# Patient Record
Sex: MALE | Race: BLACK OR AFRICAN AMERICAN | NOT HISPANIC OR LATINO | Employment: UNEMPLOYED | URBAN - METROPOLITAN AREA
[De-identification: names, ages, dates, MRNs, and addresses within clinical notes are randomized per-mention and may not be internally consistent; named-entity substitution may affect disease eponyms.]

---

## 2017-01-02 RX ORDER — ATENOLOL 100 MG/1
TABLET ORAL
Start: 2017-01-02

## 2017-01-02 NOTE — TELEPHONE ENCOUNTER
Pending Prescriptions:                       Disp   Refills    atenolol (TENORMIN) 100 MG tablet [Pharmac*90 tab*0        Sig: TAKE 1 TABLET BY MOUTH DAILY          Last Written Prescription Date: 06/23/2016  Last Fill Quantity: 30,  # refills: 0   Last Office Visit with [facility], [facility] or [facility] prescribing provider: 12/18/2015

## 2017-01-02 NOTE — TELEPHONE ENCOUNTER
Patient given refill 6/23/16 for 1 month.  Last seen 12/18/15  Needs OV - pharmacy informed.  Maggi Salinas RN

## 2017-01-07 ENCOUNTER — TRANSFERRED RECORDS (OUTPATIENT)
Dept: HEALTH INFORMATION MANAGEMENT | Facility: CLINIC | Age: 27
End: 2017-01-07

## 2017-01-08 DIAGNOSIS — I77.810 AORTIC ROOT DILATATION (H): Primary | ICD-10-CM

## 2017-01-09 RX ORDER — ATENOLOL 100 MG/1
TABLET ORAL
Start: 2017-01-09

## 2017-01-09 NOTE — TELEPHONE ENCOUNTER
Atenlol      Last Written Prescription Date: 06/23/2016  Last Fill Quantity: 30, # refills: 0    Last Office Visit with G, P or [facility] prescribing provider:  2/18/2015   Future Office Visit:        BP Readings from Last 3 Encounters:   06/24/16 99/57   12/18/15 100/60   12/12/15 108/62

## 2017-01-09 NOTE — TELEPHONE ENCOUNTER
Denied  Has upcoming appointment; has enough medication until then (spoke with pt)  Heather SHELBY RN

## 2017-01-10 RX ORDER — ATENOLOL 100 MG/1
100 TABLET ORAL DAILY
Qty: 30 TABLET | Refills: 0 | Status: SHIPPED | OUTPATIENT
Start: 2017-01-10 | End: 2017-01-17

## 2017-01-10 NOTE — TELEPHONE ENCOUNTER
JF,  Patient's mom Taylor (on LID[facility]) states pt out of Atenolol and no refills left at pharmacy from previous provider (inpatient treatment MD)  Pt is scheduled to see you 1/17/2017.  Pended 30 day Rx.  Please authorize if appropriate.  Thanks,  Heather SHELBY RN

## 2017-01-13 ENCOUNTER — TRANSFERRED RECORDS (OUTPATIENT)
Dept: HEALTH INFORMATION MANAGEMENT | Facility: CLINIC | Age: 27
End: 2017-01-13

## 2017-01-17 ENCOUNTER — OFFICE VISIT (OUTPATIENT)
Dept: FAMILY MEDICINE | Facility: CLINIC | Age: 27
End: 2017-01-17
Payer: MEDICAID

## 2017-01-17 VITALS
WEIGHT: 148 LBS | BODY MASS INDEX: 18.99 KG/M2 | HEART RATE: 83 BPM | SYSTOLIC BLOOD PRESSURE: 130 MMHG | TEMPERATURE: 97.5 F | OXYGEN SATURATION: 100 % | DIASTOLIC BLOOD PRESSURE: 80 MMHG | HEIGHT: 74 IN

## 2017-01-17 DIAGNOSIS — Z20.2 STD EXPOSURE: ICD-10-CM

## 2017-01-17 DIAGNOSIS — Z00.00 ROUTINE HISTORY AND PHYSICAL EXAMINATION OF ADULT: Primary | ICD-10-CM

## 2017-01-17 DIAGNOSIS — F11.21: ICD-10-CM

## 2017-01-17 DIAGNOSIS — I77.810 AORTIC ROOT DILATATION (H): ICD-10-CM

## 2017-01-17 DIAGNOSIS — I77.9 DISORDER OF AORTA (H): ICD-10-CM

## 2017-01-17 PROCEDURE — 86780 TREPONEMA PALLIDUM: CPT | Performed by: FAMILY MEDICINE

## 2017-01-17 PROCEDURE — 99213 OFFICE O/P EST LOW 20 MIN: CPT | Mod: 25 | Performed by: FAMILY MEDICINE

## 2017-01-17 PROCEDURE — 36415 COLL VENOUS BLD VENIPUNCTURE: CPT | Performed by: FAMILY MEDICINE

## 2017-01-17 PROCEDURE — 96372 THER/PROPH/DIAG INJ SC/IM: CPT | Performed by: FAMILY MEDICINE

## 2017-01-17 PROCEDURE — 99395 PREV VISIT EST AGE 18-39: CPT | Mod: 25 | Performed by: FAMILY MEDICINE

## 2017-01-17 PROCEDURE — 87389 HIV-1 AG W/HIV-1&-2 AB AG IA: CPT | Performed by: FAMILY MEDICINE

## 2017-01-17 RX ORDER — ATENOLOL 100 MG/1
100 TABLET ORAL DAILY
Qty: 30 TABLET | Refills: 11 | Status: SHIPPED | OUTPATIENT
Start: 2017-01-17 | End: 2017-07-04

## 2017-01-17 RX ORDER — AZITHROMYCIN 500 MG/1
1000 TABLET, FILM COATED ORAL ONCE
Qty: 2 TABLET | Refills: 0 | Status: SHIPPED | OUTPATIENT
Start: 2017-01-17 | End: 2017-01-17

## 2017-01-17 RX ORDER — CEFTRIAXONE SODIUM 250 MG/1
250 INJECTION, POWDER, FOR SOLUTION INTRAMUSCULAR; INTRAVENOUS ONCE
Qty: 2.5 ML | Refills: 0 | OUTPATIENT
Start: 2017-01-17 | End: 2017-01-17

## 2017-01-17 NOTE — PROGRESS NOTES
SUBJECTIVE:     CC: Ravi Hankins is an 26 year old male who presents for preventative health visit.     Healthy Habits:    Do you get at least three servings of calcium containing foods daily (dairy, green leafy vegetables, etc.)? yes    Amount of exercise or daily activities, outside of work: 7 day(s) per week    Problems taking medications regularly No    Medication side effects: No    Have you had an eye exam in the past two years? yes    Do you see a dentist twice per year? no    Do you have sleep apnea, excessive snoring or daytime drowsiness?no          Today's PHQ-2 Score:   PHQ-2 ( 1999 [employer]) 12/12/2015   Q1: Little interest or pleasure in doing things 0   Q2: Feeling down, depressed or hopeless 0   PHQ-2 Score 0       Abuse: Current or Past(Physical, Sexual or Emotional)- No  Do you feel safe in your environment - Yes    Social History   Substance Use Topics     Smoking status: Current Every Day Smoker     Smokeless tobacco: Never Used     Alcohol Use: No     The patient does not drink >3 drinks per day nor >7 drinks per week.    Last PSA: No results found for: PSA    No results for input(s): CHOL, HDL, LDL, TRIG, CHOLHDLRATIO, NHDL in the last 52941 hours.    Reviewed orders with patient. Reviewed health maintenance and updated orders accordingly - Yes    All Histories reviewed and updated in Epic.    STD screening:  History   Sexual Activity     Sexual Activity: Yes     Treating for chlamydia and gonorrhea and checking labs, partner describe notified that she is positive for chlamydia and gonorrhea  He is having some burning with urination  Partner is also being treated today  Recommended no intercourse for one week and then consider reevaluation for cure in 2 or 3 weeks    He recently completed a treatment program for snorted heroin.  He feels like this is going well.  He was on Suboxone for a while but tapered off while inpatient  He does not need refills of recently started medications  while in treatment  He also has a history of aortic root dilatation, and needs a refill on his atenolol        ROS: As per HPI.  Constitutional: no recent illness, no fevers/sweats/chills, sleep normal  Eyes: No vision changes or eye irritation  Ears/Nose/Throat: No runny nose, sore throat or ear pain  CV: no palpitations, no chest pain, no lower extremity swelling.  Resp: no shortness of breath, wheezing, or cough.  GI: no nausea/vomiting/diarrhea, normal stooling pattern, no reflux symptoms, no black or bloody stools  : + Burning with urination  Skin: no changing moles or other lesions, no rash  Musculoskeletal: no joint pain, muscle pain, weakness, trauma or injury  Psych: no depression, no concerns about anxiety  Neuro: no new headaches, dizziness    I have reviewed and updated the patient's past medical, social and family history in the EMR. Current problems are:  Patient Active Problem List    Diagnosis Date Noted     Chemical dependency (H) 06/21/2016     Priority: Medium     Aortic root dilatation (H) 12/18/2015     Priority: Medium     Echo 11/4/2015 SUMMARY Dilated aorta at the level of the sinuses of valsalva to 4.0 cm but 2.8 cm  distal to the sinotubular ridge.  [facility]:  6/5/2007   Root 3.6 cm  Distal 2.3cm             Tobacco abuse 12/18/2015     Priority: Medium     Heroin dependence (H) 02/01/2015     Priority: Medium     Disorder of aorta (H) 10/15/2013     Priority: Medium     CARDIOVASCULAR SCREENING; LDL GOAL LESS THAN 160 10/31/2010     Priority: Medium     Family Hx:  Family History   Problem Relation Age of Onset     HEART DISEASE Brother      HEART DISEASE       self     DIABETES No family hx of      Coronary Artery Disease No family hx of      Hypertension No family hx of      Hyperlipidemia No family hx of      CEREBROVASCULAR DISEASE No family hx of      Breast Cancer No family hx of      Colon Cancer No family hx of      Prostate Cancer No family hx of       Other Cancer No family hx of      Depression No family hx of      Anxiety Disorder No family hx of      MENTAL ILLNESS No family hx of      Substance Abuse No family hx of      Anesthesia Reaction No family hx of      Asthma No family hx of      OSTEOPOROSIS No family hx of      Genetic Disorder No family hx of      Thyroid Disease No family hx of      Obesity No family hx of      Unknown/Adopted No family hx of      Social History:  Social History   Substance Use Topics     Smoking status: Current Every Day Smoker     Smokeless tobacco: Never Used     Alcohol Use: No     Social History     Social History Narrative     Allergies:   No Known Allergies   Current Medications:  Current Outpatient Prescriptions   Medication Sig Dispense Refill     cefTRIAXone (ROCEPHIN) 250 MG injection Inject 250 mg into the muscle once for 1 dose 2.5 mL 0     azithromycin (ZITHROMAX) 500 MG tablet Take 2 tablets (1,000 mg) by mouth once for 1 dose 2 tablet 0     atenolol (TENORMIN) 100 MG tablet Take 1 tablet (100 mg) by mouth daily 30 tablet 11     [DISCONTINUED] atenolol (TENORMIN) 100 MG tablet Take 1 tablet (100 mg) by mouth daily 30 tablet 0     Acetaminophen (TYLENOL PO) Take 650 mg by mouth every 4 hours as needed for mild pain or fever       alum & mag hydroxide-simethicone (MYLANTA/MAALOX) 200-200-20 MG/5ML SUSP Take 30 mLs by mouth every 6 hours as needed for indigestion       guaiFENesin (ROBITUSSIN) 20 mg/mL SOLN Take 10 mLs by mouth every 4 hours as needed for cough       phenol-menthol (CEPASTAT) 14.5 MG lozenge Place 1 lozenge inside cheek every 2 hours as needed for sore throat       loratadine (CLARITIN) 10 MG tablet Take 10 mg by mouth daily as needed for allergies       senna-docusate (SENOKOT-S;PERICOLACE) 8.6-50 MG per tablet Take 2 tablets by mouth nightly as needed for constipation       loperamide (IMODIUM) 2 MG capsule Take 2 mg by mouth 4 times daily as needed for diarrhea       traZODone (DESYREL) 50 MG  "tablet Take 1 tablet (50 mg) by mouth nightly as needed for sleep 30 tablet 0     buprenorphine HCl-naloxone HCl (SUBOXONE) 2-0.5 MG film Take 2 strips on afternoon of discharge, then  Take 1 strip 3 times daily for 3 days, then  Take 1 strip 2 times daily for 3 days, then  Take 1 strip 1 times daily for 3 days, then  Take 0.5 strip 1 times daily for 4 days 22 Film 0        Objective:  /80 mmHg  Pulse 83  Temp(Src) 97.5  F (36.4  C) (Oral)  Ht 6' 2\" (1.88 m)  Wt 148 lb (67.132 kg)  BMI 18.99 kg/m2  SpO2 100%  General Appearance: Pleasant, alert, WN/WD in no acute respiratory distress.  Head Exam: Normal. Normocephalic, atraumatic. No sinus tenderness.  Eye Exam: Normal external eye, conjunctiva, lids. GAUDENCIO.  Ear Exam: Normal auditory canals and external ears. Non-tender.  Left TM-normal. Right TM-normal.  OroPharynx Exam: Dental hygiene adequate. Normal buccal mucosa. Normal pharynx.  Neck Exam: Supple, no masses or enlarged, tender nodes.  Thyroid Exam: No nodules or enlargement or tenderness.  Chest/Respiratory Exam: Normal, comfortable, easy respirations. Chest wall normal. Lungs are clear to auscultation. No wheezing, crackles, or rhonchi.  Cardiovascular Exam: Regular rate and rhythm. No murmur, gallop, or rubs. No pedal edema.  Gastrointestinal Exam: Soft, non-tender, no masses or organomegaly.  Musculoskeletal Exam: Back is non-tender, full ROM of upper and lower extremities.  Skin: no rash, warm and dry.  Neurologic Exam: Nonfocal, no tremor. Normal gait.  Psychiatric Exam: Alert - appropriate, normal affect    ASSESSMENT/PLAN:    ICD-10-CM    1. Routine history and physical examination of adult Z00.00    2. STD exposure Z20.2 cefTRIAXone (ROCEPHIN) 250 MG injection     CEFTRIAXONE NA INJ /250MG     azithromycin (ZITHROMAX) 500 MG tablet     HIV Antigen Antibody Combo     Anti Treponema   3. Aortic root dilatation (H) I77.810 atenolol (TENORMIN) 100 MG tablet   4. Moderate heroin dependence in " "early remission (H) F11.21    5. Disorder of aorta (H) I77.9          COUNSELING:  Reviewed preventive health counseling, as reflected in patient instructions       Regular exercise       Healthy diet/nutrition       Safe sex practices/STD prevention       HIV screeninx in teen years, 1x in adult years, and at intervals if high risk       reports that he has been smoking.  He has never used smokeless tobacco.  Tobacco Cessation Action Plan: Information offered: Patient not interested at this time  Estimated body mass index is 18.99 kg/(m^2) as calculated from the following:    Height as of 16: 6' 2\" (1.88 m).    Weight as of 16: 148 lb (67.132 kg).   Weight management plan noted, stable and monitoring    Counseling Resources:  ATP IV Guidelines  Pooled Cohorts Equation Calculator  FRAX Risk Assessment  ICSI Preventive Guidelines  Dietary Guidelines for Americans,   USDA's MyPlate  ASA Prophylaxis  Lung CA Screening    Ted Tidwell MD  [facility]  "

## 2017-01-17 NOTE — NURSING NOTE
"Chief Complaint   Patient presents with     Physical     not fasting     STD     /80 mmHg  Pulse 83  Temp(Src) 97.5  F (36.4  C) (Oral)  Ht 6' 2\" (1.88 m)  Wt 148 lb (67.132 kg)  BMI 18.99 kg/m2  SpO2 100% Estimated body mass index is 18.99 kg/(m^2) as calculated from the following:    Height as of this encounter: 6' 2\" (1.88 m).    Weight as of this encounter: 148 lb (67.132 kg).  BP completed using cuff size: regular       Health Maintenance due pending provider review:  NONE    n/a      Veronica Arreola CMA  "

## 2017-01-18 LAB
HIV 1+2 AB+HIV1 P24 AG SERPL QL IA: NORMAL
T PALLIDUM IGG+IGM SER QL: NEGATIVE

## 2017-01-26 ENCOUNTER — TELEPHONE (OUTPATIENT)
Dept: FAMILY MEDICINE | Facility: CLINIC | Age: 27
End: 2017-01-26

## 2017-01-26 NOTE — TELEPHONE ENCOUNTER
We called to let him know that we discared the urine he left.  We cannot retest him this early.  Too early to recheck.  I told him at the time, and then told him again, If he wants to be retested he has to wait till 3 weeks after the shot he got

## 2017-01-26 NOTE — TELEPHONE ENCOUNTER
Spoke with pt.  He states his questions were answered.  Problem was his partner who has PCP here was given different information than he was.  No further questions.  Heather SHELBY RN

## 2017-01-26 NOTE — TELEPHONE ENCOUNTER
Reason for Call:  Other call back    Detailed comments: PT would like to go over testing that was done on clinic for 1/17    Phone Number Patient can be reached at: Home number on file [phone] (home)    Best Time:     Can we leave a detailed message on this number? YES    Call taken on 1/26/2017 at 1:53 PM by Kaley Morales

## 2017-01-26 NOTE — TELEPHONE ENCOUNTER
JF  Patient calling regarding Tuesdays test results.  States he gave a urine specimen and received a call from you stating he was positive?  I do not see any UA result from Tuesday 1/24  Please advise.  Thanks, Maggi Salinas RN

## 2017-07-04 ENCOUNTER — HOSPITAL ENCOUNTER (EMERGENCY)
Facility: CLINIC | Age: 27
Discharge: HOME | End: 2017-07-04
Attending: EMERGENCY MEDICINE | Admitting: EMERGENCY MEDICINE
Payer: COMMERCIAL

## 2017-07-04 VITALS
DIASTOLIC BLOOD PRESSURE: 56 MMHG | HEART RATE: 63 BPM | SYSTOLIC BLOOD PRESSURE: 106 MMHG | TEMPERATURE: 97.7 F | RESPIRATION RATE: 16 BRPM | OXYGEN SATURATION: 98 %

## 2017-07-04 DIAGNOSIS — F10.230 ALCOHOL DEPENDENCE WITH UNCOMPLICATED WITHDRAWAL (H): ICD-10-CM

## 2017-07-04 DIAGNOSIS — I77.810 AORTIC ROOT DILATATION (H): ICD-10-CM

## 2017-07-04 DIAGNOSIS — F16.10 ECSTASY ABUSE (H): ICD-10-CM

## 2017-07-04 DIAGNOSIS — F14.23: ICD-10-CM

## 2017-07-04 LAB — ALCOHOL BREATH TEST: 0 (ref 0–0.01)

## 2017-07-04 PROCEDURE — 99283 EMERGENCY DEPT VISIT LOW MDM: CPT | Mod: Z6 | Performed by: EMERGENCY MEDICINE

## 2017-07-04 PROCEDURE — 99283 EMERGENCY DEPT VISIT LOW MDM: CPT | Performed by: EMERGENCY MEDICINE

## 2017-07-04 RX ORDER — ATENOLOL 100 MG/1
100 TABLET ORAL DAILY
Qty: 30 TABLET | Refills: 0 | Status: SHIPPED | OUTPATIENT
Start: 2017-07-04 | End: 2018-03-26 | Stop reason: ALTCHOICE

## 2017-07-04 ASSESSMENT — ENCOUNTER SYMPTOMS: HALLUCINATIONS: 0

## 2017-07-04 NOTE — ED AVS SNAPSHOT
[facility], Emergency Department    [address]    [place] [address]    Phone:  [phone]    Fax:  [phone]                                       Ravi Hankins   MRN: [id number]    Department:  [facility], Emergency Department   Date of Visit:  7/4/2017           Patient Information     Date Of Birth          1990        Your diagnoses for this visit were:     Aortic root dilatation (H)     Alcohol dependence with uncomplicated withdrawal (H)     Cocaine dependence with withdrawal (H)     Ecstasy abuse        You were seen by Peyton Salmon MD.        Discharge Instructions       Discharge to [facility] detox.     Take your medication as prescribed.     24 Hour Appointment Hotline       To make an appointment at any [facility] clinic, call [phone] ([phone]). If you don't have a family doctor or clinic, we will help you find one. [facility] clinics are conveniently located to serve the needs of you and your family.             Review of your medicines      ASK your doctor about these medications        Dose / Directions Last dose taken    * ATENOLOL PO   Dose:  100 mg   Ask about: Which instructions should I use?        Take 100 mg by mouth daily   Refills:  0        * atenolol 100 MG tablet   Commonly known as:  TENORMIN   Dose:  100 mg   Quantity:  30 tablet   Ask about: Which instructions should I use?        Take 1 tablet (100 mg) by mouth daily   Refills:  0        * Notice:  This list has 2 medication(s) that are the same as other medications prescribed for you. Read the directions carefully, and ask your doctor or other care provider to review them with you.            Prescriptions were sent or printed at these locations (1 Prescription)                   Other Prescriptions                Printed at Department/Unit printer (1 of 1)         atenolol (TENORMIN) 100 MG tablet                Procedures and tests performed during your visit     Alcohol breath test  "POCT    Medication History IP Pharmacy Consult      Orders Needing Specimen Collection     None      Pending Results     No orders found from 2017 to 2017.            Pending Culture Results     No orders found from 2017 to 2017.            Pending Results Instructions     If you had any lab results that were not finalized at the time of your Discharge, you can call the ED Lab Result RN at [phone]. You will be contacted by this team for any positive Lab results or changes in treatment. The nurses are available 7 days a week from 10A to 6:30P.  You can leave a message 24 hours per day and they will return your call.        Thank you for choosing [facility]       Thank you for choosing [facility] for your care. Our goal is always to provide you with excellent care. Hearing back from our patients is one way we can continue to improve our services. Please take a few minutes to complete the written survey that you may receive in the mail after you visit with us. Thank you!        [facility] Information     [facility] lets you send messages to your doctor, view your test results, renew your prescriptions, schedule appointments and more. To sign up, go to [email or web address] . Click on \"Log in\" on the left side of the screen, which will take you to the Welcome page. Then click on \"Sign up Now\" on the right side of the page.     You will be asked to enter the access code listed below, as well as some personal information. Please follow the directions to create your username and password.     Your access code is: [email or web address]  Expires: 10/2/2017  3:42 PM     Your access code will  in 90 days. If you need help or a new code, please call your [facility] clinic or [phone].        Care EveryWhere ID     This is your Care EveryWhere ID. This could be used by other organizations to access your [facility] medical records  [id number]        Equal Access to Services     AMANDA GRANT AH: Lonnie meeks " Lance, moreno levy, nelsoncal kapetersonmary khalil, mark dsouza. So [facility] [phone].    ATENCIÓN: Si habla español, tiene a greenberg disposición servicios gratuitos de asistencia lingüística. Llame al [phone].    We comply with applicable federal civil rights laws and [place] laws. We do not discriminate on the basis of race, color, national origin, age, disability sex, sexual orientation or gender identity.            After Visit Summary       This is your record. Keep this with you and show to your community pharmacist(s) and doctor(s) at your next visit.

## 2017-07-04 NOTE — ED AVS SNAPSHOT
` `     [facility], [place], EMERGENCY DEPARTMENT: [phone]                 INTERAGENCY TRANSFER FORM - NOTES (H&P, Discharge Summary, Consults, Procedures, Therapies)   2017                    Hospital Admission Date: 2017  RAVI HANKINS   : 1990  Sex: Male        Patient PCP Information     Provider PCP Type    Ted Tidwell MD General      History & Physicals     No notes of this type exist for this encounter.      Discharge Summaries     No notes of this type exist for this encounter.      Consult Notes     No notes of this type exist for this encounter.         Progress Notes - Physician (Notes from 17 through 17)      ED Provider Notes by Peyton Salmon MD at 2017  2:06 PM     Author:  Peyton Salmon MD Service:  Emergency Medicine Author Type:  Physician    Filed:  2017  3:21 PM Date of Service:  2017  2:06 PM Creation Time:  2017  3:17 PM    Status:  Signed :  Peyton Salmon MD (Physician)           History[CB1.1]     Chief Complaint   Patient presents with     Addiction Problem     seeking detox and treatment of etoh, cocaine, liath. daily use last 6 months. [facility] in [facility] MH section, they cabbed him here for eval.[CB1.2]     HPI  Ravi Hankins is a 27 year old male who presents seeking detox for polysubstance abuse.  He states he was here a year ago for heroin treatment.  Since then he has used cocaine, ecstasy and alcohol.   He uses daily.  Last use was yesterday. He drinks 2/5 L cognac daily.  He has been abusing this for about 6-7 months.  He denies hx of withdrawal seizures.  He denies hx of mental health issues.  No si/hi.  He takes atenolol for aorta dilation.  He is not compliant on this meds and does miss doses.      I have reviewed the Medications, Allergies, Past Medical and Surgical History, and Social History in the Epic system.    Review of Systems   Psychiatric/Behavioral: Negative for  hallucinations, self-injury and suicidal ideas.   All other systems reviewed and are negative.      Physical Exam   BP: 110/62  Pulse: 52  Temp: 97.7  F (36.5  C)  Resp: 16  SpO2: 100 %  Physical Exam   Constitutional: He is oriented to person, place, and time. He appears well-developed and well-nourished. No distress.   Thin male   HENT:   Head: Normocephalic and atraumatic.   Right Ear: External ear normal.   Left Ear: External ear normal.   Nose: Nose normal.   Eyes: No scleral icterus.   Neck: Normal range of motion. Neck supple.   Cardiovascular: Normal rate, regular rhythm and normal heart sounds.    Pulmonary/Chest: Effort normal and breath sounds normal.   Abdominal: Soft. There is no tenderness.   Musculoskeletal: Normal range of motion.   Neurological: He is alert and oriented to person, place, and time.   Skin: Skin is warm and dry. He is not diaphoretic.   Psychiatric: His speech is normal and behavior is normal. Judgment and thought content normal. His mood appears anxious. Cognition and memory are normal.   Nursing note and vitals reviewed.      ED Course[CB1.1]     ED Course[CB1.2]     Procedures[CB1.1]             Labs Ordered and Resulted from Time of ED Arrival Up to the Time of Departure from the ED - No data to display[CB1.2]         Assessments & Plan (with Medical Decision Making)   The patient presents to [facility] seeking detox for polysubstance abuse. He has hx of heroin abuse but states he has been using cocaine, etoh and ecstasy daily for the past 6-7 months.  Last use yesterday.  He feels like he is having withdrawal symptoms.  He denies withdrawal seizures.  He denies si/hi.  He denies mental health issues.  There is a bed a [facility] detox (no detox beds here).  He will be cabbed to [facility].  He is medically stable.     I have reviewed the nursing notes.    I have reviewed the findings, diagnosis, plan and need for follow up with the patient.[CB1.1]    New Prescriptions    No  medications on file       Final diagnoses:   Alcohol dependence with uncomplicated withdrawal (H)   Cocaine dependence with withdrawal (H)   Ecstasy abuse[CB1.2]       7/4/2017   [facility], [place], EMERGENCY DEPARTMENT[CB1.1]     Peyton Salmon MD  07/04/17 1521  [CB1.2]     Revision History        User Key Date/Time User Provider Type Action    > CB1.2 7/4/2017  3:21 PM Peyton Salmon MD Physician Sign     CB1.1 7/4/2017  3:17 PM Peyton Salmon MD Physician                   Procedure Notes     No notes of this type exist for this encounter.      Progress Notes - Therapies (Notes from 07/01/17 through 07/04/17)     No notes of this type exist for this encounter.

## 2017-07-04 NOTE — ED AVS SNAPSHOT
"    [facility], [place], EMERGENCY DEPARTMENT: [phone]                                              INTERAGENCY TRANSFER FORM - PHYSICIAN ORDERS   2017                    Hospital Admission Date: 2017  CHINTAN HARVEY   : 1990  Sex: Male        Attending Provider: (none)    Allergies:  No Known Allergies    Infection:  None   Service:  BEC    Ht:  1.88 m (6' 2\")   Wt:  --   Admission Wt:  --    BMI:  --   BSA:  --            Patient PCP Information     Provider PCP Type    Ted Kervin Tidwell MD General      ED Clinical Impression     Diagnosis Description Comment Added By Time Added    Alcohol dependence with uncomplicated withdrawal (H) [F10.230] Alcohol dependence with uncomplicated withdrawal (H) [F10.230]  Peyton Salmon MD 2017  3:15 PM    Cocaine dependence with withdrawal (H) [F14.23] Cocaine dependence with withdrawal (H) [F14.23]  Peyton Salmon MD 2017  3:16 PM    Ecstasy abuse [F15.10] Ecstasy abuse [F15.10]  Peyton Salmon MD 2017  3:16 PM      Hospital Problems as of 2017     None      Non-Hospital Problems as of 2017              Priority Class Noted    CARDIOVASCULAR SCREENING; LDL GOAL LESS THAN 160   10/31/2010    Disorder of aorta (H) Medium  10/15/2013    Heroin dependence (H) Medium  2015    Aortic root dilatation (H) Medium  2015    Tobacco abuse Medium  2015    Chemical dependency (H) Medium  2016      Code Status History     Date Active Date Inactive Code Status Order ID Comments User Context    2016 12:25 PM 2016  4:04 PM Full Code [id number]  Baljeet Wakefield, RN Inpatient         Medication Review      UNREVIEWED medications. Ask your doctor about these medications        Dose / Directions Comments    * ATENOLOL PO   Ask about: Which instructions should I use?        Dose:  100 mg   Take 100 mg by mouth daily   Refills:  0        * atenolol 100 MG tablet   Commonly known as:  TENORMIN   Used for:  Aortic root dilatation " (H)   Ask about: Which instructions should I use?        Dose:  100 mg   Take 1 tablet (100 mg) by mouth daily   Quantity:  30 tablet   Refills:  0        * Notice:  This list has 2 medication(s) that are the same as other medications prescribed for you. Read the directions carefully, and ask your doctor or other care provider to review them with you.              Further instructions from your care team       Discharge to [facility] detox.     Take your medication as prescribed.

## 2017-07-04 NOTE — ED AVS SNAPSHOT
"    [facility], [place], EMERGENCY DEPARTMENT: [phone]                                              INTERAGENCY TRANSFER FORM - LAB / IMAGING / EKG / EMG RESULTS   2017                    Hospital Admission Date: 2017  CHINTAN HARVEY   : 1990  Sex: Male        Attending Provider: (none)    Allergies:  No Known Allergies    Infection:  None   Service:  BEC    Ht:  1.88 m (6' 2\")   Wt:  --   Admission Wt:  --    BMI:  --   BSA:  --            Patient PCP Information     Provider PCP Type    Ted Kervin Tidwell MD General      Unresulted Labs     None      Encounter-Level Documents:     There are no encounter-level documents.      Order-Level Documents:     There are no order-level documents.      "

## 2017-07-04 NOTE — ED AVS SNAPSHOT
[facility], [place], Emergency Department    [address]    [facility] [address]    Phone:  [phone]    Fax:  [phone]                                       Ravi Hankins   MRN: [id number]    Department:  [facility], Emergency Department   Date of Visit:  7/4/2017           After Visit Summary Signature Page     I have received my discharge instructions, and my questions have been answered. I have discussed any challenges I see with this plan with the nurse or doctor.    ..........................................................................................................................................  Patient/Patient Representative Signature      ..........................................................................................................................................  Patient Representative Print Name and Relationship to Patient    ..................................................               ................................................  Date                                            Time    ..........................................................................................................................................  Reviewed by Signature/Title    ...................................................              ..............................................  Date                                                            Time

## 2017-07-04 NOTE — ED AVS SNAPSHOT
"` `     [facility], [place], EMERGENCY DEPARTMENT: [phone]                                              INTERAGENCY TRANSFER FORM - NURSING   2017                    Hospital Admission Date: 2017  CHINTAN HARVEY   : 1990  Sex: Male        Attending Provider: (none)    Allergies:  No Known Allergies    Infection:  None   Service:  BEC    Ht:  1.88 m (6' 2\")   Wt:  --   Admission Wt:  --    BMI:  --   BSA:  --            Patient PCP Information     Provider PCP Type    Ted Kervin Tidwell MD General      Current Code Status     Date Active Code Status Order ID Comments User Context       Prior      Code Status History     Date Active Date Inactive Code Status Order ID Comments User Context    2016 12:25 PM 2016  4:04 PM Full Code [id number]  Baljeet Wakefield, RN Inpatient      Advance Directives        Does patient have a scanned Advance Directive/ACP document in EPIC?           No        Hospital Problems as of 2017     None      Non-Hospital Problems as of 2017              Priority Class Noted    CARDIOVASCULAR SCREENING; LDL GOAL LESS THAN 160   10/31/2010    Disorder of aorta (H) Medium  10/15/2013    Heroin dependence (H) Medium  2015    Aortic root dilatation (H) Medium  2015    Tobacco abuse Medium  2015    Chemical dependency (H) Medium  2016      Immunizations     Name Date      DPT 95     DPT 92     DPT 90     DPT 10/03/90     DPT 90     HIB 09/10/91     HIB 91     HIB 90     Hepatitis B 00     Hepatitis B 99     Hepatitis B 99     Influenza (IIV3) 10/24/08     MMR 99     MMR 09/10/91     OPV 95     OPV 92     OPV 10/03/90     OPV 90     TD (ADULT, 7+) 03     Tdap (Adacel,Boostrix) 09/20/10     Varicella Pt Report Hx of Varicella/Chicken Pox 97          END      ASSESSMENT     Discharge Profile Flowsheet     COMMUNICATION ASSESSMENT     Patient's " communication style  spoken language (English or Bilingual) 07/04/17 1406            Vitals     Vital Signs Flowsheet     VITAL SIGNS     Comfort  negligible pain 07/04/17 1547    Temp  97.7  F (36.5  C) 07/04/17 1413   Change in Pain  about the same 07/04/17 1413    Temp src  Oral 07/04/17 1413   Pain Control  partially effective 07/04/17 1413    Resp  16 07/04/17 1547   Functioning  can do everything I need to 07/04/17 1413    Pulse  63 07/04/17 1547   Sleep  awake with occasional pain 07/04/17 1413    Pulse/Heart Rate Source  Monitor 07/04/17 1413   MARIA TERESA COMA SCALE      BP  106/56 07/04/17 1547   Best Eye Response  4-->(E4) spontaneous 07/04/17 1413    OXYGEN THERAPY     Best Motor Response  6-->(M6) obeys commands 07/04/17 1413    SpO2  98 % 07/04/17 1547   Best Verbal Response  5-->(V5) oriented 07/04/17 1413    CLINICALLY ALIGNED PAIN ASSESSMENT (CAPA) ([facility], [facility] AND [facility] ADULTS ONLY)     Maria Teresa Coma Scale Score  15 07/04/17 1413            Patient Lines/Drains/Airways Status    Active LINES/DRAINS/AIRWAYS     None            Patient Lines/Drains/Airways Status    Active PICC/CVC     None            Intake/Output Detail Report     None      Case Management/Discharge Planning     Case Management/Discharge Planning Flowsheet     REFERRAL INFORMATION     ABUSE RISK SCREEN      Arrived From  admitted as an inpatient 06/21/16 1129   QUESTION TO PATIENT:  Has a member of your family or a partner(now or in the past) intimidated, hurt, manipulated, or controlled you in any way?  no 07/04/17 1410    LIVING ENVIRONMENT     QUESTION TO PATIENT: Do you feel safe going back to the place where you are living?  yes 07/04/17 1410    Lives With  parent(s) 06/21/16 1129   OBSERVATION: Is there reason to believe there has been maltreatment of a vulnerable adult (ie. Physical/Sexual/Emotional abuse, self neglect, lack of adequate food, shelter, medical care, or financial exploitation)?  no 07/04/17 1410     COPING/STRESS     HOMICIDE RISK      Major Change/Loss/Stressor  chemical dependency/abuse;employment concerns 06/21/16 1141   Homicidal Ideation  no 07/04/17 1419

## 2017-07-04 NOTE — ED PROVIDER NOTES
History     Chief Complaint   Patient presents with     Addiction Problem     seeking detox and treatment of etoh, cocaine, laith. daily use last 6 months. [facility] section, they cabbed him here for eval.     HPI  Ravi Hankins is a 27 year old male who presents seeking detox for polysubstance abuse.  He states he was here a year ago for heroin treatment.  Since then he has used cocaine, ecstasy and alcohol.   He uses daily.  Last use was yesterday. He drinks 2/5 L cognac daily.  He has been abusing this for about 6-7 months.  He denies hx of withdrawal seizures.  He denies hx of mental health issues.  No si/hi.  He takes atenolol for aorta dilation.  He is not compliant on this meds and does miss doses.      I have reviewed the Medications, Allergies, Past Medical and Surgical History, and Social History in the Epic system.    Review of Systems   Psychiatric/Behavioral: Negative for hallucinations, self-injury and suicidal ideas.   All other systems reviewed and are negative.      Physical Exam   BP: 110/62  Pulse: 52  Temp: 97.7  F (36.5  C)  Resp: 16  SpO2: 100 %  Physical Exam   Constitutional: He is oriented to person, place, and time. He appears well-developed and well-nourished. No distress.   Thin male   HENT:   Head: Normocephalic and atraumatic.   Right Ear: External ear normal.   Left Ear: External ear normal.   Nose: Nose normal.   Eyes: No scleral icterus.   Neck: Normal range of motion. Neck supple.   Cardiovascular: Normal rate, regular rhythm and normal heart sounds.    Pulmonary/Chest: Effort normal and breath sounds normal.   Abdominal: Soft. There is no tenderness.   Musculoskeletal: Normal range of motion.   Neurological: He is alert and oriented to person, place, and time.   Skin: Skin is warm and dry. He is not diaphoretic.   Psychiatric: His speech is normal and behavior is normal. Judgment and thought content normal. His mood appears  anxious. Cognition and memory are normal.   Nursing note and vitals reviewed.      ED Course     ED Course     Procedures             Labs Ordered and Resulted from Time of ED Arrival Up to the Time of Departure from the ED - No data to display         Assessments & Plan (with Medical Decision Making)   The patient presents to [facility] seeking detox for polysubstance abuse. He has hx of heroin abuse but states he has been using cocaine, etoh and ecstasy daily for the past 6-7 months.  Last use yesterday.  He feels like he is having withdrawal symptoms.  He denies withdrawal seizures.  He denies si/hi.  He denies mental health issues.  There is a bed a [facility] detox (no detox beds here).  He will be cabbed to [facility].  He is medically stable.     I have reviewed the nursing notes.    I have reviewed the findings, diagnosis, plan and need for follow up with the patient.    New Prescriptions    No medications on file       Final diagnoses:   Alcohol dependence with uncomplicated withdrawal (H)   Cocaine dependence with withdrawal (H)   Ecstasy abuse       7/4/2017   [facility], [place], EMERGENCY DEPARTMENT     Peyton Salmon MD  07/04/17 5801

## 2017-07-04 NOTE — PHARMACY-ADMISSION MEDICATION HISTORY
Admission medication history interview status for the 7/4/2017 admission is complete. See Epic admission navigator for allergy information, pharmacy, prior to admission medications and immunization status.     Medication history interview sources:  Patient, Epic electronic medical record - clinic record 1/17/17    Changes made to PTA medication list (reason)  Added: none  Deleted: none  Changed: none    Additional medication history information (including reliability of information, actions taken by pharmacist): Patient was a reliable historian. He reported he does not take any over the counter products, vitamin or herbal supplements.      Prior to Admission medications    Medication Sig Last Dose Taking? Auth Provider   ATENOLOL PO Take 100 mg by mouth daily 7/4/2017 at Unknown time Yes Unknown, Entered By History         Medication history completed by: Ileana Segura, PharmD, BCPP

## 2017-07-04 NOTE — ED AVS SNAPSHOT
` `     [facility], [place], EMERGENCY DEPARTMENT: [phone]            Medication Administration Report for Ravi Hankins as of 07/04/17 9642   Legend:    Given Hold Not Given Due Canceled Entry Other Actions    Time Time (Time) Time  Time-Action       Inactive    Active    Linked        Medications 06/28/17 06/29/17 06/30/17 07/01/17 07/02/17 07/03/17 07/04/17

## 2017-08-24 DIAGNOSIS — I77.810 AORTIC ROOT DILATATION (H): ICD-10-CM

## 2017-08-24 RX ORDER — METOPROLOL SUCCINATE 50 MG/1
50 TABLET, EXTENDED RELEASE ORAL DAILY
Qty: 30 TABLET | Refills: 3 | Status: SHIPPED | OUTPATIENT
Start: 2017-08-24 | End: 2017-12-22

## 2017-08-24 NOTE — TELEPHONE ENCOUNTER
JF  Routing refill request to provider for review/approval because:  Rx was most recently renewed by IP provider  Please approve if appropriate  Neela Carter RN      Atenolol      Last Written Prescription Date: 7/4/17  Last Fill Quantity: 30, # refills: 0    Last Office Visit with [facility] or [facility] prescribing provider:  1/17/17   Future Office Visit:        BP Readings from Last 3 Encounters:   07/04/17 106/56   01/17/17 130/80   12/18/15 100/60

## 2017-08-25 DIAGNOSIS — I77.810 AORTIC ROOT DILATATION (H): ICD-10-CM

## 2017-08-25 RX ORDER — ATENOLOL 100 MG/1
100 TABLET ORAL DAILY
Qty: 30 TABLET | Refills: 0 | Status: CANCELLED | OUTPATIENT
Start: 2017-08-25

## 2017-08-25 NOTE — TELEPHONE ENCOUNTER
atenolol      Last Written Prescription Date: 7/4/17  Last Fill Quantity: 30, # refills: 0    Last Office Visit with [facility] or [facility] prescribing provider:  1/17/17   Future Office Visit:    Next 5 appointments (look out 90 days)     Sep 07, 2017 11:00 AM CDT   Office Visit with Ted Tidwell MD   [facility]    [address]  [facility] [id number] [phone]                    BP Readings from Last 3 Encounters:   07/04/17 106/56   01/17/17 130/80   12/18/15 100/60

## 2017-08-25 NOTE — TELEPHONE ENCOUNTER
Pharmacy sent fax.  Atenolol is on backorder.  Not expected to be available until late September.  Asking if you want to prescribed alternative, such as Metoprolol.  Patient last saw you 1/17/17.   Future OV scheduled to see you 9/7/17  Please advise.  Thanks, Maggi Salinas RN

## 2017-12-22 ENCOUNTER — TELEPHONE (OUTPATIENT)
Dept: FAMILY MEDICINE | Facility: CLINIC | Age: 27
End: 2017-12-22

## 2017-12-22 DIAGNOSIS — I77.810 AORTIC ROOT DILATATION (H): ICD-10-CM

## 2017-12-22 RX ORDER — METOPROLOL SUCCINATE 50 MG/1
50 TABLET, EXTENDED RELEASE ORAL DAILY
Qty: 30 TABLET | Refills: 0 | Status: SHIPPED | OUTPATIENT
Start: 2017-12-22 | End: 2018-03-26

## 2017-12-22 NOTE — TELEPHONE ENCOUNTER
Medication is being filled for 1 time refill only due to:  Patient needs to be seen because due for physical Jan 2018.   Heather SHELBY RN

## 2018-03-25 DIAGNOSIS — I77.810 AORTIC ROOT DILATATION (H): ICD-10-CM

## 2018-03-26 RX ORDER — METOPROLOL SUCCINATE 50 MG/1
50 TABLET, EXTENDED RELEASE ORAL DAILY
Qty: 30 TABLET | Refills: 0 | Status: SHIPPED | OUTPATIENT
Start: 2018-03-26 | End: 2019-10-16

## 2018-03-26 RX ORDER — ATENOLOL 100 MG/1
TABLET ORAL
Start: 2018-03-26

## 2018-03-26 NOTE — TELEPHONE ENCOUNTER
JF,  Please advise on refill  Patient has upcoming appt with you:    Next 5 appointments (look out 90 days)     Apr 12, 2018  4:30 PM CDT   PHYSICAL with Ted Tidwell MD   [facility]    [address]  [facility] [address]   [phone]                Last Rx 12/22/2017 for 30 days - break in medication  Heather SHELBY RN

## 2018-04-12 ENCOUNTER — OFFICE VISIT (OUTPATIENT)
Dept: FAMILY MEDICINE | Facility: CLINIC | Age: 28
End: 2018-04-12
Payer: COMMERCIAL

## 2018-04-12 VITALS
SYSTOLIC BLOOD PRESSURE: 133 MMHG | WEIGHT: 158.1 LBS | TEMPERATURE: 98.3 F | DIASTOLIC BLOOD PRESSURE: 77 MMHG | BODY MASS INDEX: 20.29 KG/M2 | HEART RATE: 109 BPM | OXYGEN SATURATION: 97 % | HEIGHT: 74 IN

## 2018-04-12 DIAGNOSIS — Z00.00 ROUTINE HISTORY AND PHYSICAL EXAMINATION OF ADULT: Primary | ICD-10-CM

## 2018-04-12 DIAGNOSIS — F11.21: ICD-10-CM

## 2018-04-12 DIAGNOSIS — I77.810 AORTIC ROOT DILATATION (H): ICD-10-CM

## 2018-04-12 PROCEDURE — 99213 OFFICE O/P EST LOW 20 MIN: CPT | Mod: 25 | Performed by: FAMILY MEDICINE

## 2018-04-12 PROCEDURE — 99395 PREV VISIT EST AGE 18-39: CPT | Performed by: FAMILY MEDICINE

## 2018-04-12 RX ORDER — ATENOLOL 100 MG/1
100 TABLET ORAL DAILY
Qty: 90 TABLET | Refills: 3 | Status: SHIPPED | OUTPATIENT
Start: 2018-04-12 | End: 2019-04-25

## 2018-04-12 NOTE — PROGRESS NOTES
SUBJECTIVE:   CC: Ravi Hankins is an 27 year old male who presents for preventative health visit.     Physical   Annual:     Getting at least 3 servings of Calcium per day::  Yes    Bi-annual eye exam::  Yes    Dental care twice a year::  NO    Sleep apnea or symptoms of sleep apnea::  None    Diet::  Regular (no restrictions)    Frequency of exercise::  1 day/week    Duration of exercise::  Less than 15 minutes    Taking medications regularly::  Yes    Medication side effects::  None    Additional concerns today::  No                  in addition to health maintenance patient would like to discuss the following problem:  He would like to follow up for his Hx of aortic root dilatation, and get a refill on atenolol  He has been taking this medication she this was discovered when he was a kid  Last significant cardiology F/u was when he was 17, and echo scanned from that time showing 36mm diameter  He did have a follow up echo at [facility] in 2015 with a diameter of 4cm reported  At one point he was told not to play basketball/etc but then also told it was ok.  He had a genetic workup for Marfan and this was negative  His cardiac Hx is complicated by a Hx of heroin dependence  Currently on probation, and sober, gets 2x/week urine drug screenings      ROS: As per HPI.    Problem pertinent review of systems no polyuria polydipsia vision changes or peripheral numbness or tingling    Problem pertinent exam  Cardiovascular Exam: Regular rate and rhythm. No murmur, gallop, or rubs. No pedal edema, normal pulses.    Problem pertinent labs  BP Readings from Last 3 Encounters:   04/12/18 133/77   07/04/17 106/56   01/17/17 130/80    Wt Readings from Last 3 Encounters:   04/12/18 158 lb 1.6 oz (71.7 kg)   01/17/17 148 lb (67.1 kg)   06/21/16 148 lb (67.1 kg)          Recent Labs   Lab Test  06/22/16   0731  02/03/15   0855  01/07/13   0845   ALT  38   --   30   CR  0.99  1.03  0.95   GFRESTIMATED  >90  Non   GFR Calc    88  >90   GFRESTBLACK  >90   GFR Calc    >90   GFR Calc    >90   POTASSIUM  4.8  3.4  4.0        Problem pertinent ASSESSMENT, PLAN:  Aortic root dilatation, heroin dep early remission  It is unclear that taking atenolol 100mg is really the best treatment at this time  Given stability he may be able to stop this  I recommend cardiology follow up, reassessment since his last visit was > 10 years ago        Today's PHQ-2 Score:   PHQ-2 ( 1999 [employer]) 4/12/2018   Q1: Little interest or pleasure in doing things 0   Q2: Feeling down, depressed or hopeless 0   PHQ-2 Score 0   Q1: Little interest or pleasure in doing things Not at all   Q2: Feeling down, depressed or hopeless Not at all   PHQ-2 Score 0       Abuse: Current or Past(Physical, Sexual or Emotional)- No  Do you feel safe in your environment - Yes    Social History   Substance Use Topics     Smoking status: Current Every Day Smoker     Smokeless tobacco: Never Used     Alcohol use No     Alcohol Use 4/12/2018   If you drink alcohol do you typically have greater than 3 drinks per day OR greater than 7 drinks per week? No   No flowsheet data found.    Last PSA: No results found for: PSA    Reviewed orders with patient. Reviewed health maintenance and updated orders accordingly - Yes  Labs reviewed in EPIC  BP Readings from Last 3 Encounters:   04/12/18 133/77   07/04/17 106/56   01/17/17 130/80    Wt Readings from Last 3 Encounters:   04/12/18 158 lb 1.6 oz (71.7 kg)   01/17/17 148 lb (67.1 kg)   06/21/16 148 lb (67.1 kg)                  Patient Active Problem List   Diagnosis     CARDIOVASCULAR SCREENING; LDL GOAL LESS THAN 160     Disorder of aorta (H)     Heroin dependence (H)     Aortic root dilatation (H)     Tobacco abuse     Chemical dependency (H)     Past Surgical History:   Procedure Laterality Date     NO HISTORY OF SURGERY         Social History   Substance Use Topics     Smoking status: Current Every Day  Smoker     Smokeless tobacco: Never Used     Alcohol use No     Family History   Problem Relation Age of Onset     HEART DISEASE Brother      HEART DISEASE       self     DIABETES No family hx of      Coronary Artery Disease No family hx of      Hypertension No family hx of      Hyperlipidemia No family hx of      CEREBROVASCULAR DISEASE No family hx of      Breast Cancer No family hx of      Colon Cancer No family hx of      Prostate Cancer No family hx of      Other Cancer No family hx of      Depression No family hx of      Anxiety Disorder No family hx of      MENTAL ILLNESS No family hx of      Substance Abuse No family hx of      Anesthesia Reaction No family hx of      Asthma No family hx of      OSTEOPOROSIS No family hx of      Genetic Disorder No family hx of      Thyroid Disease No family hx of      Obesity No family hx of      Unknown/Adopted No family hx of          Current Outpatient Prescriptions   Medication Sig Dispense Refill     atenolol (TENORMIN) 100 MG tablet Take 1 tablet (100 mg) by mouth daily 90 tablet 3     metoprolol succinate (TOPROL-XL) 50 MG 24 hr tablet Take 1 tablet (50 mg) by mouth daily 30 tablet 0     ATENOLOL PO Take 100 mg by mouth daily       No Known Allergies    Reviewed and updated as needed this visit by clinical staff         Reviewed and updated as needed this visit by Provider            Review of Systems  C: NEGATIVE for fever, chills, change in weight  I: NEGATIVE for worrisome rashes, moles or lesions  E: NEGATIVE for vision changes or irritation  ENT: NEGATIVE for ear, mouth and throat problems  R: NEGATIVE for significant cough or SOB  CV: NEGATIVE for chest pain, palpitations or peripheral edema  GI: NEGATIVE for nausea, abdominal pain, heartburn, or change in bowel habits   male: negative for dysuria, hematuria, decreased urinary stream, erectile dysfunction, urethral discharge  M: NEGATIVE for significant arthralgias or myalgia  N: NEGATIVE for weakness,  "dizziness or paresthesias  P: NEGATIVE for changes in mood or affect    OBJECTIVE:   There were no vitals taken for this visit.    Physical Exam    General Appearance: Pleasant, alert, in no acute respiratory distress.  Head Exam: Normal. Normocephalic, atraumatic. No sinus tenderness.  Eye Exam: Normal external eye, conjunctiva, lids. GAUDENCIO.  Ear Exam: Normal auditory canals and external ears. Non-tender.  Left TM-normal. Right TM-normal.  OroPharynx Exam: Dental hygiene adequate. Normal buccal mucosa. Normal pharynx.  Neck Exam: Supple, no masses or enlarged, tender nodes.  Thyroid Exam: No nodules or enlargement or tenderness.  Chest/Respiratory Exam: Normal, comfortable, easy respirations. Chest wall normal. Lungs are clear to auscultation. No wheezing, crackles, or rhonchi.  Cardiovascular Exam: Regular rate and rhythm. No murmur, gallop, or rubs. No pedal edema.  Gastrointestinal Exam: Soft, non-tender, no masses or organomegaly.  Musculoskeletal Exam: Back is non-tender, full ROM of upper and lower extremities.  Skin: no rash, warm and dry.    Neurologic Exam: Nonfocal, no tremor. Normal gait.  Psychiatric Exam: Alert - appropriate, normal affect      ASSESSMENT/PLAN:       ICD-10-CM    1. Routine history and physical examination of adult Z00.00    2. Aortic root dilatation (H) I77.810 atenolol (TENORMIN) 100 MG tablet     CARDIO  ADULT REFERRAL       COUNSELING:   Reviewed preventive health counseling, as reflected in patient instructions       Regular exercise       Healthy diet/nutrition       Safe sex practices/STD prevention         reports that he has been smoking.  He has never used smokeless tobacco.  Tobacco Cessation Action Plan: Information offered: Patient not interested at this time  Estimated body mass index is 19 kg/(m^2) as calculated from the following:    Height as of 1/17/17: 6' 2\" (1.88 m).    Weight as of 1/17/17: 148 lb (67.1 kg).       Counseling Resources:  ATP IV " Guidelines  Pooled Cohorts Equation Calculator  FRAX Risk Assessment  ICSI Preventive Guidelines  Dietary Guidelines for Americans, 2010  USDA's MyPlate  ASA Prophylaxis  Lung CA Screening    Ted Kervin Tidwell MD  [facility]

## 2018-04-12 NOTE — NURSING NOTE
"Chief Complaint   Patient presents with     Physical       Initial /77  Pulse 109  Temp 98.3  F (36.8  C) (Oral)  Ht 6' 2\" (1.88 m)  Wt 158 lb 1.6 oz (71.7 kg)  SpO2 97%  BMI 20.3 kg/m2 Estimated body mass index is 20.3 kg/(m^2) as calculated from the following:    Height as of this encounter: 6' 2\" (1.88 m).    Weight as of this encounter: 158 lb 1.6 oz (71.7 kg).  Medication Reconciliation: complete      Health Maintenance that is potentially due pending provider review:  NONE    n/a    CUBA Candelario  "

## 2018-04-12 NOTE — MR AVS SNAPSHOT
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   o   Discuss preventive care.    o   Review your medicines if your doctor has prescribed any.    You should be tested each year for STDs (sexually transmitted diseases), if you re at risk.     After age 35, talk to your provider about cholesterol testing. If you are at risk for heart disease, have your cholesterol tested at least every 5 years.     If you are at risk for diabetes, you should have a diabetes test (fasting glucose).  Shots: Get a flu shot each year. Get a tetanus shot every 10 years.     Nutrition:    Eat at least 5 servings of fruits and vegetables daily.     Eat whole-grain bread, whole-wheat pasta and brown rice instead of white grains and rice.     Talk to your provider about Calcium and Vitamin D.     Lifestyle    Exercise for at least 150 minutes a week (30 minutes a day, 5 days a week). This will help you control your weight and prevent disease.     Limit alcohol to one drink per day.     No smoking.     Wear sunscreen to prevent skin cancer.     See your dentist every six months for an exam and cleaning.             Follow-ups after your visit        Additional Services     CARDIO  ADULT REFERRAL       [facility] is referring you to Cardiology Services.       The  Representative will assist you in the coordination of your  "Cardiology care as prescribed by your physician.    The  Representative will call you within 24 hours to help schedule your appointment, or you may contact the  Representative at: [phone].         Type of Referral: Cardiology Follow Up            Timeframe requested: within 4 weeks       Coverage of these services is subject to the terms and limitations of your health insurance plan.  Please call member services at your health plan with any benefit or coverage questions.      If X-rays, CT or MRI's have been performed, please contact the facility where they were done to arrange for , prior to your scheduled appointment.  Please bring this referral request to your appointment and present it to your specialist.                  Who to contact     If you have questions or need follow up information about today's clinic visit or your schedule please contact [facility] directly at [phone].  Normal or non-critical lab and imaging results will be communicated to you by MyChart, letter or phone within 4 business days after the clinic has received the results. If you do not hear from us within 7 days, please contact the clinic through MyChart or phone. If you have a critical or abnormal lab result, we will notify you by phone as soon as possible.  Submit refill requests through [employer] or call your pharmacy and they will forward the refill request to us. Please allow 3 business days for your refill to be completed.          Additional Information About Your Visit        [facility] Information     [employer]form lets you send messages to your doctor, view your test results, renew your prescriptions, schedule appointments and more. To sign up, go to [email or web address] . Click on \"Log in\" on the left side of the screen, which will take you to the Welcome page. Then click on \"Sign up Now\" on the right side of the page.     You will be asked to enter the access code listed below, as " "well as some personal information. Please follow the directions to create your username and password.     Your access code is: [email or web address]  Expires: 2018  9:52 PM     Your access code will  in 90 days. If you need help or a new code, please call your [facility] clinic or [phone].        Care EveryWhere ID     This is your [facility] EveryWhere ID. This could be used by other organizations to access your [facility] medical records  [id number]        Your Vitals Were     Pulse Temperature Height Pulse Oximetry BMI (Body Mass Index)       109 98.3  F (36.8  C) (Oral) 6' 2\" (1.88 m) 97% 20.3 kg/m2        Blood Pressure from Last 3 Encounters:   18 133/77   17 106/56   17 130/80    Weight from Last 3 Encounters:   18 158 lb 1.6 oz (71.7 kg)   17 148 lb (67.1 kg)   16 148 lb (67.1 kg)              We Performed the Following     CARDIO  ADULT REFERRAL     OFFICE/OUTPT VISIT,EST,LEVL III          Today's Medication Changes          These changes are accurate as of 18  9:52 PM.  If you have any questions, ask your nurse or doctor.               These medicines have changed or have updated prescriptions.        Dose/Directions    * ATENOLOL PO   This may have changed:  Another medication with the same name was added. Make sure you understand how and when to take each.   Changed by:  Ted Tidwell MD        Dose:  100 mg   Take 100 mg by mouth daily   Refills:  0       * atenolol 100 MG tablet   Commonly known as:  TENORMIN   This may have changed:  You were already taking a medication with the same name, and this prescription was added. Make sure you understand how and when to take each.   Used for:  Aortic root dilatation (H)   Changed by:  Ted Tidwell MD        Dose:  100 mg   Take 1 tablet (100 mg) by mouth daily   Quantity:  90 tablet   Refills:  3       * Notice:  This list has 2 medication(s) that are the same as other medications " prescribed for you. Read the directions carefully, and ask your doctor or other care provider to review them with you.         Where to get your medicines      These medications were sent to [id number] - [place], [place] - [address]  [address] [address]     Phone:  [phone]     atenolol 100 MG tablet                Primary Care Provider Office Phone # Fax #    Ted Kervin Tidwell [phone] [address] [address]        Equal Access to Services     [facility]: Hadii aad ku hadasho Soomaali, waaxda luqadaha, qaybta kaalmada adeegyada, waxcal corona haydebby loza . So [facility] [phone].    ATENCIÓN: Si habla español, tiene a greenberg disposición servicios gratuitos de asistencia lingüística. [facility] [phone].    We comply with applicable federal civil rights laws and [place] laws. We do not discriminate on the basis of race, color, national origin, age, disability, sex, sexual orientation, or gender identity.            Thank you!     Thank you for choosing [facility]  for your care. Our goal is always to provide you with excellent care. Hearing back from our patients is one way we can continue to improve our services. Please take a few minutes to complete the written survey that you may receive in the mail after your visit with us. Thank you!             Your Updated Medication List - Protect others around you: Learn how to safely use, store and throw away your medicines at www.disposemymeds.org.          This list is accurate as of 4/12/18  9:52 PM.  Always use your most recent med list.                   Brand Name Dispense Instructions for use Diagnosis    * ATENOLOL PO      Take 100 mg by mouth daily        * atenolol 100 MG tablet    TENORMIN    90 tablet    Take 1 tablet (100 mg) by mouth daily    Aortic root dilatation (H)       metoprolol succinate 50 MG 24 hr tablet    TOPROL-XL    30  tablet    Take 1 tablet (50 mg) by mouth daily    Aortic root dilatation (H)       * Notice:  This list has 2 medication(s) that are the same as other medications prescribed for you. Read the directions carefully, and ask your doctor or other care provider to review them with you.

## 2019-04-25 ENCOUNTER — OFFICE VISIT (OUTPATIENT)
Dept: FAMILY MEDICINE | Facility: CLINIC | Age: 29
End: 2019-04-25
Payer: COMMERCIAL

## 2019-04-25 VITALS
HEART RATE: 87 BPM | SYSTOLIC BLOOD PRESSURE: 129 MMHG | BODY MASS INDEX: 20.43 KG/M2 | OXYGEN SATURATION: 96 % | WEIGHT: 159.2 LBS | HEIGHT: 74 IN | DIASTOLIC BLOOD PRESSURE: 82 MMHG

## 2019-04-25 DIAGNOSIS — Z11.3 SCREEN FOR STD (SEXUALLY TRANSMITTED DISEASE): ICD-10-CM

## 2019-04-25 DIAGNOSIS — F11.21: ICD-10-CM

## 2019-04-25 DIAGNOSIS — F10.920 ALCOHOLIC INTOXICATION WITHOUT COMPLICATION (H): ICD-10-CM

## 2019-04-25 DIAGNOSIS — I77.810 AORTIC ROOT DILATATION (H): ICD-10-CM

## 2019-04-25 DIAGNOSIS — Z00.00 ROUTINE HISTORY AND PHYSICAL EXAMINATION OF ADULT: Primary | ICD-10-CM

## 2019-04-25 PROCEDURE — 87591 N.GONORRHOEAE DNA AMP PROB: CPT | Performed by: FAMILY MEDICINE

## 2019-04-25 PROCEDURE — 36415 COLL VENOUS BLD VENIPUNCTURE: CPT | Performed by: FAMILY MEDICINE

## 2019-04-25 PROCEDURE — 0064U ANTB TP TOTAL&RPR IA QUAL: CPT | Performed by: FAMILY MEDICINE

## 2019-04-25 PROCEDURE — 99213 OFFICE O/P EST LOW 20 MIN: CPT | Mod: 25 | Performed by: FAMILY MEDICINE

## 2019-04-25 PROCEDURE — 80053 COMPREHEN METABOLIC PANEL: CPT | Performed by: FAMILY MEDICINE

## 2019-04-25 PROCEDURE — 87389 HIV-1 AG W/HIV-1&-2 AB AG IA: CPT | Performed by: FAMILY MEDICINE

## 2019-04-25 PROCEDURE — 99395 PREV VISIT EST AGE 18-39: CPT | Performed by: FAMILY MEDICINE

## 2019-04-25 PROCEDURE — 87491 CHLMYD TRACH DNA AMP PROBE: CPT | Performed by: FAMILY MEDICINE

## 2019-04-25 RX ORDER — ATENOLOL 100 MG/1
100 TABLET ORAL DAILY
Qty: 90 TABLET | Refills: 3 | Status: SHIPPED | OUTPATIENT
Start: 2019-04-25 | End: 2020-05-15

## 2019-04-25 ASSESSMENT — MIFFLIN-ST. JEOR: SCORE: 1761.88

## 2019-04-25 NOTE — LETTER
Ravi Hankins  [address] [place] [address]  [facility] [address]    Dear Ravi    It was nice to see you at your recent visit.  Enclosed are the lab results taken at that time.  Resulted Orders   Neisseria gonorrhoeae PCR   Result Value Ref Range    Specimen Descrip Urine     N Gonorrhea PCR Negative NEG^Negative   Chlamydia trachomatis PCR   Result Value Ref Range    Specimen Description Urine     Chlamydia Trachomatis PCR Negative NEG^Negative   HIV Antigen Antibody Combo   Result Value Ref Range    HIV Antigen Antibody Combo Nonreactive NR^Nonreactive          Comment:      HIV-1 p24 Ag & HIV-1/HIV-2 Ab Not Detected   Treponema Abs w Reflex to RPR and Titer   Result Value Ref Range    Treponema Antibodies Nonreactive NR^Nonreactive   Comprehensive metabolic panel   Result Value Ref Range    Sodium 144 133 - 144 mmol/L    Potassium 3.8 3.4 - 5.3 mmol/L    Chloride 109 94 - 109 mmol/L    Carbon Dioxide 26 20 - 32 mmol/L    Anion Gap 9 3 - 14 mmol/L    Glucose 85 70 - 99 mg/dL    Urea Nitrogen 8 7 - 30 mg/dL    Creatinine 1.04 0.66 - 1.25 mg/dL    Calcium 8.7 8.5 - 10.1 mg/dL    Bilirubin Total 0.4 0.2 - 1.3 mg/dL    Albumin 3.8 3.4 - 5.0 g/dL    Protein Total 7.2 6.8 - 8.8 g/dL    Alkaline Phosphatase 45 40 - 150 U/L    ALT 20 0 - 70 U/L    AST 19 0 - 45 U/L       Your test results are normal.  Follow up as needed or in 1 year.    Sincerely,    Ted Tidwell MD MPH
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
## 2019-04-25 NOTE — NURSING NOTE
"Chief Complaint   Patient presents with     Physical     /82   Pulse 87   Ht 1.88 m (6' 2\")   Wt 72.2 kg (159 lb 3.2 oz)   SpO2 96%   BMI 20.44 kg/m   Estimated body mass index is 20.44 kg/m  as calculated from the following:    Height as of this encounter: 1.88 m (6' 2\").    Weight as of this encounter: 72.2 kg (159 lb 3.2 oz).  Medication Reconciliation: complete      Health Maintenance that is potentially due pending provider review:  NONE    n/a    CUBA Candelario  "

## 2019-04-26 LAB
ALBUMIN SERPL-MCNC: 3.8 G/DL (ref 3.4–5)
ALP SERPL-CCNC: 45 U/L (ref 40–150)
ALT SERPL W P-5'-P-CCNC: 20 U/L (ref 0–70)
ANION GAP SERPL CALCULATED.3IONS-SCNC: 9 MMOL/L (ref 3–14)
AST SERPL W P-5'-P-CCNC: 19 U/L (ref 0–45)
BILIRUB SERPL-MCNC: 0.4 MG/DL (ref 0.2–1.3)
BUN SERPL-MCNC: 8 MG/DL (ref 7–30)
CALCIUM SERPL-MCNC: 8.7 MG/DL (ref 8.5–10.1)
CHLORIDE SERPL-SCNC: 109 MMOL/L (ref 94–109)
CO2 SERPL-SCNC: 26 MMOL/L (ref 20–32)
CREAT SERPL-MCNC: 1.04 MG/DL (ref 0.66–1.25)
GFR SERPL CREATININE-BSD FRML MDRD: >90 ML/MIN/{1.73_M2}
GLUCOSE SERPL-MCNC: 85 MG/DL (ref 70–99)
HIV 1+2 AB+HIV1 P24 AG SERPL QL IA: NONREACTIVE
POTASSIUM SERPL-SCNC: 3.8 MMOL/L (ref 3.4–5.3)
PROT SERPL-MCNC: 7.2 G/DL (ref 6.8–8.8)
SODIUM SERPL-SCNC: 144 MMOL/L (ref 133–144)
T PALLIDUM AB SER QL: NONREACTIVE

## 2019-04-28 LAB
C TRACH DNA SPEC QL NAA+PROBE: NEGATIVE
N GONORRHOEA DNA SPEC QL NAA+PROBE: NEGATIVE
SPECIMEN SOURCE: NORMAL
SPECIMEN SOURCE: NORMAL

## 2019-10-09 ENCOUNTER — HOSPITAL ENCOUNTER (EMERGENCY)
Facility: CLINIC | Age: 29
Discharge: HOME | End: 2019-10-09
Admitting: PHYSICIAN ASSISTANT
Payer: COMMERCIAL

## 2019-10-09 VITALS
SYSTOLIC BLOOD PRESSURE: 114 MMHG | OXYGEN SATURATION: 100 % | WEIGHT: 168 LBS | HEIGHT: 74 IN | BODY MASS INDEX: 21.56 KG/M2 | DIASTOLIC BLOOD PRESSURE: 74 MMHG | TEMPERATURE: 98.1 F | RESPIRATION RATE: 17 BRPM | HEART RATE: 62 BPM

## 2019-10-09 DIAGNOSIS — R55 SYNCOPE: ICD-10-CM

## 2019-10-09 LAB
ANION GAP SERPL CALCULATED.3IONS-SCNC: 7 MMOL/L (ref 3–14)
BASOPHILS # BLD AUTO: 0 10E9/L (ref 0–0.2)
BASOPHILS NFR BLD AUTO: 1 %
BUN SERPL-MCNC: 11 MG/DL (ref 7–30)
CALCIUM SERPL-MCNC: 7.8 MG/DL (ref 8.5–10.1)
CHLORIDE SERPL-SCNC: 112 MMOL/L (ref 94–109)
CO2 SERPL-SCNC: 24 MMOL/L (ref 20–32)
CREAT SERPL-MCNC: 1.05 MG/DL (ref 0.66–1.25)
DIFFERENTIAL METHOD BLD: ABNORMAL
EOSINOPHIL # BLD AUTO: 0.1 10E9/L (ref 0–0.7)
EOSINOPHIL NFR BLD AUTO: 2 %
ERYTHROCYTE [DISTWIDTH] IN BLOOD BY AUTOMATED COUNT: 13.2 % (ref 10–15)
GFR SERPL CREATININE-BSD FRML MDRD: >90 ML/MIN/{1.73_M2}
GLUCOSE SERPL-MCNC: 85 MG/DL (ref 70–99)
HCT VFR BLD AUTO: 43.3 % (ref 40–53)
HGB BLD-MCNC: 14.3 G/DL (ref 13.3–17.7)
INTERPRETATION ECG - MUSE: NORMAL
LYMPHOCYTES # BLD AUTO: 1.3 10E9/L (ref 0.8–5.3)
LYMPHOCYTES NFR BLD AUTO: 32 %
MCH RBC QN AUTO: 30.9 PG (ref 26.5–33)
MCHC RBC AUTO-ENTMCNC: 33 G/DL (ref 31.5–36.5)
MCV RBC AUTO: 94 FL (ref 78–100)
MONOCYTES # BLD AUTO: 0.5 10E9/L (ref 0–1.3)
MONOCYTES NFR BLD AUTO: 13 %
NEUTROPHILS # BLD AUTO: 2.1 10E9/L (ref 1.6–8.3)
NEUTROPHILS NFR BLD AUTO: 52 %
NRBC # BLD AUTO: 0 10*3/UL
NRBC BLD AUTO-RTO: 1 /100
PLATELET # BLD AUTO: 154 10E9/L (ref 150–450)
POTASSIUM SERPL-SCNC: 3.8 MMOL/L (ref 3.4–5.3)
RBC # BLD AUTO: 4.63 10E12/L (ref 4.4–5.9)
SODIUM SERPL-SCNC: 143 MMOL/L (ref 133–144)
TROPONIN I SERPL-MCNC: <0.015 UG/L (ref 0–0.04)
WBC # BLD AUTO: 4 10E9/L (ref 4–11)

## 2019-10-09 PROCEDURE — 84484 ASSAY OF TROPONIN QUANT: CPT | Performed by: PHYSICIAN ASSISTANT

## 2019-10-09 PROCEDURE — 99284 EMERGENCY DEPT VISIT MOD MDM: CPT | Mod: 25

## 2019-10-09 PROCEDURE — 85025 COMPLETE CBC W/AUTO DIFF WBC: CPT | Performed by: PHYSICIAN ASSISTANT

## 2019-10-09 PROCEDURE — 25000128 H RX IP 250 OP 636: Performed by: PHYSICIAN ASSISTANT

## 2019-10-09 PROCEDURE — 93005 ELECTROCARDIOGRAM TRACING: CPT

## 2019-10-09 PROCEDURE — 80048 BASIC METABOLIC PNL TOTAL CA: CPT | Performed by: PHYSICIAN ASSISTANT

## 2019-10-09 PROCEDURE — 96360 HYDRATION IV INFUSION INIT: CPT

## 2019-10-09 RX ADMIN — SODIUM CHLORIDE 1000 ML: 9 INJECTION, SOLUTION INTRAVENOUS at 15:23

## 2019-10-09 ASSESSMENT — ENCOUNTER SYMPTOMS
LIGHT-HEADEDNESS: 1
DIAPHORESIS: 1
NAUSEA: 1

## 2019-10-09 ASSESSMENT — MIFFLIN-ST. JEOR: SCORE: 1796.79

## 2019-10-09 NOTE — ED TRIAGE NOTES
Pt reports felling lightheaded and nauseated, pt had a syncopal event that lasted 30-45 today while getting out of a car.  Pt reports a history of a dilated aorta.

## 2019-10-09 NOTE — ED NOTES
Bed: ED24  Expected date:   Expected time:   Means of arrival:   Comments:  Gregory 421 Syncope kymberly 29 m

## 2019-10-09 NOTE — ED PROVIDER NOTES
History     Chief Complaint:  Loss of Consciousness    HPI   Ravi Hankins is a 29 year old male with a history of tobacco use, chemical dependency, aortic root dilation, and heroin dependence who presents alone via EMS for evaluation of syncope today, associated with lightheadedness and nausea. Patient reports feeling baseline and having a typical day. Of note, he did use marijuana and drink alcohol today. While getting out of the car today just after smoking a joint of marijuana, the patient felt lightheaded, diaphoresis, and had a syncopal episode that lasted approximately 16 seconds per his friend.  He did not fall, and the friend was able to help him to the ground.  He notes that when his friend checked his pulse immediately after this it appeared to be low in the 40's-50's. He did not hit his head as his brother was able to help him down. His brother called EMS.    Here, he does not know if he had any tongue biting but denies urinary or bowel incontinence. He denies any chest pain, shortness of breath or exertional episodes.  He is followed by cardiology for his aortic root dilation, with most recent echo in 2015 showing no new concerning changes which would require intervention.  He is prescribed beta-blocker which he states he is compliant with.  Allergies:  No Known Drug Allergies     Medications:    Tenormin  Atenolol  Toprol    Past Medical History:    Chemical dependency  Aortic root dilation  Tobacco abuse  Heroin dependence  Migraines    Past Surgical History:    Surgical history reviewed. No pertinent surgical history.    Family History:    Brother: heart disease  Daughter: diabetes    Social History:  The patient was accompanied to the ED by EMS.  Smoking Status: Current Every Day Smoker  Smokeless Tobacco: Never Used  Alcohol Use: Negative  Drug Use: Positive  PCP: Ted Tidwell  Marital Status:  Single      Review of Systems   Constitutional: Positive for diaphoresis.  "  Respiratory: Negative for shortness of breath.    Cardiovascular: Negative for chest pain.   Gastrointestinal: Positive for nausea.   Endocrine: Polydipsia:      Genitourinary: Negative for enuresis.   Neurological: Positive for syncope and light-headedness.   All other systems reviewed and are negative.      Physical Exam     Patient Vitals for the past 24 hrs:   BP Temp Temp src Pulse Heart Rate Resp SpO2 Height Weight   10/09/19 1530 (!) 121/90 -- -- 73 71 14 -- -- --   10/09/19 1500 -- -- -- -- 71 (!) 7 100 % -- --   10/09/19 1451 131/84 98.1  F (36.7  C) Oral -- 69 16 99 % 1.88 m (6' 2\") 76.2 kg (168 lb)     Physical Exam  General: Alert and cooperative with exam. Resting comfortably on gurney  Head:  Scalp is NC/AT  Eyes:  No scleral icterus, PERRL  ENT:  The external nose and ears are normal.   Neck:  Normal range of motion without rigidity.  CV:  Regular rate and rhythm    No pathologic murmur, rubs, or gallops.  Resp:  Breath sounds are clear bilaterally.  No crackles, wheezes, rhonchi.    Non-labored, no retractions or accessory muscle use  GI:  Abdomen is soft, no distension, no tenderness, no masses. No peritoneal signs.  Bowel sounds present in all quadrants  :  No suprapubic or flank tenderness  MS:  No lower extremity edema or asymmetric calf swelling.  Skin:  Warm and dry, No rash or lesions noted.  Neuro: Oriented. No gross motor deficits.    Strength and sensation grossly intact in all 4 extremities.  Cranial nerves 2-12 intact. GCS: 15. Normal finger to nose and heel to shin testing.  Gait normal  Psych:  Awake. Alert. Normal affect. Appropriate interactions.   Emergency Department Course     ECG:  ECG taken at 1514, ECG read at 1514 by Dr. Aristeo Ennis MD  Normal sinus rhythm  Normal ECG  Rate 67 bpm. AR interval 188. QRS duration 92. QT/QTc 400/422. P-R-T axes 76 71 76.     Laboratory:  Laboratory findings were communicated with the patient who voiced understanding of the " findings.    CBC: AWNL (WBC 4.0, HGB 14.3, )   BMP: Chloride (A), Calcium 7.8 (L) o/w WNL (Creatinine 1.05)   Troponin (Collected 1502): <0.015     Interventions:  1523 0.9% NaCl bolus 1000 mL IV      Emergency Department Course:  Nursing notes and vitals reviewed.  EKG obtained in the ED, see results above.    IV was inserted and blood was drawn for laboratory testing, results above.     (1500)   I performed an exam of the patient as documented above. History obtained from patient.    (1630)   Findings and plan explained to the Patient. Patient discharged home with instructions regarding supportive care, medications, and reasons to return. The importance of close follow-up was reviewed. I personally reviewed the laboratory results with the Patient and answered all related questions prior to discharge.    Impression & Plan      Medical Decision Makin-year-old male who presents after syncopal episode.  Patient history and records reviewed.  On my examination he is well-appearing with normal vitals and a completely normal exam.  EKG is normal with no evidence of WPW, prolonged QT, Brugada features, evidence of hypertrophic cardiomyopathy, ischemia, or arrhythmia.  He has a normal neurologic exam with no focal deficits to suggest stroke.  Lab work here demonstrates normal hemoglobin, electrolytes, negative troponin.    The patient's episode does seem most consistent with a vasovagal spell given the onset immediately following smoking, lightheadedness, diaphoresis with low heart rate.  There is no exertional component, and was no associated chest pain or shortness of breath at the time or here in the emergency department to raise further concern for ACS, dissection, or PE.  Nevertheless the patient does have a history of aortic root dilation for which he is followed by cardiology but has not kept his recent appointments.  While I do not feel that this episode is related to this, I nevertheless stressed the  importance that he is seen by cardiology as soon as possible for evaluation of this condition given his family history and known history of aortic root dilation.  Referral was placed for cardiology and echocardiogram as he states he would like to get set up within the [facility] system.  He will return to the ED if he has any new or worsening symptoms, or if he develops any chest pain, shortness of breath, or further syncopal episodes.    Diagnosis:    ICD-10-CM    1. Syncope R55 Follow-Up with Cardiologist     Echocardiogram Complete        Disposition:   Discharge.    Scribe Disclosure:  IBreezy, am serving as a scribe at 2:57 PM on 10/9/2019 to document services personally performed by Lincoln Madison PA-C based on my observations and the provider's statements to me.     EMERGENCY DEPARTMENT       Lincoln Madison PA-C  10/10/19 0953

## 2019-10-10 ASSESSMENT — ENCOUNTER SYMPTOMS: SHORTNESS OF BREATH: 0

## 2019-10-15 ENCOUNTER — HOSPITAL ENCOUNTER (OUTPATIENT)
Dept: CARDIOLOGY | Facility: CLINIC | Age: 29
Discharge: HOME | End: 2019-10-15
Attending: PHYSICIAN ASSISTANT | Admitting: PHYSICIAN ASSISTANT
Payer: COMMERCIAL

## 2019-10-15 DIAGNOSIS — R55 SYNCOPE: ICD-10-CM

## 2019-10-15 PROCEDURE — 93306 TTE W/DOPPLER COMPLETE: CPT

## 2019-10-15 PROCEDURE — 93306 TTE W/DOPPLER COMPLETE: CPT | Mod: 26 | Performed by: INTERNAL MEDICINE

## 2019-10-16 ENCOUNTER — OFFICE VISIT (OUTPATIENT)
Dept: CARDIOLOGY | Facility: CLINIC | Age: 29
End: 2019-10-16
Attending: PHYSICIAN ASSISTANT
Payer: COMMERCIAL

## 2019-10-16 VITALS
SYSTOLIC BLOOD PRESSURE: 129 MMHG | WEIGHT: 160.8 LBS | BODY MASS INDEX: 20.64 KG/M2 | HEART RATE: 74 BPM | HEIGHT: 74 IN | DIASTOLIC BLOOD PRESSURE: 85 MMHG

## 2019-10-16 DIAGNOSIS — I77.810 AORTIC ROOT DILATATION (H): Primary | ICD-10-CM

## 2019-10-16 DIAGNOSIS — R55 VASOVAGAL SYNCOPE: ICD-10-CM

## 2019-10-16 PROCEDURE — 99204 OFFICE O/P NEW MOD 45 MIN: CPT | Performed by: INTERNAL MEDICINE

## 2019-10-16 ASSESSMENT — MIFFLIN-ST. JEOR: SCORE: 1764.13

## 2019-10-16 NOTE — PROGRESS NOTES
Service Date: 10/16/2019      HISTORY OF PRESENT ILLNESS:  It is a pleasure for me to see this very delightful 29-year-old gentleman who is accompanied by his mother.  He is here for followup of enlarged aortic root as well as a recent syncopal episode.      His mother tells me that she had a son who  at the age of 20 from ruptured aortic root.  Her family has been through extensive genetic screening and she tells me that all the usual causes of aortic aneurysm have been excluded.        The patient himself does have an aortic root, which was described at the 95th percentile for his age and body habitus.  He has been followed up by Dr. Ibrahim until his detention.  He has been taking atenolol 100 mg for many years.  This medication is well tolerated.  In fact, he tells me that if he stops, it would feel palpitations and his heart.  He thinks this may be psychological and I tend to agree with him.      This young gentleman has a history of opioid abuse.  Fortunately, he managed to kick this habit 5 years ago.  He does use alcohol and weed.  He is a smoker.  He has no previous episodes of syncope.  Several days ago, he bought a bottle of cognac and had 2 shots.  He then smoked some weed.  As he was getting out of his car, he felt lightheaded.  He also felt mildly nauseated.  He walked a few steps and his brother witnessed him collapsing.  He looked very pale and his lips were purplish.  His heart rate was in the 40s.  He was taken to the emergency room.  He recovered very quickly.  Laboratory investigations were unremarkable.  His EKG shows sinus rhythm with benign repolarization only.      He had breakfast that day.  This event occurred around 12:00 p.m.  He had not been drinking enough water that day, and he felt dehydrated.  He had no systemic disturbances such as fevers, urinary or bowel disturbances.      PHYSICAL EXAMINATION:  Unremarkable.        He has had a followup echocardiogram in our offices in his  aortic root at the level sinuses of Valsalva measured 3.8 cm.      IMPRESSION:   1.  Vasovagal syncope.   2.  Very mild aortic root enlargement, idiopathic.      I feel quite certain that this gentleman had his first episode of vasovagal syncope.  I think the combination of possible dehydration, alcohol use and perhaps weed had contributed to him having this episode.  I do not think is related at all to his mild aortic root enlargement and I strongly reassured him.  We talked about avoidance maneuvers should he experience those symptoms again.      I also reviewed a 2 or 3 echocardiograms done over the years.  His aortic root size has been stable.  As such, I do not think we need to follow it up more than once every 2-3 years.  As atenolol is well tolerated, I have asked him to continue this medication for the time being.  His blood pressure is high normal today, though I think this may be white coat effect.      I look forward to seeing him again in 2-3 years' time for followup.  He should have a repeat echocardiography prior to clinic visit.         PHILOMENA ESQUIVEL MD, [facility]             D: 10/16/2019   T: 10/16/2019   MT: RUSSELL      Name:     CHINTAN HARVEY   MRN:      [id number]        Account:      [id number]   :      1990           Service Date: 10/16/2019      Document: [id number]

## 2019-10-16 NOTE — LETTER
10/16/2019    Ted Tidwell MD  [address] [facility] [address]    RE: Ravi Hankins       Dear Colleague,    I had the pleasure of seeing Ravi Hankins in the [facility].    HPI and Plan:   See dictation    No orders of the defined types were placed in this encounter.      No orders of the defined types were placed in this encounter.      Encounter Diagnoses   Name Primary?     Vasovagal syncope      Aortic root dilatation (H) Yes       CURRENT MEDICATIONS:  Current Outpatient Medications   Medication Sig Dispense Refill     atenolol (TENORMIN) 100 MG tablet Take 1 tablet (100 mg) by mouth daily 90 tablet 3       ALLERGIES   No Known Allergies    PAST MEDICAL HISTORY:  History reviewed. No pertinent past medical history.    PAST SURGICAL HISTORY:  Past Surgical History:   Procedure Laterality Date     NO HISTORY OF SURGERY         FAMILY HISTORY:  Family History   Problem Relation Age of Onset     Heart Disease Brother      Abdominal Aortic Aneurysm Brother      Heart Disease Other         self     Diabetes No family hx of      Coronary Artery Disease No family hx of      Hypertension No family hx of      Hyperlipidemia No family hx of      Cerebrovascular Disease No family hx of      Breast Cancer No family hx of      Colon Cancer No family hx of      Prostate Cancer No family hx of      Other Cancer No family hx of      Depression No family hx of      Anxiety Disorder No family hx of      Mental Illness No family hx of      Substance Abuse No family hx of      Anesthesia Reaction No family hx of      Asthma No family hx of      Osteoporosis No family hx of      Genetic Disorder No family hx of      Thyroid Disease No family hx of      Obesity No family hx of      Unknown/Adopted No family hx of        SOCIAL HISTORY:  Social History     Socioeconomic History     Marital status: Single     Spouse name: None     Number of children: None     Years of  "education: None     Highest education level: None   Occupational History     None   Social Needs     Financial resource strain: None     Food insecurity:     Worry: None     Inability: None     Transportation needs:     Medical: None     Non-medical: None   Tobacco Use     Smoking status: Current Every Day Smoker     Packs/day: 0.25     Smokeless tobacco: Never Used   Substance and Sexual Activity     Alcohol use: Yes     Alcohol/week: 0.0 standard drinks     Comment: Pt reports having \"2 shots \" today.  Pt reports drinking  everyday.     Drug use: Yes     Types: Marijuana     Comment: Pt reports smoking a blunt today, pt reports smoking canabis everyday..     Sexual activity: Yes   Lifestyle     Physical activity:     Days per week: None     Minutes per session: None     Stress: None   Relationships     Social connections:     Talks on phone: None     Gets together: None     Attends Restorationism service: None     Active member of club or organization: None     Attends meetings of clubs or organizations: None     Relationship status: None     Intimate partner violence:     Fear of current or ex partner: None     Emotionally abused: None     Physically abused: None     Forced sexual activity: None   Other Topics Concern     Parent/sibling w/ CABG, MI or angioplasty before 65F 55M? No   Social History Narrative     None       Review of Systems:  Skin:  Negative     Eyes:  Positive for glasses  ENT:  Negative    Respiratory:  Negative    Cardiovascular:  Negative    Gastroenterology: Negative    Genitourinary:  Negative    Musculoskeletal:  Negative    Neurologic:  Positive for migraine headaches  Psychiatric:  Positive for depression;anxiety;excessive stress  Heme/Lymph/Imm:  Negative    Endocrine:  Negative      Physical Exam:  Vitals: /85   Pulse 74   Ht 1.88 m (6' 2\")   Wt 72.9 kg (160 lb 12.8 oz)   BMI 20.65 kg/m       Constitutional:  cooperative, alert and oriented, well developed, well nourished, in no " acute distress        Skin:  warm and dry to the touch, no apparent skin lesions or masses noted   pacemaker incision in the left infraclavicular area was well-healed      Head:  normocephalic, no masses or lesions        Eyes:  pupils equal and round, conjunctivae and lids unremarkable, sclera white, no xanthalasma, EOMS intact, no nystagmus        Lymph:No Cervical lymphadenopathy present     ENT:  no pallor or cyanosis, dentition good        Neck:  carotid pulses are full and equal bilaterally, JVP normal, no carotid bruit        Respiratory:  normal breath sounds, clear to auscultation, normal A-P diameter, normal symmetry, normal respiratory excursion, no use of accessory muscles         Cardiac: regular rhythm, normal S1/S2, no S3 or S4, apical impulse not displaced, no murmurs, gallops or rubs                pulses full and equal, no bruits auscultated                                        GI:  abdomen soft, non-tender, BS normoactive, no mass, no HSM, no bruits        Extremities and Muscular Skeletal:  no deformities, clubbing, cyanosis, erythema observed              Neurological:  no gross motor deficits        Psych:  Alert and Oriented x 3        Recent Lab Results:  LIPID RESULTS:  No results found for: CHOL, HDL, LDL, TRIG, CHOLHDLRATIO    LIVER ENZYME RESULTS:  Lab Results   Component Value Date    AST 19 04/25/2019    ALT 20 04/25/2019       CBC RESULTS:  Lab Results   Component Value Date    WBC 4.0 10/09/2019    RBC 4.63 10/09/2019    HGB 14.3 10/09/2019    HCT 43.3 10/09/2019    MCV 94 10/09/2019    MCH 30.9 10/09/2019    MCHC 33.0 10/09/2019    RDW 13.2 10/09/2019     10/09/2019       BMP RESULTS:  Lab Results   Component Value Date     10/09/2019    POTASSIUM 3.8 10/09/2019    CHLORIDE 112 (H) 10/09/2019    CO2 24 10/09/2019    ANIONGAP 7 10/09/2019    GLC 85 10/09/2019    BUN 11 10/09/2019    CR 1.05 10/09/2019    GFRESTIMATED >90 10/09/2019    GFRESTBLACK >90 10/09/2019    ASHLEY  7.8 (L) 10/09/2019        A1C RESULTS:  No results found for: A1C    INR RESULTS:  No results found for: INR        CC  Lincoln Madison PA-C  [facility] [address]  [place] [address]                  Thank you for allowing me to participate in the care of your patient.      Sincerely,     DR PHILOMENA ESQUIVEL MD     [facility]    cc:   Lincoln Madison PA-C  [facility]  [address]  [place] [address]

## 2019-10-16 NOTE — LETTER
10/16/2019      Ted Tidwell MD  [address] [facility] [address]      RE: Robinnabila MARBIN Hankins       Dear Colleague,    I had the pleasure of seeing Ravi Hankins in the [facility].    Service Date: 10/16/2019      HISTORY OF PRESENT ILLNESS:  It is a pleasure for me to see this very delightful 29-year-old gentleman who is accompanied by his mother.  He is here for followup of enlarged aortic root as well as a recent syncopal episode.      His mother tells me that she had a son who  at the age of 20 from ruptured aortic root.  Her family has been through extensive genetic screening and she tells me that all the usual causes of aortic aneurysm have been excluded.        The patient himself does have an aortic root, which was described at the 95th percentile for his age and body habitus.  He has been followed up by Dr. Ibrahim until his [facility].  He has been taking atenolol 100 mg for many years.  This medication is well tolerated.  In fact, he tells me that if he stops, it would feel palpitations and his heart.  He thinks this may be psychological and I tend to agree with him.      This young gentleman has a history of opioid abuse.  Fortunately, he managed to kick this habit 5 years ago.  He does use alcohol and weed.  He is a smoker.  He has no previous episodes of syncope.  Several days ago, he bought a bottle of cognac and had 2 shots.  He then smoked some weed.  As he was getting out of his car, he felt lightheaded.  He also felt mildly nauseated.  He walked a few steps and his brother witnessed him collapsing.  He looked very pale and his lips were purplish.  His heart rate was in the 40s.  He was taken to the emergency room.  He recovered very quickly.  Laboratory investigations were unremarkable.  His EKG shows sinus rhythm with benign repolarization only.      He had breakfast that day.  This event occurred around 12:00 p.m.  He had not been  drinking enough water that day, and he felt dehydrated.  He had no systemic disturbances such as fevers, urinary or bowel disturbances.      PHYSICAL EXAMINATION:  Unremarkable.        He has had a followup echocardiogram in our offices in his aortic root at the level sinuses of Valsalva measured 3.8 cm.      IMPRESSION:   1.  Vasovagal syncope.   2.  Very mild aortic root enlargement, idiopathic.      I feel quite certain that this gentleman had his first episode of vasovagal syncope.  I think the combination of possible dehydration, alcohol use and perhaps weed had contributed to him having this episode.  I do not think is related at all to his mild aortic root enlargement and I strongly reassured him.  We talked about avoidance maneuvers should he experience those symptoms again.      I also reviewed a 2 or 3 echocardiograms done over the years.  His aortic root size has been stable.  As such, I do not think we need to follow it up more than once every 2-3 years.  As atenolol is well tolerated, I have asked him to continue this medication for the time being.  His blood pressure is high normal today, though I think this may be white coat effect.      I look forward to seeing him again in 2-3 years' time for followup.  He should have a repeat echocardiography prior to clinic visit.         PHILOMENA ESQUIVEL MD, [facility]             D: 10/16/2019   T: 10/16/2019   MT: RUSSELL      Name:     CHINTAN HARVEY   MRN:      [id number]        Account:      [id number]   :      1990           Service Date: 10/16/2019      Document: [id number]           Outpatient Encounter Medications as of 10/16/2019   Medication Sig Dispense Refill     atenolol (TENORMIN) 100 MG tablet Take 1 tablet (100 mg) by mouth daily 90 tablet 3     [DISCONTINUED] ATENOLOL PO Take 100 mg by mouth daily       [DISCONTINUED] metoprolol succinate (TOPROL-XL) 50 MG 24 hr tablet Take 1 tablet (50 mg) by mouth daily 30 tablet 0     Facility-Administered  Encounter Medications as of 10/16/2019   Medication Dose Route Frequency Provider Last Rate Last Dose     Self Administer Medications: Behavioral Services   Does not apply See Admin Instructions Jamie Romero MD           Again, thank you for allowing me to participate in the care of your patient.      Sincerely,    DR PHILOMENA ESQUIVEL MD     [facility]    cc:   HEATHER DumontC  [facility]  [address]  [place] [address]

## 2020-03-01 NOTE — PATIENT INSTRUCTIONS
Preventive Health Recommendations  Male Ages 26 - 39    Yearly exam:             See your health care provider every year in order to  o   Review health changes.   o   Discuss preventive care.    o   Review your medicines if your doctor has prescribed any.    You should be tested each year for STDs (sexually transmitted diseases), if you re at risk.     After age 35, talk to your provider about cholesterol testing. If you are at risk for heart disease, have your cholesterol tested at least every 5 years.     If you are at risk for diabetes, you should have a diabetes test (fasting glucose).  Shots: Get a flu shot each year. Get a tetanus shot every 10 years.     Nutrition:    Eat at least 5 servings of fruits and vegetables daily.     Eat whole-grain bread, whole-wheat pasta and brown rice instead of white grains and rice.     Talk to your provider about Calcium and Vitamin D.     Lifestyle    Exercise for at least 150 minutes a week (30 minutes a day, 5 days a week). This will help you control your weight and prevent disease.     Limit alcohol to one drink per day.     No smoking.     Wear sunscreen to prevent skin cancer.     See your dentist every six months for an exam and cleaning.        no

## 2020-03-08 ENCOUNTER — OFFICE VISIT (OUTPATIENT)
Dept: URGENT CARE | Facility: URGENT CARE | Age: 30
End: 2020-03-08
Payer: COMMERCIAL

## 2020-03-08 VITALS
BODY MASS INDEX: 20.54 KG/M2 | RESPIRATION RATE: 20 BRPM | WEIGHT: 160 LBS | DIASTOLIC BLOOD PRESSURE: 74 MMHG | SYSTOLIC BLOOD PRESSURE: 108 MMHG | TEMPERATURE: 98.8 F | OXYGEN SATURATION: 97 % | HEART RATE: 72 BPM

## 2020-03-08 DIAGNOSIS — K64.4 EXTERNAL HEMORRHOIDS: Primary | ICD-10-CM

## 2020-03-08 PROCEDURE — 99213 OFFICE O/P EST LOW 20 MIN: CPT | Performed by: PHYSICIAN ASSISTANT

## 2020-03-08 RX ORDER — LIDOCAINE HYDROCHLORIDE 20 MG/ML
JELLY TOPICAL PRN
Qty: 30 ML | Refills: 1 | Status: SHIPPED | OUTPATIENT
Start: 2020-03-08 | End: 2021-05-11

## 2020-03-08 NOTE — PATIENT INSTRUCTIONS
Patient Education     Treating Hemorrhoids: Surgery    Your healthcare provider may recommend surgery to remove hemorrhoids that cause severe symptoms. Your healthcare provider can explain the procedure that will be used. You ll also be told how to get ready for surgery, and what to expect while you recover. You may have tried other minor surgical procedures for your hemorrhoids (rubberband ligation, infrared coagulation) but found your hemorrhoids returned.  Getting ready for surgery  Your surgery will be done at a hospital or outpatient surgical center. Be sure to follow all your healthcare provider s guidelines to prepare for surgery.    Tell your healthcare provider what medicines you take. This includes blood thinners, aspirin, and ibuprofen. Also mention if you take any herbal remedies or supplements. In some cases, you may need to stop taking them a week before surgery.    Stop smoking.    Arrange for an adult family member or friend to give you a ride home after the procedure.    Stop eating and drinking before midnight, the night before your surgery, or as instructed by your doctor.    Follow a bowel preparation (such as with enemas) if instructed to do so.  Risks and complications When to call your healthcare provider  The possible risks and complications of the treatments described on these pages include:    Infection    Bleeding    Trouble urinating    Narrowing of the anal canal (very rare)  After surgery, call your healthcare provider immediately if you have any of the following:    Increasing pain    Persistent bleeding    Fever or chills    Inability to move your bowels    Trouble urinating  The day of surgery  Arrive at the hospital or surgery center on time. You will be asked to sign some forms and change into a patient gown. You ll then be given an IV (intravenous line), which supplies fluids and medicine. You may also be given a laxative or enema to clean stool from your rectum. Just before  surgery, you ll talk with an anesthesiologist. He or she can explain the type of medicine used to prevent pain during surgery.    Local anesthesia numbs just the surgical area.    Monitored sedation makes you relaxed and drowsy.    Regional anesthesia numbs certain areas of your body.    General anesthesia lets you sleep during the procedure.  During surgery  Your healthcare provider will insert an anoscope to view the anal canal. Using surgical tools, the swollen hemorrhoids are then removed. In some cases, the incision is closed with sutures. In other cases, you may have a procedure that closes the incision with staples.  Hemorrhoidectomy with sutures  The hemorrhoids are removed using surgical tools, such as a scalpel or cautery (sealing) device. The incision is then closed with sutures. In some cases, the incision may be left partially open. This allows fluid to drain and helps the healing process.  Stapled hemorrhoidopexy  This procedure uses a special device to remove a ring of tissue from the anal canal. Removing the tissue cuts off blood supply to the hemorrhoids, causing them to shrink. The tissue ring is then secured with staples. This helps hold the tissue in place.  After surgery  You ll be taken to a recovery area to rest for a while. You can usually go home the same day. But in some cases you may need to remain in the hospital overnight. For a short time after surgery you may have nausea, minor bleeding, and discharge. You ll also likely have some pain. To help you feel better, your healthcare provider will prescribe pain medicine. You may also be prescribed medicines to help make bowel movements easier.  Date Last Reviewed: 7/1/2016 2000-2019 [employer]. [address] [place] [address], [place] [address]. All rights reserved. This information is not intended as a substitute for professional medical care. Always follow your healthcare professional's instructions.           Patient Education      Hemorrhoids    Hemorrhoids are swollen and inflamed veins inside the rectum and near the anus. The rectum is the last several inches of the colon. The anus is the passage between the rectum and the outside of the body.  Causes  The veins can become swollen due to increased pressure in them. This is most often caused by:    Chronic constipation or diarrhea    Straining when having a bowel movement    Sitting too long on the toilet    A low-fiber diet    Pregnancy  Symptoms    Bleeding from the rectum (this may be noticeable after bowel movements)    Lump near the anus    Itching around the anus    Pain around the anus  There are different types of hemorrhoids. Depending on the type you have and the severity, you may be able to treat yourself at home. In some cases, a procedure may be the best treatment option. Your healthcare provider can tell you more about this, if needed.  Home care  General care    To get relief from pain or itching, try:  ? Medicines. Your healthcare provider may recommend stool softeners, suppositories, or laxatives to help manage constipation. Use these exactly as directed.  ? Sitz baths. A sitz bath involves sitting in a few inches of warm bath water. Be careful not to make the water so hot that you burn yourself--test it before sitting in it. Soak for about 10 to 15 minutes a few times a day. This may help relieve pain.  ? Topical products. Your healthcare provider may prescribe or recommend creams, ointments, or pads that can be applied to the hemorrhoid. Use these exactly as directed.  Tips to help prevent hemorrhoids    Eat more fiber. Fiber adds bulk to stool and absorbs water as it moves through your colon. This makes stool softer and easier to pass.  ? Increase the fiber in your diet with more fiber-rich foods. These include fresh fruit, vegetables, and whole grains.  ? Take a fiber supplement or bulking agent, if advised by your healthcare provider. These include products such as  psyllium or methylcellulose.    Drink more water. Your healthcare provider may direct you to drink plenty of water. This can help keep stool soft.    Be more active. Frequent exercise aids digestion and helps prevent constipation. It may also help make bowel movements more regular.    Don t strain during bowel movements. This can make hemorrhoids more likely. Also, don t sit on the toilet for long periods of time.  Follow-up care  Follow up with your healthcare provider as advised. If a culture or imaging tests were done, someone will let you know the results when they are ready. This may take a few days or longer. If your healthcare provider recommends a procedure for your hemorrhoids, these options can be discussed. Options may include surgery and outpatient office treatments.  When to seek medical advice  Call your healthcare provider right away if any of these occur:    Increased bleeding from the rectum    Increased pain around the rectum or anus    Weakness or dizziness  Call 911  Call 911 if any of these occur:    Trouble breathing or swallowing    Fainting or loss of consciousness    Unusually fast heart rate    Vomiting blood    Large amounts of blood in stool or black, tarry stools  Date Last Reviewed: 9/1/2017 2000-2019 [employer]. [address] [place] [address], [place] [address]. All rights reserved. This information is not intended as a substitute for professional medical care. Always follow your healthcare professional's instructions.

## 2020-05-07 ENCOUNTER — VIRTUAL VISIT (OUTPATIENT)
Dept: FAMILY MEDICINE | Facility: OTHER | Age: 30
End: 2020-05-07

## 2020-05-07 NOTE — PROGRESS NOTES
"Date: 2020 15:41:20  Clinician: Devorah De La Torre  Clinician NPI: [id number]  Patient: Ravi Hankins  Patient : 1990  Patient Address: [address] [place] [address]  Patient Phone: [phone]  Visit Protocol: URI  Patient Summary:  Ravi is a 29 year old ( : 1990 ) male who initiated a Visit for COVID-19 (Coronavirus) evaluation and screening. When asked the question \"Please sign me up to receive news, health information and promotions. \", Ravi responded \"No\".    Ravi states his symptoms started today.   His symptoms consist of. Ravi also feels feverish.   Symptom details   Temperature: His current temperature is 99.9 degrees Fahrenheit.    Ravi denies having myalgias, rhinitis, facial pain or pressure, sore throat, cough, nasal congestion, vomiting, nausea, teeth pain, ageusia, anosmia, diarrhea, ear pain, headache, malaise, wheezing, enlarged lymph nodes, and chills. He also denies taking antibiotic medication for the symptoms and having recent facial or sinus surgery in the past 60 days. He is not experiencing dyspnea.    Pertinent COVID-19 (Coronavirus) information  Ravi does not work or volunteer as healthcare worker or a  and does not work or volunteer in a healthcare facility.   He lives with a healthcare worker.   Ravi has not had a close contact with a laboratory-confirmed COVID-19 patient within 14 days of symptom onset. He also has not had a close contact with a suspected COVID-19 patient within 14 days of symptom onset.   Pertinent medical history  Ravi needs a return to work/school note.   Weight: 165 lbs   Ravi smokes or uses smokeless tobacco.   Weight: 165 lbs    MEDICATIONS: atenolol oral, ALLERGIES: NKDA  Clinician Response:  Dear Robertledanabila,   Your symptoms show that you may have coronavirus (COVID-19). This illness can cause fever, cough and trouble breathing. Many " people get a mild case and get better on their own. Some people can get very sick.   Will I be tested for COVID-19?  We would recommend you be tested for coronavirus. Here is how to get that scheduled:   Call [phone]. Tell them you were referred by [facility] to have a COVID-19 test. You will be scheduled at one of our [facility] testing locations (drive-up). Please have your [facility] visit information ready when you call including your visit ID number to verify you were referred.    How can I protect others in the meantime?  First, stay home and away from others (self-isolate) until:   You've had no fever---and no medicine that reduces fever---for 3 full days (72 hours). And...    Your other symptoms have gotten better. For example, your cough or breathing has improved. And...    At least 10 days have passed since your symptoms started.   During this time:   Don't go to work, school or anywhere else.     Stay away from others in your home. No hugging, kissing or shaking hands.    Don't let anyone visit.    Cover your mouth and nose with a mask, tissue or wash cloth to avoid spreading germs.    Wash your hands and face often. Use soap and water.   How can I take care of myself?  1.Take Tylenol (acetaminophen) for fever or pain. If you have liver or kidney problems, ask your family doctor if it's okay to take Tylenol.   Adults can take either:    650 mg (two 325 mg pills) every 4 to 6 hours, or...    1,000 mg (two 500 mg pills) every 8 hours as needed.     Note: Don't take more than 3,000 mg in one day.   For children, check the Tylenol bottle for the right dose. The dose is based on the child's age or weight.  2.If you have other health problems (like cancer, heart failure, an organ transplant or severe kidney disease): Call your specialty clinic if you don't feel better in the next 2 days.  3.Know when to call 911: If your breathing is so bad that it keeps you from doing normal activities, call 911 or go to the  emergency room. Tell them that you've been staying home and may have COVID-19.  4.Sign up for [employer]. We know it's scary to hear that you might have COVID-19. We want to track your symptoms to make sure you're okay over the next 2 weeks. Please look for an email from [employer]---this is a free, online program that we'll use to keep in touch. To sign up, follow the link in the email. Learn more at [email or web address].  Where can I get more information?  To learn more about COVID-19 and how to care for yourself at home, please visit the CDC website at https://www.cdc.gov/coronavirus/2019-ncov/about/steps-when-sick.html.  For more about your care at [facility], please visit [email or web address]     Diagnosis: Myalgia  Diagnosis ICD: M79.1

## 2020-05-08 ENCOUNTER — OFFICE VISIT - HEALTHEAST (OUTPATIENT)
Dept: FAMILY MEDICINE | Facility: CLINIC | Age: 30
End: 2020-05-08

## 2020-05-08 DIAGNOSIS — Z20.822 SUSPECTED 2019 NOVEL CORONAVIRUS INFECTION: ICD-10-CM

## 2020-05-09 ENCOUNTER — COMMUNICATION - HEALTHEAST (OUTPATIENT)
Dept: FAMILY MEDICINE | Facility: CLINIC | Age: 30
End: 2020-05-09

## 2020-06-12 ENCOUNTER — TELEPHONE (OUTPATIENT)
Dept: FAMILY MEDICINE | Facility: CLINIC | Age: 30
End: 2020-06-12

## 2020-06-12 ENCOUNTER — HOSPITAL ENCOUNTER (OUTPATIENT)
Dept: BEHAVIORAL HEALTH | Facility: CLINIC | Age: 30
Discharge: HOME | End: 2020-06-12
Attending: FAMILY MEDICINE | Admitting: FAMILY MEDICINE
Payer: COMMERCIAL

## 2020-06-12 VITALS — BODY MASS INDEX: 21.56 KG/M2 | HEIGHT: 74 IN | WEIGHT: 168 LBS

## 2020-06-12 VITALS — TEMPERATURE: 99.7 F

## 2020-06-12 DIAGNOSIS — F14.20 COCAINE USE DISORDER, SEVERE, DEPENDENCE (H): ICD-10-CM

## 2020-06-12 DIAGNOSIS — F12.20 CANNABIS USE DISORDER, SEVERE, DEPENDENCE (H): ICD-10-CM

## 2020-06-12 DIAGNOSIS — F17.200 TOBACCO USE DISORDER, MODERATE, DEPENDENCE: ICD-10-CM

## 2020-06-12 DIAGNOSIS — F10.20 ALCOHOL USE DISORDER, SEVERE, DEPENDENCE (H): ICD-10-CM

## 2020-06-12 PROBLEM — F19.20 CHEMICAL DEPENDENCY (H): Status: ACTIVE | Noted: 2020-06-12

## 2020-06-12 LAB
SARS-COV-2 PCR COMMENT: NORMAL
SARS-COV-2 RNA SPEC QL NAA+PROBE: NEGATIVE
SARS-COV-2 RNA SPEC QL NAA+PROBE: NORMAL
SPECIMEN SOURCE: NORMAL
SPECIMEN SOURCE: NORMAL

## 2020-06-12 PROCEDURE — U0003 INFECTIOUS AGENT DETECTION BY NUCLEIC ACID (DNA OR RNA); SEVERE ACUTE RESPIRATORY SYNDROME CORONAVIRUS 2 (SARS-COV-2) (CORONAVIRUS DISEASE [COVID-19]), AMPLIFIED PROBE TECHNIQUE, MAKING USE OF HIGH THROUGHPUT TECHNOLOGIES AS DESCRIBED BY CMS-2020-01-R: HCPCS | Performed by: FAMILY MEDICINE

## 2020-06-12 PROCEDURE — 10020000 ZZH LODGING PLUS FACILITY CHARGE ADULT

## 2020-06-12 PROCEDURE — H0001 ALCOHOL AND/OR DRUG ASSESS: HCPCS | Mod: HF,TEL

## 2020-06-12 RX ORDER — IBUPROFEN 200 MG
200-400 TABLET ORAL EVERY 6 HOURS PRN
COMMUNITY
End: 2022-06-22

## 2020-06-12 RX ORDER — MAGNESIUM HYDROXIDE/ALUMINUM HYDROXICE/SIMETHICONE 120; 1200; 1200 MG/30ML; MG/30ML; MG/30ML
30 SUSPENSION ORAL EVERY 6 HOURS PRN
COMMUNITY
End: 2020-07-17

## 2020-06-12 RX ORDER — ACETAMINOPHEN 325 MG/1
325-650 TABLET ORAL EVERY 4 HOURS PRN
COMMUNITY
End: 2022-06-22

## 2020-06-12 RX ORDER — AMOXICILLIN 250 MG
2 CAPSULE ORAL DAILY PRN
COMMUNITY
End: 2020-07-17

## 2020-06-12 RX ORDER — LANOLIN ALCOHOL/MO/W.PET/CERES
3 CREAM (GRAM) TOPICAL
COMMUNITY
End: 2020-07-17

## 2020-06-12 ASSESSMENT — ANXIETY QUESTIONNAIRES
7. FEELING AFRAID AS IF SOMETHING AWFUL MIGHT HAPPEN: MORE THAN HALF THE DAYS
2. NOT BEING ABLE TO STOP OR CONTROL WORRYING: SEVERAL DAYS
4. TROUBLE RELAXING: SEVERAL DAYS
5. BEING SO RESTLESS THAT IT IS HARD TO SIT STILL: MORE THAN HALF THE DAYS
1. FEELING NERVOUS, ANXIOUS, OR ON EDGE: MORE THAN HALF THE DAYS
6. BECOMING EASILY ANNOYED OR IRRITABLE: SEVERAL DAYS
3. WORRYING TOO MUCH ABOUT DIFFERENT THINGS: MORE THAN HALF THE DAYS
GAD7 TOTAL SCORE: 11

## 2020-06-12 ASSESSMENT — PATIENT HEALTH QUESTIONNAIRE - PHQ9: SUM OF ALL RESPONSES TO PHQ QUESTIONS 1-9: 10

## 2020-06-12 ASSESSMENT — PAIN SCALES - GENERAL: PAINLEVEL: MODERATE PAIN (4)

## 2020-06-12 ASSESSMENT — MIFFLIN-ST. JEOR: SCORE: 1791.79

## 2020-06-12 NOTE — PROGRESS NOTES
Progress Note    This patient had a Rule 25 Assessment on 6/12/20 completed by FERNANDA Banda.  This patient was seen for a face to face update of the Comprehensive Substance Abuse assessment on 6/12/2020 by FERNANDA Vogt.  INSIDE: The patient's Rule 25 Assessment completed on 6/12/20 is in the patient's electronic medical record in Epic in the Chart Review section under the Notes/Trans Tab.    Alcohol/Drug use since the last CD evaluation (include date of last use):     No additional substances use since the last CD evaluation     Please note any other clinical changes since the last CD evaluation (such as medication changes, additional legal charges, detoxification admissions, overdoses, etc.)     No significant changes since the last CD evaluation       ASAM Dimensions Original scores Current Scores   I.) Intoxication and Withdrawal: 1 1   II.) Biomedical:  0 0   III.) Emotional and Behavioral:  1 1   IV.) Readiness to Change:  2 2   V.) Relapse Potential: 4 4   VI.) Recovery Environmental: 3 3     Please list clinical justifications for the above ASAM score changes since the original comprehensive assessment:     None of the ASAM scores on the six dimensions had changed since the Rule 25 Assessment was completed on 6/12/20.       Current ERIK: Current UA:     0.000     Positive for Cocaine, Oxycodone and THC and negative for all other screened drugs.       PHQ-9, REZA-7   PHQ-9 on 6/12/2020 REZA-7 on 6/12/2020   The patient's PHQ-9 score was 14 out of 27, indicating moderate depression.   The patient's REZA-7 score was 12 out of 21, indicating moderate anxiety.       Rankin-Suicide Severity Rating Scale Reassessment   Have you ever wished you were dead or that you could go to sleep and not wake up?  Past Month:  No     Have you actually had any thoughts of killing yourself?  Past Month:  No     Have you been thinking about how you might do this?     Past Month:  No   Lifetime:  No   Have you had  "these thoughts and had some intention of acting on them?     Past Month:  No   Lifetime:  No   Have you started to work out the details of how to kill yourself?   Past Month:  No   Lifetime:  No   Do you intend to carry out this plan?   No     When you have the thoughts how long do they last?  The patient denied ever having any suicidal thoughts in life.     Are there things - anyone or anything (i.e. family, Yazdanism, pain of death) that stopped you from wanting to die or acting on thoughts of suicide?  Does not apply       2008  [facility]  Used with permission by Leslie Gaines, PhD.       Guide to C-SSRS Risk Ratings   NO IDEATION:  with no active thoughts IDEATION: with a wish to die. IDEATION: with active thoughts. Risk Ratings   If Yes No No 0 - Very Low Risk   If NA Yes No 1 - Low Risk   If NA Yes Yes 2 - Low/moderate risk   IDEATION: associated thoughts of methods without intent or plan INTENT: Intent to follow through on suicide PLAN: Plan to follow through on suicide Risk Ratings cont...   If Yes No No 3 - Moderate Risk   If Yes Yes No 4 - High Risk   If Yes Yes Yes 5 - High Risk   The patient's ADDITIONAL RISK FACTORS and lack of PROTECTIVE FACTORS may increase their overall suicide risk ratings.     Additional Risk Factors:    No additional risk factors   Protective Factors:    Having people in his/her life that would prevent the patient from considering a suicide attempt (i.e. young children, spouse, parents, etc.)     An absence of chronic health problems or stable and well treated chronic health issues     Having easy access to supportive family members     Having restricted access to highly lethal means of suicide     Risk Status   1. - Low Risk: Evaluation Counselors:  Document in Epic / SBAR to counselor \"Low Risk\".      Treatment Counselors:  Reassess upon admission as applicable, assess weekly in progress notes under Dimension 3 and summarize in Discharge / " Treatment summary under Dimension 3.     Additional information to support suicide risk rating: There was no additional information to provide at this time.

## 2020-06-12 NOTE — PROGRESS NOTES
Ravi Hankins       6/12/2020  [email or web address]    Dear Rodo,     It was a pleasure meeting with you today, June 12th, 2020 for your Chemical Dependency Evaluation.  Based on your evaluation the recommendations are as follows:    1)  Complete a residential based or similar treatment program, such as [facility].   2)  Abstain from all mood-altering chemicals unless prescribed by a licensed provider.   3)  Attend, at minimum, 2 weekly support group meetings, such as 12 step based (AA/NA), SMART Recovery, Health Realizations, and/or Refuge Recovery meetings.     4)  Actively work with a male mentor/sponsor on a weekly basis.   5)  Follow all the recommendations of your treatment/medical providers.  6)  Patient may benefit from participating in individual psychotherapy to address significant grief and possible depression and anxiety.    As mentioned in our phone conversation, you can reach out to Inpatient Intake at [phone] if needing a bed on detox prior to your admission into [facility].    If I can be of further service, please don't hesitate to e-mail me or leave a message at my office.     Sincerely wishing you the very best,     Annmarie Pitts, MICHAEL, LADC,CI  CD Evaluation Counselor  [facility]  [address] [place] [id number]  [email or web address] Tel: [phone]  Fax: [phone]    (securely e-mailed to the pt on 6/12/2020)

## 2020-06-12 NOTE — PROGRESS NOTES
"[facility] Nursing Health Assessment      Vital signs:     [age] F    Direct admission    Counselor: Obie  Drug of Choice: Cocaine  Last use: 6/10/20  Home clinic/MD: Ted Tidwell MD [facility]  Psychiatrist/therapist: not currently    Medical history/current conditions:  Hx of aortic root dilatation    H&P Screen:  H&P within the last 90 days: No.  Patient has been informed they are required to obtain an H&P within the next 14 days.  --Pt is scheduled for a phone visit with Butch Romano PA-C to discuss atenolol refills and Rx for Seroquel appt 6/15 8:20am      Mental Health diagnosis: none  Medication compliant?: yes  Recent sucidal thoughts? no    When? na  Current thought of self-harm? no   Plan? na    Pain assessment:   Pt. Experiencing pain at this time?  No      Nursing Assessment Summary:  COVID-19 SCREEN COMPLETED BY LP RN:      Community Medical Screen for COVID19     In the last 2 weeks how frequently have you been out in the community? infrequently     Where have you been going outside of your home? Not really leaving home     Have you been covering your nose and mouth while out in the community?yes     Who do you live with and have they been following \"stay at home\" orders? yes     Have you come in contact with anyone in the last month who \"was suspected of\" or \"tested positive\" for COVID-19? No. Pt was tested on 5/8 and was negative     \"Do you\" or \"have you\" had....any of the following symptoms in the last 2 weeks? no       Fever     Cough     Shortness of breath or difficulty breathing     Chills     Repeated shaking with chills    Muscle pain     Headache     Sore throat     New loss of taste or smell     Pt needs approval from Infection Prevention for admission to [facility]? no     Pt approved for COVID-19 screen? yes      COVID TEST COMPLETED BY [facility] ? Yes results pending    COVID-19 - Pt informed of the following while at :    Wear mask over " "nose/mouth at all times when out in pt milieu    Staff will take temperature and O2Sats twice daily    Practice good hand washing hygiene and avoid touching face    If pt has any of the symptoms below, notify staff immediately.      Fever     Cough     Shortness of breath or difficulty breathing     Chills     Repeated shaking with chills    Muscle pain     Integrative Therapies: Essential Oils    Patient requesting essential oil inhaler to manage (Mood/Mental Health/Physical/Spiritual symptoms).     Discussed appropriate use of essential oil inhalers and instructed patient not to leave labeled product out on unit.     Patient was screened for kidney disease, asthma/reactive airway disease and rashes and wounds or 1st trimester of pregnancy    List Essential Oils requested by pt lavender    Patient verbalized and demonstrated understanding of how to use essential oil inhaler correctly and will notify LP RN with any concerns or side effects. Patient agrees not to share their essential oil inhaler with other clients.  Continue to support the patient in safely utilizing integrative therapies as able to manage symptoms during treatment.       Patient tobacco use:    Do you use tobacco? Yes \"I use about a half a pack a day and I am trying to quit.\"   NRT (Nicotine Replacement therapy) ordered?pt declined  Pt is aware of the dangers of tobacco cessation and in contemplation.    Pt given written education.      On-going nursing intervention required?   No    Acute care visit recommended: no       "

## 2020-06-12 NOTE — ADDENDUM NOTE
Encounter addended [facility] on: 6/12/2020 11:16 AM   Actions taken: Clinical Note Signed
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
## 2020-06-12 NOTE — TELEPHONE ENCOUNTER
Reason for Call:  Medication or medication refill:    Do you use a [facility]?  Name of the pharmacy and phone number for the current request:     [facility]    Name of the medication requested:   atenolol (TENORMIN) 100 MG tablet  30 tablet  0  5/15/2020     Seroquel     Other request:  Pt needs refill for atenolo and would to start  Seroquel     Can we leave a detailed message on this number? YES    Phone number patient can be reached at: Other phone number:  [phone]    Best Time: any    Call taken on 6/12/2020 at 3:42 PM by Marlo Parkinson

## 2020-06-12 NOTE — PROGRESS NOTES
"The patient has been notified of the following:     \"We have found that certain health care needs can be provided without the need for a face to face visit.  This service lets us provide the care you need with a phone conversation.      I will have full access to your [facility] [place] medical record during this entire phone call.   I will be taking notes for your medical record.     Since this is like an office visit, we will bill your insurance company for this service.  If your insurance denies the charge we will appeal and/or write off the cost of the service.  The Governor's executive order may result in expanded health insurance coverage for this service, which would be paid by your insurance.         There are potential benefits and risks of telephone visits (e.g. limits to patient confidentiality) that differ from in-person visits.?  Confidentiality still applies for telephone services, and nobody will record the visit.  It is important to be in a quiet, private space that is free of distractions (including cell phone or other devices) during the visit.??     If during the course of the call I believe a telephone visit is not appropriate, you will not be charged for this service\"    Consent has been obtained for this service by care team member: Yes    Pt also gave verbal consent for LIDIA to and from:       Himself, [phone] [email or web address]     His Emergency Contact:  Christal Hankins (Mom)   Tel:[phone]    His PCP, Dr. Tidwell at [facility], [phone].    Phone visit start time: 8:00 a.m.  Phone visit end time:  9:30 a.m.    Rule 25 Assessment  Background Information   1. Date of Assessment Request  2. Date of Assessment  6/12/2020 3. Date Service Authorized     4.   FERNANDA Montoya   5.  Phone Number   [phone]  6. Referent  Self 7. Assessment Site  [facility]     8. Client Name   Ravi Hankins 9. Date of " "Birth  1990 Age  30 year old 10. Gender  male  11. PMI/ Insurance No.  [id number]   12. Client's Primary Language:  English 13. Do you require special accommodations, such as an  or assistance with written material? No   14. Current Address: [address] [place] [id number]   15. Client Phone Numbers: [phone] (home)      16. Tell me what has happened to bring you here today.    \"I use alcohol, opiates and cocaine.  It's a problem and I want to get into [facility].  I was in that program over 4-5 years ago.\"    17. Have you had other rule 25 assessments?     Yes. When, Where, and What circumstances: \"I have had two before 4-5 years ago and another about 6 months before that.\"    DIMENSION I - Acute Intoxication /Withdrawal Potential   1. Chemical use most recent 12 months outside a facility and other significant use history (client self-report)              X = Primary Drug Used   Age of First Use Most Recent Pattern of Use and Duration   Need enough information to show pattern (both frequency and amounts) and to show tolerance for each chemical that has a diagnosis   Date of last use and time, if needed   Withdrawal Potential? Requiring special care Method of use  (oral, smoked, snort, IV, etc)   X   Alcohol     13 \"When I was younger I did not use much, but at age 16 it increased and continued until the present.  It varies, but typically I   use a lot, a half pint to a pint daily.\" 6/11/2020  \"half pint of Cognac.\" high oral   X   Marijuana/  Hashish   13 \" I typically smoke a couple bowls before bed for as long as I can remember.\" \"couple hours ago, a couple bowls.\" moderate smoke   X   Cocaine/Crack     17 \"In the beginning it was only a couple of lines, but it became progressively worse.  If I get drunk I want cocaine.  I use a couple grams over a weeks time.\" 6/10/2020  \"about a gram that day.\" moderate snort      Meth/  Amphetamines   No use          Heroin     24 " "'That was what I went to tx for .\" \"4-5 years ago.\"     X   Other Opiates/  Synthetics   20 \"If I am just doing coke and not drinking I use Percocet to calm down.  I use a couple 30 mg in a day once or twice a week.\" 6/9/2020  \"a 30 mg pill.\" moderate snort      Inhalants     No use          Benzodiazepines     No use          Hallucinogens     No use          Barbiturates/  Sedatives/  Hypnotics No use          Over-the-Counter Drugs   No use          Other     No use       X   Nicotine     12 \"I use about a half a pack a day and I am trying to quit.\" 6/12/2020 none smoke     2. Do you use greater amounts of alcohol/other drugs to feel intoxicated or achieve the desired effect?  Yes.  Or use the same amount and get less of an effect?  Yes.  Example: The patient reported having increased use and tolerance issues with alcohol and powder cocaine.    3A. Have you ever been to detox?     Yes    3B. When was the first time?     4-5 years ago.    3C. How many times since then?     none    3D. Date of most recent detox:     \"4-5 years ago.\"    4.  Withdrawal symptoms: Have you had any of the following withdrawal symptoms?  Past 12 months Recent (past 30 days)   Sweating (Rapid Pulse)  Unable to Sleep  Agitation  Headache  Fatigue / Extremely Tired  Sad / Depressed Feeling  Muscle Aches  Vivid / Unpleasant Dreams  Irritability  Sensitivity to Noise  Nausea / Vomiting  Diarrhea  Diminished Appetite  Unable to Eat  Anxiety / Worried Sweating (Rapid Pulse)  Unable to Sleep  Agitation  Headache  Fatigue / Extremely Tired  Sad / Depressed Feeling  Muscle Aches  Vivid / Unpleasant Dreams  Irritability  Sensitivity to Noise  Nausea / Vomiting  Diarrhea  Diminished Appetite  Unable to Eat  Anxiety / Worried     's Visual Observations and Symptoms: telephone visit, could not assess.    Based on the above information, is withdrawal likely to require attention as part of treatment participation?  Yes    Dimension I Ratings " "  Acute intoxication/Withdrawal potential - The placing authority must use the criteria in Dimension I to determine a client s acute intoxication and withdrawal potential.    RISK DESCRIPTIONS - Severity ratin Client has some difficulty tolerating and coping with withdrawal discomfort. Client's intoxication may be severe, but responds to support and treatment such that the client does not immediately endanger self or others. Client displays moderate signs and symptoms with moderate risk of severe withdrawal.    REASONS SEVERITY WAS ASSIGNED (What about the amount of the person s use and date of most recent use and history of withdrawal problems suggests the potential of withdrawal symptoms requiring professional assistance? )     Patient reports that his last use of alcohol was on 2020 when he drank a half pint of Cognac.  His last use of Percocet was on 2020 when he had a 30 mg pill and marijuana was \"a couple hours ago, 2 bowls.\"   He had about a gram of cocaine on 6/10/2020.  Patient reports several symptoms of intoxication or withdrawal.   The pt is currently using and will likely experience withdrawal from alcohol and opiates should he cease his use.  The pt may be in need of detox should they pursue treatment.       DIMENSION II - Biomedical Complications and Conditions   1a. Do you have any current health/medical conditions?(Include any infectious diseases, allergies, or chronic or acute pain, history of chronic conditions)       No, although he takes Atenolol for high blood pressure and has lidocaine for a recent hemorrhoid.     1b. On a scale of mild, moderate to severe please specify the severity of the patient's diabetes and/or neuropathy.    The patient denied having a history of being diagnosed with diabetes or neuropathy.    2. Do you have a health care provider? When was your most recent appointment? What concerns were identified?     The patient's PCP is Dr. Tidwell at [facility], " "[phone].    3. If indicated by answers to items 1 or 2: How do you deal with these concerns? Is that working for you? If you are not receiving care for this problem, why not?      The patient denied having any current clinical health issues.    4A. List current medication(s) including over-the-counter or herbal supplements--including pain management:     Current Outpatient Medications   Medication     atenolol (TENORMIN) 100 MG tablet     lidocaine (XYLOCAINE) 2 % external gel     No current facility-administered medications for this encounter.      Facility-Administered Medications Ordered in Other Encounters   Medication     Self Administer Medications: Behavioral Services       4B. Do you follow current medical recommendations/take medications as prescribed?     Yes    4C. When did you last take your medication?     2020      4D. Do you need a referral to have a follow up with a primary care physician?    No.    5. Has a health care provider/healer ever recommended that you reduce or quit alcohol/drug use?     Yes    6. Are you pregnant?     NA, because the patient is male    7. Have you had any injuries, assaults/violence towards you, accidents, health related issues, overdose(s) or hospitalizations related to your use of alcohol or other drugs:     Yes, explain: \"I did have some bad dope 4-5 years ago and that's part of the reason I went to tx.\"    8. Do you have any specific physical needs/accommodations? No    Dimension II Ratings   Biomedical Conditions and Complications - The placing authority must use the criteria in Dimension II to determine a client s biomedical conditions and complications.   RISK DESCRIPTIONS - Severity ratin Client displays full functioning with good ability to cope with physical discomfort.    REASONS SEVERITY WAS ASSIGNED (What physical/medical problems does this person have that would inhibit his or her ability to participate in treatment? What issues does he or she " "have that require assistance to address?)    The pt reports that he has no significant medical concerns and sees  His PCP when needed.  The pt does take medication for high blood pressure and also has lidocaine gel for a recent hemorrhoid. The pt denies any medical conditions that would interfere with tx.  He is a daily smoker, smoking a half a pack of cigarettes and he would like to quit.  He reports general pain at a level of 4 out of 10.       DIMENSION III - Emotional, Behavioral, Cognitive Conditions and Complications   1. (Optional) Tell me what it was like growing up in your family. (substance use, mental health, discipline, abuse, support)     \"Actually I grew up pretty good.  My parents  when I was 13 and it went down hill from there.  I have two brothers and one sister.  My oldest brother passed away in 1994. I am the baby.  There was no abuse.\"    2. When was the last time that you had significant problems...  A. with feeling very trapped, lonely, sad, blue, depressed or hopeless  about the future? Past Month-\"I recently lost my little sister (his niece) to an overdose.  She passed just over a year ago.\"    B. with sleep trouble, such as bad dreams, sleeping restlessly, or falling  asleep during the day? Past Month-\"I have a lot of things on my mind.  I feel guilty like I should have been a better support to my niece.\"    C. with feeling very anxious, nervous, tense, scared, panicked, or like  something bad was going to happen? Past Month-\"I need help and I need to get into tx.\"    D. with becoming very distressed and upset when something reminded  you of the past? Past Month-\"I feel like I have done the wrong things in my life and could have been further along in my life.\"    E. with thinking about ending your life or committing suicide? Never    3. When was the last time that you did the following things two or more times?  A. Lied or conned to get things you wanted or to avoid having to " "do  something? Past Month    B. Had a hard time paying attention at school, work, or home? Past month-\"My mind has too many things that it worry about.\"    C. Had a hard time listening to instructions at school, work, or home? Never    D. Were a bully or threatened other people? Never    E. Started physical fights with other people? Past Month- \"My cousin and I got into an argument and I was under the influence.\"    Note: These questions are from the Global Appraisal of Individual Needs--Short Screener. Any item marked  past month  or  2 to 12 months ago  will be scored with a severity rating of at least 2.     For each item that has occurred in the past month or past year ask follow up questions to determine how often the person has felt this way or has the behavior occurred? How recently? How has it affected their daily living? And, whether they were using or in withdrawal at the time?    See above    4A. If the person has answered item 2E with  in the past year  or  the past month , ask about frequency and history of suicide in the family or someone close and whether they were under the influence.     The patient denied any family member or someone close to the patient had ever completed suicide.    Any history of suicide in your family? Or someone close to you?     The patient denied any family member or someone close to the patient had ever completed suicide.    4B. If the person answered item 2E  in the past month  ask about  intent, plan, means and access and any other follow-up information  to determine imminent risk. Document any actions taken to intervene  on any identified imminent risk.      The patient denied having any suicide ideation within the past month.    5A. Have you ever been diagnosed with a mental health problem?     No      5B. Are you receiving care for any mental health issues? If yes, what is the focus of that care or treatment?  Are you satisfied with the service? Most recent " "appointment?  How has it been helpful?     The patient denied having any current or past mental health issues that had required treatment.    6. Have you been prescribed medications for emotional/psychological problems?     The patient denied having any history of being prescribed psychotropic medications for mental health issues.    7. Does your MH provider know about your use?     The patient does not currently have any mental health providers.    8A. Have you ever been verbally, emotionally, physically or sexually abused?      The patient denied having any history of being verbally, emotionally, physically or sexually abused.     Follow up questions to learn current risk, continuing emotional impact.      The patient denied having any history of being verbally, emotionally, physically or sexually abused.    8B. Have you received counseling for abuse?      The patient denied having any history of being verbally, emotionally, physically or sexually abused.    9. Have you ever experienced or been part of a group that experienced community violence, historical trauma, rape or assault?     No.    10A. :    No    11. Do you have problems with any of the following things in your daily life?    Headaches, Concentration, Remembering, In relationships with others and Fights, being fired, arrests      Note: If the person has any of the above problems, follow up with items 12, 13, and 14. If none of the issues in item 11 are a problem for the person, skip to item 15.    The patient would benefit from developing sober coping skills.    12. Have you been diagnosed with traumatic brain injury or Alzheimer s?  No    13. If the answer to #12 is no, ask the following questions:    Have you ever hit your head or been hit on the head? Yes, \"when I was a kid I was pushed into a concrete wall while playing basketball.  I had a severe concussion.\"  Were you ever seen in the Emergency Room, hospital or by a doctor because of an " "injury to your head? Yes    Have you had any significant illness that affected your brain (brain tumor, meningitis, West Nile Virus, stroke or seizure, heart attack, near drowning or near suffocation)? No    14. If the answer to #12 is yes, ask if any of the problems identified in #11 occurred since the head injury or loss of oxygen. No    15A. Highest grade of school completed:     High school graduate/GED    15B. Do you have a learning disability? No    15C. Did you ever have tutoring in Math or English? Yes    15D. Have you ever been diagnosed with Fetal Alcohol Effects or Fetal Alcohol Syndrome? No    16. If yes to item 15 B, C, or D: How has this affected your use or been affected by your use?     'It does not.\"    Dimension III Ratings   Emotional/Behavioral/Cognitive - The placing authority must use the criteria in Dimension III to determine a client s emotional, behavioral, and cognitive conditions and complications.   RISK DESCRIPTIONS - Severity ratin Client has impulse control and coping skills. Client presents a mild to moderate risk of harm to self or others or displays symptoms of emotional, behavioral or cognitive problems. Client has a mental health diagnosis and is stable. Client functions adequately in significant life areas.    REASONS SEVERITY WAS ASSIGNED - What current issues might with thinking, feelings or behavior pose barriers to participation in a treatment program? What coping skills or other assets does the person have to offset those issues? Are these problems that can be initially accommodated by a treatment provider? If not, what specialized skills or attributes must a provider have?    The pt denies any mental health diagnosis and denies taking any medication for any mental health concerns.  He does express significant grief over the loss of his niece, whom he calls his \"little sister\" just over a year ago.  Pt denies a history of any form of abuse. At the time of the assessment, " "pt's PHQ-9 score was 10 indicating moderate depression and his REZA-7 score was 11 indicating moderate anxiety.  Pt denies SIB, SA, suicidal thoughts at this time or at any time during his lifetime. During pt's assessment, his Gem-Suicide Severity Rating Scale score was 0. - Very Low Risk:  Evaluation Counselors:  Document in Epic / SBAR to counselor \"Very Low Risk\".      Treatment Counselors:  Reassess upon admission as applicable, assess weekly in progress notes under Dimension 3 and summarize in Discharge / Treatment summary under Dimension 3. indicating a level of risk.  Pt lacks emotional and stress management skills.  He would benefit from beginning individual mental health therapy to address his feelings of grief as well as other feelings he is experiencing.       DIMENSION IV - Readiness for Change   1. You ve told me what brought you here today. (first section) What do you think the problem really is?     \"I hve never really spoke with anyone about my feelings, grief resentment.  I need counseling.\"    2. Tell me how things are going. Ask enough questions to determine whether the person has use related problems or assets that can be built upon in the following areas:     Family/friends/relationships; \"My brother and I have not been the closest.  He does not approve of my use.  I do have friends that don't use, but I don't hang out with them.\"  Legal; \"I had a DWI and a DUI a month apart over 2 and a half years ago.  I have no felonies.\"  Financial; 'I am just making it and the money goes to drugs.'  Emotional; \"I have a lot to talk about with someone.\"  Educational; \"I would like to go back to school.  I like working with kids.\"   Recreational/ leisure; \"It has been hard with COVID-19.  I am just [place] walks [place] or hang out with my family.  I like basketball and roller skating. When I am using, I stay home and sometimes I go out.\"  Vocational/employment; \"I was working about 5 months ago " "and have been on unemployment.  I worked about 30 hours a week.\"  Living arrangements (DSM): \"I live with my Mom.  She has been sober for over 30 years.\"        3. What activities have you engaged in when using alcohol/other drugs that could be hazardous to you or others (i.e. driving a car/motorcycle/boat, operating machinery, unsafe sex, sharing needles for drugs or tattoos, etc     The patient denied engaging in any of the above dangerous activities when using alcohol and/or drugs except for DWI..    4. How much time do you spend getting, using or getting over using alcohol or drugs? (DSM)     \"At least half a day.\"    5. Reasons for drinking/drug use (Use the space below to record answers. It may not be necessary to ask each item.)  Like the feeling Yes   Trying to forget problems Yes   To cope with stress Yes   To relieve physical pain Yes   To cope with anxiety Yes   To cope with depression Yes   To relax or unwind Yes   Makes it easier to talk with people No   Partner encourages use No   Most friends drink or use Yes   To cope with family problems Yes   Afraid of withdrawal symptoms/to feel better Yes   Other (specify)  No     A. What concerns other people about your alcohol or drug use/Has anyone told you that you use too much? What did they say? (DSM)     \"My brother and others are concerned because I have been using too long, I am not getting anywhere and they don't want to lose me.\"    B. What did you think about that/ do you think you have a problem with alcohol or drug use?     \"I am an addict.\"    6. What changes are you willing to make? What substance are you willing to stop using? How are you going to do that? Have you tried that before? What interfered with your success with that goal?      \"I am willing to quit everything and go to tx.\"    7. What would be helpful to you in making this change?     \"I need mental health counseling and really paying attention in tx.\"    Dimension IV Ratings " "  Readiness for Change - The placing authority must use the criteria in Dimension IV to determine a client s readiness for change.   RISK DESCRIPTIONS - Severity ratin Client displays verbal compliance, but lacks consistent behaviors; has low motivation for change; and is passively involved in treatment.    REASONS SEVERITY WAS ASSIGNED - (What information did the person provide that supports your assessment of his or her readiness to change? How aware is the person of problems caused by continued use? How willing is she or he to make changes? What does the person feel would be helpful? What has the person been able to do without help?)      Patient admits he has a problem with alcohol, cocaine, opiates, marijuana and nicotine..  Patient has some insight into the effects substance use has had on his physical and mental health but lacks consistent behaviors and follow-through for sobriety. Pt has continued to use despite feeling unhealthy, interpersonal conflicts, increased financial stress and feelings of depression related to long-term use. Pt is willing to attend residential tx.  Pt appears to be in the Preparation Stage within the Stages of Change Model.        DIMENSION V - Relapse, Continued Use, and Continued Problem Potential   1A. In what ways have you tried to control, cut-down or quit your use? If you have had periods of sobriety, how did you accomplish that? What was helpful? What happened to prevent you from continuing your sobriety? (DSM)     \"I went to tx 4-5 years ago.  I was sober for 3 weeks in tx.\"    1B. What were the circumstances of your most recent relapse with mood altering chemicals?    'I did not take tx seriously enough and I did not change my environment.  I need to deal with my mental state and the losses I have experienced.\"    2. Have you experienced cravings? If yes, ask follow up questions to determine if the person recognizes triggers and if the person has had any success in " "dealing with them.     The patient reported having cravings to use mood altering chemicals on an almost daily basis.  \"I crave alcohol and then cocaine and cravings are at a 9 out of 10.\"    3. Have you been treated for alcohol/other drug abuse/dependence? Yes.    3B. Number of times(lifetime) (over what period) \"I have been in to tx twice.\"  3C. Number of times completed treatment (lifetime) none.    3D. During the past three years have you participated in outpatient and/or residential?  No    4. Support group participation: Have you/do you attend support group meetings to reduce/stop your alcohol/drug use? How recently? What was your experience? Are you willing to restart? If the person has not participated, is he or she willing?     \"The last time I went was a year ago for probation due to DUI.  I am willing to go.\"    5. What would assist you in staying sober/straight?     \"Being able to talk with someone about my feelings.\"    Dimension V Ratings   Relapse/Continued Use/Continued problem potential - The placing authority must use the criteria in Dimension V to determine a client s relapse, continued use, and continued problem potential.   RISK DESCRIPTIONS - Severity ratin No awareness of the negative impact of mental health problems or substance abuse. No coping skills to arrest mental health or addiction illnesses, or prevent relapse.    REASONS SEVERITY WAS ASSIGNED - (What information did the person provide that indicates his or her understanding of relapse issues? What about the person s experience indicates how prone he or she is to relapse? What coping skills does the person have that decrease relapse potential?)      Patient has attended 2 CD treatments, completing neither of those treatments.  Pt has minimally  attended 12 step groups and is interested in going again.  The pt reported having no sober time except for the 3 weeks he was in [facility] 4-5 years ago.  Pt identified his triggers as " "hanging out with the wrong people and not dealing with his feelings as well as strong cravings at a 9 out of 10. Pt lacks minimal impulse control along with lacking sober coping skills. Pt is currently using and is at a high risk for continued use.       DIMENSION VI - Recovery Environment   1. Are you employed/attending school? Tell me about that.     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 Describe a typical day; evening for you. Work, school, social, leisure, volunteer, spiritual practices. Include time spent obtaining, using, recovering from drugs or alcohol. (DSM)     \"I wake up, eat and start to drink.  I may take a walk.  My tolerance is so high.  In the evening I don't do much.  I drink and sometimes use cocaine.  I smoke 2 bowls of pot so I can sleep.\"      Please describe what leisure activities have been associated with your substance abuse:     \"Nothing really.\"    2B. How often do you spend more time than you planned using or use more than you planned? (DSM)     \"every day.\"    3. How important is using to your social connections? Do many of your family or friends use?     \"Most of the friends I see use.\"    4A. Are you currently in a significant relationship?     No    4C. Sexual Orientation:     Heterosexual    5A. Who do you live with?      \"I live with my Mom.'    5B. Tell me about their alcohol/drug use and mental health issues.     'My Mom has been in recovery for more than 30 years.  She has no mental health concerns\"    5C. Are you concerned for your safety there? No    5D. Are you concerned about the safety of anyone else who lives with you? No    6A. Do you have children who live with you?     No    6B. Do you have children who do not live with you?     No    7A. Who supports you in making changes in your alcohol or drug use? What are they willing to do to support you? Who is upset or angry about you making changes in your alcohol or drug use? How big a " "problem is this for you?      \"My Mom and I know my brother would support me.\"    7B. This table is provided to record information about the person s relationships and available support It is not necessary to ask each item; only to get a comprehensive picture of their support system.  How often can you count on the following people when you need someone?   Partner / Spouse The patient does not have a current partner or spouse.   Parent(s)/Aunt(s)/Uncle(s)/Grandparents Always supportive   Sibling(s)/Cousin(s) Always supportive   Child(jeane) The patient doesn't have any children.   Other relative(s) Always supportive   Friend(s)/neighbor(s) Always supportive   Child(jeane) s father(s)/mother(s) Pt does not have children   Support group member(s) Always supportive  The patient denied having any current involvement with 12-step or other support group meetings.   Community of rubi members The patient denied having any current involvement with community rubi members.   /counselor/therapist/healer The patient denied having any current involvement with a , counselor, therapist or healer.   Other (specify) No     8A. What is your current living situation?     See above    8B. What is your long term plan for where you will be living?     \" I want to get my own place.\"    8C. Tell me about your living environment/neighborhood? Ask enough follow up questions to determine safety, criminal activity, availability of alcohol and drugs, supportive or antagonistic to the person making changes.      \"I have no concerns.\"    9. Criminal justice history: Gather current/recent history and any significant history related to substance use--Arrests? Convictions? Circumstances? Alcohol or drug involvement? Sentences? Still on probation or parole? Expectations of the court? Current court order? Any sex offenses - lifetime? What level? (DSM)    HX of a DWI and a DUI \"a month apart over two and a half years ago.\"    10. " What obstacles exist to participating in treatment? (Time off work, childcare, funding, transportation, pending correction time, living situation)     The patient denied having any obstacles for participating in substance abuse treatment.    Dimension VI Ratings   Recovery environment - The placing authority must use the criteria in Dimension VI to determine a client s recovery environment.   RISK DESCRIPTIONS - Severity rating: 3 Client is not engaged in structured, meaningful activity and the client's peers, family, significant other, and living environment are unsupportive, or there is significant criminal justice system involvement.    REASONS SEVERITY WAS ASSIGNED - (What support does the person have for making changes? What structure/stability does the person have in his or her daily life that will increase the likelihood that changes can be sustained? What problems exist in the person s environment that will jeopardize getting/staying clean and sober?)     The pt currently lives with his mother who has no current issues with chemical dependency or mental health concerns.  The pt has no concerns about his living situation or neighborhood although he would like ot get a place of his own.  He has a history of a DWI and a DUI over 2 and a half years ago and denies any other legal problems. The patient reported  having a relationship conflict with his brother and others due to his ongoing substance abuse issues. The patient identified as being heterosexual and he  denied being in a romantic relationship at this time.  The patient reported having some increased financial stress due to his use.  The patient lacks a current sober peer support network.  He is currently receiving unemployment benefits.       Client Choice/Exceptions   Would you like services specific to language, age, gender, culture, Mu-ism preference, race, ethnicity, sexual orientation or disability?  No    What particular treatment choices and options  would you like to have? residential    Do you have a preference for a particular treatment program? [facility].    Criteria for Diagnosis     Criteria for Diagnosis  DSM-5 Criteria for Substance Use Disorder  Instructions: Determine whether the client currently meets the criteria for Substance Use Disorder using the diagnostic criteria in the DSM-V pp.[phone]. Current means during the most recent 12 months outside a facility that controls access to substances    Category of Substance Severity (ICD-10 Code / DSM 5 Code)     Alcohol Use Disorder Severe  (10.20) (303.90)   Cannabis Use Disorder Severe   (F12.20) (304.30)   Hallucinogen Use Disorder The patient does not meet the criteria for a Hallucinogen use disorder.   Inhalant Use Disorder The patient does not meet the criteria for an Inhalant use disorder.   Opioid Use Disorder Moderate (F11.20) (304.00)   Sedative, Hypnotic, or Anxiolytic Use Disorder The patient does not meet the criteria for a Sedative/Hypnotic use disorder.   Stimulant Related Disorder Severe-(cociane) 9304.200 (14.20)   Tobacco Use Disorder Moderate   (F17.200) (305.1)   Other (or unknown) Substance Use Disorder The patient does not currently meet the criteria for a Other (or unknown) Substance use disorder, but has a history of snorting Heroin.       Collateral Contact Summary   Number of contacts made: none    Contact with referring person:  Yes    If court related records were reviewed, summarize here: No court records had been reviewed at the time of this documentation.      Rule 25 Assessment Summary and Plan   's Recommendation    1)  Complete a residential based or similar treatment program, such as [facility].   2)  Abstain from all mood-altering chemicals unless prescribed by a licensed provider.   3)  Attend, at minimum, 2 weekly support group meetings, such as 12 step based (AA/NA), SMART Recovery, Health Realizations, and/or  [facility] Recovery meetings.     4)  Actively work with a male mentor/sponsor on a weekly basis.   5)  Follow all the recommendations of your treatment/medical providers.  6)  Patient may benefit from participating in individual psychotherapy to address significant grief and possible depression and anxiety.      Collateral Contacts     Name    [facility] EMR   Relationship    NA Phone Number    NA Releases    NA       Information Provided:  This writer reviewed this patients Electronic Medical Record and the information reviewed largely supports the information the patient reported during their CD evaluation.      ollateral Contacts      A problematic pattern of alcohol/drug use leading to clinically significant impairment or distress, as manifested by at least two of the following, occurring within a 12-month period:    1.) Alcohol/drug is often taken in larger amounts or over a longer period than was intended.  2.) There is a persistent desire or unsuccessful efforts to cut down or control alcohol/drug use  3.) A great deal of time is spent in activities necessary to obtain alcohol, use alcohol, or recover from its effects.  4.) Craving, or a strong desire or urge to use alcohol/drug  6.) Continued alcohol use despite having persistent or recurrent social or interpersonal problems caused or exacerbated by the effects of alcohol/drug.  8.) Recurrent alcohol/drug use in situations in which it is physically hazardous.  10.) Tolerance, as defined by either of the following: A need for markedly increased amounts of alcohol/drug to achieve intoxication or desired effect. and A markedly diminished effect with continued use of the same amount of alcohol/drug.  11.) Withdrawal, as manifested by either of the following: The characteristic withdrawal syndrome for alcohol/drug (refer to Criteria A and B of the criteria set for alcohol/drug withdrawal). and Alcohol/drug (or a closely related substance, such as a  benzodiazepine) is taken to relieve or avoid withdrawal symptoms.      Specify if: In early remission:  After full criteria for alcohol/drug use disorder were previously met, none of the criteria for alcohol/drug use disorder have been met for at least 3 months but for less than 12 months (with the exception that Criterion A4,  Craving or a strong desire or urge to use alcohol/drug  may be met).     In sustained remission:   After full criteria for alcohol use disorder were previously met, non of the criteria for alcohol/drug use disorder have been met at any time during a period of 12 months or longer (with the exception that Criterion A4,  Craving or strong desire or urge to use alcohol/drug  may be met).   Specify if:   This additional specifier is used if the individual is in an environment where access to alcohol is restricted.    Mild: Presence of 2-3 symptoms  Moderate: Presence of 4-5 symptoms  Severe: Presence of 6 or more symptoms

## 2020-06-12 NOTE — PROGRESS NOTES
Initial Services Plan        Service Initiation Date: 6/12/2020    Immediate health and/or safety concerns: No    Identify health and safety concern(s) below and include plan to address:    None Identified    Treatment suggestions for client during the time between intake (admit date) and completion of the individual treatment plan:     Look for a sober support network, i.e. 12 step, Smart Recovery, [facility], etc  Tour the treatment center or outpatient clinic  Introduce yourself to your treatment group. Spend time getting to know your peers  Review your patient or client handbook  Begin working on your treatment goal list    Completed by: FERNANDA Vogt  Date completed: 6/12/2020 at 2:54 PM

## 2020-06-13 ENCOUNTER — HOSPITAL ENCOUNTER (OUTPATIENT)
Dept: BEHAVIORAL HEALTH | Facility: CLINIC | Age: 30
End: 2020-06-13
Attending: FAMILY MEDICINE
Payer: COMMERCIAL

## 2020-06-13 VITALS
TEMPERATURE: 98.4 F | DIASTOLIC BLOOD PRESSURE: 84 MMHG | SYSTOLIC BLOOD PRESSURE: 126 MMHG | HEART RATE: 97 BPM | OXYGEN SATURATION: 98 %

## 2020-06-13 PROCEDURE — H2035 A/D TX PROGRAM, PER HOUR: HCPCS | Mod: HQ

## 2020-06-13 PROCEDURE — 10020000 ZZH LODGING PLUS FACILITY CHARGE ADULT

## 2020-06-13 ASSESSMENT — ANXIETY QUESTIONNAIRES: GAD7 TOTAL SCORE: 11

## 2020-06-14 ENCOUNTER — HOSPITAL ENCOUNTER (OUTPATIENT)
Dept: BEHAVIORAL HEALTH | Facility: CLINIC | Age: 30
End: 2020-06-14
Attending: FAMILY MEDICINE
Payer: COMMERCIAL

## 2020-06-14 PROCEDURE — 10020000 ZZH LODGING PLUS FACILITY CHARGE ADULT

## 2020-06-14 PROCEDURE — H2035 A/D TX PROGRAM, PER HOUR: HCPCS | Mod: HQ

## 2020-06-15 ENCOUNTER — VIRTUAL VISIT (OUTPATIENT)
Dept: FAMILY MEDICINE | Facility: CLINIC | Age: 30
End: 2020-06-15
Payer: COMMERCIAL

## 2020-06-15 ENCOUNTER — HOSPITAL ENCOUNTER (OUTPATIENT)
Dept: BEHAVIORAL HEALTH | Facility: CLINIC | Age: 30
End: 2020-06-15
Attending: FAMILY MEDICINE
Payer: COMMERCIAL

## 2020-06-15 VITALS — OXYGEN SATURATION: 100 % | TEMPERATURE: 97.8 F

## 2020-06-15 DIAGNOSIS — G47.00 INSOMNIA, UNSPECIFIED TYPE: Primary | ICD-10-CM

## 2020-06-15 DIAGNOSIS — I77.810 AORTIC ROOT DILATATION (H): ICD-10-CM

## 2020-06-15 DIAGNOSIS — F19.10 SUBSTANCE ABUSE (H): ICD-10-CM

## 2020-06-15 PROCEDURE — 99214 OFFICE O/P EST MOD 30 MIN: CPT | Mod: 95 | Performed by: PHYSICIAN ASSISTANT

## 2020-06-15 RX ORDER — ATENOLOL 100 MG/1
100 TABLET ORAL DAILY
Qty: 30 TABLET | Refills: 11 | Status: SHIPPED | OUTPATIENT
Start: 2020-06-15 | End: 2020-07-23

## 2020-06-15 RX ORDER — QUETIAPINE FUMARATE 25 MG/1
25 TABLET, FILM COATED ORAL
Qty: 30 TABLET | Refills: 0 | Status: SHIPPED | OUTPATIENT
Start: 2020-06-15 | End: 2020-07-23

## 2020-06-15 NOTE — PROGRESS NOTES
"Writer and co-staff Darrin spoke with patient this morning informing him that he would be discharged on behavioral grounds for possessing his cell phone in treatment, holding open an exterior door locked for covid health and safety reasons so that Pt and another pt could smoke, vulgar language with staff, and being \"look out\" for two other patients that were engaging sexual acts. Pt had been warned that continued violation of L+ rules would result in discharge and Pt continued to violate rules and behave dishonestly. Pt was vulgar toward staff and others when informed of discharge and was throwing objects in his room. Pt was escorted by security from . Writer gave Pt referral for area crisis residences that would provide a 10 day safe sober environment, MI/CD services, and assistance to Pt to secure placement at an all male residential treatment facility.   "

## 2020-06-15 NOTE — PROGRESS NOTES
[address] [place] [address]                  Ravi Hankins, 1990, was admitted for evaluation/treatment of chemical dependency at [facility].  This person took part in these program(s):    ______ The Inpatient Program   ______ The Outpatient Program   __x____ The [facility]   ______ [facility] Out[facility]       Date admitted: 6-12-20  Date discharged: 6-15-20    Type of discharge:   ______ Satisfactory - completed evaluation / treatment   ______ Discharged without completing   ___x___ Behavioral discharge   ______ Transferred to another chemical dependency program   ______ Transferred to another type of service   ______ Left against medical advice (AMA) / Eloped       Comments:         Counselor: FERNANDA Wood                       Date: 6/15/2020             Time: 1:14 PM

## 2020-06-15 NOTE — PROGRESS NOTES
"Around 10pm, the 6th floor staff saw on camera that this pt went inside a peer s room and the peer stayed outside his own room watching towards the  as loud music was being played inside the room. As soon the 6th floor staff went to check on them following their suspicious behavior, the peer quickly went inside his room seeing the staff coming and said something to this pt, while this pt was found by the staff inside the restroom which was very suspicious. Staff told them both to empty their pockets and show whatever they have. They both didn't comply to the staff and instead complaining that staff can't touch them. The staff brought them both to writer's office and as soon they all got inside the writer's office. They both were using \"F\" word towards the staff in the presence of this writer, and especially this pt was very loud and disrespectful towards the 6th floor staff saying that he doesn't trust him and don't want to talk to the staff. Writer told them both to watch out for their language and be respectful to the staff. Per staffs' decision, writer took their UA/BA while the 6th floor staff searched their rooms with the 5th floor staff. Later they complied to show their pockets and Rodo turned in his cell phone which he was hiding earlier. Writer told this pt that he signed the paper of electronic policy and reminded him how clearly writer told him during the orientation that possessing cell phone is a ground for discharge, and how writer gave him chance to turn in the phone if he hasn't turn in the phone in the morning on the day of admission. This pt told the writer that he didn't realized having a phone with him.     "

## 2020-06-15 NOTE — NURSING NOTE
"Chief Complaint   Patient presents with     Sleep Problem     requesting seroquel     Heart Problem     initial There were no vitals taken for this visit. Estimated body mass index is 21.57 kg/m  as calculated from the following:    Height as of 6/12/20: 1.88 m (6' 2\").    Weight as of 6/12/20: 76.2 kg (168 lb).  BP completed using cuff size: regular.  L  R arm      Health Maintenance that is potentially due pending provider review:      Carlos Alberto Conn ma  "

## 2020-06-15 NOTE — PROGRESS NOTES
Name: Ravi ZAZUETA Hankins  Date: 6/14/2020  Medical Record: [id number]  Envelope Number: [id number]  List of Contents (List each item separately in new row):   1 cracked cell phone  Admission:  I am responsible for any personal items that are not sent to the safe or pharmacy.  [facility] is not responsible for loss, theft or damage of any property in my possession.  Patient Signature:  ___________________________________________     Date/Time:__________________________    Staff Signature: __________________________________       Date/Time:__________________________    2nd Staff person, if patient is unable/unwilling to sign:      __________________________________________________________       Date/Time: __________________________  Discharge:  [facility] has returned all of my personal belongings:    Patient Signature: ________________________________________     Date/Time: ____________________________________  Staff Signature: ______________________________________     Date/Time:_____________________________________

## 2020-06-15 NOTE — PROGRESS NOTES
"Talked to pt about following all the rules. Pt. Was informed the his his use of the \"F\" word and any other swear words would be grounds foe discharge. Pt.knows what he needs to do to continue in the treatment program. LP night staff heard pt agree.  "

## 2020-06-15 NOTE — PROGRESS NOTES
CHEMICAL DEPENDENCY DISCHARGE SUMMARY    PATIENT NAME: Ravi Hankins  : 1990    EVALUATION COUNSELOR: FERNANDA Montoya  TREATMENT COUNSELORS: Tor[facility]; [facility]  REFERRAL SOURCE: Self  PROGRAM: [facility]  ADMISSION DATE: 2020  DATE OF LAST SESSION: 2020  DISCHARGE DATE: 6/15/2020    ADMISSION DIAGNOSIS:    Alcohol Use Disorder--Severe (F10.20) (303.90)  Cannabis Use Disorder--Severe (F12.20) (304.30)  Opioid Use Disorder--Moderate (F11.20) (304.00)  Stimulant Related Disorder--Severe-(cociane) (F14.20) (304.200)  Tobacco Use Disorder--Moderate (F17.200) (305.1)    DISCHARGE DIAGNOSIS:  Alcohol Use Disorder--Severe (F10.20) (303.90)  Cannabis Use Disorder--Severe (F12.20) (304.30)  Opioid Use Disorder--Moderate (F11.20) (304.00)  Stimulant Related Disorder--Severe-(cociane) (F14.20) (304.200)  Tobacco Use Disorder--Moderate (F17.200) (305.1)    DISCHARGE STATUS: Pt was discharged from the [facility] program for behavioral issues.    LAST USE DATE: Patient reported last date of use as: opioids 2020, cocaine 6/10/2020, alcohol 2020, marijuana 2020, nicotine 2020     DAYS OF TREATMENT COMPLETED: Patient completed 2 days of treatment    PRESENTING INFORMATION: Pt presented to the [facility] facility in [place], [place] for a substance use evaluations via telephone. The pt was a self referent. The pt was seeking treatment for polysubstance use. The pt reported a hx of high blood pressure and grief and loss. The pt was assessed to be appropriate for substance use disorder tx at [facility].      READMITTANCE INFORMATION: Due to pt's behavior while in the [facility] program pt would be better served in an all male extended residential program.       SERVICES PROVIDED: Our services included assessment, treatment planning and education regarding chemical dependency, mental illness,  relationships, and relapse prevention. The patient participated in recovery oriented workshops.    ISSUES ADDRESS IN TREATMENT:  Due to pt only participating in two weekend workshops pt's risk ratings are unchanged from his initial evaluation on 2020 except for Dimension 1 which was changed from 2 to 1 due to pt reporting sobriety upon discharge from the [facility] program.     DIMENSION 1 - ACUTE INTOXICATION/WITHDRAWAL POTENTIAL  ADMISSION RISK RATIN  DISCHARGE RISK RATIN       DIMENSION 2 - BIOMEDICAL COMPLICATIONS AND CONDITIONS  ADMISSION RISK RATIN   DISCHARGE RISK RATIN       DIMENSION 3 - EMOTIONAL, BEHAVIORAL, COGNITIVE CONDITIONS AND COMPLICATIONS  ADMISSION RISK RATIN   DISCHARGE RISK RATIN       DIMENSION 4 - READINESS FOR CHANGE  ADMISSION RISK RATIN   DISCHARGE RISK RATIN       DIMENSION 5 - RELAPSE, CONTINUED USE AND CONTINUED PROBLEM POTENTIAL   ADMISSION RISK RATIN   DISCHARGE RISK RATIN     DIMENSION 6 - RECOVERY ENVIRONMENT  ADMISSION RISK RATING: 3   DISCHARGE RISK RATING: 3     LIVING ARRANGEMENTS AT DISCHARGE: Pt reported that he had a safe place to go upon discharge from the [facility] program.     PROGNOSIS: Prognosis for this patient is unfavorable at this time. This can be upgraded if the patient follows the continuing care recommendations below.      CONTINUING CARE RECOMMENDATIONS AND REFERRALS:    1.  Abstain from all mood-altering chemicals unless prescribed by a licensed medical provider, and take all medications as prescribed.  2.  Attend a minimum of three AA/NA/Sober support groups weekly in the community.  3.  Obtain a permanent male sponsor and maintain regular contact with him.  4.  Admit immediately into a residential tx program and follow all recommendations.  5.  Continue to invest in building a sober support network.  6.  Continue to monitor and understand relapse triggers and stressors through the use and development of  healthy coping skills.  7.  Obtain a mental health therapist and attend all scheduled programming  8. Attend all scheduled medical appointments.  9. Take medication as prescribed       This information has been disclosed to you from records protected by Federal confidentiality rules (42 CFR part 2). The Federal rules prohibit you from making any further disclosure of this information unless further disclosure is expressly permitted by the written consent of the person to whom it pertains or as otherwise permitted by 42 CFR part 2. A general authorization for the release of medical or other information is NOT sufficient for this purpose. The Federal rules restrict any use of the information to criminally investigate or prosecute any alcohol or drug abuse patient.       Terry Singh [facility]

## 2020-06-15 NOTE — PROGRESS NOTES
"Ravi Hankins is a 30 year old male who is being evaluated via a billable telephone visit.      The patient has been notified of following:     \"This telephone visit will be conducted via a call between you and your physician/provider. We have found that certain health care needs can be provided without the need for a physical exam.  This service lets us provide the care you need with a short phone conversation.  If a prescription is necessary we can send it directly to your pharmacy.  If lab work is needed we can place an order for that and you can then stop by our lab to have the test done at a later time.    Telephone visits are billed at different rates depending on your insurance coverage. During this emergency period, for some insurers they may be billed the same as an in-person visit.  Please reach out to your insurance provider with any questions.    If during the course of the call the physician/provider feels a telephone visit is not appropriate, you will not be charged for this service.\"    Patient has given verbal consent for Telephone visit?  Yes    What phone number would you like to be contacted at? [phone]    How would you like to obtain your AVS? Mail a copy    Subjective     Ravi Hankins is a 30 year old male who presents via phone visit today for the following health issues:    HPI  In patient treatment Friday and he cant sleep.  29 y/o new to me male here to discuss a couple of issues.  He has long standing issue with alcohol and polysubstance abuse.  He entered treatment end of last week.  Things have been overall going well.  His one main issue is that he struggles with sleep while going through treatment.  He has had this in the past.  He has tried multiple medications including trazodone, hydroxyzine.  He has been on low dose seroquel, and he found that quite beneficial without any side effects.    Lifelong history of aortic root dilation.  Has been stable through the " years and is followed by cardio.  He has bene on atenolol and does need refills.  If he does not take, can get some palpitations.           BP Readings from Last 3 Encounters:   06/13/20 126/84   03/08/20 108/74   10/16/19 129/85    Wt Readings from Last 3 Encounters:   06/12/20 76.2 kg (168 lb)   03/08/20 72.6 kg (160 lb)   10/16/19 72.9 kg (160 lb 12.8 oz)                    Reviewed and updated as needed this visit by Provider         Review of Systems   Constitutional, HEENT, cardiovascular, pulmonary, gi and gu systems are negative, except as otherwise noted.       Objective   Reported vitals:  There were no vitals taken for this visit.   alert and no distress  PSYCH: Alert and oriented times 3; coherent speech, normal   rate and volume, able to articulate logical thoughts, able   to abstract reason, no tangential thoughts, no hallucinations   or delusions  His affect is normal  RESP: No cough, no audible wheezing, able to talk in full sentences  Remainder of exam unable to be completed due to telephone visits    Diagnostic Test Results:  Labs reviewed in Epic        Assessment/Plan:  1. Aortic root dilatation (H)  Stable, follows with cardio.  - atenolol (TENORMIN) 100 MG tablet; Take 1 tablet (100 mg) by mouth daily  Dispense: 30 tablet; Refill: 11    2. Insomnia, unspecified type    - QUEtiapine (SEROQUEL) 25 MG tablet; Take 1 tablet (25 mg) by mouth nightly as needed  Dispense: 30 tablet; Refill: 0    3. Substance abuse (H)  Currently in treatment.      No follow-ups on file.      Phone call duration:  9 minutes    Butch Romano PA-C

## 2020-06-18 ENCOUNTER — TELEPHONE (OUTPATIENT)
Dept: ADDICTION MEDICINE | Facility: CLINIC | Age: 30
End: 2020-06-18
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
## 2020-06-22 ENCOUNTER — TELEPHONE (OUTPATIENT)
Dept: ADDICTION MEDICINE | Facility: CLINIC | Age: 30
End: 2020-06-22

## 2020-06-22 NOTE — TELEPHONE ENCOUNTER
6/22 Spoke with Rodo today knew last week that he left [facility] but he still wants to be apart of the [facility] we are just trying to find time to meet up so that I can do his Intake papers because of him being back to work [facility], he said he would  shoot me a text tomorrow if we can meet up after 4pm will wait for his text

## 2020-06-29 ENCOUNTER — APPOINTMENT (OUTPATIENT)
Dept: ADDICTION MEDICINE | Facility: CLINIC | Age: 30
End: 2020-06-29
Payer: COMMERCIAL

## 2020-07-06 ENCOUNTER — TELEPHONE (OUTPATIENT)
Dept: ADDICTION MEDICINE | Facility: CLINIC | Age: 30
End: 2020-07-06

## 2020-07-17 ENCOUNTER — HOSPITAL ENCOUNTER (EMERGENCY)
Facility: CLINIC | Age: 30
Discharge: HOME | End: 2020-07-18
Attending: EMERGENCY MEDICINE | Admitting: EMERGENCY MEDICINE
Payer: COMMERCIAL

## 2020-07-17 VITALS
TEMPERATURE: 98.1 F | HEART RATE: 90 BPM | WEIGHT: 165 LBS | DIASTOLIC BLOOD PRESSURE: 89 MMHG | HEIGHT: 74 IN | RESPIRATION RATE: 18 BRPM | SYSTOLIC BLOOD PRESSURE: 132 MMHG | OXYGEN SATURATION: 100 % | BODY MASS INDEX: 21.17 KG/M2

## 2020-07-17 DIAGNOSIS — S09.90XA CLOSED HEAD INJURY, INITIAL ENCOUNTER: ICD-10-CM

## 2020-07-17 DIAGNOSIS — S01.01XA LACERATION OF SCALP, INITIAL ENCOUNTER: ICD-10-CM

## 2020-07-17 DIAGNOSIS — S01.312A LACERATION OF LEFT EAR, INITIAL ENCOUNTER: ICD-10-CM

## 2020-07-17 PROCEDURE — 12011 RPR F/E/E/N/L/M 2.5 CM/<: CPT

## 2020-07-17 PROCEDURE — 90715 TDAP VACCINE 7 YRS/> IM: CPT | Performed by: EMERGENCY MEDICINE

## 2020-07-17 PROCEDURE — 25000128 H RX IP 250 OP 636: Performed by: EMERGENCY MEDICINE

## 2020-07-17 PROCEDURE — 12002 RPR S/N/AX/GEN/TRNK2.6-7.5CM: CPT

## 2020-07-17 PROCEDURE — 90471 IMMUNIZATION ADMIN: CPT

## 2020-07-17 PROCEDURE — 99283 EMERGENCY DEPT VISIT LOW MDM: CPT | Mod: 25

## 2020-07-17 RX ADMIN — CLOSTRIDIUM TETANI TOXOID ANTIGEN (FORMALDEHYDE INACTIVATED), CORYNEBACTERIUM DIPHTHERIAE TOXOID ANTIGEN (FORMALDEHYDE INACTIVATED), BORDETELLA PERTUSSIS TOXOID ANTIGEN (GLUTARALDEHYDE INACTIVATED), BORDETELLA PERTUSSIS FILAMENTOUS HEMAGGLUTININ ANTIGEN (FORMALDEHYDE INACTIVATED), BORDETELLA PERTUSSIS PERTACTIN ANTIGEN, AND BORDETELLA PERTUSSIS FIMBRIAE 2/3 ANTIGEN 0.5 ML: 5; 2; 2.5; 5; 3; 5 INJECTION, SUSPENSION INTRAMUSCULAR at 23:23

## 2020-07-17 ASSESSMENT — ENCOUNTER SYMPTOMS
HEADACHES: 1
VOMITING: 0
WOUND: 1

## 2020-07-17 ASSESSMENT — MIFFLIN-ST. JEOR: SCORE: 1778.19

## 2020-07-17 NOTE — ED AVS SNAPSHOT
Emergency Department  [address] [facility] [address]  Phone:  [phone]  Fax:  [phone]                                    Ravi Hankins   MRN: [id number]    Department:   Emergency Department   Date of Visit:  7/17/2020           After Visit Summary Signature Page    I have received my discharge instructions, and my questions have been answered. I have discussed any challenges I see with this plan with the nurse or doctor.    ..........................................................................................................................................  Patient/Patient Representative Signature      ..........................................................................................................................................  Patient Representative Print Name and Relationship to Patient    ..................................................               ................................................  Date                                   Time    ..........................................................................................................................................  Reviewed by Signature/Title    ...................................................              ..............................................  Date                                               Time          22EPIC Rev 08/18

## 2020-07-18 RX ORDER — HYDROCODONE BITARTRATE AND ACETAMINOPHEN 5; 325 MG/1; MG/1
1 TABLET ORAL EVERY 6 HOURS PRN
Qty: 10 TABLET | Refills: 0 | Status: SHIPPED | OUTPATIENT
Start: 2020-07-18 | End: 2020-07-21
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
## 2020-07-23 ENCOUNTER — OFFICE VISIT (OUTPATIENT)
Dept: FAMILY MEDICINE | Facility: CLINIC | Age: 30
End: 2020-07-23
Payer: COMMERCIAL

## 2020-07-23 VITALS
BODY MASS INDEX: 19.79 KG/M2 | DIASTOLIC BLOOD PRESSURE: 88 MMHG | HEIGHT: 74 IN | RESPIRATION RATE: 14 BRPM | SYSTOLIC BLOOD PRESSURE: 138 MMHG | OXYGEN SATURATION: 100 % | WEIGHT: 154.2 LBS | HEART RATE: 71 BPM

## 2020-07-23 DIAGNOSIS — Z48.02: Primary | ICD-10-CM

## 2020-07-23 DIAGNOSIS — I77.810 AORTIC ROOT DILATATION (H): ICD-10-CM

## 2020-07-23 DIAGNOSIS — G47.00 INSOMNIA, UNSPECIFIED TYPE: ICD-10-CM

## 2020-07-23 DIAGNOSIS — K21.9 GASTROESOPHAGEAL REFLUX DISEASE WITHOUT ESOPHAGITIS: ICD-10-CM

## 2020-07-23 PROCEDURE — 99214 OFFICE O/P EST MOD 30 MIN: CPT | Performed by: FAMILY MEDICINE

## 2020-07-23 RX ORDER — QUETIAPINE FUMARATE 25 MG/1
25 TABLET, FILM COATED ORAL
Qty: 30 TABLET | Refills: 1 | Status: SHIPPED | OUTPATIENT
Start: 2020-07-23 | End: 2021-05-11

## 2020-07-23 RX ORDER — ATENOLOL 100 MG/1
100 TABLET ORAL DAILY
Qty: 30 TABLET | Refills: 11 | COMMUNITY
Start: 2020-07-23 | End: 2021-07-15

## 2020-07-23 ASSESSMENT — PAIN SCALES - GENERAL: PAINLEVEL: EXTREME PAIN (8)

## 2020-07-23 ASSESSMENT — MIFFLIN-ST. JEOR: SCORE: 1729.2

## 2020-07-23 NOTE — PROGRESS NOTES
"Subjective     Ravi Hankins is a 30 year old male who presents to clinic today for the following health issues:     HPI       ED/UC Followup:    Facility:  [facility]  Date of visit: 07/17/2020  Reason for visit: head injury   Current Status: 8 staples, still having headache     Has not washed wound since the injury 7/17  Has some headache  Never had loss of consciousness with laceration on the scalp  Wondering if can get more pain medications- out of them from  Emergency department .    Has been a bit more stressed due to the injury/event  Needs refill on Seroquel that helps him to sleep  Denies  depression or bipolar disorder  Denies any further street drugs.  Likes to  basket ball , bedtime not done any for 2 yrs     We talked about his Blood pressure , initial was high.  Had three pork chops with a lot of salt last night  Every morning fried egg sandwiches   Heart burn lately, daily.    Does take atenolol daily   PROBLEMS TO ADD ON...    BP Readings from Last 3 Encounters:   07/23/20 138/88   07/17/20 132/89   03/08/20 108/74    Wt Readings from Last 3 Encounters:   07/23/20 69.9 kg (154 lb 3.2 oz)   07/17/20 74.8 kg (165 lb)   03/08/20 72.6 kg (160 lb)                    Reviewed and updated as needed this visit by Provider         Review of Systems   Constitutional, HEENT, cardiovascular, pulmonary, GI, , musculoskeletal, neuro, skin, endocrine and psych systems are negative, except as otherwise noted.      Objective    /88   Pulse 71   Resp 14   Ht 1.88 m (6' 2\")   Wt 69.9 kg (154 lb 3.2 oz)   SpO2 100%   BMI 19.80 kg/m    Body mass index is 19.8 kg/m .  Physical Exam   GENERAL: healthy, alert and no distress  left parietal scalp laceration- staples removed. Scabbed wound but otherwise well approximated   NECK: no adenopathy, no asymmetry, masses, or scars and thyroid normal to palpation  RESP: lungs clear to auscultation - no rales, rhonchi or wheezes  CV: regular rate and " rhythm, normal S1 S2, no S3 or S4, no murmur, click or rub, no peripheral edema and peripheral pulses strong  ABDOMEN: soft, nontender, no hepatosplenomegaly, no masses and bowel sounds normal  MS: no gross musculoskeletal defects noted, no edema    Diagnostic Test Results:  Labs reviewed in Epic  none         Assessment & Plan       ICD-10-CM    1. Encounter for removal of staples  Z48.02    2. Insomnia, unspecified type  G47.00 QUEtiapine (SEROQUEL) 25 MG tablet   3. Aortic root dilatation (H)  I77.810 atenolol (TENORMIN) 100 MG tablet    initial bp was very high- recheck is better   4. Gastroesophageal reflux disease without esophagitis  K21.9 omeprazole (PRILOSEC) 20 MG DR capsule    change life style habits and avoid fatty  diet . OK to start PPI for 8 weeks        Tobacco Cessation:   reports that he has been smoking. He has been smoking about 0.25 packs per day. He has never used smokeless tobacco.  Tobacco Cessation Action Plan: Information offered: Patient not interested at this time            Return in about 4 weeks (around 8/20/2020) for routine physical.    Monie Avilez MD  [facility]

## 2020-07-23 NOTE — PATIENT INSTRUCTIONS
1. Encounter for removal of staples  Wound is looking ok.  Ibuprofen with something to eat up to 400-600 mg three times daily as needed  Headache for next few days      2. Insomnia, unspecified type  Refill is given do make an appointment with primary care physicain for further long term refills  - QUEtiapine (SEROQUEL) 25 MG tablet; Take 1 tablet (25 mg) by mouth nightly as needed (sleep)  Dispense: 30 tablet; Refill: 1    3. Aortic root dilatation (H)  Initial Blood pressure was high- please avoid salty foods.  No medications changes needed today.    - atenolol (TENORMIN) 100 MG tablet; Take 1 tablet (100 mg) by mouth daily  No refills needed    4. Gastroesophageal reflux disease without esophagitis  Avoid fatty foods, if that alone does not help ok to start proton pump inhibitors for 8 weeks    - omeprazole (PRILOSEC) 20 MG DR capsule; Take 1 capsule (20 mg) by mouth daily  Dispense: 30 capsule; Refill: 2

## 2020-08-10 ENCOUNTER — TELEPHONE (OUTPATIENT)
Dept: FAMILY MEDICINE | Facility: CLINIC | Age: 30
End: 2020-08-10

## 2020-08-10 NOTE — TELEPHONE ENCOUNTER
Patient wondering if he can come in to have sutures removed on ear   Please advise when he can come in is available this Wednesday 08/12  or Thursday 08/13 after 4pm   Shawnee Joseph MA

## 2020-08-11 ENCOUNTER — OFFICE VISIT (OUTPATIENT)
Dept: FAMILY MEDICINE | Facility: CLINIC | Age: 30
End: 2020-08-11
Payer: COMMERCIAL

## 2020-08-11 DIAGNOSIS — Z48.02 ENCOUNTER FOR REMOVAL OF SUTURES: Primary | ICD-10-CM

## 2020-08-11 PROCEDURE — 99207 ZZC NON-BILLABLE SERV PER CHARTING: CPT | Performed by: FAMILY MEDICINE

## 2020-08-11 NOTE — PROGRESS NOTES
Subjective     Ravi Hankins is a 30 year old male who presents to clinic today for the following health issues:    HPI       Patient is here for  Suture removal   Sutures removal missed on 7/23 from left ear lobe  No concerns     -------------------------------------    BP Readings from Last 3 Encounters:   07/23/20 138/88   07/17/20 132/89   03/08/20 108/74    Wt Readings from Last 3 Encounters:   07/23/20 69.9 kg (154 lb 3.2 oz)   07/17/20 74.8 kg (165 lb)   03/08/20 72.6 kg (160 lb)                    Reviewed and updated as needed this visit by Provider         Review of Systems   Constitutional, HEENT, cardiovascular, pulmonary, gi and gu systems are negative, except as otherwise noted.      Objective    There were no vitals taken for this visit.  There is no height or weight on file to calculate BMI.  Physical Exam   GENERAL: healthy, alert and no distress  Left ear  Helix)- 5 sutures removed from  Left ear-wound well approxinated and healing   Diagnostic Test Results:  Labs reviewed in Epic  none         Assessment & Plan       ICD-10-CM    1. Encounter for removal of sutures  Z48.02               Return if symptoms worsen or fail to improve, for concerns,unresolved.    Monie Avilez MD  [facility]

## 2020-09-30 ENCOUNTER — OFFICE VISIT (OUTPATIENT)
Dept: FAMILY MEDICINE | Facility: CLINIC | Age: 30
End: 2020-09-30
Payer: COMMERCIAL

## 2020-09-30 VITALS
HEART RATE: 71 BPM | TEMPERATURE: 98.5 F | DIASTOLIC BLOOD PRESSURE: 89 MMHG | HEIGHT: 73 IN | SYSTOLIC BLOOD PRESSURE: 138 MMHG | BODY MASS INDEX: 20.28 KG/M2 | RESPIRATION RATE: 16 BRPM | OXYGEN SATURATION: 95 % | WEIGHT: 153 LBS

## 2020-09-30 DIAGNOSIS — F39 MOOD DISORDER (H): ICD-10-CM

## 2020-09-30 DIAGNOSIS — Z00.00 ROUTINE GENERAL MEDICAL EXAMINATION AT A HEALTH CARE FACILITY: Primary | ICD-10-CM

## 2020-09-30 DIAGNOSIS — G43.809 OTHER MIGRAINE WITHOUT STATUS MIGRAINOSUS, NOT INTRACTABLE: ICD-10-CM

## 2020-09-30 DIAGNOSIS — Z11.3 SCREEN FOR STD (SEXUALLY TRANSMITTED DISEASE): ICD-10-CM

## 2020-09-30 DIAGNOSIS — M62.838 NECK MUSCLE SPASM: ICD-10-CM

## 2020-09-30 DIAGNOSIS — F17.200 NICOTINE DEPENDENCE, UNCOMPLICATED, UNSPECIFIED NICOTINE PRODUCT TYPE: ICD-10-CM

## 2020-09-30 DIAGNOSIS — F43.21 DYSFUNCTIONAL GRIEVING: ICD-10-CM

## 2020-09-30 DIAGNOSIS — R03.0 ELEVATED BP WITHOUT DIAGNOSIS OF HYPERTENSION: ICD-10-CM

## 2020-09-30 PROCEDURE — 86780 TREPONEMA PALLIDUM: CPT | Performed by: FAMILY MEDICINE

## 2020-09-30 PROCEDURE — 80053 COMPREHEN METABOLIC PANEL: CPT | Performed by: FAMILY MEDICINE

## 2020-09-30 PROCEDURE — 86696 HERPES SIMPLEX TYPE 2 TEST: CPT | Performed by: FAMILY MEDICINE

## 2020-09-30 PROCEDURE — 87591 N.GONORRHOEAE DNA AMP PROB: CPT | Performed by: FAMILY MEDICINE

## 2020-09-30 PROCEDURE — 87389 HIV-1 AG W/HIV-1&-2 AB AG IA: CPT | Performed by: FAMILY MEDICINE

## 2020-09-30 PROCEDURE — 99395 PREV VISIT EST AGE 18-39: CPT | Performed by: FAMILY MEDICINE

## 2020-09-30 PROCEDURE — 36415 COLL VENOUS BLD VENIPUNCTURE: CPT | Performed by: FAMILY MEDICINE

## 2020-09-30 PROCEDURE — 99213 OFFICE O/P EST LOW 20 MIN: CPT | Mod: 25 | Performed by: FAMILY MEDICINE

## 2020-09-30 PROCEDURE — 82977 ASSAY OF GGT: CPT | Performed by: FAMILY MEDICINE

## 2020-09-30 PROCEDURE — 87491 CHLMYD TRACH DNA AMP PROBE: CPT | Performed by: FAMILY MEDICINE

## 2020-09-30 PROCEDURE — 86695 HERPES SIMPLEX TYPE 1 TEST: CPT | Performed by: FAMILY MEDICINE

## 2020-09-30 ASSESSMENT — PATIENT HEALTH QUESTIONNAIRE - PHQ9: SUM OF ALL RESPONSES TO PHQ QUESTIONS 1-9: 8

## 2020-09-30 ASSESSMENT — MIFFLIN-ST. JEOR: SCORE: 1699.94

## 2020-09-30 NOTE — NURSING NOTE
"Chief Complaint   Patient presents with     Physical     initial BP (!) 132/92 (BP Location: Left arm, Cuff Size: Adult Regular)   Pulse 71   Ht 1.842 m (6' 0.5\")   Wt 69.4 kg (153 lb)   SpO2 95%   BMI 20.47 kg/m   Estimated body mass index is 20.47 kg/m  as calculated from the following:    Height as of this encounter: 1.842 m (6' 0.5\").    Weight as of this encounter: 69.4 kg (153 lb).  BP completed using cuff size: regular.  L arm      Health Maintenance that is potentially due pending provider review:  NONE    n/a    Carlos Alberto Conn ma  "

## 2020-09-30 NOTE — PROGRESS NOTES
SUBJECTIVE:   CC: Ravi Hankins is an 30 year old male who presents for preventative health visit.   Here to get all sexually transmitted infections completed  We discussed his mood and alcohol use  Reports he Always had anger issues.  Was given Seroquel to help him sleep- takes on and off    Drinking WAS out of hand since sister passed away- 2 yrs ago.  Now on weekend about 10 shots of abby each day on weekend.  Counseling- once a week.  Does admit drinks more than should.  Does admit fluctuating moods    History of migraine headache  Start with neck spasm  Wondering if the PHYSICAL THERAPY / CHIROPRACTER will help  Takes over the counter extra strenght tyleol- can not tell if tthat helps but goes to sleep and that helps  Ice pack, dark, quite room helps tthe most    Smoking about 2 cigs a day  l    Patient has been advised of split billing requirements and indicates understanding: Yes  Healthy Habits:     Getting at least 3 servings of Calcium per day:  Yes    Bi-annual eye exam:  NO    Dental care twice a year:  NO    Sleep apnea or symptoms of sleep apnea:  Daytime drowsiness and Excessive snoring    Diet:  Other    Frequency of exercise:  None    Taking medications regularly:  Yes    Medication side effects:  Not applicable    PHQ-2 Total Score: 2    Additional concerns today:  No          PROBLEMS TO ADD ON...    Today's PHQ-2 Score:   PHQ-2 ( 1999 Pfizer) 9/30/2020   Q1: Little interest or pleasure in doing things 1   Q2: Feeling down, depressed or hopeless 1   PHQ-2 Score 2   Q1: Little interest or pleasure in doing things Several days   Q2: Feeling down, depressed or hopeless Several days   PHQ-2 Score 2       Abuse: Current or Past(Physical, Sexual or Emotional)- No  Do you feel safe in your environment? Yes        Social History     Tobacco Use     Smoking status: Current Every Day Smoker     Packs/day: 0.25     Smokeless tobacco: Never Used   Substance Use Topics     Alcohol use: Yes      Alcohol/week: 0.0 standard drinks     Comment: 2 times/week     If you drink alcohol do you typically have >3 drinks per day or >7 drinks per week? No    Alcohol Use 9/30/2020   Prescreen: >3 drinks/day or >7 drinks/week? Yes   Prescreen: >3 drinks/day or >7 drinks/week? -   AUDIT SCORE  16     AUDIT - Alcohol Use Disorders Identification Test - Reproduced from the World Health Organization Audit 2001 (Second Edition) 9/30/2020   1.  How often do you have a drink containing alcohol? 2 to 3 times a week   2.  How many drinks containing alcohol do you have on a typical day when you are drinking? 3 or 4   3.  How often do you have five or more drinks on one occasion? Monthly   4.  How often during the last year have you found that you were not able to stop drinking once you had started? Weekly   5.  How often during the last year have you failed to do what was normally expected of you because of drinking? Never   6.  How often during the last year have you needed a first drink in the morning to get yourself going after a heavy drinking session? Never   7.  How often during the last year have you had a feeling of guilt or remorse after drinking? Monthly   8.  How often during the last year have you been unable to remember what happened the night before because of your drinking? Less than monthly   9.  Have you or someone else been injured because of your drinking? No   10. Has a relative, friend, doctor or other health care worker been concerned about your drinking or suggested you cut down? Yes, during the last year   TOTAL SCORE 16       Last PSA: No results found for: PSA    Reviewed orders with patient. Reviewed health maintenance and updated orders accordingly - Yes  Labs reviewed in EPIC    Reviewed and updated as needed this visit by clinical staff  Tobacco  Allergies  Meds  Problems  Med Hx  Surg Hx  Fam Hx         Reviewed and updated as needed this visit by Provider  Tobacco  Allergies  Meds   "Problems  Med Hx  Surg Hx  Fam Hx            Review of Systems  CONSTITUTIONAL: NEGATIVE for fever, chills, change in weight  INTEGUMENTARY/SKIN: NEGATIVE for worrisome rashes, moles or lesions  EYES: NEGATIVE for vision changes or irritation  ENT: NEGATIVE for ear, mouth and throat problems  RESP: NEGATIVE for significant cough or SOB  CV: NEGATIVE for chest pain, palpitations or peripheral edema  GI: NEGATIVE for nausea, abdominal pain, heartburn, or change in bowel habits   male: negative for dysuria, hematuria, decreased urinary stream, erectile dysfunction, urethral discharge  MUSCULOSKELETAL: NEGATIVE for significant arthralgias or myalgia  NEURO: NEGATIVE for weakness, dizziness or paresthesias  PSYCHIATRIC: NEGATIVE for changes in mood or affect    OBJECTIVE:   /89   Pulse 71   Temp 98.5  F (36.9  C) (Oral)   Resp 16   Ht 1.842 m (6' 0.5\")   Wt 69.4 kg (153 lb)   SpO2 95%   BMI 20.47 kg/m      Physical Exam  GENERAL: healthy, alert and no distress  EYES: Eyes grossly normal to inspection, PERRL and conjunctivae and sclerae normal  HENT: ear canals and TM's normal, nose and mouth without ulcers or lesions  NECK: no adenopathy, no asymmetry, masses, or scars and thyroid normal to palpation  RESP: lungs clear to auscultation - no rales, rhonchi or wheezes  CV: regular rate and rhythm, normal S1 S2, no S3 or S4, no murmur, click or rub, no peripheral edema and peripheral pulses strong  ABDOMEN: soft, nontender, no hepatosplenomegaly, no masses and bowel sounds normal  MS: no gross musculoskeletal defects noted, no edema  SKIN: no suspicious lesions or rashes  NEURO: Normal strength and tone, mentation intact and speech normal  PSYCH: mentation appears normal, affect normal/bright    Diagnostic Test Results:  Labs reviewed in Epic  No results found for this or any previous visit (from the past 24 hour(s)).    ASSESSMENT/PLAN:   Ravi 30 year old male was seen today for " physical.    Diagnoses and all orders for this visit:    Routine general medical examination at a health care facility    Neck muscle spasm  -     [facility] PT, HAND, AND CHIROPRACTIC REFERRAL; Future  Ok to start PHYSICAL THERAPY     Other migraine without status migrainosus, not intractable  -     [facility] PT, HAND, AND CHIROPRACTIC REFERRAL; Future  Over the counter NSAID help  Advised to osmar headache diary and if > 2 migraine headache/month prophylactic medications like elavil, be benficial.  Advised follow up regrading it in 1-3 months    Mood disorder (H)  -     MENTAL HEALTH REFERRAL  - Adult; Psychiatry; Psychiatry; [facility]: [facility] Psychiatry Service/Bridge to Long-Term Psychiatry as indicated ([phone]); No - Patient must be evaluated prior to placing referral OR document reason for refer...    Alcoholism /alcohol abuse (H)  -     Comprehensive metabolic panel (BMP + Alb, Alk Phos, ALT, AST, Total. Bili, TP)  -     GGT  Advised cessation of alcohol use.  I shared my concerns that possible undiagnosed mood disorder is contributing to alcohol abuse     Elevated BP without diagnosis of hypertension  Initia; Blood pressure was high- recheck is better      Dysfunctional grieving  -     MENTAL HEALTH REFERRAL  - Adult; Psychiatry; Psychiatry; [facility]: [facility] Psych[facility]ry Service/Bridge to Long-Term Psychiatry as indicated ([phone]); No - Patient must be evaluated prior to placing referral OR document reason for refer...    Nicotine dependence, uncomplicated, unspecified nicotine product type    Screen for STD (sexually transmitted disease)  -     HIV Antigen Antibody Combo  -     Chlamydia trachomatis PCR  -     Neisseria gonorrhoeae PCR  -     Herpes Simplex Virus 1 and 2 IgG  -     Treponema Abs w Reflex to RPR and Titer        Patient has been advised of split billing requirements and indicates understanding: Yes  COUNSELING:   Reviewed preventive health counseling, as reflected in patient  "instructions       Regular exercise       Healthy diet/nutrition       Alcohol Use       Safe sex practices/STD prevention       One time pneumovax for smokers    Estimated body mass index is 20.47 kg/m  as calculated from the following:    Height as of this encounter: 1.842 m (6' 0.5\").    Weight as of this encounter: 69.4 kg (153 lb).         He reports that he has been smoking. He has been smoking about 0.25 packs per day. He has never used smokeless tobacco.  Tobacco Cessation Action Plan:   Phone counseling: Place order for Tobacco Cessation Referral      Counseling Resources:  ATP IV Guidelines  Pooled Cohorts Equation Calculator  FRAX Risk Assessment  ICSI Preventive Guidelines  Dietary Guidelines for Americans, 2010  USDA's MyPlate  ASA Prophylaxis  Lung CA Screening    Monie Avilez MD  [facility]  "

## 2020-09-30 NOTE — PATIENT INSTRUCTIONS
Do whats possible to cut back on drinking to 0 drinks a day  If anger and mood is on going issues- see psychiatrist and or follow up att uptown provider  HOW TO QUIT SMOKING  Smoking is one of the hardest habits to break. About half of all those who have ever smoked have been able to quit, and most of those (about 70%) who still smoke want to quit. Here are some of the best ways to stop smoking.     KEEP TRYING:  It takes most smokers about 8 tries before they are finally able to fully quit. So, the more often you try and fail, the better your chance of quitting the next time! So, don't give up!    GO COLD TURKEY:  Most ex-smokers quit cold turkey. Trying to cut back gradually doesn't seem to work as well, perhaps because it continues the smoking habit. Also, it is possible to fool yourself by inhaling more while smoking fewer cigarettes. This results in the same amount of nicotine in your body!    GET SUPPORT:  Support programs can make an important difference, especially for the heavy smoker. These groups offer lectures, methods to change your behavior and peer support. Call the free national Quitline for more information. 800-QUIT-NOW (367-017-3197). Low-cost or free programs are offered by many hospitals, local chapters of the American Lung Association (357-106-3391) and the American Cancer Society (966-236-2630). Support at home is important too. Non-smokers can help by offering praise and encouragement. If the smoker fails to quit, encourage them to try again!    OVER-THE-COUNTER MEDICINES:  For those who can't quit on their own, Nicotine Replacement Therapy (NRT) may make quitting much easier. Certain aids such as the nicotine patch, gum and lozenge are available without a prescription. However, it is best to use these under the guidance of your doctor. The skin patch provides a steady supply of nicotine to the body. Nicotine gum and lozenge gives temporary bursts of low levels of nicotine. Both methods take  the edge off the craving for cigarettes. WARNING: If you feel symptoms of nicotine overdose, such as nausea, vomiting, dizziness, weakness, or fast heartbeat, stop using these and see your doctor.    PRESCRIPTION MEDICINES:  After evaluating your smoking patterns and prior attempts at quitting, your doctor may offer a prescription medicine such as bupropion (Zyban, Wellbutrin), varenicline (Chantix, Champix), a niocotine inhaler or nasal spray. Each has its unique advantage and side effects which your doctor can review with you.    HEALTH BENEFITS OF QUITTING:  The benefits of quitting start right away and keep improving the longer you go without smokin minutes: blood pressure and pulse return to normal  8 hours: oxygen levels return to normal  2 days: ability to smell and taste begins to improve as damaged nerves start to regrow  2-3 weeks: circulation and lung function improves  1-9 months: decreased cough, congestion and shortness of breath; less tired  1 year: risk of heart attack decreases by half  5 years: risk of lung cancer decreases by half; risk of stroke becomes the same as a non-smoker  For information about how to quit smoking, visit the following links:  National Cancer [place] ,   Clearing the Air, Quit Smoking Today   - an online booklet. http://www.smokefree.gov/pubs/clearing_the_air.pdf  Smokefree.gov http://smokefree.gov/  QuitNet http://www.quitnet.com/    9418-8573 Dacrie Olivas, [address] [place] [address], [place] [id number]. All rights reserved. This information is not intended as a substitute for professional medical care. Always follow your healthcare professional's instructions.

## 2020-10-01 LAB
ALBUMIN SERPL-MCNC: 4.3 G/DL (ref 3.4–5)
ALP SERPL-CCNC: 89 U/L (ref 40–150)
ALT SERPL W P-5'-P-CCNC: 73 U/L (ref 0–70)
ANION GAP SERPL CALCULATED.3IONS-SCNC: 7 MMOL/L (ref 3–14)
AST SERPL W P-5'-P-CCNC: 67 U/L (ref 0–45)
BILIRUB SERPL-MCNC: 0.4 MG/DL (ref 0.2–1.3)
BUN SERPL-MCNC: 14 MG/DL (ref 7–30)
C TRACH DNA SPEC QL NAA+PROBE: NEGATIVE
CALCIUM SERPL-MCNC: 9.2 MG/DL (ref 8.5–10.1)
CHLORIDE SERPL-SCNC: 107 MMOL/L (ref 94–109)
CO2 SERPL-SCNC: 25 MMOL/L (ref 20–32)
CREAT SERPL-MCNC: 0.97 MG/DL (ref 0.66–1.25)
GFR SERPL CREATININE-BSD FRML MDRD: >90 ML/MIN/{1.73_M2}
GGT SERPL-CCNC: 120 U/L (ref 0–75)
GLUCOSE SERPL-MCNC: 54 MG/DL (ref 70–99)
HIV 1+2 AB+HIV1 P24 AG SERPL QL IA: NONREACTIVE
HSV1 IGG SERPL QL IA: 0.4 AI (ref 0–0.8)
HSV2 IGG SERPL QL IA: <0.2 AI (ref 0–0.8)
N GONORRHOEA DNA SPEC QL NAA+PROBE: NEGATIVE
POTASSIUM SERPL-SCNC: 5.2 MMOL/L (ref 3.4–5.3)
PROT SERPL-MCNC: 8.4 G/DL (ref 6.8–8.8)
SODIUM SERPL-SCNC: 139 MMOL/L (ref 133–144)
SPECIMEN SOURCE: NORMAL
SPECIMEN SOURCE: NORMAL
T PALLIDUM AB SER QL: NONREACTIVE

## 2020-10-02 NOTE — RESULT ENCOUNTER NOTE
Please call patient  Negative sexually transmitted infection- all above listed    Liver enzymes slight high- most likely from fatty liver due to alcohol use. Please avod and receck labs to mkae sure they are improving  Rest of labs- electrolytes kidney functions normal  Thanks

## 2021-04-16 ENCOUNTER — OFFICE VISIT (OUTPATIENT)
Dept: URGENT CARE | Facility: URGENT CARE | Age: 31
End: 2021-04-16
Payer: COMMERCIAL

## 2021-04-16 VITALS
TEMPERATURE: 98.4 F | HEART RATE: 75 BPM | DIASTOLIC BLOOD PRESSURE: 64 MMHG | BODY MASS INDEX: 21.87 KG/M2 | OXYGEN SATURATION: 98 % | HEIGHT: 73 IN | SYSTOLIC BLOOD PRESSURE: 100 MMHG | WEIGHT: 165 LBS

## 2021-04-16 DIAGNOSIS — Z20.818 STREP THROAT EXPOSURE: Primary | ICD-10-CM

## 2021-04-16 LAB
DEPRECATED S PYO AG THROAT QL EIA: NEGATIVE
SPECIMEN SOURCE: NORMAL
SPECIMEN SOURCE: NORMAL
STREP GROUP A PCR: NOT DETECTED

## 2021-04-16 PROCEDURE — 87651 STREP A DNA AMP PROBE: CPT | Performed by: PHYSICIAN ASSISTANT

## 2021-04-16 PROCEDURE — 99213 OFFICE O/P EST LOW 20 MIN: CPT | Performed by: FAMILY MEDICINE

## 2021-04-16 PROCEDURE — 99N1174 PR STATISTIC STREP A RAPID: Performed by: PHYSICIAN ASSISTANT

## 2021-04-16 ASSESSMENT — MIFFLIN-ST. JEOR: SCORE: 1762.32

## 2021-04-16 NOTE — PROGRESS NOTES
"SUBJECTIVE:Ravi Hankins is a 30 year old male with a chief complaint of strep exposure.    Onset of symptoms was none.    Course of illness: none.    Current and Associated symptoms: none    Predisposing factors include exposure to strep.    History reviewed. No pertinent past medical history.  No Known Allergies  Social History     Tobacco Use     Smoking status: Current Every Day Smoker     Packs/day: 0.10     Smokeless tobacco: Never Used     Tobacco comment: 1-2 cigarettes a day   Substance Use Topics     Alcohol use: Yes     Alcohol/week: 0.0 standard drinks     Comment: 2 times/week       ROS:  SKIN: no rash  GI: no vomiting    OBJECTIVE:   /64   Pulse 75   Temp 98.4  F (36.9  C) (Tympanic)   Ht 1.854 m (6' 1\")   Wt 74.8 kg (165 lb)   SpO2 98%   BMI 21.77 kg/m  GENERAL APPEARANCE: healthy, alert and no distress  No respiratory distress, speaks in complete sentences    Rapid Strep test is negative; await throat culture results.      ICD-10-CM    1. Strep throat exposure  Z20.818 Streptococcus A Rapid Scr w Reflx to PCR     Group A Streptococcus PCR Throat Swab       Symptomatic treat with gargles, lozenges, and OTC analgesic as needed.  Follow-up with primary clinic if not improving.     "

## 2021-05-11 ENCOUNTER — OFFICE VISIT (OUTPATIENT)
Dept: FAMILY MEDICINE | Facility: CLINIC | Age: 31
End: 2021-05-11
Payer: COMMERCIAL

## 2021-05-11 VITALS
RESPIRATION RATE: 12 BRPM | BODY MASS INDEX: 20.81 KG/M2 | TEMPERATURE: 98.6 F | SYSTOLIC BLOOD PRESSURE: 112 MMHG | WEIGHT: 157 LBS | DIASTOLIC BLOOD PRESSURE: 78 MMHG | HEART RATE: 70 BPM | HEIGHT: 73 IN

## 2021-05-11 DIAGNOSIS — Z20.2 EXPOSURE TO TRICHOMONAS: ICD-10-CM

## 2021-05-11 DIAGNOSIS — Z11.3 SCREENING EXAMINATION FOR STD (SEXUALLY TRANSMITTED DISEASE): Primary | ICD-10-CM

## 2021-05-11 DIAGNOSIS — R74.8 ELEVATED LIVER ENZYMES: ICD-10-CM

## 2021-05-11 LAB
ALBUMIN SERPL-MCNC: 3.8 G/DL (ref 3.4–5)
ALP SERPL-CCNC: 70 U/L (ref 40–150)
ALT SERPL W P-5'-P-CCNC: 44 U/L (ref 0–70)
ANION GAP SERPL CALCULATED.3IONS-SCNC: 5 MMOL/L (ref 3–14)
AST SERPL W P-5'-P-CCNC: 43 U/L (ref 0–45)
BILIRUB SERPL-MCNC: 0.4 MG/DL (ref 0.2–1.3)
BUN SERPL-MCNC: 11 MG/DL (ref 7–30)
CALCIUM SERPL-MCNC: 9.4 MG/DL (ref 8.5–10.1)
CHLORIDE SERPL-SCNC: 109 MMOL/L (ref 94–109)
CO2 SERPL-SCNC: 27 MMOL/L (ref 20–32)
CREAT SERPL-MCNC: 1.02 MG/DL (ref 0.66–1.25)
GFR SERPL CREATININE-BSD FRML MDRD: >90 ML/MIN/{1.73_M2}
GGT SERPL-CCNC: 90 U/L (ref 0–75)
GLUCOSE SERPL-MCNC: 71 MG/DL (ref 70–99)
POTASSIUM SERPL-SCNC: 3.9 MMOL/L (ref 3.4–5.3)
PROT SERPL-MCNC: 7.8 G/DL (ref 6.8–8.8)
SODIUM SERPL-SCNC: 141 MMOL/L (ref 133–144)

## 2021-05-11 PROCEDURE — 87591 N.GONORRHOEAE DNA AMP PROB: CPT | Performed by: FAMILY MEDICINE

## 2021-05-11 PROCEDURE — 86803 HEPATITIS C AB TEST: CPT | Performed by: FAMILY MEDICINE

## 2021-05-11 PROCEDURE — 82977 ASSAY OF GGT: CPT | Performed by: FAMILY MEDICINE

## 2021-05-11 PROCEDURE — 87389 HIV-1 AG W/HIV-1&-2 AB AG IA: CPT | Performed by: FAMILY MEDICINE

## 2021-05-11 PROCEDURE — 80053 COMPREHEN METABOLIC PANEL: CPT | Performed by: FAMILY MEDICINE

## 2021-05-11 PROCEDURE — 99000 SPECIMEN HANDLING OFFICE-LAB: CPT | Performed by: FAMILY MEDICINE

## 2021-05-11 PROCEDURE — 99213 OFFICE O/P EST LOW 20 MIN: CPT | Performed by: FAMILY MEDICINE

## 2021-05-11 PROCEDURE — 36415 COLL VENOUS BLD VENIPUNCTURE: CPT | Performed by: FAMILY MEDICINE

## 2021-05-11 PROCEDURE — 86780 TREPONEMA PALLIDUM: CPT | Mod: 90 | Performed by: FAMILY MEDICINE

## 2021-05-11 PROCEDURE — 87491 CHLMYD TRACH DNA AMP PROBE: CPT | Performed by: FAMILY MEDICINE

## 2021-05-11 RX ORDER — METRONIDAZOLE 500 MG/1
500 TABLET ORAL 3 TIMES DAILY
Qty: 21 TABLET | Refills: 0 | Status: SHIPPED | OUTPATIENT
Start: 2021-05-11 | End: 2021-05-18

## 2021-05-11 ASSESSMENT — MIFFLIN-ST. JEOR: SCORE: 1729.99

## 2021-05-11 NOTE — PROGRESS NOTES
"    Assessment & Plan     Screening examination for STD (sexually transmitted disease)     - NEISSERIA GONORRHOEA PCR  - CHLAMYDIA TRACHOMATIS PCR  - HIV Antigen Antibody Combo  - Hepatitis C antibody  - Treponema Abs w Reflex to RPR and Titer    Elevated liver enzymes  Elevated LFT -   Looks like pt due for liver enzyme recheck   Pending today   - Comprehensive metabolic panel (BMP + Alb, Alk Phos, ALT, AST, Total. Bili, TP)  - GGT    Exposure to trichomonas  Girlfriend with trichomonas - currently being treated with abx -   Discussed metronidazole 500mg BID for 7 days  Discussed potential side effects of medication and recommended avoid alcohol  - pt aware of risk  Pt will call or RTC if symptoms worsen or do not improve.   - metroNIDAZOLE (FLAGYL) 500 MG tablet; Take 1 tablet (500 mg) by mouth 3 times daily for 7 days - printed incorrectly -   Will take BID                 No follow-ups on file.    Yasemin Stephens, [facility] is a 30 year old who presents for the following health issues     HPI     Here for std screening and lab work.   GF tested positive for trichomonas   GF treated with meds on Monday -         Review of Systems   Constitutional, HEENT, cardiovascular, pulmonary, GI, , musculoskeletal, neuro, skin, endocrine and psych systems are negative, except as otherwise noted.      Objective    /78   Pulse 70   Temp 98.6  F (37  C) (Oral)   Resp 12   Ht 1.861 m (6' 1.25\")   Wt 71.2 kg (157 lb)   BMI 20.57 kg/m    Body mass index is 20.57 kg/m .  Physical Exam   GENERAL: healthy, alert and no distress                "

## 2021-05-11 NOTE — LETTER
May 18, 2021      Ravi Hankins  [address] [place] [address] [address]  [facility] [address]        Dear ,    Enclosed is a copy of your results. If you have any questions, please contact us at [phone].     -GGT is still just a little borderline.  This can go up with alcohol use and we recommend to cut back or stop completely to get this back to normal.  The good news is your liver tests are back to normal.   -Liver and gallbladder tests (ALT,AST, Alk phos,bilirubin) are normal.   -Kidney function (GFR) is normal.   -Sodium is normal.   -Potassium is normal.   -Calcium is normal.   -Glucose (diabetic screening test) is normal.   -Hepatitis C antibody screen test shows no signs of a previous hepatitis C infection.   -HIV test is normal.   -Chlamydia and gonnohrea tests are normal.     Thanks,   Yasemin Stephens, DO     Resulted Orders   NEISSERIA GONORRHOEA PCR   Result Value Ref Range    Specimen Descrip Urine     N Gonorrhea PCR Negative NEG^Negative      Comment:      Negative for N. gonorrhoeae rRNA by transcription mediated amplification.  A negative result by transcription mediated amplification does not preclude   the presence of N. gonorrhoeae infection because results are dependent on   proper and adequate collection, absence of inhibitors, and sufficient rRNA to   be detected.     CHLAMYDIA TRACHOMATIS PCR   Result Value Ref Range    Specimen Description Urine     Chlamydia Trachomatis PCR Negative NEG^Negative      Comment:      Negative for C. trachomatis rRNA by transcription mediated amplification.  A negative result by transcription mediated amplification does not preclude   the presence of C. trachomatis infection because results are dependent on   proper and adequate collection, absence of inhibitors, and sufficient rRNA to   be detected.     HIV Antigen Antibody Combo   Result Value Ref Range    HIV Antigen Antibody Combo Nonreactive NR^Nonreactive          Comment:      HIV-1 p24 Ag & HIV-1/HIV-2  Ab Not Detected   Hepatitis C antibody   Result Value Ref Range    Hepatitis C Antibody Nonreactive NR^Nonreactive      Comment:      Assay performance characteristics have not been established for newborns,   infants, and children     Comprehensive metabolic panel (BMP + Alb, Alk Phos, ALT, AST, Total. Bili, TP)   Result Value Ref Range    Sodium 141 133 - 144 mmol/L    Potassium 3.9 3.4 - 5.3 mmol/L    Chloride 109 94 - 109 mmol/L    Carbon Dioxide 27 20 - 32 mmol/L    Anion Gap 5 3 - 14 mmol/L    Glucose 71 70 - 99 mg/dL    Urea Nitrogen 11 7 - 30 mg/dL    Creatinine 1.02 0.66 - 1.25 mg/dL    GFR Estimate >90 >60 mL/min/[1.73_m2]      Comment:      Non  GFR Calc  Starting 12/18/2018, serum creatinine based estimated GFR (eGFR) will be   calculated using the Chronic Kidney Disease Epidemiology Collaboration   (CKD-EPI) equation.      GFR Estimate If Black >90 >60 mL/min/[1.73_m2]      Comment:       GFR Calc  Starting 12/18/2018, serum creatinine based estimated GFR (eGFR) will be   calculated using the Chronic Kidney Disease Epidemiology Collaboration   (CKD-EPI) equation.      Calcium 9.4 8.5 - 10.1 mg/dL    Bilirubin Total 0.4 0.2 - 1.3 mg/dL    Albumin 3.8 3.4 - 5.0 g/dL    Protein Total 7.8 6.8 - 8.8 g/dL    Alkaline Phosphatase 70 40 - 150 U/L    ALT 44 0 - 70 U/L    AST 43 0 - 45 U/L   GGT   Result Value Ref Range    GGT 90 (H) 0 - 75 U/L   Treponema Abs w Reflex to RPR and Titer   Result Value Ref Range    Treponema Antibodies Nonreactive NR^Nonreactive      Comment:      Methodology Change: Test performed on the [employer] Liaison XL by Treponema   pallidum Total Antibodies Assay as of 3.17.2020.

## 2021-05-12 LAB
C TRACH DNA SPEC QL NAA+PROBE: NEGATIVE
HCV AB SERPL QL IA: NONREACTIVE
HIV 1+2 AB+HIV1 P24 AG SERPL QL IA: NONREACTIVE
N GONORRHOEA DNA SPEC QL NAA+PROBE: NEGATIVE
SPECIMEN SOURCE: NORMAL
SPECIMEN SOURCE: NORMAL
T PALLIDUM AB SER QL: NONREACTIVE

## 2021-05-18 NOTE — RESULT ENCOUNTER NOTE
Please send copy of results with a letter:    Enclosed is a copy of your results. If you have any questions, please contact us at [phone].    -GGT is still just a little borderline.  This can go up with alcohol use and we recommend to cut back or stop completely to get this back to normal.  The good news is your liver tests are back to normal.  -Liver and gallbladder tests (ALT,AST, Alk phos,bilirubin) are normal.  -Kidney function (GFR) is normal.  -Sodium is normal.  -Potassium is normal.  -Calcium is normal.  -Glucose (diabetic screening test) is normal.  -Hepatitis C antibody screen test shows no signs of a previous hepatitis C infection.  -HIV test is normal.  -Chlamydia and gonnohrea tests are normal.    Thanks,   Yasemin Stephens, DO

## 2021-06-02 ENCOUNTER — VIRTUAL VISIT (OUTPATIENT)
Dept: FAMILY MEDICINE | Facility: CLINIC | Age: 31
End: 2021-06-02
Payer: COMMERCIAL

## 2021-06-02 DIAGNOSIS — R11.10 NON-INTRACTABLE VOMITING, PRESENCE OF NAUSEA NOT SPECIFIED, UNSPECIFIED VOMITING TYPE: Primary | ICD-10-CM

## 2021-06-02 DIAGNOSIS — Z11.59 SCREENING FOR VIRAL DISEASE: ICD-10-CM

## 2021-06-02 PROCEDURE — 99213 OFFICE O/P EST LOW 20 MIN: CPT | Mod: 95 | Performed by: FAMILY MEDICINE

## 2021-06-02 NOTE — PROGRESS NOTES
Ravi is a 30 year old who is being evaluated via a billable telephone visit.      What phone number would you like to be contacted at? [phone]  How would you like to obtain your AVS? Mail a copy    Assessment & Plan     Non-intractable vomiting, presence of nausea not specified, unspecified vomiting type  He reports that the vomiting was most likely from food he had eaten.  He reports feeling better.  Is possible that this could be from viral gastroenteritis.  It is reassuring that his symptoms have improved.  I ordered a COVID-19 test since he is required to have a negative test before going back to work.  - Symptomatic COVID-19 Virus (Coronavirus) by PCR; Future    Screening for viral disease  - Symptomatic COVID-19 Virus (Coronavirus) by PCR; Future                 No follow-ups on file.    Deejay Acosta, [facility]   Ravi is a 30 year old who presents for the following health issues     HPI     Pt requesting orders for COVID test - pt had food poisoning and threw up at work yesterday, boss is requesting a negative COVID test before he can return to work.  He denies feeling sick any longer.  He denies having fevers or congestion.  He is not short of breath or coughing.        Review of Systems   Constitutional, HEENT, cardiovascular, pulmonary, gi and gu systems are negative, except as otherwise noted.      Objective           Vitals:  No vitals were obtained today due to virtual visit.    Physical Exam   healthy, alert and no distress  PSYCH: Alert and oriented times 3; coherent speech, normal   rate and volume, able to articulate logical thoughts, able   to abstract reason, no tangential thoughts, no hallucinations   or delusions  His affect is normal  RESP: No cough, no audible wheezing, able to talk in full sentences  Remainder of exam unable to be completed due to telephone visits                Phone call duration: 5 minutes

## 2021-06-03 DIAGNOSIS — Z11.59 SCREENING FOR VIRAL DISEASE: ICD-10-CM

## 2021-06-03 DIAGNOSIS — R11.10 NON-INTRACTABLE VOMITING, PRESENCE OF NAUSEA NOT SPECIFIED, UNSPECIFIED VOMITING TYPE: ICD-10-CM

## 2021-06-03 LAB
LABORATORY COMMENT REPORT: NORMAL
SARS-COV-2 RNA RESP QL NAA+PROBE: NEGATIVE
SARS-COV-2 RNA RESP QL NAA+PROBE: NORMAL
SPECIMEN SOURCE: NORMAL
SPECIMEN SOURCE: NORMAL

## 2021-06-03 PROCEDURE — U0003 INFECTIOUS AGENT DETECTION BY NUCLEIC ACID (DNA OR RNA); SEVERE ACUTE RESPIRATORY SYNDROME CORONAVIRUS 2 (SARS-COV-2) (CORONAVIRUS DISEASE [COVID-19]), AMPLIFIED PROBE TECHNIQUE, MAKING USE OF HIGH THROUGHPUT TECHNOLOGIES AS DESCRIBED BY CMS-2020-01-R: HCPCS | Performed by: FAMILY MEDICINE

## 2021-06-03 PROCEDURE — U0005 INFEC AGEN DETEC AMPLI PROBE: HCPCS | Performed by: FAMILY MEDICINE

## 2021-06-08 NOTE — PATIENT INSTRUCTIONS - HE
If you were tested, we will call you with your COVID-19 result. You don't need to call us to check on your result. You can also use the information at the end of this document to sign up for [facility] for[MD] where you can get your results and a message about those results sent to you through the for[MD] application.    Regardless of if you have been tested or not:  Patient who have symptoms (cough, fever, or shortness of breath), need to isolate for 7 days from when symptoms started AND 72 hours after fever resolves (without fever reducing medications) AND improvement of respiratory symptoms (whichever is longer).      Isolate yourself at home (in own room/own bathroom if possible)    Do Not allow any visitors    Do Not go to work or school    Do Not go to [employer],  centers, shopping, or other public places.    Do Not shake hands.    Avoid close and intimate contact with others (hugging, kissing).    Follow CDC recommendations for household cleaning of frequently touched services.     After the initial 7 days, continue to isolate yourself from household members as much as possible. To continue decrease the risk of community spread and exposure, you and any members of your household should limit activities in public for 14 days after starting home isolation.     You can reference the following CDC link for helpful home isolation/care tips:  https://www.cdc.gov/coronavirus/2019-ncov/downloads/10Things.pdf    Protect Others:    Cover Your Mouth and Nose with a mask, disposable tissue or wash cloth to avoid spreading germs to others.    Wash your hands and face frequently with soap and water    Call Back If: Breathing difficulty develops or you become worse.    For more information about COVID19 and options for caring for yourself at home, please visit the CDC website at https://www.cdc.gov/coronavirus/2019-ncov/about/steps-when-sick.html  For more options for care at [facility], please visit  our website at [email or web address]    For more information, please use the [place] Department of Health COVID-19 Website: https://www.health.[facility].[place]/diseases/coronavirus/index.html  [place] Department of Health ([facility]) COVID-19 Hotlines (Interpreters available):      Health questions: Phone Number: [phone] or [phone] and Hours: 7 a.m. to 7 p.m.    Schools and  questions: Phone Number: [phone] or [phone] and Hours 7 a.m. to 7 p.m.

## 2021-06-20 NOTE — LETTER
Letter by Lena Mejía RN at      Author: Lena Mejía RN Service: -- Author Type: --    Filed:  Encounter Date: 5/9/2020 Status: (Other)       5/9/2020        Ravi Hankins  [address] [place] [address] [address]  [facility] [address]    This letter provides a written record that you were tested for COVID-19 on 5/8/20.     Your result was negative.    This means that we didnt find the virus that causes COVID-19 in your sample. A test may show negative when you do actually have the virus. This can happen when the virus is in the early stages of infection, before you feel illness symptoms.    Even if you dont have symptoms, they may still appear. For safety, its very important to follow these rules.    Keep yourself away from others (self-isolation):      Stay home. Dont go to work, school or anywhere else.     Stay in your own room (and use your own bathroom), if you can.    Stay away from others in your home. No hugging, kissing or shaking hands. No visitors.    Clean high touch surfaces often (doorknobs, counters, handles, etc.). Use a household cleaning spray or wipes.    Cover your mouth and nose with a mask, tissue or washcloth to avoid spreading germs.    Wash your hands and face often with soap and water.    Stay in self-isolation until you meet ALL of the guidelines below:    1. You have had no fever for at least 72 hours (that is 3 full days of no fever without the use of medicine that reduces fevers), AND  2. other symptoms (such as cough, shortness of breath) have gotten better, AND  3. at least 10 days have passed since your symptoms first appeared.    Going back to work  Check with your employer for any guidelines to follow for going back to work.    Employers: This document serves as formal notice that your employee tested negative for COVID-19, as of the testing date shown above.    For questions regarding this letter or your Negative COVID-19 result, call [phone] between 8A to 6:30P (M-F) and 10A  to 6:30P (weekends).

## 2021-07-15 ENCOUNTER — TELEPHONE (OUTPATIENT)
Dept: FAMILY MEDICINE | Facility: CLINIC | Age: 31
End: 2021-07-15

## 2021-07-15 DIAGNOSIS — I77.810 AORTIC ROOT DILATATION (H): ICD-10-CM

## 2021-07-15 RX ORDER — ATENOLOL 100 MG/1
100 TABLET ORAL DAILY
Qty: 90 TABLET | Refills: 0 | Status: SHIPPED | OUTPATIENT
Start: 2021-07-15 | End: 2021-10-21

## 2021-07-15 NOTE — TELEPHONE ENCOUNTER
JF,   ELLEN:  Mom (Taylor Cancinoer) asking for refill for pt of Atenolol   Not due for physical until Sept 2021  Med is reported as historical however looking back and per mom pt has been on 100mg daily for years  Prescription approved per [facility] Refill Protocol.  Heather SHELBY RN

## 2021-08-19 ENCOUNTER — TELEPHONE (OUTPATIENT)
Dept: BEHAVIORAL HEALTH | Facility: CLINIC | Age: 31
End: 2021-08-19

## 2021-08-19 ENCOUNTER — HOSPITAL ENCOUNTER (EMERGENCY)
Facility: CLINIC | Age: 31
Discharge: LEFT BEFORE DISCHARGE | End: 2021-08-19
Payer: COMMERCIAL

## 2021-08-19 VITALS
RESPIRATION RATE: 16 BRPM | DIASTOLIC BLOOD PRESSURE: 92 MMHG | TEMPERATURE: 98.1 F | OXYGEN SATURATION: 100 % | BODY MASS INDEX: 21.62 KG/M2 | HEART RATE: 59 BPM | SYSTOLIC BLOOD PRESSURE: 141 MMHG | WEIGHT: 165 LBS

## 2021-08-19 NOTE — TELEPHONE ENCOUNTER
Pt called with interest in [facility]  Scheduled eval for 8/25 by video.  Referral created, bens requested

## 2021-08-19 NOTE — ED TRIAGE NOTES
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 12 lean normal  Vitals  130;ssbp, orthostatics negative . No IV in place. Walked to stretcher .

## 2021-08-20 ENCOUNTER — OFFICE VISIT (OUTPATIENT)
Dept: FAMILY MEDICINE | Facility: CLINIC | Age: 31
End: 2021-08-20
Payer: COMMERCIAL

## 2021-08-20 VITALS
OXYGEN SATURATION: 97 % | HEART RATE: 57 BPM | TEMPERATURE: 98.6 F | WEIGHT: 160.5 LBS | BODY MASS INDEX: 21.27 KG/M2 | SYSTOLIC BLOOD PRESSURE: 129 MMHG | DIASTOLIC BLOOD PRESSURE: 85 MMHG | HEIGHT: 73 IN

## 2021-08-20 DIAGNOSIS — I77.9 DISORDER OF AORTA (H): ICD-10-CM

## 2021-08-20 DIAGNOSIS — R55 SYNCOPE, UNSPECIFIED SYNCOPE TYPE: Primary | ICD-10-CM

## 2021-08-20 LAB
ALBUMIN SERPL-MCNC: 3.5 G/DL (ref 3.4–5)
ALP SERPL-CCNC: 64 U/L (ref 40–150)
ALT SERPL W P-5'-P-CCNC: 79 U/L (ref 0–70)
ANION GAP SERPL CALCULATED.3IONS-SCNC: 6 MMOL/L (ref 3–14)
AST SERPL W P-5'-P-CCNC: 80 U/L (ref 0–45)
BILIRUB SERPL-MCNC: 0.8 MG/DL (ref 0.2–1.3)
BUN SERPL-MCNC: 11 MG/DL (ref 7–30)
CALCIUM SERPL-MCNC: 8.9 MG/DL (ref 8.5–10.1)
CHLORIDE BLD-SCNC: 106 MMOL/L (ref 94–109)
CO2 SERPL-SCNC: 26 MMOL/L (ref 20–32)
CREAT SERPL-MCNC: 1.14 MG/DL (ref 0.66–1.25)
ERYTHROCYTE [DISTWIDTH] IN BLOOD BY AUTOMATED COUNT: 12.4 % (ref 10–15)
GFR SERPL CREATININE-BSD FRML MDRD: 85 ML/MIN/1.73M2
GLUCOSE BLD-MCNC: 92 MG/DL (ref 70–99)
HCT VFR BLD AUTO: 41.3 % (ref 40–53)
HGB BLD-MCNC: 13.5 G/DL (ref 13.3–17.7)
MCH RBC QN AUTO: 31.8 PG (ref 26.5–33)
MCHC RBC AUTO-ENTMCNC: 32.7 G/DL (ref 31.5–36.5)
MCV RBC AUTO: 97 FL (ref 78–100)
PLATELET # BLD AUTO: 156 10E3/UL (ref 150–450)
POTASSIUM BLD-SCNC: 3.6 MMOL/L (ref 3.4–5.3)
PROT SERPL-MCNC: 7.4 G/DL (ref 6.8–8.8)
RBC # BLD AUTO: 4.25 10E6/UL (ref 4.4–5.9)
SODIUM SERPL-SCNC: 138 MMOL/L (ref 133–144)
TSH SERPL DL<=0.005 MIU/L-ACNC: 0.76 MU/L (ref 0.4–4)
WBC # BLD AUTO: 3.5 10E3/UL (ref 4–11)

## 2021-08-20 PROCEDURE — 80053 COMPREHEN METABOLIC PANEL: CPT | Performed by: FAMILY MEDICINE

## 2021-08-20 PROCEDURE — 84443 ASSAY THYROID STIM HORMONE: CPT | Performed by: FAMILY MEDICINE

## 2021-08-20 PROCEDURE — 85027 COMPLETE CBC AUTOMATED: CPT | Performed by: FAMILY MEDICINE

## 2021-08-20 PROCEDURE — 99213 OFFICE O/P EST LOW 20 MIN: CPT | Performed by: FAMILY MEDICINE

## 2021-08-20 PROCEDURE — 36415 COLL VENOUS BLD VENIPUNCTURE: CPT | Performed by: FAMILY MEDICINE

## 2021-08-20 PROCEDURE — 93000 ELECTROCARDIOGRAM COMPLETE: CPT | Performed by: FAMILY MEDICINE

## 2021-08-20 ASSESSMENT — MIFFLIN-ST. JEOR: SCORE: 1734.28

## 2021-08-20 NOTE — PATIENT INSTRUCTIONS
PLEASE CALL ONE OF THE FOLLOWING NUMBERS TO SCHEDULE YOUR CARDIOLOGY PROCEDURE/VISIT:    [facility] SCHEDULING:   [phone]

## 2021-08-20 NOTE — PROGRESS NOTES
Assessment & Plan     Syncope, unspecified syncope type  Syncopal episodes -   Went to ER and was not seen - too busy with ill/COVID patients  EKG today shows left ventricular hypertrophy -   Will check ECHO and refer to cardiology for further work-up - may need zio patch monitor for heart palpitation  - EKG 12-lead complete w/read - Clinics  - Adult Cardiology Eval Referral; Future  - Echocardiogram Complete; Future  - Comprehensive metabolic panel (BMP + Alb, Alk Phos, ALT, AST, Total. Bili, TP); Future  - TSH with free T4 reflex; Future  - CBC with platelets; Future  - Comprehensive metabolic panel (BMP + Alb, Alk Phos, ALT, AST, Total. Bili, TP)  - TSH with free T4 reflex  - CBC with platelets    Disorder of aorta (H)     - Adult Cardiology Eval Referral; Future  - Echocardiogram Complete; Future             Tobacco Cessation:   reports that he has been smoking. He has been smoking about 0.10 packs per day. He has never used smokeless tobacco.  Not addressed at visit today         No follow-ups on file.    Yasemin Stephens DO  [facility]    Wilfredo Rodrigues is a 31 year old who presents for the following health issues     HPI     Concern - fainting   Onset: yesterday   Description: pt states after work he came home ate and as he put his plate on the sink he felt off and then woke up on the floor states he was in the heat all day but has been hydrating   Intensity: moderate  Progression of Symptoms:  same  Accompanying Signs & Symptoms: slight headache thru the day and feeling of being off , random palpitations also   Previous history of similar problem: yes, states he had same episode last year  Precipitating factors:        Worsened by:   Alleviating factors:        Improved by:   Therapies tried and outcome: did go to ED but wait was too long so left     Was feeling off yesterday -   Within the first hour of working -   Was having heart fluttering and skipping beats - felt a  "little lightheaded and dizzy  Finished eating food - stood up and felt like vision was blurry  Had LOC and woke up on the ground - alone so unwitnessed  Called EMS and went to hospital but the wait was 4+ hours (ccovid pandemic)  No loss of bowel or bladder control  No additional palpitations    Had similar episode syncope October 9, 2019 -           Review of Systems   Constitutional, HEENT, cardiovascular, pulmonary, gi and gu systems are negative, except as otherwise noted.      Objective    /85 (BP Location: Right arm, Patient Position: Sitting, Cuff Size: Adult Regular)   Pulse 57   Temp 98.6  F (37  C) (Temporal)   Ht 1.85 m (6' 0.84\")   Wt 72.8 kg (160 lb 8 oz)   SpO2 97%   BMI 21.27 kg/m    Body mass index is 21.27 kg/m .  Physical Exam   GENERAL: healthy, alert and no distress  RESP: lungs clear to auscultation - no rales, rhonchi or wheezes  CV: regular rate and rhythm, normal S1 S2, no S3 or S4, no murmur, click or rub, no peripheral edema and peripheral pulses strong    EKG - Reviewed and interpreted by me sinus kymberly with LVH enlargement            "

## 2021-08-25 NOTE — TELEPHONE ENCOUNTER
Patient has a virtual sobeida eval today, 8/25/2021 at 0800. A phone call was made out to patient to check them in for this appointment, but no answer and their voicemail box is full. Will try to call again.

## 2021-08-25 NOTE — TELEPHONE ENCOUNTER
Patient returned call and said they forgot about this appointment, so they would like to reschedule. Patient was transferred to outpatient intake to get this appointment reschedule.

## 2022-01-18 ENCOUNTER — OFFICE VISIT (OUTPATIENT)
Dept: URGENT CARE | Facility: URGENT CARE | Age: 32
End: 2022-01-18
Payer: COMMERCIAL

## 2022-01-18 VITALS
BODY MASS INDEX: 21.21 KG/M2 | TEMPERATURE: 96.8 F | HEART RATE: 82 BPM | RESPIRATION RATE: 15 BRPM | OXYGEN SATURATION: 99 % | DIASTOLIC BLOOD PRESSURE: 85 MMHG | WEIGHT: 160 LBS | SYSTOLIC BLOOD PRESSURE: 137 MMHG

## 2022-01-18 DIAGNOSIS — J02.9 SORE THROAT: Primary | ICD-10-CM

## 2022-01-18 LAB
DEPRECATED S PYO AG THROAT QL EIA: NEGATIVE
GROUP A STREP BY PCR: NOT DETECTED

## 2022-01-18 PROCEDURE — 99213 OFFICE O/P EST LOW 20 MIN: CPT | Performed by: FAMILY MEDICINE

## 2022-01-18 PROCEDURE — 87651 STREP A DNA AMP PROBE: CPT | Performed by: FAMILY MEDICINE

## 2022-01-18 NOTE — PROGRESS NOTES
SUBJECTIVE:Ravi Hankins is a 31 year old male with a chief complaint of sore throat.    Onset of symptoms was day(s) ago.    Course of illness: still present.    Severity moderate  Current and Associated symptoms: none  Predisposing factors include 2 recent negative COVID tests.    No past medical history on file.  No Known Allergies  Social History     Tobacco Use     Smoking status: Current Every Day Smoker     Packs/day: 0.10     Smokeless tobacco: Never Used     Tobacco comment: 1-2 cigarettes a day   Substance Use Topics     Alcohol use: Yes     Alcohol/week: 0.0 standard drinks     Comment: 2 times/week       ROS:  SKIN: no rash  GI: no vomiting    OBJECTIVE:   /85 (BP Location: Left arm, Patient Position: Sitting, Cuff Size: Adult Regular)   Pulse 82   Temp 96.8  F (36  C) (Tympanic)   Resp 15   Wt 72.6 kg (160 lb)   SpO2 99%   BMI 21.21 kg/m  GENERAL APPEARANCE: healthy, alert and no distress  EYES: EOMI,  PERRL, conjunctiva clear  HENT: ear canals and TM's normal.  Nose normal.  Pharynx erythematous with some exudate noted.  RESP: lungs clear to auscultation - no rales, rhonchi or wheezes  SKIN: no suspicious lesions or rashes    Rapid Strep test is negative; await throat culture results.      ICD-10-CM    1. Sore throat  J02.9 Streptococcus A Rapid Screen w/Reflex to PCR     Group A Streptococcus PCR Throat Swab     Pt declines COVID testing  Symptomatic treat with gargles, lozenges, and OTC analgesic as needed.  Follow-up with primary clinic if not improving.

## 2022-01-21 ENCOUNTER — HOSPITAL ENCOUNTER (OUTPATIENT)
Dept: CARDIOLOGY | Facility: CLINIC | Age: 32
Discharge: HOME | End: 2022-01-21
Attending: FAMILY MEDICINE | Admitting: FAMILY MEDICINE
Payer: COMMERCIAL

## 2022-01-21 DIAGNOSIS — I77.9 DISORDER OF AORTA (H): ICD-10-CM

## 2022-01-21 DIAGNOSIS — R55 SYNCOPE, UNSPECIFIED SYNCOPE TYPE: ICD-10-CM

## 2022-01-21 LAB — BI-PLANE LVEF ECHO: NORMAL

## 2022-01-21 PROCEDURE — 93306 TTE W/DOPPLER COMPLETE: CPT | Mod: 26 | Performed by: INTERNAL MEDICINE

## 2022-01-21 PROCEDURE — 93306 TTE W/DOPPLER COMPLETE: CPT

## 2022-01-21 NOTE — LETTER
2022      Ravi Hankins  [address]   [facility] [address]        Dear ,    We are writing to inform you of your test results.    Your heart ultrasound (ECHO) is stable. You continue to have the borderline aortic root dilation at 3.8 cm but this is stable since 2019 so we can continue to monitor.   Plan to repeat in 2-3 years.       Resulted Orders   Echocardiogram Complete   Result Value Ref Range    Biplane LVEF 63%     Narrative    [id number]  [id number]  [id number]  [id number]^DEANGELO^MACK^DANAE     [facility]  [facility]  Echocardiography Laboratory  [address]  [address]  [place] [address]  Phone [phone]  Fax [phone]     Name: RAVI HANKINS  MRN: [id number]  : 1990  Study Date: 2022 12:43 PM  Age: 31 yrs  Gender: Male  Patient Location: [facility]  Reason For Study: Syncope, unspecified syncope type, Disorder of aorta (H)  Ordering Physician: MACK BRIGHT  Referring Physician: MACK BRIGHT  Performed By: [facility] Marianela Groves     BSA: 1.9 m2  Height: 72 in  Weight: 160 lb  HR: 77  BP: 125/88 mmHg  ______________________________________________________________________________  Procedure  Complete Echo Adult.     ______________________________________________________________________________  Interpretation Summary     1. Normal left ventricular size and systolic function. Biplane LVEF 63%.  2. Normal right ventricular systolic function.  3. Normal cardiac valves.  4. Borderline dilated aortic root (3.8 cm), normal ascending aorta.     ______________________________________________________________________________  Left Ventricle  The left ventricle is normal in size. There is normal left ventricular wall  thickness. Left ventricular systolic function is normal. Biplane LVEF is 63%.  Left ventricular diastolic function is normal. No regional wall motion  abnormalities noted.     Right Ventricle  The right  ventricle is normal in size and function.     Atria  Normal left atrial size. Right atrial size is normal. There is no color  Doppler evidence of an atrial shunt.     Mitral Valve  The mitral valve leaflets appear normal. There is no evidence of stenosis,  fluttering, or prolapse. There is no mitral regurgitation noted.     Tricuspid Valve  Normal tricuspid valve. This degree of valvular regurgitation is within normal  limits. Right ventricular systolic pressure could not be approximated due to  inadequate tricuspid regurgitation.     Aortic Valve  The aortic valve is trileaflet. No aortic regurgitation is present. No aortic  stenosis is present.     Pulmonic Valve  The pulmonic valve is not well seen, but is grossly normal. This degree of  valvular regurgitation is within normal limits.     Vessels  Borderline aortic root dilatation. 3.8 cm. Normal size ascending aorta. The  inferior vena cava is normal.     Pericardium  There is no pericardial effusion.     Rhythm  Sinus rhythm was noted.     ______________________________________________________________________________  MMode/2D Measurements & Calculations  IVSd: 1.1 cm  LVIDd: 5.0 cm  LVIDs: 3.5 cm  LVPWd: 1.0 cm  FS: 30.9 %  LV mass(C)d: 195.0 grams  LV mass(C)dI: 100.6 grams/m2  Ao root diam: 3.8 cm     asc Aorta Diam: 3.7 cm  LVOT diam: 2.5 cm  LVOT area: 4.8 cm2  LA Volume Indexed (AL/bp): 37.3 ml/m2  RWT: 0.40     Doppler Measurements & Calculations  MV E max juve: 75.8 cm/sec  MV A max juve: 67.2 cm/sec  MV E/A: 1.1  MV dec time: 0.20 sec  PA acc time: 0.13 sec  Pulm Sys Juve: 45.0 cm/sec  Pulm Easley Juve: 54.4 cm/sec  Pulm A Revs Juve: 44.3 cm/sec  Pulm S/D: 0.83     E/E': 6.7  Peak E' Juve: 11.3 cm/sec     ______________________________________________________________________________  Report approved by: Dr Dru Aguiar 01/21/2022 01:45 PM             If you have any questions or concerns, please call the clinic at the number listed above.        Sincerely,      Yasemin Stephens, DO/ms

## 2022-01-25 ENCOUNTER — OFFICE VISIT (OUTPATIENT)
Dept: CARDIOLOGY | Facility: CLINIC | Age: 32
End: 2022-01-25
Attending: FAMILY MEDICINE
Payer: COMMERCIAL

## 2022-01-25 VITALS
HEIGHT: 74 IN | HEART RATE: 82 BPM | BODY MASS INDEX: 21.3 KG/M2 | WEIGHT: 166 LBS | SYSTOLIC BLOOD PRESSURE: 125 MMHG | DIASTOLIC BLOOD PRESSURE: 81 MMHG

## 2022-01-25 DIAGNOSIS — I77.9 DISORDER OF AORTA (H): ICD-10-CM

## 2022-01-25 DIAGNOSIS — R55 SYNCOPE, UNSPECIFIED SYNCOPE TYPE: ICD-10-CM

## 2022-01-25 PROCEDURE — 99213 OFFICE O/P EST LOW 20 MIN: CPT | Performed by: INTERNAL MEDICINE

## 2022-01-25 ASSESSMENT — MIFFLIN-ST. JEOR: SCORE: 1777.72

## 2022-01-25 NOTE — PROGRESS NOTES
HPI and Plan:   31-year-old man who is here for follow-up of borderline aortic root enlargement.    History outlined in my note from October 2019.    Has had 2 more episodes of presyncope.  Both episodes involve losing consciousness perhaps for a few seconds.  No serious injury as a result.  Both episodes occur when he was getting out from the car.  Continues to drink several spirits over the weekend as well as smoke weed.  Denies dehydration during these episodes  He is probably more prone to vasovagal syncope given his low body mass.  I reminded him that heavy alcohol intake and perhaps smoking we could precipitate these episodes.  We talked about avoidance maneuvers such as avoiding sudden positional changes and sitting down or even laying down when he has warning symptoms.  He understands    As for his aortic root it is only mildly enlarged at 3.8 cm at worst has been no significant changes.    His very mild borderline aortic root enlargement is stable.  I will see him again in 2 years time with repeat echocardiography prior to clinic visit.    No orders of the defined types were placed in this encounter.      No orders of the defined types were placed in this encounter.      Encounter Diagnoses   Name Primary?     Syncope, unspecified syncope type      Disorder of aorta (H)        CURRENT MEDICATIONS:  Current Outpatient Medications   Medication Sig Dispense Refill     atenolol (TENORMIN) 100 MG tablet Take 1 tablet (100 mg) by mouth daily 90 tablet 0     acetaminophen (TYLENOL) 325 MG tablet Take 325-650 mg by mouth every 4 hours as needed for mild pain (Patient not taking: Reported on 8/20/2021)       ibuprofen (ADVIL/MOTRIN) 200 MG tablet Take 200-400 mg by mouth every 6 hours as needed for mild pain (Patient not taking: Reported on 8/20/2021)         ALLERGIES   No Known Allergies    PAST MEDICAL HISTORY:  No past medical history on file.    PAST SURGICAL HISTORY:  Past Surgical History:   Procedure  Laterality Date     NO HISTORY OF SURGERY         FAMILY HISTORY:  Family History   Problem Relation Age of Onset     Heart Disease Brother      Abdominal Aortic Aneurysm Brother      Heart Disease Other         self     Substance Abuse Mother      Substance Abuse Father      Substance Abuse Maternal Grandfather      Diabetes No family hx of      Coronary Artery Disease No family hx of      Hypertension No family hx of      Hyperlipidemia No family hx of      Cerebrovascular Disease No family hx of      Breast Cancer No family hx of      Colon Cancer No family hx of      Prostate Cancer No family hx of      Other Cancer No family hx of      Depression No family hx of      Anxiety Disorder No family hx of      Mental Illness No family hx of      Anesthesia Reaction No family hx of      Asthma No family hx of      Osteoporosis No family hx of      Genetic Disorder No family hx of      Thyroid Disease No family hx of      Obesity No family hx of      Unknown/Adopted No family hx of        SOCIAL HISTORY:  Social History     Socioeconomic History     Marital status: Single     Spouse name: None     Number of children: None     Years of education: None     Highest education level: None   Occupational History     None   Tobacco Use     Smoking status: Current Every Day Smoker     Packs/day: 0.10     Smokeless tobacco: Never Used     Tobacco comment: 1-2 cigarettes a day   Substance and Sexual Activity     Alcohol use: Yes     Alcohol/week: 0.0 standard drinks     Comment: 2 times/week     Drug use: Yes     Types: Marijuana     Comment: Pt reports smoking a blunt today, pt reports smoking canabis everyday..     Sexual activity: Yes     Partners: Female   Other Topics Concern     Parent/sibling w/ CABG, MI or angioplasty before 65F 55M? No   Social History Narrative     None     Social Determinants of Health     Financial Resource Strain: Not on file   Food Insecurity: Not on file   Transportation Needs: Not on file  "  Physical Activity: Not on file   Stress: Not on file   Social Connections: Not on file   Intimate Partner Violence: Not on file   Housing Stability: Not on file       Review of Systems:  Skin:        Eyes:       ENT:       Respiratory:       Cardiovascular:       Gastroenterology:      Genitourinary:       Musculoskeletal:       Neurologic:       Psychiatric:       Heme/Lymph/Imm:       Endocrine:         Physical Exam:  Vitals: /81 (BP Location: Left arm, Cuff Size: Adult Regular)   Pulse 82   Ht 1.88 m (6' 2\")   Wt 75.3 kg (166 lb)   BMI 21.31 kg/m      Constitutional:  cooperative, alert and oriented, well developed, well nourished, in no acute distress        Skin:  warm and dry to the touch, no apparent skin lesions or masses noted   pacemaker incision in the left infraclavicular area was well-healed      Head:  normocephalic, no masses or lesions        Eyes:  pupils equal and round, conjunctivae and lids unremarkable, sclera white, no xanthalasma, EOMS intact, no nystagmus        Lymph:No Cervical lymphadenopathy present     ENT:  no pallor or cyanosis, dentition good        Neck:  carotid pulses are full and equal bilaterally, JVP normal, no carotid bruit        Respiratory:  normal breath sounds, clear to auscultation, normal A-P diameter, normal symmetry, normal respiratory excursion, no use of accessory muscles         Cardiac: regular rhythm, normal S1/S2, no S3 or S4, apical impulse not displaced, no murmurs, gallops or rubs                pulses full and equal, no bruits auscultated                                        GI:  abdomen soft, non-tender, BS normoactive, no mass, no HSM, no bruits        Extremities and Muscular Skeletal:  no deformities, clubbing, cyanosis, erythema observed              Neurological:  no gross motor deficits        Psych:  Alert and Oriented x 3        Recent Lab Results:  LIPID RESULTS:  No results found for: CHOL, HDL, LDL, TRIG, CHOLHDLRATIO    LIVER " ENZYME RESULTS:  Lab Results   Component Value Date    AST 80 (H) 08/20/2021    AST 43 05/11/2021    ALT 79 (H) 08/20/2021    ALT 44 05/11/2021       CBC RESULTS:  Lab Results   Component Value Date    WBC 3.5 (L) 08/20/2021    WBC 4.0 10/09/2019    RBC 4.25 (L) 08/20/2021    RBC 4.63 10/09/2019    HGB 13.5 08/20/2021    HGB 14.3 10/09/2019    HCT 41.3 08/20/2021    HCT 43.3 10/09/2019    MCV 97 08/20/2021    MCV 94 10/09/2019    MCH 31.8 08/20/2021    MCH 30.9 10/09/2019    MCHC 32.7 08/20/2021    MCHC 33.0 10/09/2019    RDW 12.4 08/20/2021    RDW 13.2 10/09/2019     08/20/2021     10/09/2019       BMP RESULTS:  Lab Results   Component Value Date     08/20/2021     05/11/2021    POTASSIUM 3.6 08/20/2021    POTASSIUM 3.9 05/11/2021    CHLORIDE 106 08/20/2021    CHLORIDE 109 05/11/2021    CO2 26 08/20/2021    CO2 27 05/11/2021    ANIONGAP 6 08/20/2021    ANIONGAP 5 05/11/2021    GLC 92 08/20/2021    GLC 71 05/11/2021    BUN 11 08/20/2021    BUN 11 05/11/2021    CR 1.14 08/20/2021    CR 1.02 05/11/2021    GFRESTIMATED 85 08/20/2021    GFRESTIMATED >90 05/11/2021    GFRESTBLACK >90 05/11/2021    ASHLEY 8.9 08/20/2021    ASHLEY 9.4 05/11/2021        A1C RESULTS:  No results found for: A1C    INR RESULTS:  No results found for: INR        CC  Yasemin Stephens DO  [address]  [place] [address]

## 2022-01-25 NOTE — LETTER
1/25/2022    Ted Tidwell MD  [address] [facility] [address]    RE: Ravi Hankins       Dear Colleague,     I had the pleasure of seeing Ravi Hankins in the [facility].  HPI and Plan:   31-year-old man who is here for follow-up of borderline aortic root enlargement.    History outlined in my note from October 2019.    Has had 2 more episodes of presyncope.  Both episodes involve losing consciousness perhaps for a few seconds.  No serious injury as a result.  Both episodes occur when he was getting out from the car.  Continues to drink several spirits over the weekend as well as smoke weed.  Denies dehydration during these episodes  He is probably more prone to vasovagal syncope given his low body mass.  I reminded him that heavy alcohol intake and perhaps smoking we could precipitate these episodes.  We talked about avoidance maneuvers such as avoiding sudden positional changes and sitting down or even laying down when he has warning symptoms.  He understands    As for his aortic root it is only mildly enlarged at 3.8 cm at worst has been no significant changes.    His very mild borderline aortic root enlargement is stable.  I will see him again in 2 years time with repeat echocardiography prior to clinic visit.    No orders of the defined types were placed in this encounter.      No orders of the defined types were placed in this encounter.      Encounter Diagnoses   Name Primary?     Syncope, unspecified syncope type      Disorder of aorta (H)        CURRENT MEDICATIONS:  Current Outpatient Medications   Medication Sig Dispense Refill     atenolol (TENORMIN) 100 MG tablet Take 1 tablet (100 mg) by mouth daily 90 tablet 0     acetaminophen (TYLENOL) 325 MG tablet Take 325-650 mg by mouth every 4 hours as needed for mild pain (Patient not taking: Reported on 8/20/2021)       ibuprofen (ADVIL/MOTRIN) 200 MG tablet Take 200-400 mg by mouth every 6 hours as needed  for mild pain (Patient not taking: Reported on 8/20/2021)         ALLERGIES   No Known Allergies    PAST MEDICAL HISTORY:  No past medical history on file.    PAST SURGICAL HISTORY:  Past Surgical History:   Procedure Laterality Date     NO HISTORY OF SURGERY         FAMILY HISTORY:  Family History   Problem Relation Age of Onset     Heart Disease Brother      Abdominal Aortic Aneurysm Brother      Heart Disease Other         self     Substance Abuse Mother      Substance Abuse Father      Substance Abuse Maternal Grandfather      Diabetes No family hx of      Coronary Artery Disease No family hx of      Hypertension No family hx of      Hyperlipidemia No family hx of      Cerebrovascular Disease No family hx of      Breast Cancer No family hx of      Colon Cancer No family hx of      Prostate Cancer No family hx of      Other Cancer No family hx of      Depression No family hx of      Anxiety Disorder No family hx of      Mental Illness No family hx of      Anesthesia Reaction No family hx of      Asthma No family hx of      Osteoporosis No family hx of      Genetic Disorder No family hx of      Thyroid Disease No family hx of      Obesity No family hx of      Unknown/Adopted No family hx of        SOCIAL HISTORY:  Social History     Socioeconomic History     Marital status: Single     Spouse name: None     Number of children: None     Years of education: None     Highest education level: None   Occupational History     None   Tobacco Use     Smoking status: Current Every Day Smoker     Packs/day: 0.10     Smokeless tobacco: Never Used     Tobacco comment: 1-2 cigarettes a day   Substance and Sexual Activity     Alcohol use: Yes     Alcohol/week: 0.0 standard drinks     Comment: 2 times/week     Drug use: Yes     Types: Marijuana     Comment: Pt reports smoking a blunt today, pt reports smoking canabis everyday..     Sexual activity: Yes     Partners: Female   Other Topics Concern     Parent/sibling w/ CABG, MI or  "angioplasty before 65F 55M? No   Social History Narrative     None     Social Determinants of Health     Financial Resource Strain: Not on file   Food Insecurity: Not on file   Transportation Needs: Not on file   Physical Activity: Not on file   Stress: Not on file   Social Connections: Not on file   Intimate Partner Violence: Not on file   Housing Stability: Not on file       Review of Systems:  Skin:        Eyes:       ENT:       Respiratory:       Cardiovascular:       Gastroenterology:      Genitourinary:       Musculoskeletal:       Neurologic:       Psychiatric:       Heme/Lymph/Imm:       Endocrine:         Physical Exam:  Vitals: /81 (BP Location: Left arm, Cuff Size: Adult Regular)   Pulse 82   Ht 1.88 m (6' 2\")   Wt 75.3 kg (166 lb)   BMI 21.31 kg/m      Constitutional:  cooperative, alert and oriented, well developed, well nourished, in no acute distress        Skin:  warm and dry to the touch, no apparent skin lesions or masses noted   pacemaker incision in the left infraclavicular area was well-healed      Head:  normocephalic, no masses or lesions        Eyes:  pupils equal and round, conjunctivae and lids unremarkable, sclera white, no xanthalasma, EOMS intact, no nystagmus        Lymph:No Cervical lymphadenopathy present     ENT:  no pallor or cyanosis, dentition good        Neck:  carotid pulses are full and equal bilaterally, JVP normal, no carotid bruit        Respiratory:  normal breath sounds, clear to auscultation, normal A-P diameter, normal symmetry, normal respiratory excursion, no use of accessory muscles         Cardiac: regular rhythm, normal S1/S2, no S3 or S4, apical impulse not displaced, no murmurs, gallops or rubs                pulses full and equal, no bruits auscultated                                        GI:  abdomen soft, non-tender, BS normoactive, no mass, no HSM, no bruits        Extremities and Muscular Skeletal:  no deformities, clubbing, cyanosis, erythema " observed              Neurological:  no gross motor deficits        Psych:  Alert and Oriented x 3        Recent Lab Results:  LIPID RESULTS:  No results found for: CHOL, HDL, LDL, TRIG, CHOLHDLRATIO    LIVER ENZYME RESULTS:  Lab Results   Component Value Date    AST 80 (H) 08/20/2021    AST 43 05/11/2021    ALT 79 (H) 08/20/2021    ALT 44 05/11/2021       CBC RESULTS:  Lab Results   Component Value Date    WBC 3.5 (L) 08/20/2021    WBC 4.0 10/09/2019    RBC 4.25 (L) 08/20/2021    RBC 4.63 10/09/2019    HGB 13.5 08/20/2021    HGB 14.3 10/09/2019    HCT 41.3 08/20/2021    HCT 43.3 10/09/2019    MCV 97 08/20/2021    MCV 94 10/09/2019    MCH 31.8 08/20/2021    MCH 30.9 10/09/2019    MCHC 32.7 08/20/2021    MCHC 33.0 10/09/2019    RDW 12.4 08/20/2021    RDW 13.2 10/09/2019     08/20/2021     10/09/2019       BMP RESULTS:  Lab Results   Component Value Date     08/20/2021     05/11/2021    POTASSIUM 3.6 08/20/2021    POTASSIUM 3.9 05/11/2021    CHLORIDE 106 08/20/2021    CHLORIDE 109 05/11/2021    CO2 26 08/20/2021    CO2 27 05/11/2021    ANIONGAP 6 08/20/2021    ANIONGAP 5 05/11/2021    GLC 92 08/20/2021    GLC 71 05/11/2021    BUN 11 08/20/2021    BUN 11 05/11/2021    CR 1.14 08/20/2021    CR 1.02 05/11/2021    GFRESTIMATED 85 08/20/2021    GFRESTIMATED >90 05/11/2021    GFRESTBLACK >90 05/11/2021    ASHLEY 8.9 08/20/2021    ASHLEY 9.4 05/11/2021        A1C RESULTS:  No results found for: A1C    INR RESULTS:  No results found for: INR        DR PHILOMENA ESQUIVEL MD   [facility]      cc:   Yasemin Stephens DO  [address] [address]

## 2022-01-26 NOTE — RESULT ENCOUNTER NOTE
Please send copy of results with a letter:    Enclosed is a copy of your results. If you have any questions, please contact us at [phone].    Your heart ultrasound (ECHO) is stable. You continue to have the borderline aortic root dilation at 3.8 cm but this is stable since 2019 so we can continue to monitor.  Plan to repeat in 2-3 years.      Thanks,   Yasemin Stephens, DO

## 2022-02-17 PROBLEM — F10.20 ALCOHOL DEPENDENCE, UNCOMPLICATED (H): Status: ACTIVE | Noted: 2020-09-30

## 2022-06-21 NOTE — PROGRESS NOTES
SUBJECTIVE:   CC: Ravi Hankins is an 32 year old male who presents for preventative health visit.     {Split Bill scripting  The purpose of this visit is to discuss your medical history and prevent health problems before you are sick. You may be responsible for a co-pay, coinsurance, or deductible if your visit today includes services such as checking on a sore throat, having an x-ray or lab test, or treating and evaluating a new or existing condition :082265}  {Patient advised of split billing (Optional):678433}  Healthy Habits:     Getting at least 3 servings of Calcium per day:  Yes    Bi-annual eye exam:  Yes    Dental care twice a year:  NO    Sleep apnea or symptoms of sleep apnea:  None    Diet:  Regular (no restrictions)    Frequency of exercise:  2-3 days/week    Duration of exercise:  Greater than 60 minutes    Taking medications regularly:  Yes    Medication side effects:  None    PHQ-2 Total Score: 1    Additional concerns today:  No    {Add if <65 person on Medicare  - Required Questions (Optional):777281}  {Outside tests to abstract? :231812}    {additional problems to add (Optional):264216}    Today's PHQ-2 Score:   PHQ-2 ( 1999 [employer]) 6/22/2022   Q1: Little interest or pleasure in doing things 0   Q2: Feeling down, depressed or hopeless 1   PHQ-2 Score 1   PHQ-2 Total Score (12-17 Years)- Positive if 3 or more points; Administer PHQ-A if positive -   Q1: Little interest or pleasure in doing things Not at all   Q2: Feeling down, depressed or hopeless Several days   PHQ-2 Score 1       Abuse: Current or Past(Physical, Sexual or Emotional)- No  Do you feel safe in your environment? Yes    Have you ever done Advance Care Planning? (For example, a Health Directive, POLST, or a discussion with a medical provider or your loved ones about your wishes): No, advance care planning information given to patient to review.  Patient declined advance care planning discussion at this time.    Social History  "    Tobacco Use     Smoking status: Current Every Day Smoker     Packs/day: 0.10     Smokeless tobacco: Never Used     Tobacco comment: 1-2 cigarettes a day   Substance Use Topics     Alcohol use: Yes     Alcohol/week: 0.0 standard drinks     Comment: 2 times/week     If you drink alcohol do you typically have >3 drinks per day or >7 drinks per week? No    Alcohol Use 6/22/2022   Prescreen: >3 drinks/day or >7 drinks/week? Yes   Prescreen: >3 drinks/day or >7 drinks/week? -   AUDIT SCORE  7     AUDIT - Alcohol Use Disorders Identification Test - Reproduced from the World Health Organization Audit 2001 (Second Edition) 6/22/2022   1.  How often do you have a drink containing alcohol? 2 to 3 times a week   2.  How many drinks containing alcohol do you have on a typical day when you are drinking? 3 or 4   3.  How often do you have five or more drinks on one occasion? Less than monthly   4.  How often during the last year have you found that you were not able to stop drinking once you had started? Never   5.  How often during the last year have you failed to do what was normally expected of you because of drinking? Never   6.  How often during the last year have you needed a first drink in the morning to get yourself going after a heavy drinking session? Never   7.  How often during the last year have you had a feeling of guilt or remorse after drinking? Less than monthly   8.  How often during the last year have you been unable to remember what happened the night before because of your drinking? Less than monthly   9.  Have you or someone else been injured because of your drinking? No   10. Has a relative, friend, doctor or other health care worker been concerned about your drinking or suggested you cut down? No   TOTAL SCORE 7       Last PSA: No results found for: PSA    Reviewed orders with patient. Reviewed health maintenance and updated orders accordingly - { :560342::\"Yes\"}  {Chronicprobdata " "(optional):585503}    Reviewed and updated as needed this visit by clinical staff   Tobacco  Allergies  Meds   Med Hx  Surg Hx  Fam Hx  Soc Hx          Reviewed and updated as needed this visit by Provider                   {HISTORY OPTIONS (Optional):127341}    Review of Systems   Constitutional: Negative for chills and fever.   HENT: Negative for congestion, ear pain and sore throat.    Eyes: Negative for pain and visual disturbance.   Respiratory: Negative for cough and shortness of breath.    Cardiovascular: Negative for chest pain, palpitations and peripheral edema.   Gastrointestinal: Positive for heartburn. Negative for abdominal pain, constipation, diarrhea, hematochezia and nausea.   Genitourinary: Negative for dysuria, frequency, genital sores, hematuria, impotence, penile discharge and urgency.   Musculoskeletal: Negative for arthralgias, joint swelling and myalgias.   Skin: Negative for rash.   Neurological: Negative for dizziness, weakness and paresthesias.   Psychiatric/Behavioral: Positive for mood changes. The patient is not nervous/anxious.      {MALE ROS (Optional):888742::\"CONSTITUTIONAL: NEGATIVE for fever, chills, change in weight\",\"INTEGUMENTARY/SKIN: NEGATIVE for worrisome rashes, moles or lesions\",\"EYES: NEGATIVE for vision changes or irritation\",\"ENT: NEGATIVE for ear, mouth and throat problems\",\"RESP: NEGATIVE for significant cough or SOB\",\"CV: NEGATIVE for chest pain, palpitations or peripheral edema\",\"GI: NEGATIVE for nausea, abdominal pain, heartburn, or change in bowel habits\",\" male: negative for dysuria, hematuria, decreased urinary stream, erectile dysfunction, urethral discharge\",\"MUSCULOSKELETAL: NEGATIVE for significant arthralgias or myalgia\",\"NEURO: NEGATIVE for weakness, dizziness or paresthesias\",\"PSYCHIATRIC: NEGATIVE for changes in mood or affect\"}    OBJECTIVE:   /87   Pulse 70   Temp 97.7  F (36.5  C) (Temporal)   Resp 18   Ht 1.84 m (6' 0.44\")   Wt 71.4 " "kg (157 lb 6.4 oz)   SpO2 96%   BMI 21.09 kg/m      Physical Exam  {Exam Choices (Optional):727073}    {Diagnostic Test Results (Optional):804528::\"Diagnostic Test Results:\",\"Labs reviewed in Epic\"}    ASSESSMENT/PLAN:   {Diag Picklist:843376}    {Patient advised of split billing (Optional):039653}    COUNSELING:   {MALE COUNSELING MESSAGES:695104::\"Reviewed preventive health counseling, as reflected in patient instructions\"}    Estimated body mass index is 21.09 kg/m  as calculated from the following:    Height as of this encounter: 1.84 m (6' 0.44\").    Weight as of this encounter: 71.4 kg (157 lb 6.4 oz).     {Weight Management Plan (ACO) Complete if BMI is abnormal-  Ages 18-64  BMI >24.9.  Age 65+ with BMI <23 or >30 (Optional):308979}    He reports that he has been smoking. He has been smoking about 0.10 packs per day. He has never used smokeless tobacco.  Tobacco Cessation Action Plan:   {TOBACCO CESSATION ACTION PLAN:147426}      Counseling Resources:  ATP IV Guidelines  Pooled Cohorts Equation Calculator  FRAX Risk Assessment  ICSI Preventive Guidelines  Dietary Guidelines for Americans, 2010  USDA's MyPlate  ASA Prophylaxis  Lung CA Screening    Monie Avilez MD  [facility]  "

## 2022-06-22 ENCOUNTER — OFFICE VISIT (OUTPATIENT)
Dept: FAMILY MEDICINE | Facility: CLINIC | Age: 32
End: 2022-06-22
Payer: COMMERCIAL

## 2022-06-22 VITALS
HEIGHT: 72 IN | BODY MASS INDEX: 21.32 KG/M2 | DIASTOLIC BLOOD PRESSURE: 87 MMHG | WEIGHT: 157.4 LBS | TEMPERATURE: 97.7 F | HEART RATE: 70 BPM | OXYGEN SATURATION: 96 % | RESPIRATION RATE: 18 BRPM | SYSTOLIC BLOOD PRESSURE: 130 MMHG

## 2022-06-22 DIAGNOSIS — Z91.199 NO-SHOW FOR APPOINTMENT: Primary | ICD-10-CM

## 2022-06-22 RX ORDER — ATENOLOL 100 MG/1
100 TABLET ORAL DAILY
Qty: 90 TABLET | Refills: 0 | Status: SHIPPED | OUTPATIENT
Start: 2022-06-22 | End: 2022-10-04

## 2022-06-22 ASSESSMENT — ENCOUNTER SYMPTOMS
SORE THROAT: 0
FEVER: 0
NERVOUS/ANXIOUS: 0
COUGH: 0
CONSTIPATION: 0
WEAKNESS: 0
DIARRHEA: 0
MYALGIAS: 0
SHORTNESS OF BREATH: 0
DIZZINESS: 0
PARESTHESIAS: 0
NAUSEA: 0
EYE PAIN: 0
CHILLS: 0
PALPITATIONS: 0
HEMATOCHEZIA: 0
ABDOMINAL PAIN: 0
JOINT SWELLING: 0
ARTHRALGIAS: 0
FREQUENCY: 0
HEARTBURN: 1
DYSURIA: 0
HEMATURIA: 0

## 2022-06-22 ASSESSMENT — PAIN SCALES - GENERAL: PAINLEVEL: NO PAIN (0)

## 2022-08-02 ENCOUNTER — TELEPHONE (OUTPATIENT)
Dept: UROLOGY | Facility: CLINIC | Age: 32
End: 2022-08-02

## 2022-08-02 NOTE — TELEPHONE ENCOUNTER
Writer called and spoke with patient.  Stent was removed by girlfriend by direction of RN over phone.    Nicki TALLEY RN Urology 8/2/2022 2:11 PM

## 2022-08-02 NOTE — TELEPHONE ENCOUNTER
[facility] Call Center    Phone Message    May a detailed message be left on voicemail: yes     Reason for Call: Other: . pt stated he had emergency kidney stone surgery with  on 7/29/2022 at [facility] and was told to call to schedule a stent removal, please call pt to schedule, thank you    Action Taken: Message routed to:  [facility] ([facility] uro    Travel Screening: Not Applicable

## 2022-10-03 DIAGNOSIS — R03.0 ELEVATED BP WITHOUT DIAGNOSIS OF HYPERTENSION: Primary | ICD-10-CM

## 2022-10-04 RX ORDER — ATENOLOL 100 MG/1
100 TABLET ORAL DAILY
Qty: 30 TABLET | Refills: 0 | Status: SHIPPED | OUTPATIENT
Start: 2022-10-04 | End: 2022-11-02

## 2022-10-04 NOTE — TELEPHONE ENCOUNTER
Routing refill request to provider for review/approval because:  Medication needs associated diagnosis.  Elyse QUEVEDO RN

## 2022-11-02 ENCOUNTER — TELEPHONE (OUTPATIENT)
Dept: FAMILY MEDICINE | Facility: CLINIC | Age: 32
End: 2022-11-02

## 2022-11-02 DIAGNOSIS — R03.0 ELEVATED BP WITHOUT DIAGNOSIS OF HYPERTENSION: ICD-10-CM

## 2022-11-02 RX ORDER — ATENOLOL 100 MG/1
100 TABLET ORAL DAILY
Qty: 30 TABLET | Refills: 0 | Status: SHIPPED | OUTPATIENT
Start: 2022-11-02 | End: 2022-12-05

## 2022-12-05 DIAGNOSIS — R03.0 ELEVATED BP WITHOUT DIAGNOSIS OF HYPERTENSION: ICD-10-CM

## 2022-12-05 RX ORDER — ATENOLOL 100 MG/1
100 TABLET ORAL DAILY
Qty: 30 TABLET | Refills: 0 | Status: SHIPPED | OUTPATIENT
Start: 2022-12-05 | End: 2023-01-05

## 2022-12-05 NOTE — TELEPHONE ENCOUNTER
Medication is being filled for 1 time refill only due to:  Patient needs to be seen because it has been more than one year since last visit.     Future appointment 12/12.  Maggi Salinas RN

## 2023-01-05 DIAGNOSIS — R03.0 ELEVATED BP WITHOUT DIAGNOSIS OF HYPERTENSION: ICD-10-CM

## 2023-01-05 RX ORDER — ATENOLOL 100 MG/1
100 TABLET ORAL DAILY
Qty: 30 TABLET | Refills: 0 | Status: SHIPPED | OUTPATIENT
Start: 2023-01-05 | End: 2023-01-09

## 2023-01-05 NOTE — TELEPHONE ENCOUNTER
A.S.      Routing refill request to provider for review/approval because:  Last seen 8/20/21.  Cancelled 6 appointments for physical with you between 6/22/22-12/12/22    Has appointment scheduled for 1/9/23  Have sent several refills for one month supply     Thanks,  Maggi Salinas RN

## 2023-01-05 NOTE — TELEPHONE ENCOUNTER
A.S.      Please see message from 11:49 am today.  Patient currently out of medication.      Thank you,  Maggi Salinas RN

## 2023-01-09 ENCOUNTER — OFFICE VISIT (OUTPATIENT)
Dept: FAMILY MEDICINE | Facility: CLINIC | Age: 33
End: 2023-01-09
Payer: COMMERCIAL

## 2023-01-09 VITALS
WEIGHT: 162.6 LBS | DIASTOLIC BLOOD PRESSURE: 72 MMHG | OXYGEN SATURATION: 97 % | BODY MASS INDEX: 22.02 KG/M2 | HEART RATE: 75 BPM | HEIGHT: 72 IN | SYSTOLIC BLOOD PRESSURE: 107 MMHG | RESPIRATION RATE: 16 BRPM | TEMPERATURE: 99.3 F

## 2023-01-09 DIAGNOSIS — F10.20 ALCOHOL DEPENDENCE, UNCOMPLICATED (H): ICD-10-CM

## 2023-01-09 DIAGNOSIS — F39 MOOD DISORDER (H): ICD-10-CM

## 2023-01-09 DIAGNOSIS — R03.0 ELEVATED BP WITHOUT DIAGNOSIS OF HYPERTENSION: ICD-10-CM

## 2023-01-09 DIAGNOSIS — Z00.00 ROUTINE GENERAL MEDICAL EXAMINATION AT A HEALTH CARE FACILITY: Primary | ICD-10-CM

## 2023-01-09 DIAGNOSIS — I77.9 DISORDER OF AORTA (H): ICD-10-CM

## 2023-01-09 DIAGNOSIS — Z11.3 SCREEN FOR STD (SEXUALLY TRANSMITTED DISEASE): ICD-10-CM

## 2023-01-09 LAB
ALBUMIN SERPL BCG-MCNC: 4.2 G/DL (ref 3.5–5.2)
ALP SERPL-CCNC: 60 U/L (ref 40–129)
ALT SERPL W P-5'-P-CCNC: 17 U/L (ref 10–50)
ANION GAP SERPL CALCULATED.3IONS-SCNC: 13 MMOL/L (ref 7–15)
AST SERPL W P-5'-P-CCNC: 26 U/L (ref 10–50)
BILIRUB SERPL-MCNC: 0.4 MG/DL
BUN SERPL-MCNC: 12.5 MG/DL (ref 6–20)
CALCIUM SERPL-MCNC: 9.6 MG/DL (ref 8.6–10)
CHLORIDE SERPL-SCNC: 105 MMOL/L (ref 98–107)
CREAT SERPL-MCNC: 1.1 MG/DL (ref 0.67–1.17)
DEPRECATED HCO3 PLAS-SCNC: 22 MMOL/L (ref 22–29)
GFR SERPL CREATININE-BSD FRML MDRD: >90 ML/MIN/1.73M2
GGT SERPL-CCNC: 27 U/L (ref 8–61)
GLUCOSE SERPL-MCNC: 93 MG/DL (ref 70–99)
POTASSIUM SERPL-SCNC: 4.4 MMOL/L (ref 3.4–5.3)
PROT SERPL-MCNC: 7.4 G/DL (ref 6.4–8.3)
SODIUM SERPL-SCNC: 140 MMOL/L (ref 136–145)

## 2023-01-09 PROCEDURE — 87340 HEPATITIS B SURFACE AG IA: CPT | Performed by: FAMILY MEDICINE

## 2023-01-09 PROCEDURE — 86780 TREPONEMA PALLIDUM: CPT | Performed by: FAMILY MEDICINE

## 2023-01-09 PROCEDURE — 99395 PREV VISIT EST AGE 18-39: CPT | Performed by: FAMILY MEDICINE

## 2023-01-09 PROCEDURE — 87591 N.GONORRHOEAE DNA AMP PROB: CPT | Performed by: FAMILY MEDICINE

## 2023-01-09 PROCEDURE — 82977 ASSAY OF GGT: CPT | Performed by: FAMILY MEDICINE

## 2023-01-09 PROCEDURE — 36415 COLL VENOUS BLD VENIPUNCTURE: CPT | Performed by: FAMILY MEDICINE

## 2023-01-09 PROCEDURE — 86803 HEPATITIS C AB TEST: CPT | Performed by: FAMILY MEDICINE

## 2023-01-09 PROCEDURE — 87389 HIV-1 AG W/HIV-1&-2 AB AG IA: CPT | Performed by: FAMILY MEDICINE

## 2023-01-09 PROCEDURE — 87491 CHLMYD TRACH DNA AMP PROBE: CPT | Performed by: FAMILY MEDICINE

## 2023-01-09 PROCEDURE — 80053 COMPREHEN METABOLIC PANEL: CPT | Performed by: FAMILY MEDICINE

## 2023-01-09 RX ORDER — ATENOLOL 100 MG/1
100 TABLET ORAL DAILY
Qty: 90 TABLET | Refills: 3 | Status: ON HOLD | OUTPATIENT
Start: 2023-01-09 | End: 2023-03-20

## 2023-01-09 RX ORDER — QUETIAPINE FUMARATE 50 MG/1
50 TABLET, FILM COATED ORAL
Status: ON HOLD | COMMUNITY
Start: 2022-12-14 | End: 2023-03-20

## 2023-01-09 ASSESSMENT — ENCOUNTER SYMPTOMS
FEVER: 0
HEMATURIA: 0
EYE PAIN: 0
WEAKNESS: 0
DIARRHEA: 0
FREQUENCY: 0
NERVOUS/ANXIOUS: 0
HEARTBURN: 0
ARTHRALGIAS: 0
HEADACHES: 0
DYSURIA: 0
COUGH: 0
ABDOMINAL PAIN: 0
PARESTHESIAS: 0
NAUSEA: 0
MYALGIAS: 0
HEMATOCHEZIA: 0
DIZZINESS: 0
SHORTNESS OF BREATH: 0
JOINT SWELLING: 0
CONSTIPATION: 0
SORE THROAT: 0
PALPITATIONS: 0
CHILLS: 0

## 2023-01-09 ASSESSMENT — ANXIETY QUESTIONNAIRES
8. IF YOU CHECKED OFF ANY PROBLEMS, HOW DIFFICULT HAVE THESE MADE IT FOR YOU TO DO YOUR WORK, TAKE CARE OF THINGS AT HOME, OR GET ALONG WITH OTHER PEOPLE?: NOT DIFFICULT AT ALL
GAD7 TOTAL SCORE: 3
GAD7 TOTAL SCORE: 3
6. BECOMING EASILY ANNOYED OR IRRITABLE: NOT AT ALL
4. TROUBLE RELAXING: NOT AT ALL
2. NOT BEING ABLE TO STOP OR CONTROL WORRYING: SEVERAL DAYS
GAD7 TOTAL SCORE: 3
IF YOU CHECKED OFF ANY PROBLEMS ON THIS QUESTIONNAIRE, HOW DIFFICULT HAVE THESE PROBLEMS MADE IT FOR YOU TO DO YOUR WORK, TAKE CARE OF THINGS AT HOME, OR GET ALONG WITH OTHER PEOPLE: NOT DIFFICULT AT ALL
5. BEING SO RESTLESS THAT IT IS HARD TO SIT STILL: NOT AT ALL
7. FEELING AFRAID AS IF SOMETHING AWFUL MIGHT HAPPEN: NOT AT ALL
1. FEELING NERVOUS, ANXIOUS, OR ON EDGE: SEVERAL DAYS
7. FEELING AFRAID AS IF SOMETHING AWFUL MIGHT HAPPEN: NOT AT ALL
3. WORRYING TOO MUCH ABOUT DIFFERENT THINGS: SEVERAL DAYS

## 2023-01-09 ASSESSMENT — PATIENT HEALTH QUESTIONNAIRE - PHQ9
10. IF YOU CHECKED OFF ANY PROBLEMS, HOW DIFFICULT HAVE THESE PROBLEMS MADE IT FOR YOU TO DO YOUR WORK, TAKE CARE OF THINGS AT HOME, OR GET ALONG WITH OTHER PEOPLE: NOT DIFFICULT AT ALL
SUM OF ALL RESPONSES TO PHQ QUESTIONS 1-9: 1
SUM OF ALL RESPONSES TO PHQ QUESTIONS 1-9: 1

## 2023-01-09 ASSESSMENT — PAIN SCALES - GENERAL: PAINLEVEL: NO PAIN (0)

## 2023-01-09 NOTE — LETTER
January 10, 2023      Ravi Hankins  [address]  [facility] [address]        Dear ,    We are writing to inform you of your test results.    -All of your labs are normal.   -Liver and gallbladder tests are normal (ALT,AST, Alk phos, bilirubin), kidney function is normal (Cr, GFR), sodium is normal, potassium is normal, calcium is normal, glucose is normal.     -keep up the good work.     Please keep us posted with questions or concerns .       Resulted Orders   Comprehensive metabolic panel (BMP + Alb, Alk Phos, ALT, AST, Total. Bili, TP)   Result Value Ref Range    Sodium 140 136 - 145 mmol/L    Potassium 4.4 3.4 - 5.3 mmol/L    Chloride 105 98 - 107 mmol/L    Carbon Dioxide (CO2) 22 22 - 29 mmol/L    Anion Gap 13 7 - 15 mmol/L    Urea Nitrogen 12.5 6.0 - 20.0 mg/dL    Creatinine 1.10 0.67 - 1.17 mg/dL    Calcium 9.6 8.6 - 10.0 mg/dL    Glucose 93 70 - 99 mg/dL    Alkaline Phosphatase 60 40 - 129 U/L    AST 26 10 - 50 U/L    ALT 17 10 - 50 U/L    Protein Total 7.4 6.4 - 8.3 g/dL    Albumin 4.2 3.5 - 5.2 g/dL    Bilirubin Total 0.4 <=1.2 mg/dL    GFR Estimate >90 >60 mL/min/1.73m2      Comment:      Effective December 21, 2021 eGFRcr in adults is calculated using the 2021 CKD-EPI creatinine equation which includes age and gender (Taylor et al., NEJM, DOI: 10.1056/BAMPew4622372)   GGT   Result Value Ref Range    GGT 27 8 - 61 U/L       If you have any questions or concerns, please call the clinic at the number listed above.       Sincerely,      Monie Avilez MD/shay

## 2023-01-09 NOTE — PROGRESS NOTES
SUBJECTIVE:   CC: Ravi is an 32 year old who presents for preventative health visit.     Patient has been advised of split billing requirements and indicates understanding: Yes  Healthy Habits:     Getting at least 3 servings of Calcium per day:  Yes    Bi-annual eye exam:  Yes    Dental care twice a year:  NO    Sleep apnea or symptoms of sleep apnea:  None    Diet:  Regular (no restrictions)    Frequency of exercise:  2-3 days/week    Duration of exercise:  30-45 minutes    Taking medications regularly:  Yes    Medication side effects:  Not applicable    PHQ-2 Total Score: 0    Additional concerns today:  No  out patient treatment- for alcohol abuse   No alcohol since 3 weeks ago.  Lithotripsy for renal calculi.  Staying hydrated.      GF is 13 weeks pregnant. Expected date of delivery 2023  Quit smoing 6 months ago    Sexually transmitted infection screening.  GF diagnosed with HEPATITIS B VIRUS  infection  Today's PHQ-2 Score:   PHQ-2 (  [employer]) 2023   Q1: Little interest or pleasure in doing things 0   Q2: Feeling down, depressed or hopeless 0   PHQ-2 Score 0   PHQ-2 Total Score (12-17 Years)- Positive if 3 or more points; Administer PHQ-A if positive -   Q1: Little interest or pleasure in doing things Not at all   Q2: Feeling down, depressed or hopeless Not at all   PHQ-2 Score 0       Have you ever done Advance Care Planning? (For example, a Health Directive, POLST, or a discussion with a medical provider or your loved ones about your wishes): No, advance care planning information given to patient to review.  Patient declined advance care planning discussion at this time.    Social History     Tobacco Use     Smoking status: Former     Packs/day: 0.10     Years: 5.00     Pack years: 0.50     Types: Cigarettes     Quit date:      Years since quittin.0     Smokeless tobacco: Never     Tobacco comments:     1-2 cigarettes a day for 5 years   Substance Use Topics     Alcohol use:  Yes     Alcohol/week: 0.0 standard drinks     Comment: 2 times/week     If you drink alcohol do you typically have >3 drinks per day or >7 drinks per week? No    Alcohol Use 1/9/2023   Prescreen: >3 drinks/day or >7 drinks/week? No   Prescreen: >3 drinks/day or >7 drinks/week? -   AUDIT SCORE  -     AUDIT - Alcohol Use Disorders Identification Test - Reproduced from the World Health Organization Audit 2001 (Second Edition) 6/22/2022   1.  How often do you have a drink containing alcohol? 2 to 3 times a week   2.  How many drinks containing alcohol do you have on a typical day when you are drinking? 3 or 4   3.  How often do you have five or more drinks on one occasion? Less than monthly   4.  How often during the last year have you found that you were not able to stop drinking once you had started? Never   5.  How often during the last year have you failed to do what was normally expected of you because of drinking? Never   6.  How often during the last year have you needed a first drink in the morning to get yourself going after a heavy drinking session? Never   7.  How often during the last year have you had a feeling of guilt or remorse after drinking? Less than monthly   8.  How often during the last year have you been unable to remember what happened the night before because of your drinking? Less than monthly   9.  Have you or someone else been injured because of your drinking? No   10. Has a relative, friend, doctor or other health care worker been concerned about your drinking or suggested you cut down? No   TOTAL SCORE 7       Last PSA: No results found for: PSA    Reviewed orders with patient. Reviewed health maintenance and updated orders accordingly - Yes  BP Readings from Last 3 Encounters:   01/09/23 107/72   06/22/22 130/87   01/25/22 125/81    Wt Readings from Last 3 Encounters:   01/09/23 73.8 kg (162 lb 9.6 oz)   06/22/22 71.4 kg (157 lb 6.4 oz)   01/25/22 75.3 kg (166 lb)                     Reviewed and updated as needed this visit by clinical staff   Tobacco  Allergies  Meds  Problems  Med Hx  Surg Hx  Fam Hx          Reviewed and updated as needed this visit by Provider   Tobacco  Allergies  Meds  Problems  Med Hx  Surg Hx  Fam Hx             Review of Systems   Constitutional: Negative for chills and fever.   HENT: Negative for congestion, ear pain, hearing loss and sore throat.    Eyes: Negative for pain and visual disturbance.   Respiratory: Negative for cough and shortness of breath.    Cardiovascular: Negative for chest pain, palpitations and peripheral edema.   Gastrointestinal: Negative for abdominal pain, constipation, diarrhea, heartburn, hematochezia and nausea.   Genitourinary: Negative for dysuria, frequency, genital sores, hematuria and urgency.   Musculoskeletal: Negative for arthralgias, joint swelling and myalgias.   Skin: Negative for rash.   Neurological: Negative for dizziness, weakness, headaches and paresthesias.   Psychiatric/Behavioral: Negative for mood changes. The patient is not nervous/anxious.          OBJECTIVE:   /72   Pulse 75   Temp 99.3  F (37.4  C) (Temporal)   Resp 16   Ht 1.829 m (6')   Wt 73.8 kg (162 lb 9.6 oz)   SpO2 97%   BMI 22.05 kg/m      Physical Exam  GENERAL: healthy, alert and no distress  EYES: Eyes grossly normal to inspection, PERRL and conjunctivae and sclerae normal  HENT: ear canals and TM's normal, nose and mouth without ulcers or lesions  NECK: no adenopathy, no asymmetry, masses, or scars and thyroid normal to palpation  RESP: lungs clear to auscultation - no rales, rhonchi or wheezes  CV: regular rate and rhythm, normal S1 S2, no S3 or S4, no murmur, click or rub, no peripheral edema and peripheral pulses strong  ABDOMEN: soft, nontender, no hepatosplenomegaly, no masses and bowel sounds normal  MS: no gross musculoskeletal defects noted, no edema  SKIN: no suspicious lesions or rashes  NEURO: Normal strength and  tone, mentation intact and speech normal  PSYCH: mentation appears normal, affect normal/bright    Diagnostic Test Results:  Labs reviewed in Epic  No results found for this or any previous visit (from the past 24 hour(s)).    ASSESSMENT/PLAN:   Ravi was seen today for physical.    Diagnoses and all orders for this visit:    Routine general medical examination at a health care facility    Alcohol dependence, uncomplicated (H)  Comments:  In treatment program.  Congratulated on soberity for past 3 weeks  Orders:  -     Comprehensive metabolic panel (BMP + Alb, Alk Phos, ALT, AST, Total. Bili, TP); Future  -     GGT; Future  -     Comprehensive metabolic panel (BMP + Alb, Alk Phos, ALT, AST, Total. Bili, TP)  -     GGT    Mood disorder (H)  Comments:  stable on seroquel. takes it at hs    Disorder of aorta (H)  Comments:  Repeat echo- next yr- along with cardiology follow up  Orders:  -     Echocardiogram Complete; Future    Elevated BP without diagnosis of hypertension  Comments:  stable  Orders:  -     atenolol (TENORMIN) 100 MG tablet; Take 1 tablet (100 mg) by mouth daily  -     Comprehensive metabolic panel (BMP + Alb, Alk Phos, ALT, AST, Total. Bili, TP); Future  -     Comprehensive metabolic panel (BMP + Alb, Alk Phos, ALT, AST, Total. Bili, TP)    Screen for STD (sexually transmitted disease)  -     Chlamydia trachomatis PCR; Future  -     Hepatitis C antibody; Future  -     HIV Antigen Antibody Combo; Future  -     Neisseria gonorrhoeae PCR; Future  -     Treponema Abs w Reflex to RPR and Titer  -     Hepatitis B surface antigen; Future  -     Chlamydia trachomatis PCR  -     Hepatitis C antibody  -     HIV Antigen Antibody Combo  -     Neisseria gonorrhoeae PCR  -     Hepatitis B surface antigen    Other orders  -     Cancel: INFLUENZA VACCINE IM > 6 MONTHS VALENT IIV4 (AFLURIA/FLUZONE)- patient declined   -     REVIEW OF HEALTH MAINTENANCE PROTOCOL ORDERS  -     Cancel: Pneumococcal 20 Valent  Conjugate (Prevnar 20)- patient declined   -     Cancel: COVID-19,PF,PFIZER BOOSTER BIVALENT (12+YRS)- patient declined         Patient has been advised of split billing requirements and indicates understanding: Yes      COUNSELING:   Reviewed preventive health counseling, as reflected in patient instructions       Regular exercise       Healthy diet/nutrition       Vision screening       Hearing screening       Alcohol Use        Safe sex practices/STD prevention        He reports that he quit smoking about a year ago. His smoking use included cigarettes. He has a 0.50 pack-year smoking history. He has never used smokeless tobacco.  Nicotine/Tobacco Cessation Plan:   pt reports quit smoking since 6 months            Monie Avilez MD  [facility] [place]  Answers for HPI/ROS submitted by the patient on 1/9/2023  If you checked off any problems, how difficult have these problems made it for you to do your work, take care of things at home, or get along with other people?: Not difficult at all  PHQ9 TOTAL SCORE: 1  REZA 7 TOTAL SCORE: 3

## 2023-01-10 LAB
C TRACH DNA SPEC QL NAA+PROBE: NEGATIVE
HBV SURFACE AG SERPL QL IA: NONREACTIVE
HCV AB SERPL QL IA: NONREACTIVE
HIV 1+2 AB+HIV1 P24 AG SERPL QL IA: NONREACTIVE
N GONORRHOEA DNA SPEC QL NAA+PROBE: NEGATIVE
T PALLIDUM AB SER QL: NONREACTIVE

## 2023-02-01 ENCOUNTER — TELEPHONE (OUTPATIENT)
Dept: FAMILY MEDICINE | Facility: CLINIC | Age: 33
End: 2023-02-01
Payer: COMMERCIAL

## 2023-02-01 DIAGNOSIS — Z11.59 ENCOUNTER FOR HEPATITIS C VIRUS SCREENING TEST FOR HIGH RISK PATIENT: Primary | ICD-10-CM

## 2023-02-01 DIAGNOSIS — Z91.89 ENCOUNTER FOR HEPATITIS C VIRUS SCREENING TEST FOR HIGH RISK PATIENT: Primary | ICD-10-CM

## 2023-02-01 NOTE — TELEPHONE ENCOUNTER
A.S.      Patient called  Per AVS he was to have labs done, noticed not all labs were ordered.    Only order needed is Hep C antibody, all the rest listed below have been done.    Note from 1/9/23 OV:    Screen for STD (sexually transmitted disease)  -     Chlamydia trachomatis PCR; Future  -     Hepatitis C antibody; Future  -     HIV Antigen Antibody Combo; Future  -     Neisseria gonorrhoeae PCR; Future  -     Treponema Abs w Reflex to RPR and Titer  -     Hepatitis B surface antigen; Future  -     HIV Antigen Antibody Combo  -     Hepatitis B surface antigen    Thank you,  Maggi Salinas RN

## 2023-02-01 NOTE — TELEPHONE ENCOUNTER
Please clarify the question by calling patient and inform patient that.  I do see all, STI completed on January 9, 2023 that includes HIV hepatitis C syphilis and hepatitis B along with gonorrhea and chlamydia.    Is he asking for repeat STI including above?      Thank you.

## 2023-02-25 NOTE — TELEPHONE ENCOUNTER
"7/6 Called Rodo and he answer but then hung up was trying to see if I could do his intake this week if I don't hear back from him I\"ll try reaching out again next week  "
Normal

## 2023-03-15 ENCOUNTER — TELEPHONE (OUTPATIENT)
Dept: BEHAVIORAL HEALTH | Facility: CLINIC | Age: 33
End: 2023-03-15
Payer: COMMERCIAL

## 2023-03-15 NOTE — TELEPHONE ENCOUNTER
Pt is a(n) adult (18+ out of HS) Seeking as eval for Adult JES[facility] Assessment for [facility]. and is interested in L+.  Appointment scheduled by:  Patient.  (If patient is self-pay, we much complete a Cost Estimate and save it to the pt chart)  Caller name:  same    Caller phone #: same  If in person, please state reason why:  NA  Brief reason for appt:  Seeking L due to ETOH and cocaine   May seek er for pos 3a Detox     Cost estimate Did not get completed.  Contact information verified/updated: Yes

## 2023-03-17 ENCOUNTER — HOSPITAL ENCOUNTER (INPATIENT)
Facility: CLINIC | Age: 33
LOS: 3 days | Discharge: PSYCHIATRIC OR SUBSTANCE USE FACILITY | End: 2023-03-20
Attending: EMERGENCY MEDICINE | Admitting: PSYCHIATRY & NEUROLOGY
Payer: COMMERCIAL

## 2023-03-17 DIAGNOSIS — F10.229 ALCOHOL DEPENDENCE WITH INTOXICATION WITH COMPLICATION (H): ICD-10-CM

## 2023-03-17 DIAGNOSIS — F10.20 ALCOHOL DEPENDENCE, CONTINUOUS (H): Primary | ICD-10-CM

## 2023-03-17 DIAGNOSIS — Z11.52 ENCOUNTER FOR SCREENING LABORATORY TESTING FOR SEVERE ACUTE RESPIRATORY SYNDROME CORONAVIRUS 2 (SARS-COV-2): ICD-10-CM

## 2023-03-17 LAB
ALBUMIN SERPL BCG-MCNC: 4.6 G/DL (ref 3.5–5.2)
ALCOHOL BREATH TEST: 0.16 (ref 0–0.01)
ALP SERPL-CCNC: 86 U/L (ref 40–129)
ALT SERPL W P-5'-P-CCNC: 39 U/L (ref 10–50)
AMPHETAMINES UR QL SCN: ABNORMAL
ANION GAP SERPL CALCULATED.3IONS-SCNC: 20 MMOL/L (ref 7–15)
AST SERPL W P-5'-P-CCNC: 49 U/L (ref 10–50)
BARBITURATES UR QL SCN: ABNORMAL
BASOPHILS # BLD AUTO: 0 10E3/UL (ref 0–0.2)
BASOPHILS NFR BLD AUTO: 0 %
BENZODIAZ UR QL SCN: ABNORMAL
BILIRUB SERPL-MCNC: 0.5 MG/DL
BUN SERPL-MCNC: 10.4 MG/DL (ref 6–20)
BZE UR QL SCN: ABNORMAL
CALCIUM SERPL-MCNC: 9.4 MG/DL (ref 8.6–10)
CANNABINOIDS UR QL SCN: ABNORMAL
CHLORIDE SERPL-SCNC: 100 MMOL/L (ref 98–107)
CREAT SERPL-MCNC: 0.8 MG/DL (ref 0.67–1.17)
DEPRECATED HCO3 PLAS-SCNC: 21 MMOL/L (ref 22–29)
EOSINOPHIL # BLD AUTO: 0 10E3/UL (ref 0–0.7)
EOSINOPHIL NFR BLD AUTO: 0 %
ERYTHROCYTE [DISTWIDTH] IN BLOOD BY AUTOMATED COUNT: 12.9 % (ref 10–15)
GFR SERPL CREATININE-BSD FRML MDRD: >90 ML/MIN/1.73M2
GLUCOSE SERPL-MCNC: 88 MG/DL (ref 70–99)
HCT VFR BLD AUTO: 44.2 % (ref 40–53)
HGB BLD-MCNC: 14.4 G/DL (ref 13.3–17.7)
IMM GRANULOCYTES # BLD: 0 10E3/UL
IMM GRANULOCYTES NFR BLD: 0 %
LYMPHOCYTES # BLD AUTO: 1.8 10E3/UL (ref 0.8–5.3)
LYMPHOCYTES NFR BLD AUTO: 37 %
MAGNESIUM SERPL-MCNC: 2 MG/DL (ref 1.7–2.3)
MCH RBC QN AUTO: 30.4 PG (ref 26.5–33)
MCHC RBC AUTO-ENTMCNC: 32.6 G/DL (ref 31.5–36.5)
MCV RBC AUTO: 93 FL (ref 78–100)
MONOCYTES # BLD AUTO: 0.7 10E3/UL (ref 0–1.3)
MONOCYTES NFR BLD AUTO: 14 %
NEUTROPHILS # BLD AUTO: 2.3 10E3/UL (ref 1.6–8.3)
NEUTROPHILS NFR BLD AUTO: 49 %
NRBC # BLD AUTO: 0 10E3/UL
NRBC BLD AUTO-RTO: 0 /100
OPIATES UR QL SCN: ABNORMAL
PLATELET # BLD AUTO: 187 10E3/UL (ref 150–450)
POTASSIUM SERPL-SCNC: 4 MMOL/L (ref 3.4–5.3)
PROT SERPL-MCNC: 8.4 G/DL (ref 6.4–8.3)
RBC # BLD AUTO: 4.73 10E6/UL (ref 4.4–5.9)
SARS-COV-2 RNA RESP QL NAA+PROBE: NEGATIVE
SODIUM SERPL-SCNC: 141 MMOL/L (ref 136–145)
WBC # BLD AUTO: 4.7 10E3/UL (ref 4–11)

## 2023-03-17 PROCEDURE — 99285 EMERGENCY DEPT VISIT HI MDM: CPT | Performed by: EMERGENCY MEDICINE

## 2023-03-17 PROCEDURE — 85025 COMPLETE CBC W/AUTO DIFF WBC: CPT | Performed by: EMERGENCY MEDICINE

## 2023-03-17 PROCEDURE — 250N000013 HC RX MED GY IP 250 OP 250 PS 637: Performed by: PSYCHIATRY & NEUROLOGY

## 2023-03-17 PROCEDURE — C9803 HOPD COVID-19 SPEC COLLECT: HCPCS | Performed by: EMERGENCY MEDICINE

## 2023-03-17 PROCEDURE — 83735 ASSAY OF MAGNESIUM: CPT | Performed by: EMERGENCY MEDICINE

## 2023-03-17 PROCEDURE — 80307 DRUG TEST PRSMV CHEM ANLYZR: CPT | Performed by: EMERGENCY MEDICINE

## 2023-03-17 PROCEDURE — 128N000004 HC R&B CD ADULT

## 2023-03-17 PROCEDURE — 80053 COMPREHEN METABOLIC PANEL: CPT | Performed by: EMERGENCY MEDICINE

## 2023-03-17 PROCEDURE — 99284 EMERGENCY DEPT VISIT MOD MDM: CPT | Performed by: EMERGENCY MEDICINE

## 2023-03-17 PROCEDURE — U0003 INFECTIOUS AGENT DETECTION BY NUCLEIC ACID (DNA OR RNA); SEVERE ACUTE RESPIRATORY SYNDROME CORONAVIRUS 2 (SARS-COV-2) (CORONAVIRUS DISEASE [COVID-19]), AMPLIFIED PROBE TECHNIQUE, MAKING USE OF HIGH THROUGHPUT TECHNOLOGIES AS DESCRIBED BY CMS-2020-01-R: HCPCS | Performed by: EMERGENCY MEDICINE

## 2023-03-17 PROCEDURE — 250N000013 HC RX MED GY IP 250 OP 250 PS 637: Performed by: EMERGENCY MEDICINE

## 2023-03-17 PROCEDURE — 36415 COLL VENOUS BLD VENIPUNCTURE: CPT | Performed by: EMERGENCY MEDICINE

## 2023-03-17 PROCEDURE — HZ2ZZZZ DETOXIFICATION SERVICES FOR SUBSTANCE ABUSE TREATMENT: ICD-10-PCS | Performed by: PSYCHIATRY & NEUROLOGY

## 2023-03-17 PROCEDURE — 82075 ASSAY OF BREATH ETHANOL: CPT | Performed by: EMERGENCY MEDICINE

## 2023-03-17 RX ORDER — DIAZEPAM 5 MG
5-20 TABLET ORAL EVERY 30 MIN PRN
Status: DISCONTINUED | OUTPATIENT
Start: 2023-03-17 | End: 2023-03-20 | Stop reason: HOSPADM

## 2023-03-17 RX ORDER — DIAZEPAM 5 MG
5-20 TABLET ORAL EVERY 30 MIN PRN
Status: DISCONTINUED | OUTPATIENT
Start: 2023-03-17 | End: 2023-03-17

## 2023-03-17 RX ORDER — ATENOLOL 50 MG/1
50 TABLET ORAL DAILY PRN
Status: DISCONTINUED | OUTPATIENT
Start: 2023-03-17 | End: 2023-03-18

## 2023-03-17 RX ORDER — TRAZODONE HYDROCHLORIDE 50 MG/1
50 TABLET, FILM COATED ORAL
Status: DISCONTINUED | OUTPATIENT
Start: 2023-03-17 | End: 2023-03-20 | Stop reason: HOSPADM

## 2023-03-17 RX ORDER — MULTIPLE VITAMINS W/ MINERALS TAB 9MG-400MCG
1 TAB ORAL DAILY
Status: DISCONTINUED | OUTPATIENT
Start: 2023-03-17 | End: 2023-03-17

## 2023-03-17 RX ORDER — FOLIC ACID 1 MG/1
1 TABLET ORAL DAILY
Status: DISCONTINUED | OUTPATIENT
Start: 2023-03-17 | End: 2023-03-17

## 2023-03-17 RX ORDER — MAGNESIUM HYDROXIDE/ALUMINUM HYDROXICE/SIMETHICONE 120; 1200; 1200 MG/30ML; MG/30ML; MG/30ML
30 SUSPENSION ORAL EVERY 4 HOURS PRN
Status: DISCONTINUED | OUTPATIENT
Start: 2023-03-17 | End: 2023-03-20 | Stop reason: HOSPADM

## 2023-03-17 RX ORDER — AMOXICILLIN 250 MG
1 CAPSULE ORAL 2 TIMES DAILY PRN
Status: DISCONTINUED | OUTPATIENT
Start: 2023-03-17 | End: 2023-03-20 | Stop reason: HOSPADM

## 2023-03-17 RX ORDER — FOLIC ACID 1 MG/1
1 TABLET ORAL DAILY
Status: DISCONTINUED | OUTPATIENT
Start: 2023-03-17 | End: 2023-03-20 | Stop reason: HOSPADM

## 2023-03-17 RX ORDER — MULTIPLE VITAMINS W/ MINERALS TAB 9MG-400MCG
1 TAB ORAL DAILY
Status: DISCONTINUED | OUTPATIENT
Start: 2023-03-17 | End: 2023-03-20 | Stop reason: HOSPADM

## 2023-03-17 RX ORDER — HYDROXYZINE HYDROCHLORIDE 25 MG/1
25 TABLET, FILM COATED ORAL EVERY 4 HOURS PRN
Status: DISCONTINUED | OUTPATIENT
Start: 2023-03-17 | End: 2023-03-20 | Stop reason: HOSPADM

## 2023-03-17 RX ADMIN — MULTIPLE VITAMINS W/ MINERALS TAB 1 TABLET: TAB at 13:04

## 2023-03-17 RX ADMIN — DIAZEPAM 5 MG: 5 TABLET ORAL at 20:40

## 2023-03-17 RX ADMIN — TRAZODONE HYDROCHLORIDE 50 MG: 50 TABLET ORAL at 20:40

## 2023-03-17 RX ADMIN — FOLIC ACID 1 MG: 1 TABLET ORAL at 13:04

## 2023-03-17 RX ADMIN — THIAMINE HCL TAB 100 MG 100 MG: 100 TAB at 13:04

## 2023-03-17 ASSESSMENT — ACTIVITIES OF DAILY LIVING (ADL)
CONCENTRATING,_REMEMBERING_OR_MAKING_DECISIONS_DIFFICULTY: NO
ADLS_ACUITY_SCORE: 28
WALKING_OR_CLIMBING_STAIRS_DIFFICULTY: NO
DOING_ERRANDS_INDEPENDENTLY_DIFFICULTY: NO
ADLS_ACUITY_SCORE: 28
ADLS_ACUITY_SCORE: 35
ADLS_ACUITY_SCORE: 45
FALL_HISTORY_WITHIN_LAST_SIX_MONTHS: NO
DRESSING/BATHING_DIFFICULTY: NO
DIFFICULTY_EATING/SWALLOWING: NO
CHANGE_IN_FUNCTIONAL_STATUS_SINCE_ONSET_OF_CURRENT_ILLNESS/INJURY: NO
ADLS_ACUITY_SCORE: 28
ADLS_ACUITY_SCORE: 35
TOILETING_ISSUES: NO
WEAR_GLASSES_OR_BLIND: NO
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
## 2023-03-17 NOTE — ED PROVIDER NOTES
"ED Provider Note  [facility]      History     Chief Complaint   Patient presents with     Addiction Problem     Seeking detox on alcohol, last drink was around 6AM today \" had \"4 shots of abby\" daily drinker, drinks \" at least 8 shooters or more\" Unsure if he had seizures in the past, denies SI     HPI  Ravi Hankins is a 32 year old male who presents to the emergency department seeking detox from alcohol.  Patient states been drinking alcohol daily for years.  He states that he has escalated his use over the past several months.  He states that he drinks at least 8 shooters of Doyle [employer] Carlos tequila per day.  He states that he needs to drink immediately in the morning her he feels ill.  Patient states that his withdrawal symptoms consist of mild tremulousness.  He states that he is more bothered by feelings of anxiety and nausea.  He states that he resumes drinking when the symptoms developed and they immediately resolved.  He denies history of alcohol withdrawal seizures or DTs.  Denies any recent fall or injury.  He denies any recent illness or medical concerns.  He denies any mental health concerns including depression and suicidal ideation.  Patient states he last drank 4 shots this morning before coming to the emergency department.    Past Medical History  No past medical history on file.  Past Surgical History:   Procedure Laterality Date     NO HISTORY OF SURGERY       atenolol (TENORMIN) 100 MG tablet  QUEtiapine (SEROQUEL) 50 MG tablet      No Known Allergies  Family History  Family History   Problem Relation Age of Onset     Heart Disease Brother      Abdominal Aortic Aneurysm Brother      Heart Disease Other         self     Substance Abuse Mother      Substance Abuse Father      Substance Abuse Maternal Grandfather      Diabetes No family hx of      Coronary Artery Disease No family hx of      Hypertension No family hx of      Hyperlipidemia No family hx of      " "Cerebrovascular Disease No family hx of      Breast Cancer No family hx of      Colon Cancer No family hx of      Prostate Cancer No family hx of      Other Cancer No family hx of      Depression No family hx of      Anxiety Disorder No family hx of      Mental Illness No family hx of      Anesthesia Reaction No family hx of      Asthma No family hx of      Osteoporosis No family hx of      Genetic Disorder No family hx of      Thyroid Disease No family hx of      Obesity No family hx of      Unknown/Adopted No family hx of      Social History   Social History     Tobacco Use     Smoking status: Former     Packs/day: 0.10     Years: 5.00     Pack years: 0.50     Types: Cigarettes     Quit date:      Years since quittin.2     Smokeless tobacco: Never     Tobacco comments:     1-2 cigarettes a day for 5 years   Vaping Use     Vaping Use: Former   Substance Use Topics     Alcohol use: Yes     Alcohol/week: 0.0 standard drinks     Comment: 2 times/week     Drug use: Yes     Types: Marijuana     Comment: Pt reports smoking a blunt today, pt reports smoking canabis everyday..         A medically appropriate review of systems was performed with pertinent positives and negatives noted in the HPI, and all other systems negative.    Physical Exam   BP: (!) 138/95  Pulse: 81  Temp: 98.1  F (36.7  C)  Resp: 16  Height: 188 cm (6' 2\")  Weight: 71 kg (156 lb 8 oz)  SpO2: 95 %  Physical Exam  Vitals and nursing note reviewed.   Constitutional:       Appearance: Normal appearance.   HENT:      Head: Normocephalic and atraumatic.      Mouth/Throat:      Mouth: Mucous membranes are moist.   Cardiovascular:      Rate and Rhythm: Normal rate and regular rhythm.      Pulses: Normal pulses.   Pulmonary:      Effort: Pulmonary effort is normal.   Abdominal:      General: Bowel sounds are normal.      Tenderness: There is no abdominal tenderness.   Musculoskeletal:         General: Normal range of motion.   Neurological:      " General: No focal deficit present.      Mental Status: He is alert.      Motor: No weakness.      Coordination: Coordination normal.      Gait: Gait normal.   Psychiatric:         Mood and Affect: Mood normal.           ED Course, Procedures, & Data      Procedures              Results for orders placed or performed during the hospital encounter of 03/17/23   Asymptomatic COVID-19 Virus (Coronavirus) by PCR Nose     Status: Normal    Specimen: Nose; Swab   Result Value Ref Range    SARS CoV2 PCR Negative Negative    Narrative    Testing was performed using the Xpert Xpress SARS-CoV-2 Assay on the Cepheid Gene-Xpert Instrument Systems. Additional information about this Emergency Use Authorization (EUA) assay can be found via the Lab Guide. This test should be ordered for the detection of SARS-CoV-2 in individuals who meet SARS-CoV-2 clinical and/or epidemiological criteria as well as from individuals without symptoms or other reasons to suspect COVID-19. Test performance for asymptomatic patients has only been established in anterior nasal swab specimens. This test is for in vitro diagnostic use under the FDA EUA for laboratories certified under CLIA to perform high complexity testing. This test has not been FDA cleared or approved. A negative result does not rule out the presence of PCR inhibitors in the specimen or target RNA concentration below the limit of detection for the assay. The possibility of a false negative should be considered if the patient's recent exposure or clinical presentation suggests COVID-19. This test was validated by the [facility]. This laboratory is certified under the Clinical Laboratory Improvement Amendments (CLIA) as qualified to perform high complexity laboratory testing.     Comprehensive metabolic panel     Status: Abnormal   Result Value Ref Range    Sodium 141 136 - 145 mmol/L    Potassium 4.0 3.4 - 5.3 mmol/L    Chloride 100 98 - 107 mmol/L     Carbon Dioxide (CO2) 21 (L) 22 - 29 mmol/L    Anion Gap 20 (H) 7 - 15 mmol/L    Urea Nitrogen 10.4 6.0 - 20.0 mg/dL    Creatinine 0.80 0.67 - 1.17 mg/dL    Calcium 9.4 8.6 - 10.0 mg/dL    Glucose 88 70 - 99 mg/dL    Alkaline Phosphatase 86 40 - 129 U/L    AST 49 10 - 50 U/L    ALT 39 10 - 50 U/L    Protein Total 8.4 (H) 6.4 - 8.3 g/dL    Albumin 4.6 3.5 - 5.2 g/dL    Bilirubin Total 0.5 <=1.2 mg/dL    GFR Estimate >90 >60 mL/min/1.73m2   Magnesium     Status: Normal   Result Value Ref Range    Magnesium 2.0 1.7 - 2.3 mg/dL   Drug abuse screen 1 urine (ED)     Status: Abnormal   Result Value Ref Range    Amphetamines Urine Screen Negative Screen Negative    Barbituates Urine Screen Negative Screen Negative    Benzodiazepine Urine Screen Negative Screen Negative    Cannabinoids Urine Screen Positive (A) Screen Negative    Cocaine Urine Screen Negative Screen Negative    Opiates Urine Screen Negative Screen Negative   CBC with platelets and differential     Status: None   Result Value Ref Range    WBC Count 4.7 4.0 - 11.0 10e3/uL    RBC Count 4.73 4.40 - 5.90 10e6/uL    Hemoglobin 14.4 13.3 - 17.7 g/dL    Hematocrit 44.2 40.0 - 53.0 %    MCV 93 78 - 100 fL    MCH 30.4 26.5 - 33.0 pg    MCHC 32.6 31.5 - 36.5 g/dL    RDW 12.9 10.0 - 15.0 %    Platelet Count 187 150 - 450 10e3/uL    % Neutrophils 49 %    % Lymphocytes 37 %    % Monocytes 14 %    % Eosinophils 0 %    % Basophils 0 %    % Immature Granulocytes 0 %    NRBCs per 100 WBC 0 <1 /100    Absolute Neutrophils 2.3 1.6 - 8.3 10e3/uL    Absolute Lymphocytes 1.8 0.8 - 5.3 10e3/uL    Absolute Monocytes 0.7 0.0 - 1.3 10e3/uL    Absolute Eosinophils 0.0 0.0 - 0.7 10e3/uL    Absolute Basophils 0.0 0.0 - 0.2 10e3/uL    Absolute Immature Granulocytes 0.0 <=0.4 10e3/uL    Absolute NRBCs 0.0 10e3/uL   Alcohol breath test POCT     Status: Abnormal   Result Value Ref Range    Alcohol Breath Test 0.161 (A) 0.00 - 0.01   Urine Drugs of Abuse Screen     Status: Abnormal     Narrative    The following orders were created for panel order Urine Drugs of Abuse Screen.  Procedure                               Abnormality         Status                     ---------                               -----------         ------                     Drug abuse screen 1 urin...[[id number]]  Abnormal            Final result                 Please view results for these tests on the individual orders.   CBC with platelets differential     Status: None    Narrative    The following orders were created for panel order CBC with platelets differential.  Procedure                               Abnormality         Status                     ---------                               -----------         ------                     CBC with platelets and d...[[id number]]                      Final result                 Please view results for these tests on the individual orders.               Results for orders placed or performed during the hospital encounter of 03/17/23   Asymptomatic COVID-19 Virus (Coronavirus) by PCR Nose     Status: Normal    Specimen: Nose; Swab   Result Value Ref Range    SARS CoV2 PCR Negative Negative    Narrative    Testing was performed using the Xpert Xpress SARS-CoV-2 Assay on the Cepheid Gene-Xpert Instrument Systems. Additional information about this Emergency Use Authorization (EUA) assay can be found via the Lab Guide. This test should be ordered for the detection of SARS-CoV-2 in individuals who meet SARS-CoV-2 clinical and/or epidemiological criteria as well as from individuals without symptoms or other reasons to suspect COVID-19. Test performance for asymptomatic patients has only been established in anterior nasal swab specimens. This test is for in vitro diagnostic use under the FDA EUA for laboratories certified under CLIA to perform high complexity testing. This test has not been FDA cleared or approved. A negative result does not rule out the presence of PCR inhibitors  in the specimen or target RNA concentration below the limit of detection for the assay. The possibility of a false negative should be considered if the patient's recent exposure or clinical presentation suggests COVID-19. This test was validated by the [facility]. This laboratory is certified under the Clinical Laboratory Improvement Amendments (CLIA) as qualified to perform high complexity laboratory testing.     Comprehensive metabolic panel     Status: Abnormal   Result Value Ref Range    Sodium 141 136 - 145 mmol/L    Potassium 4.0 3.4 - 5.3 mmol/L    Chloride 100 98 - 107 mmol/L    Carbon Dioxide (CO2) 21 (L) 22 - 29 mmol/L    Anion Gap 20 (H) 7 - 15 mmol/L    Urea Nitrogen 10.4 6.0 - 20.0 mg/dL    Creatinine 0.80 0.67 - 1.17 mg/dL    Calcium 9.4 8.6 - 10.0 mg/dL    Glucose 88 70 - 99 mg/dL    Alkaline Phosphatase 86 40 - 129 U/L    AST 49 10 - 50 U/L    ALT 39 10 - 50 U/L    Protein Total 8.4 (H) 6.4 - 8.3 g/dL    Albumin 4.6 3.5 - 5.2 g/dL    Bilirubin Total 0.5 <=1.2 mg/dL    GFR Estimate >90 >60 mL/min/1.73m2   Magnesium     Status: Normal   Result Value Ref Range    Magnesium 2.0 1.7 - 2.3 mg/dL   Drug abuse screen 1 urine (ED)     Status: Abnormal   Result Value Ref Range    Amphetamines Urine Screen Negative Screen Negative    Barbituates Urine Screen Negative Screen Negative    Benzodiazepine Urine Screen Negative Screen Negative    Cannabinoids Urine Screen Positive (A) Screen Negative    Cocaine Urine Screen Negative Screen Negative    Opiates Urine Screen Negative Screen Negative   CBC with platelets and differential     Status: None   Result Value Ref Range    WBC Count 4.7 4.0 - 11.0 10e3/uL    RBC Count 4.73 4.40 - 5.90 10e6/uL    Hemoglobin 14.4 13.3 - 17.7 g/dL    Hematocrit 44.2 40.0 - 53.0 %    MCV 93 78 - 100 fL    MCH 30.4 26.5 - 33.0 pg    MCHC 32.6 31.5 - 36.5 g/dL    RDW 12.9 10.0 - 15.0 %    Platelet Count 187 150 - 450 10e3/uL    % Neutrophils 49 %     % Lymphocytes 37 %    % Monocytes 14 %    % Eosinophils 0 %    % Basophils 0 %    % Immature Granulocytes 0 %    NRBCs per 100 WBC 0 <1 /100    Absolute Neutrophils 2.3 1.6 - 8.3 10e3/uL    Absolute Lymphocytes 1.8 0.8 - 5.3 10e3/uL    Absolute Monocytes 0.7 0.0 - 1.3 10e3/uL    Absolute Eosinophils 0.0 0.0 - 0.7 10e3/uL    Absolute Basophils 0.0 0.0 - 0.2 10e3/uL    Absolute Immature Granulocytes 0.0 <=0.4 10e3/uL    Absolute NRBCs 0.0 10e3/uL   Alcohol breath test POCT     Status: Abnormal   Result Value Ref Range    Alcohol Breath Test 0.161 (A) 0.00 - 0.01   Urine Drugs of Abuse Screen     Status: Abnormal    Narrative    The following orders were created for panel order Urine Drugs of Abuse Screen.  Procedure                               Abnormality         Status                     ---------                               -----------         ------                     Drug abuse screen 1 urin...[[id number]]  Abnormal            Final result                 Please view results for these tests on the individual orders.   CBC with platelets differential     Status: None    Narrative    The following orders were created for panel order CBC with platelets differential.  Procedure                               Abnormality         Status                     ---------                               -----------         ------                     CBC with platelets and d...[[id number]]                      Final result                 Please view results for these tests on the individual orders.     Medications   diazepam (VALIUM) tablet 5-20 mg (has no administration in time range)   thiamine (B-1) tablet 100 mg (100 mg Oral $Given 3/17/23 1304)   folic acid (FOLVITE) tablet 1 mg (1 mg Oral $Given 3/17/23 1304)   multivitamin w/minerals (THERA-VIT-M) tablet 1 tablet (1 tablet Oral $Given 3/17/23 1304)     Labs Ordered and Resulted from Time of ED Arrival to Time of ED Departure   COMPREHENSIVE METABOLIC PANEL -  Abnormal       Result Value    Sodium 141      Potassium 4.0      Chloride 100      Carbon Dioxide (CO2) 21 (*)     Anion Gap 20 (*)     Urea Nitrogen 10.4      Creatinine 0.80      Calcium 9.4      Glucose 88      Alkaline Phosphatase 86      AST 49      ALT 39      Protein Total 8.4 (*)     Albumin 4.6      Bilirubin Total 0.5      GFR Estimate >90     DRUG ABUSE SCREEN 1 URINE (ED) - Abnormal    Amphetamines Urine Screen Negative      Barbituates Urine Screen Negative      Benzodiazepine Urine Screen Negative      Cannabinoids Urine Screen Positive (*)     Cocaine Urine Screen Negative      Opiates Urine Screen Negative     ALCOHOL BREATH TEST POCT - Abnormal    Alcohol Breath Test 0.161 (*)    COVID-19 VIRUS (CORONAVIRUS) BY PCR - Normal    SARS CoV2 PCR Negative     MAGNESIUM - Normal    Magnesium 2.0     CBC WITH PLATELETS AND DIFFERENTIAL    WBC Count 4.7      RBC Count 4.73      Hemoglobin 14.4      Hematocrit 44.2      MCV 93      MCH 30.4      MCHC 32.6      RDW 12.9      Platelet Count 187      % Neutrophils 49      % Lymphocytes 37      % Monocytes 14      % Eosinophils 0      % Basophils 0      % Immature Granulocytes 0      NRBCs per 100 WBC 0      Absolute Neutrophils 2.3      Absolute Lymphocytes 1.8      Absolute Monocytes 0.7      Absolute Eosinophils 0.0      Absolute Basophils 0.0      Absolute Immature Granulocytes 0.0      Absolute NRBCs 0.0       No orders to display          Critical care was not performed.     Medical Decision Making  The patient's presentation was of moderate complexity (a chronic illness mild to moderate exacerbation, progression, or side effect of treatment).    The patient's evaluation involved:  ordering and/or review of 3+ test(s) in this encounter (see separate area of note for details)  review of 3+ test result(s) ordered prior to this encounter (Previous CBC, metabolic panel, toxicology screens)    The patient's management necessitated high risk (a decision  regarding hospitalization).      Assessment & Plan    32 year old male with history of alcohol use disorder to the emergency department seeking detox.  Has been drinking alcohol daily for several years with increasing use.  The patient states that he begins drinking in the morning because of anxiety, nausea, and mild tremulousness.  He drank this morning before coming to the emergency department and has an alcohol level of 0.161.  He denies a history of alcohol withdrawal seizures or DTs.  He does not have any mental health concerns.  Patient does not have any physical illness and has a normal physical exam.  Patient's labs are unrevealing.  His tox screen is positive for cannabinoids.  He appears medically stable for detox admission.    I have reviewed the nursing notes. I have reviewed the findings, diagnosis, plan and need for follow up with the patient.    New Prescriptions    No medications on file       Final diagnoses:   Alcohol dependence with intoxication with complication (H)       Jose Ferguson Md  [facility] EMERGENCY DEPARTMENT  3/17/2023     Jose Ferguson MD  03/17/23 2935

## 2023-03-17 NOTE — ED NOTES
"ED to Behavioral Floor Handoff    SITUATION  Ravi Hankins is a 32 year old male who speaks English and lives in a home with family members The patient arrived in the ED by private car from home with a complaint of Addiction Problem (Seeking detox on alcohol, last drink was around 6AM today \" had \"4 shots of abby\" daily drinker, drinks \" at least 8 shooters or more\" Unsure if he had seizures in the past, denies SI)  .The patient's current symptoms started/worsened 2 week(s) ago and during this time the symptoms have increased.   In the ED, pt was diagnosed with   Final diagnoses:   Alcohol dependence with intoxication with complication (H)        Initial vitals were: BP: (!) 138/95  Pulse: 81  Temp: 98.1  F (36.7  C)  Resp: 16  Height: 188 cm (6' 2\")  Weight: 71 kg (156 lb 8 oz)  SpO2: 95 %   --------  Is the patient diabetic? No   If yes, last blood glucose? --     If yes, was this treated in the ED? --  --------  Is the patient inebriated (ETOH) Yes or Impaired on other substances? Yes, Marijuana  MSSA done? Yes  Last MSSA score: --    Were withdrawal symptoms treated? N/A  Does the patient have a seizure history? No. If yes, date of most recent seizure--  --------  Is the patient patient experiencing suicidal ideation? denies current or recent suicidal ideation     Homicidal ideation? denies current or recent homicidal ideation or behaviors.    Self-injurious behavior/urges? denies current or recent self injurious behavior or ideation.  ------  Was pt aggressive in the ED No  Was a code called No  Is the pt now cooperative? Yes  -------  Meds given in ED:   Medications   diazepam (VALIUM) tablet 5-20 mg (has no administration in time range)   thiamine (B-1) tablet 100 mg (100 mg Oral $Given 3/17/23 1304)   folic acid (FOLVITE) tablet 1 mg (1 mg Oral $Given 3/17/23 1304)   multivitamin w/minerals (THERA-VIT-M) tablet 1 tablet (1 tablet Oral $Given 3/17/23 1304)      Family present during ED course? " No  Family currently present? No    BACKGROUND  Does the patient have a cognitive impairment or developmental disability? No  Allergies: No Known Allergies.   Social demographics are   Social History     Socioeconomic History     Marital status: Single   Tobacco Use     Smoking status: Former     Packs/day: 0.10     Years: 5.00     Pack years: 0.50     Types: Cigarettes     Quit date:      Years since quittin.2     Smokeless tobacco: Never     Tobacco comments:     1-2 cigarettes a day for 5 years   Vaping Use     Vaping Use: Former   Substance and Sexual Activity     Alcohol use: Yes     Alcohol/week: 0.0 standard drinks     Comment: 2 times/week     Drug use: Yes     Types: Marijuana     Comment: Pt reports smoking a blunt today, pt reports smoking canabis everyday..     Sexual activity: Yes     Partners: Female   Other Topics Concern     Parent/sibling w/ CABG, MI or angioplasty before 65F 55M? No        ASSESSMENT  Labs results   Labs Ordered and Resulted from Time of ED Arrival to Time of ED Departure   COMPREHENSIVE METABOLIC PANEL - Abnormal       Result Value    Sodium 141      Potassium 4.0      Chloride 100      Carbon Dioxide (CO2) 21 (*)     Anion Gap 20 (*)     Urea Nitrogen 10.4      Creatinine 0.80      Calcium 9.4      Glucose 88      Alkaline Phosphatase 86      AST 49      ALT 39      Protein Total 8.4 (*)     Albumin 4.6      Bilirubin Total 0.5      GFR Estimate >90     DRUG ABUSE SCREEN 1 URINE (ED) - Abnormal    Amphetamines Urine Screen Negative      Barbituates Urine Screen Negative      Benzodiazepine Urine Screen Negative      Cannabinoids Urine Screen Positive (*)     Cocaine Urine Screen Negative      Opiates Urine Screen Negative     ALCOHOL BREATH TEST POCT - Abnormal    Alcohol Breath Test 0.161 (*)    COVID-19 VIRUS (CORONAVIRUS) BY PCR - Normal    SARS CoV2 PCR Negative     MAGNESIUM - Normal    Magnesium 2.0     CBC WITH PLATELETS AND DIFFERENTIAL    WBC Count 4.7      RBC  "Count 4.73      Hemoglobin 14.4      Hematocrit 44.2      MCV 93      MCH 30.4      MCHC 32.6      RDW 12.9      Platelet Count 187      % Neutrophils 49      % Lymphocytes 37      % Monocytes 14      % Eosinophils 0      % Basophils 0      % Immature Granulocytes 0      NRBCs per 100 WBC 0      Absolute Neutrophils 2.3      Absolute Lymphocytes 1.8      Absolute Monocytes 0.7      Absolute Eosinophils 0.0      Absolute Basophils 0.0      Absolute Immature Granulocytes 0.0      Absolute NRBCs 0.0        Imaging Studies: No results found for this or any previous visit (from the past 24 hour(s)).   Most recent vital signs /84   Pulse 84   Temp 98.2  F (36.8  C) (Oral)   Resp 16   Ht 1.88 m (6' 2\")   Wt 71 kg (156 lb 8 oz)   SpO2 97%   BMI 20.09 kg/m     Abnormal labs/tests/findings requiring intervention:---   Pain control: pt had none  Nausea control: good    RECOMMENDATION  Are any infection precautions needed (MRSA, VRE, etc.)? No If yes, what infection? --  ---  Does the patient have mobility issues? independently. If yes, what device does the pt use? ---  ---  Is patient on 72 hour hold or commitment? No If on 72 hour hold, have hold and rights been given to patient? N/A  Are admitting orders written if after 10 p.m. ?N/A  Tasks needing to be completed:---     Reagan Pérez RN   ascom--    [phone]   [phone]  "

## 2023-03-17 NOTE — ED NOTES
AIDET: done    Security ( Screen Search ): done    Belongings: 1 bottle of Atenalol  [ 1  ] BAG(s)    CELLPHONE  [ YES ]  WALLET   [ NO ]    >w/pt: clothing, phone, shoes, ID and insurance card and cell phone      >locker / rack: rack      >security:   Bottle of medication.

## 2023-03-17 NOTE — PROGRESS NOTES
03/17/23 9006   Patient Belongings   Did you bring any home meds/supplements to the hospital?  Yes   Disposition of meds  Other (see comment)   Patient Belongings other (see comments)   Belongings Search Yes   Clothing Search Yes   Second Staff Kayode Morris   STORAGE BIN: sweatshirt, sweatpants, tank top, hat, shoes    MED ROOM BIN: cell phone, smart watch, insurance card, NO WALLET    SECURITY #[id number], #[id number]: [place] ID, medication    A               Admission:  I am responsible for any personal items that are not sent to the safe or pharmacy.  [facility] is not responsible for loss, theft or damage of any property in my possession.     Signature:  _________________________________ Date: _______  Time: _____     Staff Signature:  ____________________________ Date: ________  Time: _____   2nd Staff person, if patient is unable/unwilling to sign:    Signature: ________________________________ Date: ________  Time: _____     Discharge:  [facility] has returned all of my personal belongings:    Signature: _________________________________ Date: ________  Time: ____     Staff Signature:  ____________________________ Date: ________  Time: _____

## 2023-03-17 NOTE — TELEPHONE ENCOUNTER
S: [facility] , Provider Phyllis calling at 3:04 PM with clinical on a 32 year old/Male presenting for alcohol detox.     B: Pt presents for ETOH detox.   Currently reports drinking Franklin Azevedo 8 shooters a day daily for years, increasing amount more recently but MD believes it is likely more. Patient has been drinking early in morning to avoid withdrawal symptoms. Patient drank 4 shots this AM. Patient feels anxious and tremulous when withdrawing, not actively withdrawing now.   Patient reports last use was shortly before arrival.   Pt ERIK: 0.161 12:33pm 3/17/2023  Pt  denies hx of DT  Pt  denies hx of seizures. Last seizure: N/A  Pt endorsing the following symptoms of withdrawal: None  MSSA Score: none    Pt denies acute mental health or medical concerns.   Pt endorses other drug use: Cannabis Amount/frequency: daily    Does Pt have a detox care plan in [facility]? no  Does pt present with specific needs, assistive devices, or exclusionary criteria? None  Is the patient ambulating, eating and drinking in the ED? yes    A: Pt meets criteria to be presented for IP detox admission. Patient is voluntary  COVID: Negative  Utox: Positive for cannabinoids   CMP: Abnormalities: CO2, anion gap 20, protein 8.4  CBC: WNL  HCG: N/A     R: Patient cleared and ready for behavioral bed placement: Yes    Presenting for admission to 3A/CD     3:21 PM Intake paged Dr. Anglin for presentation onto station 3A CD for Dr. Anglin.    3:27 PM Intake received call that patient is accepted for station 3A CD detox.     3:39 PM Patient added to queue,  account set up, and indicia completed.     3:41 PM Intake called station 3A, 3A to call report at 4:30pm.

## 2023-03-18 PROBLEM — F10.20 ALCOHOL DEPENDENCE, CONTINUOUS (H): Status: ACTIVE | Noted: 2023-03-18

## 2023-03-18 PROCEDURE — 128N000004 HC R&B CD ADULT

## 2023-03-18 PROCEDURE — 250N000013 HC RX MED GY IP 250 OP 250 PS 637

## 2023-03-18 PROCEDURE — 250N000013 HC RX MED GY IP 250 OP 250 PS 637: Performed by: PSYCHIATRY & NEUROLOGY

## 2023-03-18 PROCEDURE — 250N000013 HC RX MED GY IP 250 OP 250 PS 637: Performed by: EMERGENCY MEDICINE

## 2023-03-18 PROCEDURE — 99222 1ST HOSP IP/OBS MODERATE 55: CPT | Mod: AI | Performed by: PSYCHIATRY & NEUROLOGY

## 2023-03-18 PROCEDURE — 99222 1ST HOSP IP/OBS MODERATE 55: CPT

## 2023-03-18 RX ORDER — ATENOLOL 50 MG/1
100 TABLET ORAL DAILY
Status: DISCONTINUED | OUTPATIENT
Start: 2023-03-18 | End: 2023-03-20 | Stop reason: HOSPADM

## 2023-03-18 RX ADMIN — MULTIPLE VITAMINS W/ MINERALS TAB 1 TABLET: TAB at 08:44

## 2023-03-18 RX ADMIN — ATENOLOL 100 MG: 50 TABLET ORAL at 10:14

## 2023-03-18 RX ADMIN — HYDROXYZINE HYDROCHLORIDE 25 MG: 25 TABLET, FILM COATED ORAL at 16:33

## 2023-03-18 RX ADMIN — HYDROXYZINE HYDROCHLORIDE 25 MG: 25 TABLET, FILM COATED ORAL at 08:50

## 2023-03-18 RX ADMIN — THIAMINE HCL TAB 100 MG 100 MG: 100 TAB at 08:44

## 2023-03-18 RX ADMIN — FOLIC ACID 1 MG: 1 TABLET ORAL at 08:43

## 2023-03-18 RX ADMIN — TRAZODONE HYDROCHLORIDE 50 MG: 50 TABLET ORAL at 20:07

## 2023-03-18 ASSESSMENT — ACTIVITIES OF DAILY LIVING (ADL)
ADLS_ACUITY_SCORE: 28
ORAL_HYGIENE: INDEPENDENT
ORAL_HYGIENE: INDEPENDENT
ADLS_ACUITY_SCORE: 28
HYGIENE/GROOMING: INDEPENDENT
DRESS: INDEPENDENT
LAUNDRY: WITH SUPERVISION
ADLS_ACUITY_SCORE: 28
DRESS: INDEPENDENT
ADLS_ACUITY_SCORE: 28
ADLS_ACUITY_SCORE: 28
HYGIENE/GROOMING: INDEPENDENT
ADLS_ACUITY_SCORE: 28
LAUNDRY: WITH SUPERVISION

## 2023-03-18 ASSESSMENT — ANXIETY QUESTIONNAIRES
2. NOT BEING ABLE TO STOP OR CONTROL WORRYING: SEVERAL DAYS
GAD7 TOTAL SCORE: 6
5. BEING SO RESTLESS THAT IT IS HARD TO SIT STILL: NOT AT ALL
7. FEELING AFRAID AS IF SOMETHING AWFUL MIGHT HAPPEN: SEVERAL DAYS
3. WORRYING TOO MUCH ABOUT DIFFERENT THINGS: SEVERAL DAYS
IF YOU CHECKED OFF ANY PROBLEMS ON THIS QUESTIONNAIRE, HOW DIFFICULT HAVE THESE PROBLEMS MADE IT FOR YOU TO DO YOUR WORK, TAKE CARE OF THINGS AT HOME, OR GET ALONG WITH OTHER PEOPLE: SOMEWHAT DIFFICULT
GAD7 TOTAL SCORE: 6
4. TROUBLE RELAXING: SEVERAL DAYS
1. FEELING NERVOUS, ANXIOUS, OR ON EDGE: SEVERAL DAYS
6. BECOMING EASILY ANNOYED OR IRRITABLE: SEVERAL DAYS

## 2023-03-18 ASSESSMENT — PATIENT HEALTH QUESTIONNAIRE - PHQ9: SUM OF ALL RESPONSES TO PHQ QUESTIONS 1-9: 4

## 2023-03-18 NOTE — PLAN OF CARE
Problem: Alcohol Withdrawal  Goal: Alcohol Withdrawal Symptom Control  Outcome: Progressing   Goal Outcome Evaluation:    Plan of Care Reviewed With: patient      Pt pleasant calm and cooperative with cares. Out and visible on the unit. Attended the evening  Psychotherapy group. Engaged and social with peers. Pt eating 100 % of meals and hydrating adequately;  Pt denied SI, SIB, HI, and hallucinations; Endorsed anxiety  6/10;  PRN HYDROXYZINE 25 mg administered with effect per pt. No behavioral  concerns noted this shift. Pt wants to go to [facility] after Detox.  MSSA 4 and 3  this shift. Pt scored <8 on MSSA for >24 hours. He has not required medication for withdrawal for >24 hours. Pt has been removed from detox status per unit protocol.    2000: Vitals:.BP (!) 133/97 (BP Location: Left arm)   Pulse 81   Temp 97.4  F (36.3  C) (Temporal)   Resp 16 SpO2 100%
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
## 2023-03-18 NOTE — PLAN OF CARE
Problem: Alcohol Withdrawal  Goal: Alcohol Withdrawal Symptom Control  Outcome: Progressing      Patient slept approximately 7 hours during the night shift, appeared comfortable with unlabored breathing patterns. Continues safety checks every 15 minutes.    MSSA: 3 and 3, no prn medication given.

## 2023-03-18 NOTE — PROGRESS NOTES
Case Management:     Writer checked-in and introduced role to pt's care and how to assist pt during their stay. Inquired with pt about what they are needing during their stay and what they are considering for their aftercare plans. Pt identified wanting treatment at [facility]. Instructed pt to complete the assessment paper work and pt was agreeable to have in completed and will turn in. CM to follow up with pt to complete his JESUS assessment.    Writer meet with pt in the afternoon to complete pt's JESUS assessment. Pt is agreeable to the recommendation and referral for [facility]. CM on Monday to follow up with [facility] intake.     Anahi Celaya, MAR[facility]ERTOC, [facility]

## 2023-03-18 NOTE — PLAN OF CARE
"  Problem: Alcohol Withdrawal  Goal: Alcohol Withdrawal Symptom Control  Outcome: Progressing   Goal Outcome Evaluation:    Plan of Care Reviewed With: patient    Patient is A&O x 4, calm  with flat affect.  Pt is tolerating intakes with adequate fluids.   MSSA 2 and 4, not medicated or withdrawals.   Denies depression but anxiety rating at 7/10.   PRN Hydroxyzine 25 mg given  once this shift with relief  Visible on unit, interacting with peers in milieu    Pt reported taking Atenolol 100 mg daily for aortic dilatation , MD notified , dose was ordered and given.  Pt last had Valium, yesterday at 2000,  will be out of detox today around that time.  Staff will continue to monitor and update changes.     BP (!) 138/94   Pulse 88   Temp 97.7  F (36.5  C) (Temporal)   Resp 16   Ht 1.88 m (6' 2.02\")   Wt 72.6 kg (160 lb)   SpO2 100%   BMI 20.53 kg/m          "
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
## 2023-03-18 NOTE — PROGRESS NOTES
2040: Vitals;.BP (!) 134/94  Pulse 75   Temp 98  F (36.7  C)    Resp 16  SpO2 99%  MSSA 8. Medicated with 5 mg of valium. Appears anxious. Denies other symptoms..
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
## 2023-03-19 PROCEDURE — 250N000013 HC RX MED GY IP 250 OP 250 PS 637

## 2023-03-19 PROCEDURE — 250N000013 HC RX MED GY IP 250 OP 250 PS 637: Performed by: PSYCHIATRY & NEUROLOGY

## 2023-03-19 PROCEDURE — H2032 ACTIVITY THERAPY, PER 15 MIN: HCPCS

## 2023-03-19 PROCEDURE — G0177 OPPS/PHP; TRAIN & EDUC SERV: HCPCS

## 2023-03-19 PROCEDURE — 128N000004 HC R&B CD ADULT

## 2023-03-19 PROCEDURE — 250N000013 HC RX MED GY IP 250 OP 250 PS 637: Performed by: EMERGENCY MEDICINE

## 2023-03-19 RX ORDER — CARBOXYMETHYLCELLULOSE SODIUM 5 MG/ML
1 SOLUTION/ DROPS OPHTHALMIC
Status: DISCONTINUED | OUTPATIENT
Start: 2023-03-19 | End: 2023-03-20 | Stop reason: HOSPADM

## 2023-03-19 RX ORDER — IBUPROFEN 400 MG/1
400 TABLET, FILM COATED ORAL EVERY 6 HOURS PRN
Status: DISCONTINUED | OUTPATIENT
Start: 2023-03-19 | End: 2023-03-20 | Stop reason: HOSPADM

## 2023-03-19 RX ORDER — ACETAMINOPHEN 325 MG/1
650 TABLET ORAL EVERY 4 HOURS PRN
Status: DISCONTINUED | OUTPATIENT
Start: 2023-03-19 | End: 2023-03-20 | Stop reason: HOSPADM

## 2023-03-19 RX ADMIN — ACETAMINOPHEN 650 MG: 325 TABLET ORAL at 10:04

## 2023-03-19 RX ADMIN — THIAMINE HCL TAB 100 MG 100 MG: 100 TAB at 08:14

## 2023-03-19 RX ADMIN — CARBOXYMETHYLCELLULOSE SODIUM 1 DROP: 5 SOLUTION/ DROPS OPHTHALMIC at 09:19

## 2023-03-19 RX ADMIN — HYDROXYZINE HYDROCHLORIDE 25 MG: 25 TABLET, FILM COATED ORAL at 16:51

## 2023-03-19 RX ADMIN — TRAZODONE HYDROCHLORIDE 50 MG: 50 TABLET ORAL at 22:27

## 2023-03-19 RX ADMIN — ATENOLOL 100 MG: 50 TABLET ORAL at 08:13

## 2023-03-19 RX ADMIN — MULTIPLE VITAMINS W/ MINERALS TAB 1 TABLET: TAB at 08:14

## 2023-03-19 RX ADMIN — TRAZODONE HYDROCHLORIDE 50 MG: 50 TABLET ORAL at 21:00

## 2023-03-19 RX ADMIN — HYDROXYZINE HYDROCHLORIDE 25 MG: 25 TABLET, FILM COATED ORAL at 08:19

## 2023-03-19 RX ADMIN — FOLIC ACID 1 MG: 1 TABLET ORAL at 08:14

## 2023-03-19 ASSESSMENT — ACTIVITIES OF DAILY LIVING (ADL)
ADLS_ACUITY_SCORE: 28
DRESS: INDEPENDENT
ORAL_HYGIENE: INDEPENDENT
LAUNDRY: WITH SUPERVISION
ADLS_ACUITY_SCORE: 28
ADLS_ACUITY_SCORE: 28
HYGIENE/GROOMING: INDEPENDENT
HYGIENE/GROOMING: INDEPENDENT
ADLS_ACUITY_SCORE: 28
ADLS_ACUITY_SCORE: 28
ORAL_HYGIENE: INDEPENDENT
ADLS_ACUITY_SCORE: 28
DRESS: INDEPENDENT
LAUNDRY: WITH SUPERVISION

## 2023-03-19 NOTE — PLAN OF CARE
"  Problem: Alcohol Withdrawal  Goal: Alcohol Withdrawal Symptom Control  Outcome: Progressing   Goal Outcome Evaluation:    Plan of Care Reviewed With: patient             Patient is A&O x 4, calm  with flat affect.  Pt is tolerating intakes with adequate fluids.   Pt is out of  detox, will discharge to [facility] tomorrow.  Denies SI/HI/SIB,depression but anxiety rating at 7/10.   Contracts for safety while on unit, med compliant.  PRN Hydroxyzine 25 mg given  once this shift .  Visible on unit, interacting with peers in milieu    Pt reported dry eyes , MD notified  and ordered some, was given with relief.  C/O HA and knee pain, rating a 5/10. MD notified, ordered Tylenol and Ibuprofen.   Pt received Tylenol 650 mg  with relief  Staff will continue to monitor and update changes.     /86   Pulse 67   Temp 97.6  F (36.4  C) (Temporal)   Resp 16   Ht 1.88 m (6' 2.02\")   Wt 72.6 kg (160 lb)   SpO2 99%   BMI 20.53 kg/m      "

## 2023-03-19 NOTE — PROGRESS NOTES
03/18/23 9578   Group Therapy Session   Group Attendance attended group session   Time Session Began 1645   Time Session Ended 1740   Total Time (minutes) 55     Group Type Group psychotherapy   Group Topic Covered Reviewed the role of stigma on chemical dependency symptoms and motivation to change.   Group Session Detail  Clinician facilitated this group on the topic of stigma and its role in chemical use, as well as motivation to change. Patients reviewed their experiences with stigma and discussed strategies to address stigma. Also reviewed motivation to change and ways to use key/powerful positive words as a reminder to maintain resiliency towards relapse prevention and the change process.  Each patient took time to identify their struggles and ways to pursue change and address stigma.   Patient Response/Contribution Pt attended and contributed to the group. Provided feedback to group members   Patient Participation Detail Did engage in the group, received and provided positive feedback to peers.

## 2023-03-19 NOTE — PLAN OF CARE
Problem: Alcohol Withdrawal  Goal: Alcohol Withdrawal Symptom Control  Outcome: Progressing   Goal Outcome Evaluation:    Patient slept 7during the shift.Patient is out of detox. Safety checked in placed. Will continue to monitor.

## 2023-03-19 NOTE — PROGRESS NOTES
03/19/23 1700   Group Therapy Session   Group Attendance attended group session   Time Session Began 1645   Time Session Ended 1730   Total Time (minutes) 45   Total # Attendees 6   Group Type expressive therapy   Group Topic Covered emotions/expression   Patient Response/Contribution cooperative with task     Art Therapy directive was to create a drawing of a safe place and to identify five items in the safe place that represent each of the five senses.  Goals of directive: emotional expression, emotional regulation, trauma containment  Pt was an engaged participant, focused on task for the full duration of group. Pt finished drawing and briefly shared with group. Pt erick an image of a roller skating arena and shared five senses of this safe place for pt.  Pts mood was calm, pleasant participant.

## 2023-03-19 NOTE — PLAN OF CARE
Occupational Therapy Group Discussion     03/19/23 3543   Group Therapy Session   Group Attendance attended group session   Time Session Began 1315   Time Session Ended 1415   Total Time (minutes) 60   Total # Attendees 6   Group Type psychoeducation;life skill   Group Topic Covered self-care activities;relationship;coping skills/lifestyle management   Group Session Detail General Health and Coping Skills: group discussion and education on boundaries.   Patient Participation Detail Pt participated in a group discussion on boundaries. Pt was active in the discussion, contributing thoughtful insight and personal experiences with the group as it related to the topic.

## 2023-03-20 ENCOUNTER — TELEPHONE (OUTPATIENT)
Dept: BEHAVIORAL HEALTH | Facility: CLINIC | Age: 33
End: 2023-03-20

## 2023-03-20 ENCOUNTER — HOSPITAL ENCOUNTER (OUTPATIENT)
Dept: BEHAVIORAL HEALTH | Facility: CLINIC | Age: 33
Discharge: HOME | End: 2023-03-20
Attending: FAMILY MEDICINE
Payer: COMMERCIAL

## 2023-03-20 ENCOUNTER — TELEPHONE (OUTPATIENT)
Dept: ADDICTION MEDICINE | Facility: CLINIC | Age: 33
End: 2023-03-20

## 2023-03-20 VITALS
OXYGEN SATURATION: 100 % | WEIGHT: 160 LBS | DIASTOLIC BLOOD PRESSURE: 84 MMHG | SYSTOLIC BLOOD PRESSURE: 115 MMHG | HEIGHT: 74 IN | TEMPERATURE: 97.3 F | HEART RATE: 81 BPM | RESPIRATION RATE: 16 BRPM | BODY MASS INDEX: 20.53 KG/M2

## 2023-03-20 LAB — SARS-COV-2 RNA RESP QL NAA+PROBE: NEGATIVE

## 2023-03-20 PROCEDURE — 1002N00001 HC LODGING PLUS FACILITY CHARGE ADULT

## 2023-03-20 PROCEDURE — 250N000013 HC RX MED GY IP 250 OP 250 PS 637: Performed by: EMERGENCY MEDICINE

## 2023-03-20 PROCEDURE — H2035 A/D TX PROGRAM, PER HOUR: HCPCS

## 2023-03-20 PROCEDURE — 250N000013 HC RX MED GY IP 250 OP 250 PS 637

## 2023-03-20 PROCEDURE — 250N000013 HC RX MED GY IP 250 OP 250 PS 637: Performed by: PSYCHIATRY & NEUROLOGY

## 2023-03-20 PROCEDURE — 99239 HOSP IP/OBS DSCHRG MGMT >30: CPT | Performed by: PSYCHIATRY & NEUROLOGY

## 2023-03-20 PROCEDURE — U0005 INFEC AGEN DETEC AMPLI PROBE: HCPCS | Performed by: FAMILY MEDICINE

## 2023-03-20 RX ORDER — GUAIFENESIN 200 MG/10ML
10 LIQUID ORAL EVERY 4 HOURS PRN
COMMUNITY
End: 2023-04-14

## 2023-03-20 RX ORDER — ATENOLOL 100 MG/1
100 TABLET ORAL DAILY
Qty: 30 TABLET | Refills: 0 | Status: SHIPPED | OUTPATIENT
Start: 2023-03-21 | End: 2023-05-15

## 2023-03-20 RX ORDER — IBUPROFEN 200 MG
400 TABLET ORAL EVERY 6 HOURS PRN
COMMUNITY
End: 2023-04-14

## 2023-03-20 RX ORDER — LORATADINE 10 MG/1
10 TABLET ORAL DAILY PRN
COMMUNITY
End: 2023-04-14

## 2023-03-20 RX ORDER — MULTIPLE VITAMINS W/ MINERALS TAB 9MG-400MCG
1 TAB ORAL DAILY
Qty: 30 TABLET | Refills: 0 | Status: SHIPPED | OUTPATIENT
Start: 2023-03-21 | End: 2024-02-22

## 2023-03-20 RX ORDER — LANOLIN ALCOHOL/MO/W.PET/CERES
100 CREAM (GRAM) TOPICAL DAILY
Qty: 30 TABLET | Refills: 0 | Status: SHIPPED | OUTPATIENT
Start: 2023-03-21 | End: 2024-02-22

## 2023-03-20 RX ORDER — LANOLIN ALCOHOL/MO/W.PET/CERES
3 CREAM (GRAM) TOPICAL
COMMUNITY
End: 2023-04-14

## 2023-03-20 RX ORDER — ACETAMINOPHEN 325 MG/1
325-650 TABLET ORAL EVERY 4 HOURS PRN
COMMUNITY
End: 2023-04-14

## 2023-03-20 RX ORDER — MAGNESIUM HYDROXIDE/ALUMINUM HYDROXICE/SIMETHICONE 120; 1200; 1200 MG/30ML; MG/30ML; MG/30ML
30 SUSPENSION ORAL EVERY 6 HOURS PRN
COMMUNITY
End: 2023-04-14

## 2023-03-20 RX ORDER — HYDROXYZINE HYDROCHLORIDE 25 MG/1
25 TABLET, FILM COATED ORAL EVERY 4 HOURS PRN
Qty: 30 TABLET | Refills: 0 | Status: SHIPPED | OUTPATIENT
Start: 2023-03-20 | End: 2023-03-28

## 2023-03-20 RX ORDER — CARBOXYMETHYLCELLULOSE SODIUM 5 MG/ML
1 SOLUTION/ DROPS OPHTHALMIC
Qty: 30 EACH | Refills: 0 | Status: SHIPPED | OUTPATIENT
Start: 2023-03-20 | End: 2024-02-22

## 2023-03-20 RX ORDER — AMOXICILLIN 250 MG
2 CAPSULE ORAL DAILY PRN
COMMUNITY
End: 2023-04-14

## 2023-03-20 RX ORDER — TRAZODONE HYDROCHLORIDE 50 MG/1
50 TABLET, FILM COATED ORAL
Qty: 30 TABLET | Refills: 0 | Status: SHIPPED | OUTPATIENT
Start: 2023-03-20 | End: 2023-04-03

## 2023-03-20 RX ADMIN — HYDROXYZINE HYDROCHLORIDE 25 MG: 25 TABLET, FILM COATED ORAL at 08:02

## 2023-03-20 RX ADMIN — MULTIPLE VITAMINS W/ MINERALS TAB 1 TABLET: TAB at 08:01

## 2023-03-20 RX ADMIN — FOLIC ACID 1 MG: 1 TABLET ORAL at 08:01

## 2023-03-20 RX ADMIN — ATENOLOL 100 MG: 50 TABLET ORAL at 08:01

## 2023-03-20 RX ADMIN — THIAMINE HCL TAB 100 MG 100 MG: 100 TAB at 08:01

## 2023-03-20 RX ADMIN — CARBOXYMETHYLCELLULOSE SODIUM 1 DROP: 5 SOLUTION/ DROPS OPHTHALMIC at 09:10

## 2023-03-20 ASSESSMENT — ACTIVITIES OF DAILY LIVING (ADL)
ORAL_HYGIENE: INDEPENDENT
ADLS_ACUITY_SCORE: 28
HYGIENE/GROOMING: INDEPENDENT
ADLS_ACUITY_SCORE: 28
DRESS: SCRUBS (BEHAVIORAL HEALTH)
ADLS_ACUITY_SCORE: 28
LAUNDRY: UNABLE TO COMPLETE
ADLS_ACUITY_SCORE: 28

## 2023-03-20 ASSESSMENT — ANXIETY QUESTIONNAIRES
GAD7 TOTAL SCORE: 5
GAD7 TOTAL SCORE: 5
5. BEING SO RESTLESS THAT IT IS HARD TO SIT STILL: NOT AT ALL
6. BECOMING EASILY ANNOYED OR IRRITABLE: SEVERAL DAYS
3. WORRYING TOO MUCH ABOUT DIFFERENT THINGS: SEVERAL DAYS
1. FEELING NERVOUS, ANXIOUS, OR ON EDGE: SEVERAL DAYS
2. NOT BEING ABLE TO STOP OR CONTROL WORRYING: SEVERAL DAYS
7. FEELING AFRAID AS IF SOMETHING AWFUL MIGHT HAPPEN: NOT AT ALL
4. TROUBLE RELAXING: SEVERAL DAYS

## 2023-03-20 ASSESSMENT — COLUMBIA-SUICIDE SEVERITY RATING SCALE - C-SSRS
3. HAVE YOU BEEN THINKING ABOUT HOW YOU MIGHT KILL YOURSELF?: NO
5. HAVE YOU STARTED TO WORK OUT OR WORKED OUT THE DETAILS OF HOW TO KILL YOURSELF? DO YOU INTEND TO CARRY OUT THIS PLAN?: NO
1. IN THE PAST MONTH, HAVE YOU WISHED YOU WERE DEAD OR WISHED YOU COULD GO TO SLEEP AND NOT WAKE UP?: NO
2. HAVE YOU ACTUALLY HAD ANY THOUGHTS OF KILLING YOURSELF LIFETIME?: NO
1. IN THE PAST MONTH, HAVE YOU WISHED YOU WERE DEAD OR WISHED YOU COULD GO TO SLEEP AND NOT WAKE UP?: NO
6. HAVE YOU EVER DONE ANYTHING, STARTED TO DO ANYTHING, OR PREPARED TO DO ANYTHING TO END YOUR LIFE?: NO
2. HAVE YOU ACTUALLY HAD ANY THOUGHTS OF KILLING YOURSELF IN THE PAST MONTH?: NO
4. HAVE YOU HAD THESE THOUGHTS AND HAD SOME INTENTION OF ACTING ON THEM?: NO

## 2023-03-20 ASSESSMENT — PATIENT HEALTH QUESTIONNAIRE - PHQ9: SUM OF ALL RESPONSES TO PHQ QUESTIONS 1-9: 5

## 2023-03-20 NOTE — PROGRESS NOTES
Progress Note    This patient had a Comprehensive Substance Abuse assessment on 03/18/2023 completed by Anahi MICHAEL.  This patient was seen for a face to face update of the Comprehensive Substance Abuse assessment on 3/20/2023 by FERNANDA Carroll.  INSIDE: The patient's Comprehensive Substance Abuse assessment completed on 03/18/2023 is in the patient's electronic medical record in Epic in the Chart Review section under the Notes/Trans Tab.    Alcohol/Drug use since the last CD evaluation (include date of last use):     No additional substances use since the last CD evaluation     Please note any other clinical changes since the last CD evaluation (such as medication changes, additional legal charges, detoxification admissions, overdoses, etc.)     No significant changes since the last CD evaluation       ASAM Dimensions Original scores Current Scores   I.) Intoxication and Withdrawal: 1 0   II.) Biomedical:  0 0   III.) Emotional and Behavioral:  1 1   IV.) Readiness to Change:  0 0   V.) Relapse Potential: 4 4   VI.) Recovery Environmental: 1 1     Please list clinical justifications for the above ASAM score changes since the original comprehensive assessment:     The patient's score on Dimension I changed from a 1 to a 0.  The patient's withdrawal symptoms had been addressed by his physicians while he had been on 3 A IP detoxification unit at [facility] in [place], [place].         Current ERIK: Current UA:     No ERIK as the patient was a direct transfer from 3 A IP detoxification unit at [facility] in [place], [place].     No UA screen as the patient was a direct transfer from 3 A IP detoxification unit at [facility] in [place], [place].        PHQ-9, REZA-7   PHQ-9 on 3/20/2023 REZA-7 on 3/20/2023   The patient's PHQ-9 score was 5 out of 27, indicating mild depression.   The patient's REZA-7 score was 5 out of 21, indicating mild anxiety.       [place]son-Suicide Severity  Rating Scale Reassessment   Have you ever wished you were dead or that you could go to sleep and not wake up?  Past Month:  No     Have you actually had any thoughts of killing yourself?  Past Month:  No     Have you been thinking about how you might do this?     Past Month:  No   Lifetime:  No   Have you had these thoughts and had some intention of acting on them?     Past Month:  No   Lifetime:  No   Have you started to work out the details of how to kill yourself?   Past Month:  No   Lifetime:  No   Do you intend to carry out this plan?   No     When you have the thoughts how long do they last?  The patient denied ever having any suicidal thoughts in life.     Are there things - anyone or anything (i.e. family, Baptism, pain of death) that stopped you from wanting to die or acting on thoughts of suicide?  Does not apply       2008  [employer]  Used with permission by Leslie Gaines, PhD.       Guide to C-SSRS Risk Ratings   NO IDEATION:  with no active thoughts IDEATION: with a wish to die. IDEATION: with active thoughts. Risk Ratings   If Yes No No 0 - Very Low Risk   If NA Yes No 1 - Low Risk   If NA Yes Yes 2 - Low/moderate risk   IDEATION: associated thoughts of methods without intent or plan INTENT: Intent to follow through on suicide PLAN: Plan to follow through on suicide Risk Ratings cont...   If Yes No No 3 - Moderate Risk   If Yes Yes No 4 - High Risk   If Yes Yes Yes 5 - High Risk   The patient's ADDITIONAL RISK FACTORS and lack of PROTECTIVE FACTORS may increase their overall suicide risk ratings.     Additional Risk Factors:    A recent death of someone close to the patient and/or unresolved grief and loss issues     A recent loss that was significant to the patient, i.e. loss of job, loss of home, divorce, break-up, etc.     History of impulsive or aggressive behaviors   Protective Factors:    Having people in his/her life that would prevent the patient from  "considering a suicide attempt (i.e. young children, spouse, parents, etc.)     An absence of mental health issues or stable and well treated mental health issues     An absence of chronic health problems or stable and well treated chronic health issues     A positive relationship with his/her clinical medical and/or mental health providers     Having easy access to supportive family members     Having a good community support network     Having cultural, Denominational or spiritual beliefs that discourage suicide     Having restricted access to highly lethal means of suicide     Risk Status   0. - Very Low Risk:  Evaluation Counselors:  Document in Epic / SBAR to counselor \"Very Low Risk\".      Treatment Counselors:  Reassess upon admission as applicable, assess weekly in progress notes under Dimension 3 and summarize in Discharge / Treatment summary under Dimension 3.     Additional information to support suicide risk rating: There was no additional information to provide at this time.       "

## 2023-03-20 NOTE — PLAN OF CARE
Problem: Alcohol Withdrawal  Goal: Alcohol Withdrawal Symptom Control  Outcome: Met   Goal Outcome Evaluation:    Plan of Care Reviewed With: patient      Pt pleasant calm and cooperative with cares. Out and visible on the unit. Attending groups.Engaged and social with peers.   Pt eating 100 % of meals and hydrating adequately; He requested double portion. Pt denied SI, SIB, HI, and hallucinations; Endorsed anxiety  5/10;  PRN HYDROXYZINE 25 mg given. Pt is out of  detox, will discharge to [facility] tomorrow.  PRN: Trazodone 50 mg given HS    VSS :./88   Pulse 77   Temp 97.8  F (36.6  C) (Oral)   Resp 21    SpO2 100%

## 2023-03-20 NOTE — PROGRESS NOTES
Initial Services Plan    Before your first treatment group, please do the following    Immediate health & safety concerns: Look for sober housing and a supportive social network.  Obtain personal items (glasses, hearing aides, medicines, diabetes supplies, clothing, etc.).  Look for a support network (such as AA, NA, DBT group, a Restorationist group, etc.)    Suggestions for client during the time between intake & completion of treatment plan:  Tour your treatment center (unit or outpatient clinic).  Introduce yourself to the treatment group.  Spend time getting to know your peers.  Complete the problem list for your treatment plan.  Start drug and alcohol use history.  Review your patient or client handbook.    Client issues to be addressed in the first treatment sessions:  Identify motivations(s) for coming to treatment, i.e. legal, family, job, self  Identify concerns about coming into treatment, i.e. fear of failing again, sharing a room in treatment  Identify concerns about contacting boss, PO  Identify outside concerns that may interfere with treatment, i.e. bills not getting paid, homesick for children    FERNANDA Carroll  3/20/2023

## 2023-03-20 NOTE — TELEPHONE ENCOUNTER
----- Message from Katelyn Vera sent at 3/20/2023 11:12 AM CDT -----  Regarding: [facility] NEEDED  This pt will transfer from detox to [facility] today 3/20/23. Thanks

## 2023-03-20 NOTE — PLAN OF CARE
Behavioral Team Discussion: (3/20/2023)    Continued Stay Criteria/Rationale: Patient admitted for Alcohol  Use Disorder.  Plan: The following services will be provided to the patient; psychiatric assessment, medication management, therapeutic milieu, individual and group support, and skills groups.   Participants: 3A Provider: Dr. Dawn Anglin MD; 3A RN: Sherry Acosta, RN; 3A CM's: Fantasma Stark.  Summary/Recommendation: Providers will assess today for treatment recommendations, discharge planning, and aftercare plans. CM will meet with pt for discharge planning.   Medical/Physical: Patient denies any medical or physical concerns at this time.   Precautions:   Behavioral Orders   Procedures     Code 1 - Restrict to Unit     Fall precautions     Routine Programming     As clinically indicated     Seizure precautions     Status 15     Every 15 minutes.     Withdrawal precautions     Rationale for change in precautions or plan: N/A  Progress: Initial.    ASAM Dimension Scale Ratings:  Dimension 1: 3 Client tolerates and paramjit with withdrawal discomfort poorly. Client has severe intoxication, such that the client endangers self or others, or intoxication has not abated with less intensive levels of services. Client displays severe signs and symptoms; or risk of severe, but manageable withdrawal; or withdrawal worsening despite detox at less intensive level.  Dimension 2: 1 Client tolerates and paramjit with physical discomfort and is able to get the services that the client needs.  Dimension 3: 2 Client has difficulty with impulse control and lacks coping skills. Client has thoughts of suicide or harm to others without means; however, the thoughts may interfere with participation in some treatment activities. Client has difficulty functioning in significant life areas. Client has moderate symptoms of emotional, behavioral, or cognitive problems. Client is able to participate in most treatment activities.  Dimension  4: 3 Client displays inconsistent compliance, minimal awareness of either the client's addiction or mental disorder, and is minimally cooperative.  Dimension 5: 3 Client has poor recognition and understanding of relapse and recidivism issues and displays moderately high vulnerability for further substance use or mental health problems. Client has few coping skills and rarely applies coping skills.  Dimension 6: 3 Client is not engaged in structured, meaningful activity and the client's peers, family, significant other, and living environment are unsupportive, or there is significant criminal justice system involvement.

## 2023-03-20 NOTE — TELEPHONE ENCOUNTER
Writer contacted pt to discuss his last admission where he left after 2 days of tx. Pt was recommended to attend a longer term men's only program.    Pt stated he is much more motivated now, he has people dependent on him now and a lot has happened in the past 2.5 years. Pt will be a transfer from [address] [place] [address].

## 2023-03-20 NOTE — DISCHARGE INSTRUCTIONS
Behavioral Discharge Planning and Instructions  THANK YOU FOR CHOOSING [address]  [phone]    Summary: You were admitted to [facility] on 03/17/2023 for detoxification from Alcohol Use Disorder.  A medical exam was performed that included lab work. You have met with a  and opted to attending residential treatment.  Please take care and make your recovery a daily priority, Ravi!  It was a pleasure working with you and the entire treatment team here wishes you the very best in your recovery!     Recommendation:  Please attend [facility] and follow any and all recommendations as needed for extra support.     Main Diagnoses:  Per Dr. Dawn Anglin MD;  Alcohol use disorder severe     Major Treatments, Procedures and Findings:  You were treated for *** detoxification using ***. As an outpatient you will be prescribed ***, please take this medication as prescribed until it is gone. You completed a chemical dependency assessment. You had labs drawn and those results were reviewed with you. Please take a copy of your lab work with you to your next primary care provider appointment.    Symptoms to Report:  If you experience more anxiety, confusion, sleeplessness, deep sadness or thoughts of suicide, notify your treatment team or notify your primary care provider. IF ANY OF THE SYMPTOMS YOU ARE EXPERIENCING ARE A MEDICAL EMERGENCY CALL 911 IMMEDIATELY.     Lifestyle Adjustment:   Health Action Plan:  1.Create a daily schedule  2. Eat Healthy  3. Plan Enjoyable Sober Activities  4. Use Problem Solving Skills and Deal with Issues as they Arise.   5. Be Physically Active  6. Take your medications as prescribed  7. Get enough restful sleep  8. Practice Relaxation  9. Spend time with Supportive People  10. No use of alcohol, illegal drugs or addictive medications other than what is currently prescribed.   11.AA, NA Sponsor are excellent resources for support  12. Explore how  you can  utilize spirituality in your recovery            Disposition: Residential Treatment     Facts about COVID19 at www.cdc.gov/COVID19 and www.[place].gov/covid19    Keeping hands clean is one of the most important steps we can take to avoid getting sick and spreading germs to others.  Please wash your hands frequently and lather with soap for at least 20 seconds!    Follow-up Appointment:     Recovery apps for your phone to locate current in person and zoom recovery meetings  [place] - meeting suman  AA  - meeting suman  Meeting guide - meeting suman  Quick NA meeting - meeting suman  Isabel- has various apps    Resources:  *due to covid-19 most AA/NA meetings are being held online however some are in-person or a hybrid combination please check online to verify*  Need Support Now? If you or someone you know is struggling or in crisis, help is available. Call or text BFKW or chat Taaz  AA meetings search for them at: https://aa-intergroup.org (worldwide meeting listings)  AA meetings for [place] area can be found online at: https://a[place].org (click local online meetings listings)  NA meetings for [place] area can be found online at: https://www.nam[place].org  (click find a meeting)  AA and NA Sponsors are excellent resources for support and you can find one at any support group meeting.   Alcoholics Anonymous (https://aa.org/): for information 24 hours/day  AA Intergroup service office in [place] (http://www.aastpa[place].org/) 599.305.4019  AA Intergroup service office in [facility]: [phone]. (http://www.a[place].org/)  Narcotics Anonymous (www.nam[place].org) (114) 614-5958  https://aafairviewriverside.org/meetings  SMART Recovery - self management for addiction recovery:  www.smartrecovery.org  Pathways ~ A Health Crisis Resource & Support Center:  223.773.2934.  https://prescribetoprevent.org/patient-education/videos/  http://www.harmreduction.org  [facility]  [phone]  Support Group:  AA/NA and Sponsor/support.  National [place] on Mental Illness (www.[place].dale.org): 305.856.5097 or 340-901-8345.  Alcoholics Anonymous (www.alcoholics-anonymous.org): Check your phone book for your local chapter.  Suicide Awareness Voices of Education (SAVE) (www.save.org): 396-271-BZFC (8495)  National Suicide Prevention Line (www.mentalThe Multiverse Networkmn.org): 091-697-YDXA (1251)  Mental Health Consumer/Survivor Network of [place] (www.mhcsn.net): 885.630.3490 or 527-174-6986  Mental Health Association of [place] (www.mentalhealth.org): 339.572.8584 or 078-315-4286   Substance Abuse and Mental Health Services (www.samhsa.gov)  [place] Opioid Prevention Coalition: www.opioidcoalition.org    [place] Recovery Connection ([facility])  [facility] connects people seeking recovery to resources that help foster and sustain long-term recovery.  Whether you are seeking resources for treatment, transportation, housing, job training, education, health care or other pathways to recovery, [facility] is a great place to start.  363.823.2575.  www.[place]are[place]ilogy.org    Great Pod casts for nutrition and wellness  Listen on Apple Podcasts  Dishing Up Nutrition   [employer]   Nutrition       Understand the connection between what you eat and how you feel. Hosted by licensed nutritionists and dietitians from [employer] we share practical, real-life solutions for healthier living through nutrition.     General Medication Instructions:   See your medication sheet(s) for instructions.   Take all medications as prescribed.  Make no changes unless your primary care provider suggests them.   Go to all your primary care provider visits.  Be sure to have all your required lab tests. This way, your medicines can be refilled on time.  Do not use any forms of alcohol.    Please Note:  If you have any questions at anytime after you are discharged please call [facility] detox unit 3[facility] at  [phone].  [facility], Behavioral Intake [phone]  Medical Records call [phone]  Outpatient Behavioral Intake call [phone]  [facility] Wait List/Bed Availability call [phone]    Please remember to take all of your behavioral discharge planning and lab paperwork to any follow up appointments, it contains your lab results, diagnosis, medication list and discharge recommendations.      THANK YOU FOR CHOOSING [facility]
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
## 2023-03-20 NOTE — DISCHARGE SUMMARY
Ravi Hankins MRN# [id number]   Age: 32 year old YOB: 1990     Date of Admission:  3/17/2023  Date of Discharge:  3/20/2023  Admitting Physician:  Dawn Anglin MD  Discharge Physician:  Dawn Anglin MD      DISCHARGE  DX    Alcohol use disorder severe         Event Leading to Hospitalization:     See Admission note by admitting provider for patient encounter. for additional details.          Hospital Course:   PATIENT was admitted to [facility] attending  under DR Ana María anglin, please review the detailed admit note on 3/18/2023   The patient was placed under status 15 (15 minute checks) to ensure patient safety.   MSSA protocol was initiated due to the patient's history of alcohol abuse and concern for withdrawal symptoms.  CBC, BMP and utox obtained.    All outpatient medications were continued    PATIENTdid participate in groups and was visible in the milieu.     The patient's symptoms of withdrawal improved.     Patients energy motivation , sleep appetite improved.  Pt completed detox . It was un eventful.      Discussed with patient medications for craving.  Pt is not willing to take  antabuse/naltrexone/campral    Spoke with patient about triggers coping skills relapse prevention.    CONSULTS DONE DURING PATIENTS HOSPITALIZATION.  Patient was seen by medicine on date 3/18/2023    This as per their medical consult  Assessment & Plan     Ravi Hankins is a 32 year old male admitted on 3/17/2023 for alcohol detox. He a past medical history of polysubstance use disorder, hypertension, presyncope/syncope, aortic root dilation, and nephrolithiasis.     Medicine will sign off that as there are no acute issues.      Acute Alcohol Withdrawal  Polysubstance Use Disorder (alchol, cannabinoids, prior hx of meth, cocaine)  Last drink on prior to admission at 6 AM. Drinks 8 shooters of tequila per day, though he had 4 prior to coming to the emergency room. No history of  seizures or DT's. Blood alcohol level of 0.161.Utox positive for cannbinoids.Alk phos WNL.Total bili WNL. AST: ALT WNL. MSSA 3-8.  Patient endorsing wish to go to [facility].  Patient reports that he has a baby due soon and feels the need to treat his alcohol use disorder.   - Management per Psychiatry  - No need for BMP/hepatic panel unless clinically indicated   - Agree with MSSA protocol  - Agree with MVI, folate, thiamine      HTN hx  Syncope hx  Aortic root enlargement, borderline  BP in 130s/90s.  Suspect related to withdrawal. History of dilated aorta. History of brother dying in his youth and after that pt and his siblings were tested. Denies any CP, SOB, ROQUE, presyncope, syncope, palpations. Pt reports visiting Cardiologist yearly. Last visit on 1/25/22 with .   - Continue PTA atenolol (100 MG tablet)  - Always recheck BP if high or low and correlate with clinical situation  - Treat pain, anxiety, and any withdrawal s/s if present  - Trend  - Notify provider if SBP >170 or DBP >110 after careful reassessment, treatment of pain/anxiety/withdrawal/home PTA med administration  - Monitor for cardiac symptoms, notify Medicine for any acute symptoms and call RR for any emergencies      Nephrolithiasis  Emergency kidney stone surgery with stent on 7/29/2022 with Dr. Luna at [facility]. Stent removed by girlfriend by direction of RN over the phone. Occasionally feels some twinges in flank area but overall denies any dyspnea or flank pain.  ReACT within normal limits at 0.80 on arrival.   - Monitor OP with PCP     WALT: 20 upon admission. Likely 2/2 to etoh withdrawal.            The patient's care was discussed with the Bedside Nurse, Patient and Primary team.                 Pt was seen by cm  As per recommendations from cm      Recommendations:      1. Plan for Safety and Risk Management:  Recommended that patient call 911 or go to the local ED should there be a change in any of these risk factors..                          Report to child / adult protection services was NA.      2. JESUS Referrals:   Recommendations:    's Recommendation     Ravi  is recommended abstain from all substances.   Ravi is recommended to attend and participate in a inpatient or residential treatment at [facility] or something similar to address Ravi substance use concerns.   Ravi is recommended to continue to attend and participate in regular AA/NA support group meetings on a consistent basis to provide Ravi additional support in their recovery goals.      .  Patient reports they are willing to follow these recommendations.      Patient would like the following family or other support people involved in their treatment: My girlfriend and family. Patient has a history of opiate use and was give treatment options, including Medication Assisted Treatment, and information on the risks of opiod use disorder including recognizing and responding to opiod overdose.     3. Mental Health Referrals:    Ravi is recommended to attend, participate in individual therapy with a dually licensed clinician after completion of of inpatient treatment     4. Patient identified no cultural concerns that need to be addressed in treatment.     5. Recommendations for treatment focus:   Grief / Loss - To gain understanding and how to cope with loss  Alcohol / Substance Use - To provide pt skills and resources to help maintain longterm sobriety.           Labs:reviewed with patient     No results found for this or any previous visit (from the past 48 hour(s)).      Recent Results (from the past 240 hour(s))   Asymptomatic COVID-19 Virus (Coronavirus) by PCR Nose    Collection Time: 03/17/23 12:20 PM    Specimen: Nose; Swab   Result Value Ref Range    SARS CoV2 PCR Negative Negative   Drug abuse screen 1 urine (ED)    Collection Time: 03/17/23 12:23 PM   Result Value Ref Range    Amphetamines Urine Screen  Negative Screen Negative    Barbituates Urine Screen Negative Screen Negative    Benzodiazepine Urine Screen Negative Screen Negative    Cannabinoids Urine Screen Positive (A) Screen Negative    Cocaine Urine Screen Negative Screen Negative    Opiates Urine Screen Negative Screen Negative   Comprehensive metabolic panel    Collection Time: 03/17/23 12:31 PM   Result Value Ref Range    Sodium 141 136 - 145 mmol/L    Potassium 4.0 3.4 - 5.3 mmol/L    Chloride 100 98 - 107 mmol/L    Carbon Dioxide (CO2) 21 (L) 22 - 29 mmol/L    Anion Gap 20 (H) 7 - 15 mmol/L    Urea Nitrogen 10.4 6.0 - 20.0 mg/dL    Creatinine 0.80 0.67 - 1.17 mg/dL    Calcium 9.4 8.6 - 10.0 mg/dL    Glucose 88 70 - 99 mg/dL    Alkaline Phosphatase 86 40 - 129 U/L    AST 49 10 - 50 U/L    ALT 39 10 - 50 U/L    Protein Total 8.4 (H) 6.4 - 8.3 g/dL    Albumin 4.6 3.5 - 5.2 g/dL    Bilirubin Total 0.5 <=1.2 mg/dL    GFR Estimate >90 >60 mL/min/1.73m2   Magnesium    Collection Time: 03/17/23 12:31 PM   Result Value Ref Range    Magnesium 2.0 1.7 - 2.3 mg/dL   CBC with platelets and differential    Collection Time: 03/17/23 12:31 PM   Result Value Ref Range    WBC Count 4.7 4.0 - 11.0 10e3/uL    RBC Count 4.73 4.40 - 5.90 10e6/uL    Hemoglobin 14.4 13.3 - 17.7 g/dL    Hematocrit 44.2 40.0 - 53.0 %    MCV 93 78 - 100 fL    MCH 30.4 26.5 - 33.0 pg    MCHC 32.6 31.5 - 36.5 g/dL    RDW 12.9 10.0 - 15.0 %    Platelet Count 187 150 - 450 10e3/uL    % Neutrophils 49 %    % Lymphocytes 37 %    % Monocytes 14 %    % Eosinophils 0 %    % Basophils 0 %    % Immature Granulocytes 0 %    NRBCs per 100 WBC 0 <1 /100    Absolute Neutrophils 2.3 1.6 - 8.3 10e3/uL    Absolute Lymphocytes 1.8 0.8 - 5.3 10e3/uL    Absolute Monocytes 0.7 0.0 - 1.3 10e3/uL    Absolute Eosinophils 0.0 0.0 - 0.7 10e3/uL    Absolute Basophils 0.0 0.0 - 0.2 10e3/uL    Absolute Immature Granulocytes 0.0 <=0.4 10e3/uL    Absolute NRBCs 0.0 10e3/uL   Alcohol breath test POCT    Collection Time:  03/17/23 12:33 PM   Result Value Ref Range    Alcohol Breath Test 0.161 (A) 0.00 - 0.01            Because this patient meets criteria for an Alcohol Use Disorder, I performed the following brief intervention on the date of this note:              1) Expressed concern that the patient is drinking at unhealthy levels known to increase their risk of alcohol related problems              2) Gave feedback linking alcohol use and health, including personalized feedback explaining how alcohol use can interact with their medical and/or psychiatric problems, and with prescribed medications.              3) Advised patient to abstain.    PT counseled on nicotine cessation and nicotine replacement provided    Counseled the patient on the importance of having a recovery program in addition to medication to manage recovery.  Components include avoiding isolating, having willingness to change, avoiding triggers and managing cravings. Encouraged having some type of sober network and practicing honesty with trusted support person(s).     Discussed with patient many issues of addiction,triggers, relapse, and establishing a solid recovery program.    DISCHARGE MENTAL STATUS EXAMINATION:  The patient is alert, oriented x3.  Good fund of knowledge.  Good use of language.  Recent and remote memory, language, fund of knowledge are all adequate.  Euthymic mood congruent affect  Speech normal rate/rhythm linear tp no loose asso,The patient does not have any active suicidal or homicidal ideation.  Does not have any auditory or visual hallucination.  Fair insight/judgment At this time, the patient was stable to be discharged.        Pt was not determined to not be a danger to himself or others. At the current time of discharge, the patient does not meet criteria for involuntary hospitalization. On the day of discharge, the patient reports that they do not have suicidal or homicidal ideation and would never hurt themselves or others. Steps  "taken to minimize risk include: assessing patient s behavior and thought process daily during hospital stay, discharging patient with adequate plan for follow up for mental and physical health and discussing safety plan of returning to the hospital should the patient ever have thoughts of harming themselves or others. Therefore, based on all available evidence including the factors cited above, the patient does not appear to be at imminent risk for self-harm, and is appropriate for outpatient level of care.     Educated about side effects/risk vs benefits /alternative including non treatment.Pt consented to be on medication.     .Total time spent on discharge summary more than 35 min  More than  20 min  planning, coordination of care, medication reconciliation and performance of physical exam on day of discharge.Care was coordinated with unit RN and unit therapist         Review of your medicines      UNREVIEWED medicines. Ask your doctor about these medicines      Dose / Directions   atenolol 100 MG tablet  Commonly known as: TENORMIN  Used for: Elevated BP without diagnosis of hypertension      Dose: 100 mg  Take 1 tablet (100 mg) by mouth daily  Quantity: 90 tablet  Refills: 3     QUEtiapine 50 MG tablet  Commonly known as: SEROquel      Dose: 50 mg  Take 50 mg by mouth  Refills: 0             Disposition: [facility]     Facts about COVID19 at www.cdc.gov/COVID19 and www.[place].gov/covid19     Keeping hands clean is one of the most important steps we can take to avoid getting sick and spreading germs to others.  Please wash your hands frequently and lather with soap for at least 20 seconds!     Medical Follow-Up: Primary care in 3 to 4 weeks       Treatment Follow-Up: [facility]  .        \"Much or all of the text in this note was generated through the use of Dragon Dictate voice to text software. Errors in spelling or words which appear to be out of contact are unintentional, may be present due having escaped " "editing\"     "

## 2023-03-20 NOTE — PLAN OF CARE
Problem: Sleep Disturbance  Goal: Adequate Sleep/Rest  Outcome: Progressing   Goal Outcome Evaluation:           Pt is out of alcohol detox. Appeared asleep through the night with no apparent signs of distress.

## 2023-03-20 NOTE — PLAN OF CARE
Goal Outcome Evaluation:    Plan of Care Reviewed With: patient    Patient discharged to [facility].  Discharge instructions and medications explained to patient with no question asked.  Patient verbalized understanding of the discharge instructions.  Medications and belongings sent with patient upon discharge.

## 2023-03-20 NOTE — PROGRESS NOTES
Name: Ravi Hankins  Date: 3/20/2023  Medical Record: [id number]    Envelope Number: [id number]    List of Contents (List each item separately in new row):   One cell phone only  Admission:  I am responsible for any personal items that are not sent to the safe or pharmacy.  [facility] is not responsible for loss, theft or damage of any property in my possession.    Patient Signature:  ___________________________________________       Date/Time:__________________________    Staff Signature: __________________________________       Date/Time:__________________________    2nd Staff person, if patient is unable/unwilling to sign:      __________________________________________________________       Date/Time: __________________________    **All medications that are packaged for [facility] patients are transported by [facility] staff to Inpatient pharmacy for storage.     Discharge:  [facility] has returned all of my personal belongings:    Patient Signature: ________________________________________     Date/Time: ____________________________________    Staff Signature: ______________________________________     Date/Time:_____________________________________

## 2023-03-21 ENCOUNTER — HOSPITAL ENCOUNTER (OUTPATIENT)
Dept: BEHAVIORAL HEALTH | Facility: CLINIC | Age: 33
Discharge: HOME | End: 2023-03-21
Attending: FAMILY MEDICINE
Payer: COMMERCIAL

## 2023-03-21 PROCEDURE — 1002N00001 HC LODGING PLUS FACILITY CHARGE ADULT

## 2023-03-21 PROCEDURE — H2035 A/D TX PROGRAM, PER HOUR: HCPCS | Mod: HQ

## 2023-03-21 NOTE — GROUP NOTE
Psychoeducation Group Documentation    PATIENT'S NAME: Ravi Hankins  MRN:   [id number]  :   1990  ACCT. NUMBER: [id number]  DATE OF SERVICE: 3/21/23  START TIME:  3:00 PM  END TIME:  4:00 PM  FACILITATOR(S): Zuhair Do LADC; Layne Pate LADC  TOPIC: BEH Pyschoeducation  Number of patients attending the group:  7  Group Length:  1 Hours    Skills Group Therapy Type: Healthy behaviors development    Summary of Group / Topics Discussed:    Balanced lifestyle skills          Group Attendance:  Attended group session    Patient's response to the group topic/interactions:  cooperative with task    Patient appeared to be Attentive and Engaged.         Client specific details:  The patient participated in the afternoon skills group on health and research.

## 2023-03-21 NOTE — GROUP NOTE
Group Therapy Documentation    PATIENT'S NAME: Ravi Hankins  MRN:   [id number]  :   1990  ACCT. NUMBER: [id number]  DATE OF SERVICE: 3/21/23  START TIME:  9:00 AM  END TIME: 11:00 AM  FACILITATOR(S): Layne Pate LADC  TOPIC: BEH Group Therapy  Number of patients attending the group:  7    Group Length:  2 Hours    Group Therapy Type: Recovery strategies, Emotion processing, and Daily living/independence skills    Summary of Group / Topics Discussed:    Recovery Principles, Sober coping skills, Relationship/socialization, and Relapse prevention      Group Attendance:  Attended group session    Patient's response to the group topic/interactions:  cooperative with task    Patient appeared to be Actively participating, Attentive and Engaged.        Client specific details:  Patient attended group session and was attentive and participative.

## 2023-03-21 NOTE — PROGRESS NOTES
"Comprehensive Assessment Summary     Based on client interview, review of previous assessments and   comprehensive assessment interview the following diagnosis and recommendations are:     Patient: Ravi Hankins  MRN; [id number]   : 1990  Age: 32 year old Sex: male       Client meets criteria for:  Alcohol Use Disorder, Severe F10.20-(303.90)    Dimension One: Acute Intoxication/Withdrawal Potential     Ratin    (Consider the client's ability to cope with withdrawal symptoms and current state of intoxication)   *    Patient repots his last use date for alcohol as 3/17/23. Patient reports no withdrawal symptomology a this time.    Dimension Two: Biomedical Condition and Complications    Ratin  (Consider the degree to which any physical disorder would interfere with treatment for substance abuse, and the client's ability to tolerate any related discomfort; determine the impact of continued chemical use on the unborn child if the client is pregnant)       Patient reports no biomedical issues that would interfere with his ability to complete [facility] program.    Dimension Three: Emotional/Behavioral/Cognitive Conditions & Complications    Ratin  (Determine the degree to which any condition or complications are likely to interfere with treatment for substance abuse or with functioning in significant life areas and the likelihood of risk of harm to self or others)       Patient reports no formal mental health diagnosis. Patient reports feelings of grief and loss due to the death of his sister and her child/his nephew. Patient further reports that his sister   from a Fentanyl overdose and his nephew was murdered. Patient also reports feeling traumatized due to both of the aforementioned. Patient reports feeling guilt and shame and states, ,\"I sometimes beat myself up for squandering so many opportunities in my life and not being the man I'm suppose to be\" \"I feel like I've gotten over " "it, but then I've continued to use.\" Patient reports that he would like to see staff psychotherapist to address his mental and emotional concerns. Patient reports no suicidal ideation at this time.     Dimension Four: Treatment Acceptance/Resistance     Rating 1    (Consider the amount of support and encouragement necessary to keep the client involved in treatment)       Patient rates his motivation for change as a 10, 1-(low)-10-(high). Patient states, 'I'm doing it for myself and family members because many of them look up to me and I have to start being authentic, in order to be the leader.\" \"There are generational curses in my family around alcohol and drugs, a lot of my family members have  and somebody's got to change it.\" \"I feel like it's up to me to be the one.\" Patient cites verbal motivation for change, but has not demonstrated the behavior necessary to remain sober long term.     Dimension Five: Continued Use/Relaspe Prevention     Ratin   (Consider the degree to which the client's recognizes relapse issues and has the skills to prevent relapse of either substance use or mental health problems)       Patient reports no cravings at this time. Patient states, \"Whenever I think about drinking I just remember that I don't want to kill myself.\" Patient reports having five previous treatment experiences, but did not complete any of them. Patient reports his longest period of sobriety has been a year during which time he didn't attend any meetings or have a sponsor. 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 Patient lacks insight regarding his potential for relapse which includes his triggers, cues and early warning signs for relapse. Patient " is a strong candidate for relapse without a sober, structured environment.    Dimension Six: Recovery Environment     Rating: 3   (Consider the degree to which key areas of the client's life are supportive of or antagonistic to treatment participation and recovery)       Patient reports being unemployed and living with his father. Patient reports having two warrants and completing treatment is a condition of having one of them removed. Patient reports that following completion of [facility], he would like handle his legal issues, consistentlymeet with a sponsor, attend AA meetings and get back to work. Patient currently lacks a sober support network and an aftercare treatment protocol.    Note: Dimension 4 risk rating has been changed from a 0 to 1 due to the fact that patient has not maintained internal motivation necessary to remain sober.  Dimension 6 risk rating has been changed from 1 to 3 due to the fact that patient has legals, lacks a sober support network and will need a structured aftercare treatment protocol.    I have reviewed the information on the assessment, psychosocial and medical history and checklist:        it is current

## 2023-03-21 NOTE — GROUP NOTE
Group Therapy Documentation    PATIENT'S NAME: Ravi Hankins  MRN:   [id number]  :   1990  ACCT. NUMBER: [id number]  DATE OF SERVICE: 3/21/23  START TIME: 12:30 PM  END TIME:  2:30 PM  FACILITATOR(S): Dakota Martinez; Jimmy Correa LADC  TOPIC: BEH Group Therapy  Number of patients attending the group:  7  Group Length:  2 Hours    Group Therapy Type: Emotion processing and Daily living/independence skills    Summary of Group / Topics Discussed:    Relationship/socialization, Cognitive behavioral therapy skills, Mindfulness/Relaxation, and Emotions/expression       Group Attendance:  Attended group session    Patient's response to the group topic/interactions:  cooperative with task and discussed personal experience with topic    Patient appeared to be Attentive and Engaged.        Client specific details: Rodo attended PM group therapy. Patient engaged in discussion and participated in sharing feedback to his peers.  Patient shared what led him to substance use treatment.

## 2023-03-22 ENCOUNTER — TELEPHONE (OUTPATIENT)
Dept: BEHAVIORAL HEALTH | Facility: CLINIC | Age: 33
End: 2023-03-22
Payer: COMMERCIAL

## 2023-03-22 ENCOUNTER — HOSPITAL ENCOUNTER (OUTPATIENT)
Dept: BEHAVIORAL HEALTH | Facility: CLINIC | Age: 33
Discharge: HOME | End: 2023-03-22
Attending: FAMILY MEDICINE
Payer: COMMERCIAL

## 2023-03-22 DIAGNOSIS — F19.20 CHEMICAL DEPENDENCY (H): ICD-10-CM

## 2023-03-22 LAB — SARS-COV-2 RNA RESP QL NAA+PROBE: NEGATIVE

## 2023-03-22 PROCEDURE — H2035 A/D TX PROGRAM, PER HOUR: HCPCS | Mod: HQ

## 2023-03-22 PROCEDURE — U0003 INFECTIOUS AGENT DETECTION BY NUCLEIC ACID (DNA OR RNA); SEVERE ACUTE RESPIRATORY SYNDROME CORONAVIRUS 2 (SARS-COV-2) (CORONAVIRUS DISEASE [COVID-19]), AMPLIFIED PROBE TECHNIQUE, MAKING USE OF HIGH THROUGHPUT TECHNOLOGIES AS DESCRIBED BY CMS-2020-01-R: HCPCS

## 2023-03-22 PROCEDURE — 1002N00001 HC LODGING PLUS FACILITY CHARGE ADULT

## 2023-03-22 NOTE — GROUP NOTE
Group Therapy Documentation    PATIENT'S NAME: Ravi Hankins  MRN:   [id number]  :   1990  ACCT. NUMBER: [id number]  DATE OF SERVICE: 3/22/23  START TIME:  8:30 AM  END TIME:  9:30 AM  FACILITATOR(S): Esmer Bell LADC  TOPIC: BEH Group Therapy  Number of patients attending the group: 17  Group Length:  1 Hours    Group Therapy Type: Recovery strategies    Summary of Group / Topics Discussed:    Recovery Principles and Disease of addiction      Group Attendance:  Attended group session    Patient's response to the group topic/interactions:  cooperative with task    Patient appeared to be Attentive and Engaged.        Client specific details: Ravi was an active participant in lecture presented by Dr. Platt on the disease of addiction.

## 2023-03-22 NOTE — GROUP NOTE
Group Therapy Documentation    PATIENT'S NAME: Ravi Hankins  MRN:   [id number]  :   1990  ACCT. NUMBER: [id number]  DATE OF SERVICE: 3/22/23  START TIME: 12:30 PM  END TIME:  2:30 PM  FACILITATOR(S): Layne Pate LADC  TOPIC: BEH Group Therapy  Number of patients attending the group:  7    Group Length:  2 Hours    Group Therapy Type: Recovery strategies, Emotion processing, and Daily living/independence skills    Summary of Group / Topics Discussed:    Recovery Principles, Sober coping skills, Relationship/socialization, and Relapse prevention      Group Attendance:  Attended group session    Patient's response to the group topic/interactions:  cooperative with task    Patient appeared to be Actively participating, Attentive and Engaged.        Client specific details: Patient attended group session and was attentive and participative.

## 2023-03-22 NOTE — GROUP NOTE
"Group Therapy Documentation    PATIENT'S NAME: Ravi Hankins  MRN:   [id number]  :   1990  ACCT. NUMBER: [id number]  DATE OF SERVICE: 3/22/23  START TIME:  9:00 AM  END TIME: 11:00 AM  FACILITATOR(S): Jimmy Correa LADC  TOPIC: BEH Group Therapy  Number of patients attending the group:  6  Group Length:  2 Hours    Group Therapy Type: Emotion processing    Summary of Group / Topics Discussed:    Cognitive behavioral therapy skills    Group began with check-ins, followed by a reflection reading regarding dealing with the past. Group discussion also included the topic of \"thinking errors\" and cognitive re-framing techniques. Feedback was provided by participants throughout group session.       Group Attendance:  Attended group session    Patient's response to the group topic/interactions:  cooperative with task    Patient appeared to be Actively participating, Attentive and Engaged.        Client specific details:  Patient actively engaged in group discussion.      "

## 2023-03-23 ENCOUNTER — HOSPITAL ENCOUNTER (OUTPATIENT)
Dept: BEHAVIORAL HEALTH | Facility: CLINIC | Age: 33
Discharge: HOME | End: 2023-03-23
Attending: FAMILY MEDICINE
Payer: COMMERCIAL

## 2023-03-23 PROCEDURE — H2035 A/D TX PROGRAM, PER HOUR: HCPCS | Mod: HQ

## 2023-03-23 PROCEDURE — 1002N00001 HC LODGING PLUS FACILITY CHARGE ADULT

## 2023-03-23 NOTE — GROUP NOTE
Group Therapy Documentation    PATIENT'S NAME: Ravi Hankins  MRN:   [id number]  :   1990  ACCT. NUMBER: [id number]  DATE OF SERVICE: 3/23/23  START TIME:  9:00 AM  END TIME: 11:00 AM  FACILITATOR(S): Daktoa Martinez; Jimmy Correa LADC  TOPIC: BEH Group Therapy  Number of patients attending the group:  7  Group Length:  2 Hours    Group Therapy Type: Recovery strategies, Emotion processing, and Health and wellbeing     Summary of Group / Topics Discussed:    Recovery Principles and Sober coping skills      Group Attendance:  Attended group session    Patient's response to the group topic/interactions:  cooperative with task and discussed personal experience with topic    Patient appeared to be Actively participating, Attentive and Engaged.        Client specific details: Rodo attended AM small group therapy. Patient participated and remained engaged throughout group. He shared his experiences growing up and growing from challenges.

## 2023-03-23 NOTE — GROUP NOTE
"Psychoeducation Group Documentation    PATIENT'S NAME: Ravi Hankins  MRN:   [id number]  :   1990  ACCT. NUMBER: [id number]  DATE OF SERVICE: 3/23/23  START TIME:  3:00 PM  END TIME:  4:00 PM  FACILITATOR(S): Lefty Peraza LADC  TOPIC: BEH Pyschoeducation  Number of patients attending the group:  7  Group Length:  1 Hours    Skills Group Therapy Type: Presented by: Dr. Nogueira    Summary of Group / Topics Discussed:    \"Nutrition & Emotional Wellbeing\"          Group Attendance:  Attended group session    Patient's response to the group topic/interactions:  cooperative with task    Patient appeared to be Attentive.         Client specific details:  hari Rodrigues skills group presented by: Dr. Nogueira from [facility] on  Nutrition for Brain Health & Emotional Wellbeing . Q&A after her presentation and resources were provided.         "

## 2023-03-23 NOTE — GROUP NOTE
Group Therapy Documentation    PATIENT'S NAME: Ravi Hankins  MRN:   [id number]  :   1990  ACCT. NUMBER: [id number]  DATE OF SERVICE: 3/23/23  START TIME: 12:30 PM  END TIME:  2:30 PM  FACILITATOR(S): Layne Pate LADC  TOPIC: BEH Group Therapy  Number of patients attending the group:  8    Group Length:  2 Hours    Group Therapy Type: Recovery strategies, Emotion processing, and Daily living/independence skills    Summary of Group / Topics Discussed:    Recovery Principles, Sober coping skills, Relationship/socialization, and Balanced lifestyle      Group Attendance:  Attended group session    Patient's response to the group topic/interactions:  cooperative with task    Patient appeared to be Actively participating, Attentive and Engaged.        Client specific details:  Patient attended group session and was attentive and participative

## 2023-03-24 ENCOUNTER — HOSPITAL ENCOUNTER (OUTPATIENT)
Dept: BEHAVIORAL HEALTH | Facility: CLINIC | Age: 33
Discharge: HOME | End: 2023-03-24
Attending: FAMILY MEDICINE
Payer: COMMERCIAL

## 2023-03-24 PROCEDURE — H2035 A/D TX PROGRAM, PER HOUR: HCPCS | Mod: HQ

## 2023-03-24 PROCEDURE — 1002N00001 HC LODGING PLUS FACILITY CHARGE ADULT

## 2023-03-24 NOTE — GROUP NOTE
"Group Therapy Documentation    PATIENT'S NAME: Ravi Hankins  MRN:   [id number]  :   1990  ACCT. NUMBER: [id number]  DATE OF SERVICE: 3/24/23  START TIME:  9:00 AM  END TIME: 11:00 AM  FACILITATOR(S): Jimmy Correa LADC; Dakota Martinez  TOPIC: BEH Group Therapy  Number of patients attending the group:  8  Group Length:      Group Therapy Type: Recovery strategies    Summary of Group / Topics Discussed:    Sober coping skills, Balanced lifestyle, and Relapse prevention    Group began with check-ins, followed by a reflection reading regarding the different sides of a person. Group discussion focused on \"growth mindset\", which was continued from the previous group. Feedback was provided by participants throughout group session.       Group Attendance:  Attended group session    Patient's response to the group topic/interactions:  cooperative with task    Patient appeared to be Actively participating, Attentive and Engaged.        Client specific details:  Patient actively engaged in goup discussion.      "

## 2023-03-24 NOTE — GROUP NOTE
"Group Therapy Documentation    PATIENT'S NAME: Ravi Hankins  MRN:   [id number]  :   1990  ACCT. NUMBER: [id number]  DATE OF SERVICE: 3/24/23  START TIME: 12:30 PM  END TIME:  2:30 PM  FACILITATOR(S): Ileana Lopez LADC; Zuhair Do LADC; Jimmy Correa LADC  TOPIC: BEH Group Therapy  Number of patients attending the group:  20  Group Length:  2 Hours    Group Therapy Type: Recovery strategies    Summary of Group / Topics Discussed:    Recovery Principles, Balanced lifestyle, and Leisure explorations/use of leisure time    Patients watched \"A Street Cat Named Darrin,\" a film based on true events about a young man attempting sobriety from opioids. Patients participated in a discussion, discussing ways they related to the story, and sharing personal insights on the film. Each patient had an opportunity to process the film, ask questions of the facilitators, and provide constructive feedback to peers who shared.      Group Attendance:  Attended group session    Patient's response to the group topic/interactions:  cooperative with task    Patient appeared to be Attentive and Engaged.        Client specific details:  Patient fully participated in group activity and discussion, providing appropriate feedback, and sharing personal thoughts on the film.      "

## 2023-03-25 ENCOUNTER — HOSPITAL ENCOUNTER (OUTPATIENT)
Dept: BEHAVIORAL HEALTH | Facility: CLINIC | Age: 33
Discharge: HOME | End: 2023-03-25
Attending: FAMILY MEDICINE
Payer: COMMERCIAL

## 2023-03-25 PROCEDURE — 1002N00001 HC LODGING PLUS FACILITY CHARGE ADULT

## 2023-03-25 PROCEDURE — H2035 A/D TX PROGRAM, PER HOUR: HCPCS | Mod: HQ

## 2023-03-25 NOTE — GROUP NOTE
Group Therapy Documentation    PATIENT'S NAME: Ravi Hankins  MRN:   [id number]  :   1990  ACCT. NUMBER: [id number]  DATE OF SERVICE: 3/25/23  START TIME:  9:00 AM  END TIME: 11:00 AM  FACILITATOR(S): Esmer Bell LADC; Reshma Woodall LADC  TOPIC: BEH Group Therapy  Number of patients attending the group: 20  Group Length:  2 Hours    Group Therapy Type: Recovery strategies    Summary of Group / Topics Discussed:    Relationship/socialization      Group Attendance:  Attended group session    Patient's response to the group topic/interactions:  cooperative with task    Patient appeared to be Attentive and Engaged.        Client specific details: Ravi was an active participant in weekend Relationships workshop on boundaries.

## 2023-03-25 NOTE — GROUP NOTE
Psychoeducation Group Documentation    PATIENT'S NAME: Ravi Hankins  MRN:   [id number]  :   1990  ACCT. NUMBER: [id number]  DATE OF SERVICE: 3/25/23  START TIME: 12:30 PM  END TIME:  2:30 PM  FACILITATOR(S): Reshma Woodall LADC; Lam Bernard LADC; Esmer Bell LADC  TOPIC: BEH Pyschoeducation  Number of patients attending the group:  20  Group Length:  2 Hours    Skills Group Therapy Type: Recovery skills and Healthy behaviors development    Summary of Group / Topics Discussed:    Relationship/social skills          Group Attendance:  Attended group session    Patient's response to the group topic/interactions:  cooperative with task    Patient appeared to be Engaged.         Client specific details:  Ravi was participative and listened attentively.

## 2023-03-26 ENCOUNTER — HOSPITAL ENCOUNTER (OUTPATIENT)
Dept: BEHAVIORAL HEALTH | Facility: CLINIC | Age: 33
Discharge: HOME | End: 2023-03-26
Attending: FAMILY MEDICINE
Payer: COMMERCIAL

## 2023-03-26 DIAGNOSIS — F19.20 CHEMICAL DEPENDENCY (H): ICD-10-CM

## 2023-03-26 LAB — SARS-COV-2 RNA RESP QL NAA+PROBE: NEGATIVE

## 2023-03-26 PROCEDURE — U0005 INFEC AGEN DETEC AMPLI PROBE: HCPCS

## 2023-03-26 PROCEDURE — 1002N00001 HC LODGING PLUS FACILITY CHARGE ADULT

## 2023-03-26 PROCEDURE — H2035 A/D TX PROGRAM, PER HOUR: HCPCS | Mod: HQ

## 2023-03-26 NOTE — GROUP NOTE
Group Therapy Documentation    PATIENT'S NAME: Ravi Hankins  MRN:   [id number]  :   1990  ACCT. NUMBER: [id number]  DATE OF SERVICE: 3/26/23  START TIME: 12:30 PM  END TIME:  1:30 PM  FACILITATOR(S): Esmer Bell LADC; Ever Rodrigues LADC  TOPIC: BEH Group Therapy  Number of patients attending the group: 20  Group Length:  1 Hours    Group Therapy Type: Recovery strategies    Summary of Group / Topics Discussed:    Recovery Principles, Relationship/socialization, and Cognitive behavioral therapy skills      Group Attendance:  Attended group session    Patient's response to the group topic/interactions:  cooperative with task    Patient appeared to be Attentive and Engaged.        Client specific details: Ravi was an active participant in Skills group on critical thinking skills in recovery.

## 2023-03-26 NOTE — GROUP NOTE
Psychoeducation Group Documentation    PATIENT'S NAME: Ravi Hankins  MRN:   [id number]  :   1990  ACCT. NUMBER: [id number]  DATE OF SERVICE: 3/26/23  START TIME:  8:45 AM  END TIME: 10:30 AM  FACILITATOR(S): Esmer Bell LADC; Betty Solano RN; Ever Rodrigues LADC  TOPIC: BEH Pyschoeducation  Number of patients attending the group: 20  Group Length:  2 Hours    Skills Group Therapy Type: Recovery skills    Summary of Group / Topics Discussed:    Hepatitis A, B, C, and Tuberculosis          Group Attendance:  Attended group session    Patient's response to the group topic/interactions:  cooperative with task    Patient appeared to be Attentive and Engaged.         Client specific details: Ravi was an active participant in [facility] RN lecture on Hepatitis and Tuberculosis.

## 2023-03-27 ENCOUNTER — HOSPITAL ENCOUNTER (OUTPATIENT)
Dept: BEHAVIORAL HEALTH | Facility: CLINIC | Age: 33
Discharge: HOME | End: 2023-03-27
Attending: FAMILY MEDICINE
Payer: COMMERCIAL

## 2023-03-27 PROCEDURE — H2035 A/D TX PROGRAM, PER HOUR: HCPCS | Mod: HQ

## 2023-03-27 PROCEDURE — 1002N00001 HC LODGING PLUS FACILITY CHARGE ADULT

## 2023-03-27 NOTE — GROUP NOTE
Group Therapy Documentation    PATIENT'S NAME: Ravi Hankins  MRN:   [id number]  :   1990  ACCT. NUMBER: [id number]  DATE OF SERVICE: 3/27/23  START TIME:  9:00 AM  END TIME: 11:00 AM  FACILITATOR(S): Dakota Martinez; Jimmy Correa LADC  TOPIC: BEH Group Therapy  Number of patients attending the group:  7  Group Length:  2 Hours    Group Therapy Type: Recovery strategies and Emotion processing    Summary of Group / Topics Discussed:    Recovery Principles, Cognitive behavioral therapy skills, and Emotions/expression      Group Attendance:  Attended group session    Patient's response to the group topic/interactions:  cooperative with task, discussed personal experience with topic and expressed understanding of topic    Patient appeared to be Actively participating, Attentive and Engaged.        Client specific details:  Rodo attended AM small group therapy. Patient participated and remained engaged throughout group. 

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
## 2023-03-27 NOTE — GROUP NOTE
Group Therapy Documentation    PATIENT'S NAME: Ravi Hankins  MRN:   [id number]  :   1990  ACCT. NUMBER: [id number]  DATE OF SERVICE: 3/27/23  START TIME: 12:30 PM  END TIME:  2:30 PM  FACILITATOR(S): Antonella So; Jimmy Correa LADC  TOPIC: BEH Group Therapy  Number of patients attending the group:  7  Group Length:  2 Hours    Group Therapy Type: Emotion processing    Summary of Group / Topics Discussed:    Spiritual Care    Group participants discussed renewal related to the spring season. Group also included an art activity and a gardening activity regarding the topic of renewal. Feedback was provided by participants throughout group session.       Group Attendance:  Attended group session    Patient's response to the group topic/interactions:  cooperative with task    Patient appeared to be Actively participating, Attentive and Engaged.        Client specific details:  Patient actively engaged in group discussion.

## 2023-03-28 ENCOUNTER — TELEPHONE (OUTPATIENT)
Dept: BEHAVIORAL HEALTH | Facility: CLINIC | Age: 33
End: 2023-03-28

## 2023-03-28 ENCOUNTER — HOSPITAL ENCOUNTER (OUTPATIENT)
Dept: BEHAVIORAL HEALTH | Facility: CLINIC | Age: 33
Discharge: HOME | End: 2023-03-28
Attending: FAMILY MEDICINE
Payer: COMMERCIAL

## 2023-03-28 DIAGNOSIS — F10.20 ALCOHOL DEPENDENCE, CONTINUOUS (H): ICD-10-CM

## 2023-03-28 PROCEDURE — 1002N00001 HC LODGING PLUS FACILITY CHARGE ADULT

## 2023-03-28 PROCEDURE — H2035 A/D TX PROGRAM, PER HOUR: HCPCS | Mod: HQ

## 2023-03-28 RX ORDER — HYDROXYZINE HYDROCHLORIDE 25 MG/1
25 TABLET, FILM COATED ORAL EVERY 4 HOURS PRN
Qty: 30 TABLET | Refills: 0 | Status: SHIPPED | OUTPATIENT
Start: 2023-03-28 | End: 2024-02-22

## 2023-03-28 RX ORDER — HYDROXYZINE HYDROCHLORIDE 25 MG/1
25 TABLET, FILM COATED ORAL EVERY 4 HOURS PRN
Qty: 30 TABLET | Refills: 0 | OUTPATIENT
Start: 2023-03-28

## 2023-03-28 NOTE — GROUP NOTE
Group Therapy Documentation    PATIENT'S NAME: Ravi Hankins  MRN:   [id number]  :   1990  ACCT. NUMBER: [id number]  DATE OF SERVICE: 3/28/23  START TIME:  3:00 PM  END TIME:  4:00 PM  FACILITATOR(S): Ileana Lopez LADC; Layne Pate LADC; Lefty Peraza LADC  TOPIC: BEH Group Therapy  Number of patients attending the group:  6  Group Length:  1 Hours    Group Therapy Type: Recovery strategies and Health and wellbeing     Summary of Group / Topics Discussed:    Sober coping skills, Balanced lifestyle, and Self-care activities    Patients participated in acupuncture session with Dr. Nogueira. During session, patients learned the benefits of acupuncture and how to incorporate the practice as part of self-care in recovery. Patients participated in an acupuncture and meditation session and reflected on the experience. Each patient had an opportunity to process the information, ask questions of the facilitator, and provide appropriate feedback to peers who shared.      Group Attendance:  Attended group session    Patient's response to the group topic/interactions:  cooperative with task    Patient appeared to be Attentive and Engaged.        Client specific details:  Patient fully engaged in acupuncture session, providing appropriate feedback, and sharing personal thoughts on the experience.

## 2023-03-28 NOTE — GROUP NOTE
"Group Therapy Documentation    PATIENT'S NAME: Ravi Hankins  MRN:   [id number]  :   1990  ACCT. NUMBER: [id number]  DATE OF SERVICE: 3/28/23  START TIME:  9:00 AM  END TIME: 11:00 AM  FACILITATOR(S): Jimmy Correa LADC  TOPIC: BEH Group Therapy  Number of patients attending the group:  6  Group Length:  2 Hours    Group Therapy Type: Recovery strategies and Emotion processing    Summary of Group / Topics Discussed:    Sober coping skills and Relationship/socialization    Group began with check-ins, followed by a reflection reading regarding morning routines. A group participant then shared his \"First Step\" assignment. Group discussion also included the topic of balancing personal strengths with humility. Feedback was provided by participants throughout group session.      Group Attendance:  Attended group session    Patient's response to the group topic/interactions:  cooperative with task    Patient appeared to be Actively participating, Attentive and Engaged.        Client specific details:  Patient actively engaged in group discussion.      "

## 2023-03-28 NOTE — TELEPHONE ENCOUNTER
The patient had already been admitted to the [facility] treatment program at [facility] in [place], [place] directly from  on 3/20/2023 so he was no longer in need of a substance use disorder assessment.  As a result of already being in a substance use disorder treatment program his scheduled 8:00 am substance use disorder assessment on 3/28/2023 was cancelled.

## 2023-03-28 NOTE — TELEPHONE ENCOUNTER
ELLEN TAN   Patient called again regarding refill      Thanks,  Jose Roberto JIMENEZ RN   [facility]

## 2023-03-28 NOTE — GROUP NOTE
"Group Therapy Documentation    PATIENT'S NAME: Ravi Hankins  MRN:   [id number]  :   1990  ACCT. NUMBER: [id number]  DATE OF SERVICE: 3/28/23  START TIME: 12:30 PM  END TIME:  2:30 PM  FACILITATOR(S): Reshma Woodall LADC  TOPIC: BEH Group Therapy  Number of patients attending the group:  6  Group Length:  2 Hours    Group Therapy Type: Recovery strategies and Emotion processing    Summary of Group / Topics Discussed:    Recovery Principles and Emotions/expression  Introduced group with an activity worksheet called \"I Am A Person Who...\"  Presented a [employer] video titled \"What a Tool!\" By Mj Ann and discussed how humor can be a coping strategy in recovery, and how important honesty, self-forgiveness and being someone is in treatment.   A peer then presented his assignment on his depression and addiction, discussed and gave appropriate feedback. Ended the session with saying 5 things they are grateful for, the reading for the day and the serenity prayer.      Group Attendance:  Attended group session    Patient's response to the group topic/interactions:  cooperative with task    Patient appeared to be Engaged.        Client specific details:  Ravi was actively listening and participating. Ravi gave appropriate feedbacks.      "

## 2023-03-28 NOTE — TELEPHONE ENCOUNTER
Patient currently inpatient, hospital team will manage.  Routing to provider as FYI.  Elyse QUEVEDO RN

## 2023-03-28 NOTE — TELEPHONE ENCOUNTER
Patient have a video appointment today at 8am with [facility]. Writer placed a call this morning to check in. Unable to get a hold of patient. Writer left a voicemail with writer's call back number. Included in vm that 8:15am is the latest time to check in. Included Intake's number to reschedule if needed.

## 2023-03-29 ENCOUNTER — HOSPITAL ENCOUNTER (OUTPATIENT)
Dept: BEHAVIORAL HEALTH | Facility: CLINIC | Age: 33
Discharge: HOME | End: 2023-03-29
Attending: FAMILY MEDICINE
Payer: COMMERCIAL

## 2023-03-29 PROCEDURE — H2035 A/D TX PROGRAM, PER HOUR: HCPCS | Mod: HQ

## 2023-03-29 PROCEDURE — H2035 A/D TX PROGRAM, PER HOUR: HCPCS

## 2023-03-29 PROCEDURE — 1002N00001 HC LODGING PLUS FACILITY CHARGE ADULT

## 2023-03-29 NOTE — GROUP NOTE
"Group Therapy Documentation    PATIENT'S NAME: Ravi Hankins  MRN:   [id number]  :   1990  ACCT. NUMBER: [id number]  DATE OF SERVICE: 3/29/23  START TIME: 12:30 PM  END TIME:  2:30 PM  FACILITATOR(S): Jimmy Correa LADC  TOPIC: BEH Group Therapy  Number of patients attending the group:  6  Group Length:  2 Hours    Group Therapy Type: Recovery strategies, Emotion processing, and Daily living/independence skills    Summary of Group / Topics Discussed:    Sober coping skills, Relationship/socialization, and Relapse prevention      Group began with \"Relapse Prevention\" assignments shared by two participants, followed by a discussion regarding triggers and high situations for relapse. Feedback was provided by participants throughout group session.       Group Attendance:  Attended group session    Patient's response to the group topic/interactions:  cooperative with task    Patient appeared to be Actively participating, Attentive and Engaged.        Client specific details:  Patient actively engaged in group discussion.      "

## 2023-03-29 NOTE — GROUP NOTE
Psychoeducation Group Documentation    PATIENT'S NAME: Ravi Hankins  MRN:   [id number]  :   1990  ACCT. NUMBER: [id number]  DATE OF SERVICE: 3/29/23  START TIME:  8:30 AM  END TIME:  9:30 AM  FACILITATOR(S): Tyra Mcdaniel LADC; Layne Pate LADC; Jimmy Correa LADC   TOPIC: BEH Pyschoeducation  Number of patients attending the group:  20  Group Length:  1 Hours    Skills Group Therapy Type: Recovery skills and Emotion regulation skills    Summary of Group / Topics Discussed:    Relapse prevention skills       Dr. Damon Glez presented Psychoeducational Lecture on: Emotions ( identification, regulation, and coping skills). The importance of emotional regulation in regards to addiction and long term sobireoty was explored. Patients were able to ask questions upon the end of the lecture.     Group Attendance:  Attended group session    Patient's response to the group topic/interactions:  cooperative with task    Patient appeared to be Actively participating.         Client specific details:  Rodo attended psycho-ed lecture with Dr. Glez. He remained engaged throughout group lecture.

## 2023-03-29 NOTE — GROUP NOTE
Group Therapy Documentation    PATIENT'S NAME: Ravi Hankins  MRN:   [id number]  :   1990  ACCT. NUMBER: [id number]  DATE OF SERVICE: 3/29/23  START TIME:  9:45 AM  END TIME: 10:45 AM  FACILITATOR(S): Reshma Woodall LADC  TOPIC: BEH Group Therapy  Number of patients attending the group:  6  Group Length:  1 Hr    Group Therapy Type: Emotion processing    Summary of Group / Topics Discussed:    Recovery Principles and Relationship/socialization  The group started with a check-in and asked them to rate themselves on their ability to listen. Prompted them with a group music activity where in the group listen to each of the member's chosen song and they can either write, draw or journal things they hear within the music. The group came to a discussion on how we each hear and perceive the same information differently and how each can become a stronger listener. Ended the session with serenity prayer.      Group Attendance:  Attended group session    Patient's response to the group topic/interactions:  cooperative with task    Patient appeared to be Engaged.        Client specific details:  Patient was able to check-in and participate in half of the group activity. Patient was excused during the latter part of group for a dental appointment.

## 2023-03-29 NOTE — PROGRESS NOTES
"Pt returned to  from [facility] at 1225.  Several molars extracted.  Writer initiated OTC medication orders per \"Dental Pain management protocol.  Pt verbalizes understanding.    Betty Solano RN  [facility]  Nurse Liaison at  [facility]  Office - [phone]  Fax - [phone]  [facility] [id number]    "

## 2023-03-29 NOTE — GROUP NOTE
"Group Therapy Documentation    PATIENT'S NAME: Ravi Hankins  MRN:   [id number]  :   1990  ACCT. NUMBER: [id number]  DATE OF SERVICE: 3/29/23  START TIME: 12:30 PM  END TIME:  2:30 PM  FACILITATOR(S): Jimmy Correa LADC  TOPIC: BEH Group Therapy  Number of patients attending the group:  7  Group Length:  2 Hours    Group Therapy Type: Recovery strategies, Emotion processing, and Daily living/independence skills    Summary of Group / Topics Discussed:    Sober coping skills, Relationship/socialization, and Relapse prevention    Group began with \"Relapse Prevention\" assignments shared by two participants, followed by a discussion regarding triggers and high situations for relapse. Feedback was provided by participants throughout group session.       Group Attendance:      Patient's response to the group topic/interactions:      Patient appeared to be engaged.        Client specific details: Patient actively engaged in group discussion .      "

## 2023-03-30 ENCOUNTER — HOSPITAL ENCOUNTER (OUTPATIENT)
Dept: BEHAVIORAL HEALTH | Facility: CLINIC | Age: 33
Discharge: HOME | End: 2023-03-30
Attending: FAMILY MEDICINE
Payer: COMMERCIAL

## 2023-03-30 PROCEDURE — H2035 A/D TX PROGRAM, PER HOUR: HCPCS | Mod: HQ

## 2023-03-30 PROCEDURE — 1002N00001 HC LODGING PLUS FACILITY CHARGE ADULT

## 2023-03-30 NOTE — GROUP NOTE
Group Therapy Documentation    PATIENT'S NAME: Ravi Hankins  MRN:   [id number]  :   1990  ACCT. NUMBER: [id number]  DATE OF SERVICE: 3/30/23  START TIME:  9:00 AM  END TIME: 11:00 AM  FACILITATOR(S): Dakota Martinez; Terry Singh LADC  TOPIC: BEH Group Therapy  Number of patients attending the group:  9  Group Length:  2 Hours    Group Therapy Type: Recovery strategies, Emotion processing, and Health and wellbeing     Summary of Group / Topics Discussed:    Recovery Principles, Sober coping skills, Cognitive behavioral therapy skills, and Emotions/expression      Group Attendance:  Attended group session    Patient's response to the group topic/interactions:  cooperative with task and discussed personal experience with topic    Patient appeared to be Actively participating, Attentive and Engaged.        Client specific details:  Rodo attended AM small group therapy. Patient participated and remained engaged throughout group.  Patient gave appropriate feedback to his peers.

## 2023-03-30 NOTE — GROUP NOTE
Group Therapy Documentation    PATIENT'S NAME: Ravi Hankins  MRN:   [id number]  :   1990  ACCT. NUMBER: [id number]  DATE OF SERVICE: 3/30/23  START TIME:  3:00 PM  END TIME:  4:00 PM  FACILITATOR(S): Esmer Bell LADC; Zuhair Do LADC; Ileana Lopez LADC  TOPIC: BEH Group Therapy  Number of patients attending the group: 19  Group Length:  1 Hours    Group Therapy Type: Recovery strategies    Summary of Group / Topics Discussed:    Coping/DBT informed care      Group Attendance:  Attended group session    Patient's response to the group topic/interactions:  cooperative with task    Patient appeared to be Attentive and Engaged.        Client specific details: Ravi was an active participant in Skills group on DBT skills in recovery.

## 2023-03-30 NOTE — GROUP NOTE
Group Therapy Documentation    PATIENT'S NAME: Ravi Hankins  MRN:   [id number]  :   1990  ACCT. NUMBER: [id number]  DATE OF SERVICE: 3/30/23  START TIME: 12:30 PM  END TIME:  2:30 PM  FACILITATOR(S): Terry Singh LADC  TOPIC: BEH Group Therapy  Number of patients attending the group:  8  Group Length:  2 Hours    Group Therapy Type: Recovery strategies    Summary of Group / Topics Discussed:    Recovery Principles      Group Attendance:  Attended group session    Patient's response to the group topic/interactions:  cooperative with task    Patient appeared to be Actively participating.        Client specific details:  Ravi participated and interacted appropriately with peers and staff in PM group. No triggers to use noted or discussed.

## 2023-03-31 ENCOUNTER — HOSPITAL ENCOUNTER (OUTPATIENT)
Dept: BEHAVIORAL HEALTH | Facility: CLINIC | Age: 33
Discharge: HOME | End: 2023-03-31
Attending: FAMILY MEDICINE
Payer: COMMERCIAL

## 2023-03-31 PROCEDURE — 1002N00001 HC LODGING PLUS FACILITY CHARGE ADULT

## 2023-03-31 PROCEDURE — H2035 A/D TX PROGRAM, PER HOUR: HCPCS | Mod: HQ

## 2023-03-31 NOTE — GROUP NOTE
Group Therapy Documentation    PATIENT'S NAME: Ravi Hankins  MRN:   [id number]  :   1990  ACCT. NUMBER: [id number]  DATE OF SERVICE: 3/31/23  START TIME:  9:00 AM  END TIME: 11:00 AM  FACILITATOR(S): Dakota Martinez; Terry Singh LADC  TOPIC: BEH Group Therapy  Number of patients attending the group:  7  Group Length:  2 Hours    Group Therapy Type: Recovery strategies, Emotion processing, and Daily living/independence skills    Summary of Group / Topics Discussed:    Recovery Principles, Sober coping skills, Relationship/socialization, Co-occurring illnesses symptom management, Disease of addiction, and Emotions/expression      Group Attendance:  Attended group session    Patient's response to the group topic/interactions:  cooperative with task and discussed personal experience with topic    Patient appeared to be Actively participating, Attentive and Engaged.        Client specific details:  Rodo attended AM small group therapy. Patient participated and remained engaged throughout group. Rodo shared an assignment with the group and was receptive to feedback from peers.

## 2023-03-31 NOTE — GROUP NOTE
Group Therapy Documentation    PATIENT'S NAME: Ravi Hankins  MRN:   [id number]  :   1990  ACCT. NUMBER: [id number]  DATE OF SERVICE: 3/31/23  START TIME: 12:30 PM  END TIME:  2:30 PM  FACILITATOR(S): Terry Singh LADC  TOPIC: BEH Group Therapy  Number of patients attending the group:  6  Group Length:  2 Hours    Group Therapy Type: Health and wellbeing     Summary of Group / Topics Discussed:    Relationship/socialization      Group Attendance:  Attended group session    Patient's response to the group topic/interactions:  cooperative with task    Patient appeared to be Actively participating.        Client specific details:  Rodo participated and interacted appropriately with peers and staff in PM group. No triggers to use noted or discussed.

## 2023-04-01 ENCOUNTER — HOSPITAL ENCOUNTER (OUTPATIENT)
Dept: BEHAVIORAL HEALTH | Facility: CLINIC | Age: 33
Discharge: HOME | End: 2023-04-01
Attending: FAMILY MEDICINE
Payer: COMMERCIAL

## 2023-04-01 PROCEDURE — H2035 A/D TX PROGRAM, PER HOUR: HCPCS | Mod: HQ

## 2023-04-01 PROCEDURE — 1002N00001 HC LODGING PLUS FACILITY CHARGE ADULT

## 2023-04-01 NOTE — GROUP NOTE
Group Therapy Documentation    PATIENT'S NAME: Ravi Hankins  MRN:   [id number]  :   1990  ACCT. NUMBER: [id number]  DATE OF SERVICE: 23  START TIME: 12:30 PM  END TIME:  2:30 PM  FACILITATOR(S): Ileana Lopez LADC; Layne Pate LADC; Jimmy Correa LADC  TOPIC: BEH Group Therapy  Number of patients attending the group:  28  Group Length:  2 Hours    Group Therapy Type: Recovery strategies    Summary of Group / Topics Discussed:    Sober coping skills, Disease of addiction, and Relapse prevention    Patients participated in a relapse prevention skills workshop, gaining knowledge on how their behaviors in active use can show up as thought patterns in early recovery. Patients learned how to identify their addictive thought patterns and practiced coping strategies to reframe these thoughts to avoid returning to use of substances. Each patient had an opportunity to process the information, ask questions of the facilitator, and provide constructive feedback to peers who shared.      Group Attendance:  Attended group session    Patient's response to the group topic/interactions:  cooperative with task    Patient appeared to be Actively participating, Attentive and Engaged.        Client specific details:  Patient participated in relapse prevention workshop, demonstrating active listening skills, engaging in the skills activity, and providing appropriate feedback to peers who shared.

## 2023-04-01 NOTE — GROUP NOTE
Group Therapy Documentation    PATIENT'S NAME: Ravi Hankins  MRN:   [id number]  :   1990  ACCT. NUMBER: [id number]  DATE OF SERVICE: 23  START TIME:  9:00 AM  END TIME: 11:00 AM  FACILITATOR(S): Ileana Lopez LADC; Layne Pate LADC; Jimmy Correa LADC  TOPIC: BEH Group Therapy  Number of patients attending the group:  27  Group Length:  2 Hours    Group Therapy Type: Emotion processing    Summary of Group / Topics Discussed:    Relationship/socialization and Emotions/expression    Group began with personality test activity, followed by a related discussion. Participants provided feedback throughout group session.       Group Attendance:  Attended group session    Patient's response to the group topic/interactions:  cooperative with task    Patient appeared to be Actively participating, Attentive and Engaged.        Client specific details: Patient actively engaged in group discussion.

## 2023-04-02 ENCOUNTER — HOSPITAL ENCOUNTER (OUTPATIENT)
Dept: BEHAVIORAL HEALTH | Facility: CLINIC | Age: 33
Discharge: HOME | End: 2023-04-02
Attending: FAMILY MEDICINE
Payer: COMMERCIAL

## 2023-04-02 PROCEDURE — H2035 A/D TX PROGRAM, PER HOUR: HCPCS | Mod: HQ

## 2023-04-02 PROCEDURE — 1002N00001 HC LODGING PLUS FACILITY CHARGE ADULT

## 2023-04-02 NOTE — GROUP NOTE
Group Therapy Documentation    PATIENT'S NAME: Ravi Hankins  MRN:   [id number]  :   1990  ACCT. NUMBER: [id number]  DATE OF SERVICE: 23  START TIME: 12:30 PM  END TIME:  1:30 PM  FACILITATOR(S): Layne Pate LADC  TOPIC: BEH Group Therapy  Number of patients attending the group:  26    Group Length:  1 Hour    Group Therapy Type: Recovery strategies, Emotion processing, and Daily living/independence skills    Summary of Group / Topics Discussed:    Relapse prevention      Group Attendance:  Attended group session    Patient's response to the group topic/interactions:  cooperative with task    Patient appeared to be Actively participating, Attentive and Engaged.        Client specific details:  Patient attended group session and was attentive and participative.

## 2023-04-02 NOTE — GROUP NOTE
"Group Therapy Documentation    PATIENT'S NAME: Ravi Hankins  MRN:   [id number]  :   1990  ACCT. NUMBER: [id number]  DATE OF SERVICE: 23  START TIME:  8:45 AM  END TIME: 10:30 AM  FACILITATOR(S): Diamond Brizuela RN; Layne Pate LADC; Jimmy Correa [facility]  TOPIC: BEH Group Therapy  Number of patients attending the group:  25  Group Length:  2 Hours    Group Therapy Type: Daily living/independence skills and Health and wellbeing     Summary of Group / Topics Discussed:    Balanced lifestyle and Self-care activities    Group lecture on the topic of \"Self Care\" was presented by Diamond Brizuela RN. Feedback was provided by participants throughout group session.           Group Attendance:  Attended group session    Patient's response to the group topic/interactions:  cooperative with task    Patient appeared to be Actively participating, Attentive and Engaged.        Client specific details: Patient actively engaged in group lecture/discussion.      " Post-Care Instructions: I reviewed with the patient in detail post-care instructions. Patient is to keep the biopsy site dry overnight, and then apply bacitracin twice daily until healed. Patient may apply hydrogen peroxide soaks to remove any crusting.

## 2023-04-03 ENCOUNTER — HOSPITAL ENCOUNTER (OUTPATIENT)
Dept: BEHAVIORAL HEALTH | Facility: CLINIC | Age: 33
Discharge: HOME | End: 2023-04-03
Attending: FAMILY MEDICINE
Payer: COMMERCIAL

## 2023-04-03 DIAGNOSIS — F10.20 ALCOHOL DEPENDENCE, CONTINUOUS (H): ICD-10-CM

## 2023-04-03 PROCEDURE — H2035 A/D TX PROGRAM, PER HOUR: HCPCS | Mod: HQ

## 2023-04-03 PROCEDURE — 1002N00001 HC LODGING PLUS FACILITY CHARGE ADULT

## 2023-04-03 RX ORDER — TRAZODONE HYDROCHLORIDE 50 MG/1
50 TABLET, FILM COATED ORAL
Qty: 30 TABLET | Refills: 0 | Status: SHIPPED | OUTPATIENT
Start: 2023-04-03 | End: 2024-02-22

## 2023-04-03 NOTE — ADDENDUM NOTE
Encounter addended by: Jimmy Correa LADC on: 4/3/2023 11:26 AM   Actions taken: Clinical Note Signed

## 2023-04-03 NOTE — GROUP NOTE
"Group Therapy Documentation    PATIENT'S NAME: Ravi Hankins  MRN:   [id number]  :   1990  ACCT. NUMBER: [id number]  DATE OF SERVICE: 23  START TIME:  9:00 AM  END TIME: 11:00 AM  FACILITATOR(S): Jimmy Correa LADC  TOPIC: BEH Group Therapy  Number of patients attending the group:  6  Group Length:  2 Hours    Group Therapy Type: Emotion processing and Daily living/independence skills    Summary of Group / Topics Discussed:    Sober coping skills, Relationship/socialization, and Emotions/expression    Group began with check-ins, followed by a reflection reading regarding loneliness and solitude. Group also the presentation of two \"Use History\" Assignments. Feedback was provided by participants throughout group session.         Group Attendance:  Attended group session    Patient's response to the group topic/interactions:  cooperative with task    Patient appeared to be Actively participating, Attentive and Engaged.        Client specific details: Patient actively engaged in group discussion.      "

## 2023-04-03 NOTE — GROUP NOTE
Group Therapy Documentation    PATIENT'S NAME: Ravi Hankins  MRN:   [id number]  :   1990  ACCT. NUMBER: [id number]  DATE OF SERVICE: 23  START TIME: 12:30 PM  END TIME:  2:30 PM  FACILITATOR(S): Terry Singh LADC; Antonella So  TOPIC: BEH Group Therapy  Number of patients attending the group:  6  Group Length:  2 Hours    Group Therapy Type: Health and wellbeing     Summary of Group / Topics Discussed:    Spiritual Care      Group Attendance:  Attended group session    Patient's response to the group topic/interactions:  cooperative with task    Patient appeared to be Actively participating.        Client specific details:  Rodo participated and interacted appropriately with peers and staff in spiritual group. No triggers to use noted or discussed.

## 2023-04-04 ENCOUNTER — HOSPITAL ENCOUNTER (OUTPATIENT)
Dept: BEHAVIORAL HEALTH | Facility: CLINIC | Age: 33
Discharge: HOME | End: 2023-04-04
Attending: FAMILY MEDICINE
Payer: COMMERCIAL

## 2023-04-04 PROCEDURE — 1002N00001 HC LODGING PLUS FACILITY CHARGE ADULT

## 2023-04-04 PROCEDURE — H2035 A/D TX PROGRAM, PER HOUR: HCPCS | Mod: HQ

## 2023-04-04 NOTE — PROGRESS NOTES
[facility] Weekly Treatment Plan Review      Weekly Treatment Plan Review  Date Span: 3/27/2023 to 2023  Treatment Plan initiated on: 3/23/2023    Dimension1: Acute Intoxication/Withdrawal Potential -   Previous Dimension Ratin  Current Dimension Ratin  Date of Last Use: 3/17/2023  Any reports of withdrawal symptoms - No      Dimension 2: Biomedical Conditions & Complications -   Previous Dimension Ratin  Current Dimension Ratin  Medical Concerns:  patient denies  Current Medications & Medication Changes:  Current Outpatient Medications   Medication     acetaminophen (TYLENOL) 325 MG tablet     alum & mag hydroxide-simethicone (MAALOX) 200-200-20 MG/5ML SUSP suspension     atenolol (TENORMIN) 100 MG tablet     benzocaine-menthol (CEPACOL) 15-3.6 MG lozenge     carboxymethylcellulose PF (REFRESH PLUS) 0.5 % ophthalmic solution     guaiFENesin (ROBITUSSIN) 20 mg/mL liquid     hydrOXYzine (ATARAX) 25 MG tablet     ibuprofen (ADVIL/MOTRIN) 200 MG tablet     loratadine (CLARITIN) 10 MG tablet     melatonin 3 MG tablet     multivitamin w/minerals (THERA-VIT-M) tablet     senna-docusate (SENOKOT-S/PERICOLACE) 8.6-50 MG tablet     thiamine (B-1) 100 MG tablet     traZODone (DESYREL) 50 MG tablet     No current facility-administered medications for this encounter.     Facility-Administered Medications Ordered in Other Encounters   Medication     Self Administer Medications: Behavioral Services     Medication Prescriber:  See or refer to chart  Taking meds as prescribed? Yes  Medication side effects or concerns:  None reported  Outside medical appointments this week (list provider and reason for visit):  Dentist appointment    Narrative: Pt reports no medical problems this past week . Pt seems fully functioning and seeks medical attention as needed. Pt's temperature and oxygen level are measured as needed in accordance with COVID-19 precautions. They attended lecture given by SHELLEY nurse on  "self-care (2023).      Dimension 3: Emotional/Behavioral Conditions & Complications -   Previous Dimension Ratin  Current Dimension Ratin  PHQ2:       2023     9:00 AM 3/27/2023     1:00 PM 2023     2:16 PM 2022    12:54 PM 2021     1:51 PM 2020    11:26 AM 2019     1:43 PM   PHQ-2 ( 1999 [employer])   Q1: Little interest or pleasure in doing things 0 0 0 0 0 1 0   Q2: Feeling down, depressed or hopeless 0 0 0 1 0 1 0   PHQ-2 Score 0 0 0 1 0 2 0   PHQ-2 Total Score (12-17 Years)- Positive if 3 or more points; Administer PHQ-A if positive     0 2 0   Q1: Little interest or pleasure in doing things   Not at all Not at all    Not at all  Several days    Q2: Feeling down, depressed or hopeless   Not at all Several days    Several days  Several days    PHQ-2 Score   0 1    1  2       GAD2:       3/27/2023     1:00 PM 2023     9:00 AM   REZA-2   Feeling nervous, anxious, or on edge 0 1   Not being able to stop or control worrying 1 1   REZA-2 Total Score 1 2     Mental health diagnosis: patient denies formal mental health diagnosis  Date of last SIB:  denies  Date of  last SI:  denies  Date of last HI: denies  Behavioral Targets:  follow medical provider recommendations. Report any use of alcohol or drugs to primary counselors. Report any increase of withdrawal signs or symptoms to nursing staff.  Risk factors:  Patient lacks relapse prevention skills, coping skills and refusal skills. Patient's tendency to isolate due to significant grief and loss issues. Patient's current legal issues.  Protective factors:  forward/future oriented thinking, safe and stable environment, abstinence from substances, adherence with prescribed medication, agreement to use safety plan, living with other people, structured day, committment to well-being and positive social skills  Current  Assignments:  \"grief and loss\", \"5 ways substance use affects mental health\"    Narrative: Current Mental Health " "symptoms include: patient denies. See below for suicide risk assessment. Pt does not endorse thoughts of self-harm or suicide ideation at this time. Pt's current CGI is 4/4.  They report that their stress level has decreased this week; coping skills to manage stress used were doing assignments and board games. Pt reported that their mood has not significantly changed this past week.    Turner Suicide Severity Rating Scale (Short Version)      10/9/2019     2:52 PM 2020    10:27 PM 3/17/2023    11:55 AM 3/17/2023     5:00 PM 3/20/2023     1:00 PM   Turner Suicide Severity Rating (Short Version)   Over the past 2 weeks have you felt down, depressed, or hopeless? no no yes     Over the past 2 weeks have you had thoughts of killing yourself? no no no     Have you ever attempted to kill yourself? no no no     Q1 Wished to be Dead (Past Month)    no no   Q2 Suicidal Thoughts (Past Month)    no no   Q3 Suicidal Thought Method    no no   Q4 Suicidal Intent without Specific Plan    no no   Q5 Suicide Intent with Specific Plan    no no   Q6 Suicide Behavior (Lifetime)    no no   Level of Risk per Screen    low risk low risk         Dimension 4: Treatment Acceptance / Resistance -   Previous Dimension Ratin  Current Dimension Ratin  JESUS Diagnosis:  Alcohol Use Disorder   303.90 (F10.20) Severe In a controlled environment  Commitment to tx process/Stage of change- contemplative  JESUS assignments - \"step one\"    Narrative - Pt reports their motivation this week is myself and my family. Pt reports that their aftercare plans include attend AA and communicate with my sponsor. They report that they have gotten along with peers and staff this week. They engaged in the following recreational activities: playing ping-pong with peers.      Dimension 5: Relapse / Continued Problem Potential -   Previous Dimension Ratin  Current Dimension Ratin  Relapses this week - None  Urges to use - None  UA results - " "positive for BZO 3/27/2023 (patient was in detox prior to admission at )    Narrative- Pt reports no cravings this past week. Pt reports craving 2/10, ten being high. They reported not experiencing any triggers to use but used the following coping skill(s): \"occupying my mind and thinking about my future\". Pt participated in the spirituality group, facilitated by Antonella Phillips and  counseling staff was present during group. They participated in weekend workshop on relapse prevention and completed all activities.     Dimension 6: Recovery Environment -   Previous Dimension Rating:  3  Current Dimension Rating:  3  Family Involvement - NA  Summarize attendance at family groups and family sessions - NA  Family supportive of treatment?  Yes  Recreational activities - ping-ping, board games    Narrative - Pt is spending free time with same-gender peers and attending at least 3 virtual 12-step meetings weekly while in . They participated in weekend workshop on relationships and completed all activities.    Progress made on transition planning goals: patient present assignments in group, participate in group therapy and Psychoeducational skills group.     Justification for Continued Treatment at this Level of Care:  Patient's risk rating indicates the need for this type of level of care. Patient's lack of relapse prevention skills, coping skills and refusal skills. Patient was unable to maintain abstinence in an outpatient setting. Legal and medical concerns which are exacerbated by misuse.  Treatment coordination activities this week:  none at this time  Need for peer recovery support referral? No    Discharge Planning:  Target Discharge Date/Timeframe:  4/17/2023  Med Mgmt Provider/Appt:  TBD  Ind therapy Provider/Appt:  TBD  Family therapy Provider/Appt:  TBD  Other referrals:  none at this time.    Has vulnerable adult status changed? No    Interdisciplinary Clinical Supervision including: LADC and RN    Are " Treatment Plan goals/objectives effective? Yes  *If no, list changes to treatment plan:    Are the current goals meeting client's needs? Yes  *If no, list the changes to treatment plan.    Client Input / Response: Patient responded through weekly progress handout.      *Client agrees with any changes to the treatment plan: N/A  *Client received copy of changes: N/A  *Client is aware of right to access a treatment plan review: Yes     FERNANDA Krause on 4/4/2023 at 9:31 AM

## 2023-04-04 NOTE — GROUP NOTE
Group Therapy Documentation    PATIENT'S NAME: Ravi Hankins  MRN:   [id number]  :   1990  ACCT. NUMBER: [id number]  DATE OF SERVICE: 23  START TIME:  9:00 AM  END TIME: 11:00 AM  FACILITATOR(S): Dakota Martinez; Jimmy Correa LADC  TOPIC: BEH Group Therapy  Number of patients attending the group:  8  Group Length:  2 Hours    Group Therapy Type: Recovery strategies, Daily living/independence skills, and Health and wellbeing     Summary of Group / Topics Discussed:    Recovery Principles, Relationship/socialization, Co-occurring illnesses symptom management, and Disease of addiction      Group Attendance:  Attended group session    Patient's response to the group topic/interactions:  cooperative with task and discussed personal experience with topic    Patient appeared to be Actively participating, Attentive and Engaged.        Client specific details:  Rodo attended AM small group therapy. Patient participated and remained engaged throughout group.

## 2023-04-04 NOTE — GROUP NOTE
Group Therapy Documentation    PATIENT'S NAME: Ravi Hankins  MRN:   [id number]  :   1990  ACCT. NUMBER: [id number]  DATE OF SERVICE: 23  START TIME: 12:30 PM  END TIME:  2:30 PM  FACILITATOR(S): Terry Singh LADC  TOPIC: BEH Group Therapy  Number of patients attending the group:  8  Group Length:  2 Hours    Group Therapy Type: Emotion processing    Summary of Group / Topics Discussed:    Sober coping skills      Group Attendance:  Attended group session    Patient's response to the group topic/interactions:  cooperative with task    Patient appeared to be Actively participating.        Client specific details:  Rodo participated and interacted appropriately with peers and staff in PM group. No triggers to use noted or discussed.

## 2023-04-04 NOTE — GROUP NOTE
Group Therapy Documentation    PATIENT'S NAME: Ravi Hankins  MRN:   [id number]  :   1990  ACCT. NUMBER: [id number]  DATE OF SERVICE: 23  START TIME:  3:00 PM  END TIME:  4:00 PM  FACILITATOR(S): Farida Guajardo LADC; Reshma Woodall LADC; Layne Pate LADC  TOPIC: BEH Group Therapy  Number of patients attending the group:  24  Group Length:  1 Hours    Group Therapy Type: Health and wellbeing     Summary of Group / Topics Discussed:    Balanced lifestyle and Co-occurring illnesses symptom management    Group Attendance:  Attended group session    Patient's response to the group topic/interactions:  cooperative with task    Patient appeared to be Attentive and Engaged.        Client specific details: Pt listened respectfully to Dr. Ordonez's presentation on the principles of JESUS research.

## 2023-04-05 ENCOUNTER — HOSPITAL ENCOUNTER (OUTPATIENT)
Dept: BEHAVIORAL HEALTH | Facility: CLINIC | Age: 33
Discharge: HOME | End: 2023-04-05
Attending: FAMILY MEDICINE
Payer: COMMERCIAL

## 2023-04-05 DIAGNOSIS — F19.20 CHEMICAL DEPENDENCY (H): ICD-10-CM

## 2023-04-05 LAB — SARS-COV-2 RNA RESP QL NAA+PROBE: NEGATIVE

## 2023-04-05 PROCEDURE — 1002N00001 HC LODGING PLUS FACILITY CHARGE ADULT

## 2023-04-05 PROCEDURE — U0003 INFECTIOUS AGENT DETECTION BY NUCLEIC ACID (DNA OR RNA); SEVERE ACUTE RESPIRATORY SYNDROME CORONAVIRUS 2 (SARS-COV-2) (CORONAVIRUS DISEASE [COVID-19]), AMPLIFIED PROBE TECHNIQUE, MAKING USE OF HIGH THROUGHPUT TECHNOLOGIES AS DESCRIBED BY CMS-2020-01-R: HCPCS

## 2023-04-05 PROCEDURE — H2035 A/D TX PROGRAM, PER HOUR: HCPCS | Mod: HQ

## 2023-04-05 NOTE — GROUP NOTE
"Group Therapy Documentation    PATIENT'S NAME: Ravi Hankins  MRN:   [id number]  :   1990  ACCT. NUMBER: [id number]  DATE OF SERVICE: 23  START TIME: 12:30 PM  END TIME:  2:30 PM  FACILITATOR(S): Zuhair Do LADC  TOPIC: BEH Group Therapy  Number of patients attending the group:  8  Group Length:  2 Hours    Group Therapy Type: Recovery strategies    Summary of Group / Topics Discussed:    Recovery Principles, Coping/DBT informed care, Trauma informed care, Disease of addiction, and Emotions/expression      Group Attendance:  Attended group session    Patient's response to the group topic/interactions:  cooperative with task    Patient appeared to be Attentive and Engaged.        Client specific details:  Ravi participated in afternoon group. He took part in an introduction activity, followed by a reading and a discussion. For the second half of group, he presented his \"Guilt and Shame\" assignment.      "

## 2023-04-05 NOTE — GROUP NOTE
Group Therapy Documentation    PATIENT'S NAME: Ravi Hankins  MRN:   [id number]  :   1990  ACCT. NUMBER: [id number]  DATE OF SERVICE: 23  START TIME:  8:30 AM  END TIME:  9:30 AM  FACILITATOR(S): Esmer Bell LADC; Jimmy Correa LADC; Zuhair Do LADC  TOPIC: BEH Group Therapy  Number of patients attending the group:  24  Group Length:  1 Hours    Group Therapy Type: Health and wellbeing     Summary of Group / Topics Discussed:    Alcohol and the Liver/Hepatology      Group Attendance:  Attended group session    Patient's response to the group topic/interactions:  cooperative with task    Patient appeared to be Attentive and Engaged.        Client specific details: Ravi was an active participant in psychoeducational lecture on the effects of alcohol on the liver.

## 2023-04-05 NOTE — GROUP NOTE
Group Therapy Documentation    PATIENT'S NAME: Ravi Hankins  MRN:   [id number]  :   1990  ACCT. NUMBER: [id number]  DATE OF SERVICE: 23  START TIME:  9:00 AM  END TIME: 11:00 AM  FACILITATOR(S): Jimmy Correa LADC  TOPIC: BEH Group Therapy  Number of patients attending the group: 8  Group Length:  2 Hours    Group Therapy Type: Recovery strategies, Emotion processing, Daily living/independence skills, and Health and wellbeing     Summary of Group / Topics Discussed:    Sober coping skills, Relationship/socialization, and Emotions/expression    Group began with check-ins, followed by a reflection reading regarding anger. Group then included an assignment that was presented by a group participant regarding guilt and shame. Feedback was provided by participants throughout group session.       Group Attendance:  Attended group session    Patient's response to the group topic/interactions:  cooperative with task    Patient appeared to be Actively participating, Attentive and Engaged.        Client specific details: Patient actively engaged in roup discussion.

## 2023-04-06 ENCOUNTER — HOSPITAL ENCOUNTER (OUTPATIENT)
Dept: BEHAVIORAL HEALTH | Facility: CLINIC | Age: 33
Discharge: HOME | End: 2023-04-06
Attending: FAMILY MEDICINE
Payer: COMMERCIAL

## 2023-04-06 PROCEDURE — 1002N00001 HC LODGING PLUS FACILITY CHARGE ADULT

## 2023-04-06 PROCEDURE — H2035 A/D TX PROGRAM, PER HOUR: HCPCS | Mod: HQ

## 2023-04-06 NOTE — GROUP NOTE
Group Therapy Documentation    PATIENT'S NAME: Ravi Hankins  MRN:   [id number]  :   1990  ACCT. NUMBER: [id number]  DATE OF SERVICE: 23  START TIME: 12:30 PM  END TIME:  2:30 PM  FACILITATOR(S): Terry Singh LADC  TOPIC: BEH Group Therapy  Number of patients attending the group:  8  Group Length:  2 Hours    Group Therapy Type: Emotion processing    Summary of Group / Topics Discussed:    Disease of addiction      Group Attendance:  Attended group session    Patient's response to the group topic/interactions:  cooperative with task    Patient appeared to be Actively participating.        Client specific details:  Rodo participated and interacted appropriately with peers and staff in PM group. No triggers to use noted or discussed.

## 2023-04-06 NOTE — GROUP NOTE
Group Therapy Documentation    PATIENT'S NAME: Ravi Hankins  MRN:   [id number]  :   1990  ACCT. NUMBER: [id number]  DATE OF SERVICE: 23  START TIME:  9:00 AM  END TIME: 11:00 AM  FACILITATOR(S): Jimmy Correa LADC; Dakota Martinez  TOPIC: BEH Group Therapy  Number of patients attending the group:  8  Group Length:  2 Hours    Group Therapy Type: Recovery strategies, Emotion processing, Daily living/independence skills, and Health and wellbeing     Summary of Group / Topics Discussed:    Sober coping skills, Emotions/expression, and Relapse prevention    Group began with check-ins, followed by a reflection reading regarding asking for help. Group also included presentations of assignment from two participants. Feedback was provided by throughout group session.       Group Attendance:  Attended group session    Patient's response to the group topic/interactions:  cooperative with task    Patient appeared to be Actively participating, Attentive and Engaged.        Client specific details:  Patient actively engaged in group session.

## 2023-04-06 NOTE — GROUP NOTE
"Psychoeducation Group Documentation    PATIENT'S NAME: Ravi Hankins  MRN:   [id number]  :   1990  ACCT. NUMBER: [id number]  DATE OF SERVICE: 23  START TIME:  3:00 PM  END TIME:  4:00 PM  FACILITATOR(S): AUREA GUTIERREZ; Lefty Peraza LADC  TOPIC: BEH Pyschoeducation  Number of patients attending the group:  9  Group Length:  1 Hours    Skills Group Therapy Type: Gambling    Summary of Group / Topics Discussed:    \"Gambling While in Recovery\"          Group Attendance:  Attended group session    Patient's response to the group topic/interactions:  cooperative with task    Patient appeared to be Attentive.         Client specific details:  Patient attended skills group presentation on  Gambling when in Recovery  presented by [facility]DA [facility]. Patient engaged in Q&A after presentation. Each patient had an opportunity to process the information and ask questions of the presenter and receive informational packets.         "

## 2023-04-07 ENCOUNTER — HOSPITAL ENCOUNTER (OUTPATIENT)
Dept: BEHAVIORAL HEALTH | Facility: CLINIC | Age: 33
Discharge: HOME | End: 2023-04-07
Attending: FAMILY MEDICINE
Payer: COMMERCIAL

## 2023-04-07 DIAGNOSIS — F19.20 CHEMICAL DEPENDENCY (H): ICD-10-CM

## 2023-04-07 LAB — SARS-COV-2 RNA RESP QL NAA+PROBE: NEGATIVE

## 2023-04-07 PROCEDURE — H2035 A/D TX PROGRAM, PER HOUR: HCPCS | Mod: HQ

## 2023-04-07 PROCEDURE — U0003 INFECTIOUS AGENT DETECTION BY NUCLEIC ACID (DNA OR RNA); SEVERE ACUTE RESPIRATORY SYNDROME CORONAVIRUS 2 (SARS-COV-2) (CORONAVIRUS DISEASE [COVID-19]), AMPLIFIED PROBE TECHNIQUE, MAKING USE OF HIGH THROUGHPUT TECHNOLOGIES AS DESCRIBED BY CMS-2020-01-R: HCPCS

## 2023-04-07 PROCEDURE — 1002N00001 HC LODGING PLUS FACILITY CHARGE ADULT

## 2023-04-07 NOTE — GROUP NOTE
Group Therapy Documentation    PATIENT'S NAME: Ravi Hankins  MRN:   [id number]  :   1990  ACCT. NUMBER: [id number]  DATE OF SERVICE: 23  START TIME: 12:30 PM  END TIME:  2:30 PM  FACILITATOR(S): Terry Singh LADC  TOPIC: BEH Group Therapy  Number of patients attending the group:  9  Group Length:  2 Hours    Group Therapy Type: Health and wellbeing     Summary of Group / Topics Discussed:    Self-care activities      Group Attendance:  Attended group session    Patient's response to the group topic/interactions:  cooperative with task    Patient appeared to be Actively participating.        Client specific details:  Rodo participated and interacted appropriately with peers and staff in PM group. No triggers to use noted or discussed.

## 2023-04-07 NOTE — GROUP NOTE
Group Therapy Documentation    PATIENT'S NAME: Ravi Hankins  MRN:   [id number]  :   1990  ACCT. NUMBER: [id number]  DATE OF SERVICE: 23  START TIME:  9:00 AM  END TIME: 11:00 AM  FACILITATOR(S): Zuhair Do LADC  TOPIC: BEH Group Therapy  Number of patients attending the group:  9  Group Length:  2 Hours    Group Therapy Type: Recovery strategies    Summary of Group / Topics Discussed:    Mindfulness/Relaxation, Coping/DBT informed care, Trauma informed care, Disease of addiction, and Emotions/expression      Group Attendance:  Attended group session    Patient's response to the group topic/interactions:  cooperative with task    Patient appeared to be Attentive and Engaged.        Client specific details:  Ravi participated in morning group. He checked in and took part in the reading and discussion. The group went over sober resources and sober activities. For the last part of group he listened to and gave positive feedback on his peer's assignment.

## 2023-04-07 NOTE — PROGRESS NOTES
Writer spoke with pt regarding his interaction with other [facility]+ pt last night. Pt and writer walked through what happened, how he reacted, and what he could have done differently in the situation. Pt is aware that he reacted too quickly and sharply. However, he stated he practiced calming and de-escalation tactics he has learned and has been working on. Writer coached pt to continue to practice these self-calming tactics. Writer also helped pt better define assertiveness and aggression. Pt stated that he appreciatated this conversation.

## 2023-04-07 NOTE — PROGRESS NOTES
Per 6th floor staff  patient got into verbal altercation with a male peer during dinner in a cafeteria over TV volume being High. That escalated to some words exchanged between the two. 6th floor staff intervened and asked patients to stop and went over [facility] policy for this type of behavior. Later this evening patient approached staff and apologized for his behavior. Patient was encouraged to address his concerns to staff moving forward and focus on his treatment.  Patients was monitored closely throughout the shift and no further issues noted.

## 2023-04-08 ENCOUNTER — HOSPITAL ENCOUNTER (OUTPATIENT)
Dept: BEHAVIORAL HEALTH | Facility: CLINIC | Age: 33
Discharge: HOME | End: 2023-04-08
Attending: FAMILY MEDICINE
Payer: COMMERCIAL

## 2023-04-08 PROCEDURE — 1002N00001 HC LODGING PLUS FACILITY CHARGE ADULT

## 2023-04-08 PROCEDURE — H2035 A/D TX PROGRAM, PER HOUR: HCPCS

## 2023-04-08 PROCEDURE — H2035 A/D TX PROGRAM, PER HOUR: HCPCS | Mod: HQ

## 2023-04-08 NOTE — GROUP NOTE
Group Therapy Documentation    PATIENT'S NAME: Ravi Hankins  MRN:   [id number]  :   1990  ACCT. NUMBER: [id number]  DATE OF SERVICE: 23  START TIME:  9:00 AM  END TIME: 11:00 AM  FACILITATOR(S): Tyra Mcdaniel LADC; Heather Meraz LADC; Betty Solano RN; Farida Guajardo LADC  TOPIC: BEH Group Therapy  Number of patients attending the group:  22  Group Length:  2 Hours    Group Therapy Type: Health and wellbeing     Summary of Group / Topics Discussed:    Balanced lifestyle and Relapse prevention    Group Attendance:  Attended group session    Patient's response to the group topic/interactions:  cooperative with task    Patient appeared to be Actively participating, Attentive and Engaged.        Client specific details: Pt listened respectfully to a presentation on STIs/TB and participated in related activity.

## 2023-04-08 NOTE — GROUP NOTE
Group Therapy Documentation    PATIENT'S NAME: Ravi Hankins  MRN:   [id number]  :   1990  ACCT. NUMBER: [id number]  DATE OF SERVICE: 23  START TIME: 12:30 PM  END TIME:  2:30 PM  FACILITATOR(S): Farida Guajardo LADC; Tyra Mcdaniel LADC; Heather Meraz LADC  TOPIC: BEH Group Therapy  Number of patients attending the group:  22  Group Length:  2 Hours    Group Therapy Type: Recovery strategies    Summary of Group / Topics Discussed:    Recovery Principles, Relationship/socialization, and Relapse prevention    Group Attendance:  Attended group session    Patient's response to the group topic/interactions:  cooperative with task    Patient appeared to be Actively participating, Attentive and Engaged.        Client specific details: Pt participated in an activity incorporating creative interpretation of the 12 steps and focusing on teamwork and communication.

## 2023-04-09 ENCOUNTER — HOSPITAL ENCOUNTER (OUTPATIENT)
Dept: BEHAVIORAL HEALTH | Facility: CLINIC | Age: 33
Discharge: HOME | End: 2023-04-09
Attending: FAMILY MEDICINE
Payer: COMMERCIAL

## 2023-04-09 DIAGNOSIS — F19.20 CHEMICAL DEPENDENCY (H): ICD-10-CM

## 2023-04-09 LAB — SARS-COV-2 RNA RESP QL NAA+PROBE: NEGATIVE

## 2023-04-09 PROCEDURE — 1002N00001 HC LODGING PLUS FACILITY CHARGE ADULT

## 2023-04-09 PROCEDURE — U0003 INFECTIOUS AGENT DETECTION BY NUCLEIC ACID (DNA OR RNA); SEVERE ACUTE RESPIRATORY SYNDROME CORONAVIRUS 2 (SARS-COV-2) (CORONAVIRUS DISEASE [COVID-19]), AMPLIFIED PROBE TECHNIQUE, MAKING USE OF HIGH THROUGHPUT TECHNOLOGIES AS DESCRIBED BY CMS-2020-01-R: HCPCS

## 2023-04-09 PROCEDURE — H2035 A/D TX PROGRAM, PER HOUR: HCPCS | Mod: HQ

## 2023-04-09 NOTE — GROUP NOTE
Group Therapy Documentation    PATIENT'S NAME: Ravi Hankins  MRN:   [id number]  :   1990  ACCT. NUMBER: [id number]  DATE OF SERVICE: 23  START TIME:  9:00 AM  END TIME: 11:00 AM  FACILITATOR(S): Ileana Lopez LADC; Tyra Mcdaniel LADC  TOPIC: BEH Group Therapy  Number of patients attending the group:  22  Group Length:  2 Hours    Group Therapy Type: Recovery strategies    Summary of Group / Topics Discussed:    Recovery Principles, Spiritual Care, and Self-care activities    Patients watched a therapeutic, movie, Soul, gaining insight in how to find purpose in daily life. Following the movie, patients engaged in a discussion with [facility] facilitators, sharing how they related to the movie. Patients discussed how the theme of finding your purpose relates to their recovery goals. Each patient had an opportunity to process the information, ask questions of the facilitators, and provide constructive feedback to peers who shared.      Group Attendance:  Attended group session    Patient's response to the group topic/interactions:  cooperative with task    Patient appeared to be Actively participating, Attentive and Engaged.        Client specific details:  Patient fully participated in group process, providing appropriate feedback to peers, and sharing personal insight on the topic of finding your purpose.

## 2023-04-09 NOTE — GROUP NOTE
Group Therapy Documentation    PATIENT'S NAME: Ravi Hankins  MRN:   [id number]  :   1990  ACCT. NUMBER: [id number]  DATE OF SERVICE: 23  START TIME: 12:30 PM  END TIME:  1:30 PM  FACILITATOR(S): Ileana Lopez LADC; Tyra Mcdaniel LADC  TOPIC: BEH Group Therapy  Number of patients attending the group:  19  Group Length:  1 Hours    Group Therapy Type: Recovery strategies    Summary of Group / Topics Discussed:    Recovery Principles, Spiritual Care, and Self-care activities    Patients participated in group skills activity on gratitude. Patients gained knowledge on how gratitude practice increases positive thinking and healthy choices. Patients discussed gratitude in relation to the movie watched earlier with LADC and , or participated in a craft exercise with LADC, creating a box that can contain affirmations and gratitude statements. Patients gained insight through the discussion and craft on how gratitude can benefit their recovery and abstinence goals. Each patient had an opportunity to process and provide feedback.      Group Attendance:  Attended group session    Patient's response to the group topic/interactions:  cooperative with task    Patient appeared to be Actively participating, Attentive and Engaged.        Client specific details:  Patient fully engaged in group discussion and activity, sharing insights on the topic of gratitude.         98.1

## 2023-04-10 ENCOUNTER — HOSPITAL ENCOUNTER (OUTPATIENT)
Dept: BEHAVIORAL HEALTH | Facility: CLINIC | Age: 33
Discharge: HOME | End: 2023-04-10
Attending: FAMILY MEDICINE
Payer: COMMERCIAL

## 2023-04-10 PROCEDURE — 1002N00001 HC LODGING PLUS FACILITY CHARGE ADULT

## 2023-04-10 PROCEDURE — H2035 A/D TX PROGRAM, PER HOUR: HCPCS | Mod: HQ

## 2023-04-10 NOTE — GROUP NOTE
Group Therapy Documentation    PATIENT'S NAME: Ravi Hankins  MRN:   [id number]  :   1990  ACCT. NUMBER: [id number]  DATE OF SERVICE: 4/10/23  START TIME:  9:00 AM  END TIME: 11:00 AM  FACILITATOR(S): Dakota Martinez; Jimmy Correa LADC  TOPIC: BEH Group Therapy  Number of patients attending the group:  8  Group Length:  2 Hours    Group Therapy Type: Recovery strategies and Health and wellbeing     Summary of Group / Topics Discussed:    Recovery Principles, Sober coping skills, and Disease of addiction      Group Attendance:  Attended group session    Patient's response to the group topic/interactions:  cooperative with task, discussed personal experience with topic, expressed understanding of topic and gave appropriate feedback to peers    Patient appeared to be Actively participating, Attentive and Engaged.        Client specific details: Rodo attended AM small group therapy. Patient participated and remained engaged throughout group.

## 2023-04-10 NOTE — PROGRESS NOTES
[facility] Weekly Treatment Plan Review      Weekly Treatment Plan Review   date span: 4/3/23 to 2023  Treatment Plan initiated on: 3/23/2023.    Dimension1: Acute Intoxication/Withdrawal Potential -   Previous Dimension Ratin  Current Dimension Ratin  Date of Last Use: 3/17/2023  Any reports of withdrawal symptoms - No      Dimension 2: Biomedical Conditions & Complications -   Previous Dimension Ratin  Current Dimension Ratin  Medical Concerns:  none reported  Current Medications & Medication Changes:  Current Outpatient Medications   Medication     acetaminophen (TYLENOL) 325 MG tablet     alum & mag hydroxide-simethicone (MAALOX) 200-200-20 MG/5ML SUSP suspension     atenolol (TENORMIN) 100 MG tablet     benzocaine-menthol (CEPACOL) 15-3.6 MG lozenge     carboxymethylcellulose PF (REFRESH PLUS) 0.5 % ophthalmic solution     guaiFENesin (ROBITUSSIN) 20 mg/mL liquid     hydrOXYzine (ATARAX) 25 MG tablet     ibuprofen (ADVIL/MOTRIN) 200 MG tablet     loratadine (CLARITIN) 10 MG tablet     melatonin 3 MG tablet     multivitamin w/minerals (THERA-VIT-M) tablet     senna-docusate (SENOKOT-S/PERICOLACE) 8.6-50 MG tablet     thiamine (B-1) 100 MG tablet     traZODone (DESYREL) 50 MG tablet     No current facility-administered medications for this encounter.     Facility-Administered Medications Ordered in Other Encounters   Medication     Self Administer Medications: Behavioral Services     Medication Prescriber:  See or refer to chart  Taking meds as prescribed? Yes  Medication side effects or concerns:  denies  Outside medical appointments this week (list provider and reason for visit):  None reported    Narrative: Pt reports no medical problems this past week . Pt seems fully functioning and seeks medical attention as needed.  Pt's temperature and oxygen level are measured daily in accordance with COVID-19 precautions. They attended lecture given by LP nurse on 2023 about  STDs/Pregnancy.       Dimension 3: Emotional/Behavioral Conditions & Complications -   Previous Dimension Ratin  Current Dimension Ratin  PHQ2:       4/10/2023     1:00 PM 2023     9:00 AM 3/27/2023     1:00 PM 2023     2:16 PM 2022    12:54 PM 2021     1:51 PM 2020    11:26 AM   PHQ-2 (  [employer])   Q1: Little interest or pleasure in doing things 0 0 0 0 0 0 1   Q2: Feeling down, depressed or hopeless 0 0 0 0 1 0 1   PHQ-2 Score 0 0 0 0 1 0 2   PHQ-2 Total Score (12-17 Years)- Positive if 3 or more points; Administer PHQ-A if positive      0 2   Q1: Little interest or pleasure in doing things    Not at all Not at all    Not at all  Several days   Q2: Feeling down, depressed or hopeless    Not at all Several days    Several days  Several days   PHQ-2 Score    0 1    1  2      GAD2:       3/27/2023     1:00 PM 2023     9:00 AM 4/10/2023     1:00 PM   REZA-2   Feeling nervous, anxious, or on edge 0 1 0   Not being able to stop or control worrying 1 1 1   REZA-2 Total Score 1 2 1     Mental health diagnosis patient denies formal mental health diagnosis  Date of last SIB:  denies   Date of  last SI:  denies  Date of last HI: denies  Behavioral Targets:  to follow medical provider recommendations, report any use in the unit to primary counselors, report to nursing staff any increase of withdrawal signs/symtpoms  Risk factors:  Patient's lack of prevention skills, coping strategies and refusal skills to relapse. Patient's tendency to isolate due to grief and loss.   Protective factors:  spirituality, forward/future oriented thinking, dedication to family/friends, safe and stable environment, abstinence from substances, adherence with prescribed medication, agreement to use safety plan, living with other people, daily obligations, structured day and positive social skills  Current MH Assignments:  none due for this week    Narrative: Current Mental Health symptoms include: none  "identified. See below for suicide risk assessment. Pt does not endorse thoughts of self-harm or suicide ideation at this time. Pt's current CGI is 4/3.  They report that their stress level has decreased- coping skills to manage stress used were having \"positive energy, thinking about the future and being grateful\". Pt reported that their mood has significantly changed this past week and they are \"better mood, mind more clear\"    Fleming Suicide Severity Rating Scale (Short Version)      10/9/2019     2:52 PM 2020    10:27 PM 3/17/2023    11:55 AM 3/17/2023     5:00 PM 3/20/2023     1:00 PM   Fleming Suicide Severity Rating (Short Version)   Over the past 2 weeks have you felt down, depressed, or hopeless? no no yes     Over the past 2 weeks have you had thoughts of killing yourself? no no no     Have you ever attempted to kill yourself? no no no     Q1 Wished to be Dead (Past Month)    no no   Q2 Suicidal Thoughts (Past Month)    no no   Q3 Suicidal Thought Method    no no   Q4 Suicidal Intent without Specific Plan    no no   Q5 Suicide Intent with Specific Plan    no no   Q6 Suicide Behavior (Lifetime)    no no   Level of Risk per Screen    low risk low risk         Dimension 4: Treatment Acceptance / Resistance -   Previous Dimension Ratin  Current Dimension Ratin  JESUS Diagnosis:  Alcohol Use Disorder   303.90 (F10.20) Severe In a controlled environment  Commitment to tx process/Stage of change- contemplative  JESUS assignments - completed \"drug and alcohol progression/5v5\" assignment    Narrative - Pt reports their motivation this week are \"my self, my future and my family\". Pt reports that their aftercare plans include \"working with a sponsor and AA twice a week. They report that they have gotten along with peers and staff this week \"I love this program and I'm very thankful\". They engaged in the following recreational activities: board games, movies and working out.      Dimension 5: Relapse / " "Continued Problem Potential -   Previous Dimension Ratin  Current Dimension Ratin  Relapses this week - None  Urges to use - None  UA results - 2023 negative for all       Narrative- Pt reports no cravings this past week. Pt reports craving 1/10, ten being high. They reported not experiencing any triggers to use but used the following coping skill(s): patient reports \"honestly, I haven't had any urges\". Pt participated in the spirituality group, facilitated by Antonella Phillips and  counseling staff was present during group. They participated in weekend workshop on relapse prevention and completed all activities.     Dimension 6: Recovery Environment -   Previous Dimension Rating:  3  Current Dimension Rating:  3  Family Involvement - NA  Summarize attendance at family groups and family sessions - NA  Family supportive of treatment?  Yes  Recreational activities - board games, movies and working out.    Narrative - Pt is spending free time with same-gender peers and attending at least 3 virtual 12-step meetings weekly while in . They participated in weekend workshop on relationships and completed all activities.    Progress made on transition planning goals: Patient continues to present due assignments in group therapy, discuss and share appropriate feedbacks.     Justification for Continued Treatment at this Level of Care:  Risky behaviors prior to admittance. Patient's co-occurring issues that may have exacerbated misuse. Risk ratings indicated needing this type of level of care.  Treatment coordination activities this week:  none indicated.  Need for peer recovery support referral? No    Discharge Planning:  Target Discharge Date/Timeframe:  2023  Med Mgmt Provider/Appt:  TBNOELLE  Ind therapy Provider/Appt:  BETOD  Family therapy Provider/Appt:  TBNOELLE  Other referrals:  none at this time.    Has vulnerable adult status changed? No    Interdisciplinary Clinical Supervision including: FERNANDA and " RN    Are Treatment Plan goals/objectives effective? Yes  *If no, list changes to treatment plan:    Are the current goals meeting client's needs? Yes  *If no, list the changes to treatment plan.    Client Input / Response: Patient responded through weekly handout sheets.      *Client agrees with any changes to the treatment plan: N/A  *Client received copy of changes: N/A  *Client is aware of right to access a treatment plan review: Yes     FERNANDA Krause on 4/10/2023 at 1:39 PM

## 2023-04-11 ENCOUNTER — HOSPITAL ENCOUNTER (OUTPATIENT)
Dept: BEHAVIORAL HEALTH | Facility: CLINIC | Age: 33
Discharge: HOME | End: 2023-04-11
Attending: FAMILY MEDICINE
Payer: COMMERCIAL

## 2023-04-11 PROCEDURE — H2035 A/D TX PROGRAM, PER HOUR: HCPCS | Mod: HQ

## 2023-04-11 PROCEDURE — 1002N00001 HC LODGING PLUS FACILITY CHARGE ADULT

## 2023-04-11 NOTE — GROUP NOTE
Group Therapy Documentation    PATIENT'S NAME: Ravi Hankins  MRN:   [id number]  :   1990  ACCT. NUMBER: [id number]  DATE OF SERVICE: 4/10/23  START TIME: 12:30 PM  END TIME:  2:30 PM  FACILITATOR(S): Antonella So; Jimmy Correa LADC  TOPIC: BEH Group Therapy  Number of patients attending the group:  7  Group Length:  2 Hours    Group Therapy Type: Emotion processing, Daily living/independence skills, and Health and wellbeing     Summary of Group / Topics Discussed:    Spiritual Care      Group began with a discussion regarding the present moment, followed by a walk outside. Participants then discussed their observations.              Group Attendance:  Attended group session    Patient's response to the group topic/interactions:  cooperative with task    Patient appeared to be Attentive and Engaged.        Client specific details:  Patient actively engaged in group discussion.

## 2023-04-11 NOTE — ADDENDUM NOTE
Encounter addended by: Jimmy Correa LADC on: 4/11/2023 12:28 PM   Actions taken: Charge Capture section accepted

## 2023-04-11 NOTE — GROUP NOTE
Group Therapy Documentation    PATIENT'S NAME: Ravi Hankins  MRN:   [id number]  :   1990  ACCT. NUMBER: [id number]  DATE OF SERVICE: 23  START TIME: 12:30 PM  END TIME:  2:30 PM  FACILITATOR(S): Terry Singh LADC  TOPIC: BEH Group Therapy  Number of patients attending the group:  7  Group Length:  2 Hours    Group Therapy Type: Recovery strategies    Summary of Group / Topics Discussed:    Recovery Principles      Group Attendance:  Attended group session    Patient's response to the group topic/interactions:  cooperative with task    Patient appeared to be Actively participating.        Client specific details:  Rodo participated and interacted appropriately with peers and staff in PM group. No triggers to use noted or discussed.

## 2023-04-11 NOTE — GROUP NOTE
Group Therapy Documentation    PATIENT'S NAME: Ravi Hankins  MRN:   [id number]  :   1990  ACCT. NUMBER: [id number]  DATE OF SERVICE: 23  START TIME:  9:00 AM  END TIME: 11:00 AM  FACILITATOR(S): Jimmy Correa LADC  TOPIC: BEH Group Therapy  Number of patients attending the group:  7  Group Length:  2 Hours    Group Therapy Type: Recovery strategies, Emotion processing, and Daily living/independence skills    Summary of Group / Topics Discussed:    Recovery Principles, Sober coping skills, and Relationship/socialization    Group began check-ins, followed by a reflection reading regarding fear. Group also included the presentation of a participants assignment. Feedback was provided by participants throughout group session.       Group Attendance:  Attended group session    Patient's response to the group topic/interactions:  cooperative with task    Patient appeared to be Actively participating, Attentive and Engaged.        Client specific details:  Patient actively engaged in group discussion.

## 2023-04-11 NOTE — ADDENDUM NOTE
Encounter addended by: Jimmy Correa LADC on: 4/11/2023 12:06 PM   Actions taken: Clinical Note Signed

## 2023-04-11 NOTE — GROUP NOTE
Group Therapy Documentation    PATIENT'S NAME: Ravi Hankins  MRN:   [id number]  :   1990  ACCT. NUMBER: [id number]  DATE OF SERVICE: 23  START TIME:  3:00 PM  END TIME:  4:00 PM  FACILITATOR(S): Layne Pate LADC  TOPIC: BEH Group Therapy  Number of patients attending the group:  17    Group Length:  1 Hour    Group Therapy Type: Recovery strategies, Daily living/independence skills, and Health and wellbeing     Summary of Group / Topics Discussed:    Recovery Principles and Relationship/socialization      Group Attendance:  Attended group session    Patient's response to the group topic/interactions:  cooperative with task    Patient appeared to be Actively participating, Attentive and Engaged.        Client specific details:  Patient attended afternoon skills group and was attentive and participative.

## 2023-04-11 NOTE — GROUP NOTE
Group Therapy Documentation    PATIENT'S NAME: Ravi Hankins  MRN:   [id number]  :   1990  ACCT. NUMBER: [id number]  DATE OF SERVICE: 23  START TIME:  9:00 AM  END TIME: 11:00 AM  FACILITATOR(S): Jimmy Correa LADC; Antonella So  TOPIC: BEH Group Therapy  Number of patients attending the group:  7  Group Length:  2 Hours    Group Therapy Type: Spirituality    Summary of Group / Topics Discussed:    Mindfulness Skills    Group began with a discussion regarding the present moment, followed by a walk outside. Participants then discussed their observations.              Group Attendance:  {Group Attendance:967877}    Patient's response to the group topic/interactions:  {OPBEHCLIENTRESPONSE:340650}    Patient appeared to be {Engagement:[id number]}.        Client specific details:  ***.

## 2023-04-12 ENCOUNTER — HOSPITAL ENCOUNTER (OUTPATIENT)
Dept: BEHAVIORAL HEALTH | Facility: CLINIC | Age: 33
Discharge: HOME | End: 2023-04-12
Attending: FAMILY MEDICINE
Payer: COMMERCIAL

## 2023-04-12 DIAGNOSIS — F19.20 CHEMICAL DEPENDENCY (H): ICD-10-CM

## 2023-04-12 LAB — SARS-COV-2 RNA RESP QL NAA+PROBE: NEGATIVE

## 2023-04-12 PROCEDURE — H2035 A/D TX PROGRAM, PER HOUR: HCPCS | Mod: HQ

## 2023-04-12 PROCEDURE — U0003 INFECTIOUS AGENT DETECTION BY NUCLEIC ACID (DNA OR RNA); SEVERE ACUTE RESPIRATORY SYNDROME CORONAVIRUS 2 (SARS-COV-2) (CORONAVIRUS DISEASE [COVID-19]), AMPLIFIED PROBE TECHNIQUE, MAKING USE OF HIGH THROUGHPUT TECHNOLOGIES AS DESCRIBED BY CMS-2020-01-R: HCPCS

## 2023-04-12 PROCEDURE — 1002N00001 HC LODGING PLUS FACILITY CHARGE ADULT

## 2023-04-12 NOTE — GROUP NOTE
Group Therapy Documentation    PATIENT'S NAME: Ravi Hankins  MRN:   [id number]  :   1990  ACCT. NUMBER: [id number]  DATE OF SERVICE: 23  START TIME: 12:30 PM  END TIME:  2:30 PM  FACILITATOR(S): Terry Singh LADC  TOPIC: BEH Group Therapy  Number of patients attending the group:  7  Group Length:  2 Hours    Group Therapy Type: Health and wellbeing     Summary of Group / Topics Discussed:    Recovery Principles      Group Attendance:  Attended group session    Patient's response to the group topic/interactions:  cooperative with task    Patient appeared to be Actively participating.        Client specific details:  Rodo participated and interacted appropriately with peers and staff in PM group. No triggers to use noted or discussed.

## 2023-04-12 NOTE — GROUP NOTE
Group Therapy Documentation    PATIENT'S NAME: Ravi Hankins  MRN:   [id number]  :   1990  ACCT. NUMBER: [id number]  DATE OF SERVICE: 23  START TIME:  9:45 AM  END TIME: 11:30 AM  FACILITATOR(S): Reshma Woodall LADC  TOPIC: BEH Group Therapy  Number of patients attending the group:  7  Group Length:  1.75 hrs    Group Therapy Type: Emotion processing    Summary of Group / Topics Discussed:    Relationship/socialization and Emotions/expression      Group Attendance:  Attended group session    Patient's response to the group topic/interactions:  cooperative with task    Patient appeared to be Engaged.        Client specific details:  Ravi was attentive and shared appropriate feedback.

## 2023-04-12 NOTE — GROUP NOTE
Psychoeducation Group Documentation    PATIENT'S NAME: Ravi Hankins  MRN:   [id number]  :   1990  ACCT. NUMBER: [id number]  DATE OF SERVICE: 23  START TIME:  8:30 AM  END TIME:  9:30 AM  FACILITATOR(S): Toro Heredia LADC; Terry Singh LADC; Ileana Lopez LADC  TOPIC: BEH Pyschoeducation  Number of patients attending the group:  21  Group Length:  1 Hours    Skills Group Therapy Type: Recovery skills and Relationship skills development    Summary of Group / Topics Discussed:    Relationship/social skills and Relapse prevention skills          Group Attendance:  Attended group session    Patient's response to the group topic/interactions:  cooperative with task    Patient appeared to be Attentive.         Client specific details:  Rodo gave appropriate feedback..

## 2023-04-13 ENCOUNTER — HOSPITAL ENCOUNTER (OUTPATIENT)
Dept: BEHAVIORAL HEALTH | Facility: CLINIC | Age: 33
Discharge: HOME | End: 2023-04-13
Attending: FAMILY MEDICINE
Payer: COMMERCIAL

## 2023-04-13 PROCEDURE — H2035 A/D TX PROGRAM, PER HOUR: HCPCS | Mod: HQ

## 2023-04-13 PROCEDURE — 1002N00001 HC LODGING PLUS FACILITY CHARGE ADULT

## 2023-04-13 NOTE — GROUP NOTE
"Group Therapy Documentation    PATIENT'S NAME: aRvi Hankins  MRN:   [id number]  :   1990  ACCT. NUMBER: [id number]  DATE OF SERVICE: 23  START TIME:  9:00 AM  END TIME: 11:00 AM  FACILITATOR(S): Jimmy Correa LADC  TOPIC: BEH Group Therapy  Number of patients attending the group:  7  Group Length:  2 Hours    Group Therapy Type: Recovery strategies, Emotion processing, Daily living/independence skills, and Health and wellbeing     Summary of Group / Topics Discussed:    Sober coping skills, Relationship/socialization, Balanced lifestyle, Emotions/expression, and Self-care activities    Group began with check-ins, followed by a reflection reading regarding nostalgia. A participant then shared a \"Use History\" assignment, followed by a discussion regarding boundaries. Feedback was provided by participants throughout group session.       Group Attendance:  Attended group session    Patient's response to the group topic/interactions:  cooperative with task    Patient appeared to be Actively participating, Attentive and Engaged.        Client specific details:  Patient actively engaged in group discussion.      "

## 2023-04-13 NOTE — GROUP NOTE
Psychoeducation Group Documentation    PATIENT'S NAME: Ravi Hankins  MRN:   [id number]  :   1990  ACCT. NUMBER: [id number]  DATE OF SERVICE: 23  START TIME:  3:00 PM  END TIME:  4:00 PM  FACILITATOR(S): Reshma Woodall LADC; Layne Pate LADC; Lefty Peraza LADC  TOPIC: BEH Pyschoeducation  Number of patients attending the group:  19  Group Length:  1 Hours    Skills Group Therapy Type: Medication education    Summary of Group / Topics Discussed:    Medication management skills  Skills Psychoeducational group by Dr. Stu Platt.          Group Attendance:  Attended group session    Patient's response to the group topic/interactions:  cooperative with task    Patient appeared to be Engaged.         Client specific details:  Patient actively listening and shared appropriate feedbacks.

## 2023-04-13 NOTE — GROUP NOTE
Group Therapy Documentation    PATIENT'S NAME: Ravi Hankins  MRN:   [id number]  :   1990  ACCT. NUMBER: [id number]  DATE OF SERVICE: 23  START TIME: 12:30 PM  END TIME:  2:30 PM  FACILITATOR(S): Dakota Martinez; Terry Singh LADC  TOPIC: BEH Group Therapy  Number of patients attending the group:  7  Group Length:  2 Hours    Group Therapy Type: Health and wellbeing     Summary of Group / Topics Discussed:    Leisure explorations/use of leisure time and Self-care activities      Group Attendance:  Attended group session    Patient's response to the group topic/interactions:  cooperative with task, expressed understanding of topic and gave appropriate feedback to peers    Patient appeared to be Actively participating, Attentive and Engaged.        Client specific details:  Rodo attended PM group therapy. Patient engaged in discussion and participated in sharing feedback to his peers.

## 2023-04-14 ENCOUNTER — HOSPITAL ENCOUNTER (OUTPATIENT)
Dept: BEHAVIORAL HEALTH | Facility: CLINIC | Age: 33
Discharge: HOME | End: 2023-04-14
Attending: FAMILY MEDICINE
Payer: COMMERCIAL

## 2023-04-14 DIAGNOSIS — F19.20 CHEMICAL DEPENDENCY (H): ICD-10-CM

## 2023-04-14 LAB — SARS-COV-2 RNA RESP QL NAA+PROBE: NEGATIVE

## 2023-04-14 PROCEDURE — H2035 A/D TX PROGRAM, PER HOUR: HCPCS | Mod: HQ

## 2023-04-14 PROCEDURE — 1002N00001 HC LODGING PLUS FACILITY CHARGE ADULT

## 2023-04-14 PROCEDURE — U0003 INFECTIOUS AGENT DETECTION BY NUCLEIC ACID (DNA OR RNA); SEVERE ACUTE RESPIRATORY SYNDROME CORONAVIRUS 2 (SARS-COV-2) (CORONAVIRUS DISEASE [COVID-19]), AMPLIFIED PROBE TECHNIQUE, MAKING USE OF HIGH THROUGHPUT TECHNOLOGIES AS DESCRIBED BY CMS-2020-01-R: HCPCS

## 2023-04-14 NOTE — PROGRESS NOTES
[address]  [facility] [place] [address]              Ravi Hankins, 1990 : was admitted for evaluation/treatment of chemical dependency at [facility].  He took part in these program(s):     ___X___ The Inpatient Program   ______ The Outpatient Program   ___X___ The Lodging Plus Program   ______ Lodging Day Outpatient         Date admitted: 3/20/2023    Date discharged: 4/17/2023     Type of discharge:     ___X___ Satisfactory - completed evaluation / treatment   ______ Discharged without completing   ______ Behavioral discharge   ______ Transferred to another chemical dependency program   ______ Transferred to another type of service   ______ Left against medical advice (AMA) / Eloped               Clinicians: FERNANDA Mcclelland & FERNANDA Kent     Date: 4/17/2023          Time: 7:00am

## 2023-04-14 NOTE — PROGRESS NOTES
Nursing Discharge Planning Meeting    Writer completed discharge planning meeting with patient. Discharge is planned for April 17, 2023    Discussed appropriate follow up care to manage substance-related issues, and to obtain medication refills. Patient given a copy of their current medications for reference. Questions were answered at this time and the patient verbalized an understanding of the post-discharge follow up plan.    Patient will f/u with PCP as recommended:  Patient states that he will follow up for all post-discharge concerns with his primary provider.    Continue to support patient in discharge planning as needed to assure appropriate continuity of care.     Tobacco Cessation  Patient participated in the nicotine replacement therapy for tobacco cessation or reduction during their treatment programming: Yes    The patient was provided with community resources for follow-up to continue tobacco cessation support once in the community. Also the patient was encouraged to discuss their tobacco cessation efforts with the primary care provider.

## 2023-04-14 NOTE — GROUP NOTE
"Group Therapy Documentation    PATIENT'S NAME: Ravi Hankins  MRN:   [id number]  :   1990  ACCT. NUMBER: [id number]  DATE OF SERVICE: 23  START TIME:  9:00 AM  END TIME: 11:00 AM  FACILITATOR(S): Jimmy Correa LADC; Dakota Martinez  TOPIC: BEH Group Therapy  Number of patients attending the group:  7  Group Length:  2 Hours    Group Therapy Type: Recovery strategies, Emotion processing, Daily living/independence skills, and Health and wellbeing     Summary of Group / Topics Discussed:    Relationship/socialization, Balanced lifestyle, Emotions/expression, Leisure explorations/use of leisure time, and Self-care activities    Group began with check-ins, followed by reflection reading regarding humor. A participant then shared his \"Use History\" assignment, followed by a discussion regarding self-care.       Group Attendance:  Attended group session    Patient's response to the group topic/interactions:  cooperative with task    Patient appeared to be Actively participating, Attentive and Engaged.        Client specific details:  Patient actively engaged in group discussion.      "

## 2023-04-14 NOTE — GROUP NOTE
"Group Therapy Documentation    PATIENT'S NAME: Ravi Hankins  MRN:   [id number]  :   1990  ACCT. NUMBER: [id number]  DATE OF SERVICE: 23  START TIME: 12:30 PM  END TIME:  2:30 PM  FACILITATOR(S): Dakota Martinez; Jimmy Correa LADC  TOPIC: BEH Group Therapy  Number of patients attending the group:  7    Group Length:  2 Hours    Group Therapy Type: Recovery strategies, Emotion processing, and Health and wellbeing     Summary of Group / Topics Discussed:    Recovery Principles and Leisure explorations/use of leisure time      Group Attendance:  Attended group session    Patient's response to the group topic/interactions:  cooperative with task, discussed personal experience with topic and expressed understanding of topic    Patient appeared to be Actively participating, Attentive and Engaged.        Client specific details:  Rodo participated in therapeutic recreation, watched a recovery-based movie, \"Warrior\" and engaged in discussion afterwards.      "

## 2023-04-15 ENCOUNTER — HOSPITAL ENCOUNTER (OUTPATIENT)
Dept: BEHAVIORAL HEALTH | Facility: CLINIC | Age: 33
Discharge: HOME | End: 2023-04-15
Attending: FAMILY MEDICINE
Payer: COMMERCIAL

## 2023-04-15 PROCEDURE — 1002N00001 HC LODGING PLUS FACILITY CHARGE ADULT

## 2023-04-15 PROCEDURE — H2035 A/D TX PROGRAM, PER HOUR: HCPCS | Mod: HQ

## 2023-04-15 NOTE — GROUP NOTE
Group Therapy Documentation    PATIENT'S NAME: Ravi Hankins  MRN:   [id number]  :   1990  ACCT. NUMBER: [id number]  DATE OF SERVICE: 4/15/23  START TIME:  9:00 AM  END TIME: 11:00 AM  FACILITATOR(S): Zuhair Do LADC  TOPIC: BEH Group Therapy  Number of patients attending the group:  8  Group Length:  2 Hours    Group Therapy Type: Recovery strategies    Summary of Group / Topics Discussed:    Recovery Principles, Relationship/socialization, Disease of addiction, Emotions/expression, and Relapse prevention      Group Attendance:  Attended group session    Patient's response to the group topic/interactions:  cooperative with task    Patient appeared to be Attentive and Engaged.        Client specific details:  The patient participated in the morning lecture on family and boundaries.

## 2023-04-16 ENCOUNTER — HOSPITAL ENCOUNTER (OUTPATIENT)
Dept: BEHAVIORAL HEALTH | Facility: CLINIC | Age: 33
Discharge: HOME | End: 2023-04-16
Attending: FAMILY MEDICINE
Payer: COMMERCIAL

## 2023-04-16 PROCEDURE — 1002N00001 HC LODGING PLUS FACILITY CHARGE ADULT

## 2023-04-16 PROCEDURE — H2035 A/D TX PROGRAM, PER HOUR: HCPCS | Mod: HQ

## 2023-04-16 NOTE — GROUP NOTE
Psychoeducation Group Documentation    PATIENT'S NAME: Ravi Hankins  MRN:   [id number]  :   1990  ACCT. NUMBER: [id number]  DATE OF SERVICE: 23  START TIME: 12:30 PM  END TIME:  1:30 PM  FACILITATOR(S): Zuhair Do LADC; Lefty Peraza LADC  TOPIC: BEH Pyschoeducation  Number of patients attending the group:  8  Group Length:  1 Hours    Skills Group Therapy Type: Recovery skills and Relationship skills development    Summary of Group / Topics Discussed:    Relationship/social skills          Group Attendance:  Attended group session    Patient's response to the group topic/interactions:  cooperative with task    Patient appeared to be Attentive and Engaged.         Client specific details:  The patient participated in the afternoon lecture on Relationships.

## 2023-04-16 NOTE — GROUP NOTE
Psychoeducation Group Documentation    PATIENT'S NAME: Ravi Hankins  MRN:   [id number]  :   1990  ACCT. NUMBER: [id number]  DATE OF SERVICE: 23  START TIME:  9:00 AM  END TIME: 11:00 AM  FACILITATOR(S): Tommy Velazquez RN; Lefty Peraza LADC; Zuhair Do LADC  TOPIC: BEH Pyschoeducation  Number of patients attending the group: 8  Group Length:  2 Hours    Skills Group Therapy Type: Healthy behaviors development    Summary of Group / Topics Discussed:    Lecture presented by Tommy nursing staff on HIV and AIDS           Group Attendance:  Attended group session    Patient's response to the group topic/interactions:  cooperative with task    Patient appeared to be Engaged.         Client specific details:  hari Rodrigues Lecture presented by: Nursing staff on HIV-Aids, and prevention and treatments. Patient engaged in Q&A after the lecture was presented.

## 2023-04-17 NOTE — ADDENDUM NOTE
Encounter addended by: Terry Singh LADC on: 4/17/2023 9:20 AM   Actions taken: Charge Capture section accepted

## 2023-04-17 NOTE — DISCHARGE SUMMARY
"      [facility]  Adult Substance Use Disorder Program  Discharge Summary         PATIENT  Ravi Hankins   DATE OF BIRTH 1990   MRN [id number]   EVALUATION COUNSELOR Anahi Celaya, [facility]   PROGRAM [facility]   TREATMENT COUNSELOR Jimmy Correa, [facility] & Terry Singh, [facility]   REFERRAL SOURCE [facility] 3A Detox Unit   ADMIT DATE 3/20/2023   DATE OF LAST SESSION 2023   DISCHARGE DATE 2023   DISCHARGE STATUS Successful Completion - 28 Days     SUBSTANCE USE DISORDER DIAGNOSIS: Alcohol Use Disorder, Severe F10.20 (303.90)    LAST USE DATE: 3/17/2023    READMITTANCE INFORMATION: If patient wishes to readmit to [facility], they are required to wait a minimum of 30 days from discharge date.     SERVICES PROVIDED:  Our services included assessment, treatment planning and education regarding chemical dependency, mental illness, relationships, and relapse prevention. The patient also participated in individual therapy, group therapy, recovery oriented workshops, spiritual care counseling, recovery skills training and aftercare planning.    PRESENTING INFORMATION:    Patient reported his primary substance of use was alcohol, and use last on 3/17/2023. Patient was admitted to detox and completed withdrawal management prior to admission at [facility]. Patient denied having any physical health concerns upon admission. Patient denied having any prior mental health diagnosis. Patient reported that his initial motivation for treatment is \"I want to live.\" Patient did not specify any triggers for use. Patient reported having a supportive family and living in a stable environment prior to admission. Patient reported that completing treatment is condition of probation.       ISSUES ADDRESS IN TREATMENT:      DIMENSION 1 - ACUTE INTOXICATION/WITHDRAWAL POTENTIAL  ADMISSION RISK RATIN  DISCHARGE RISK RATIN  [facility] Patient reported alcohol as his primary substance of use. Patient reported last date of use as 3/17/2023. Patient was  A direct transfer from [address] Detox Unit. Throughout treatment patient denied any withdrawal symptoms that would interfere with full participation in treatment programming.     DIMENSION 2 - BIOMEDICAL COMPLICATIONS AND CONDITIONS  ADMISSION RISK RATIN  DISCHARGE RISK RATIN  Upon admissions patient denied any medical concerns that would interfere with full participation in treatment programming. Patient reported having a PCP. Patient maintained medication compliance throughout treatment. Upon discharge patient appeared able to access medical aid as needed.     DIMENSION 3 - EMOTIONAL, BEHAVIORAL, COGNITIVE CONDITIONS AND COMPLICATIONS  ADMISSION RISK RATIN  DISCHARGE RISK RATIN  Upon admissions patient denied having any history of mental health diagnosis. Patient developed treatment plan goals addressing shame/guilt, grief/loss, resentments, coping skills, communication, and  insight on self sabotage. Patient completed assignments and presented them in group to staff/peers. Patient was open to hearing feedback from peers regarding his feelings and experiences that were shared. Patient gained significant insight on his use and how it was correlated to his mental health instability. Upon discharge, Patient was rated as \"Low Risk\" for suicide. Upon discharge patient denied any suicidal thoughts or ideations.     DIMENSION 4 - READINESS FOR CHANGE  ADMISSION RISK RATIN  DISCHARGE RISK RATIN  Upon admission, patient reported that his motivation to be sober was, \"Getting healthy and understanding the reasons on why I drink. Also to further process my childhood and the family deaths I have experienced.\" Patient attended groups as assigned. He remained engaged in groups, and shared experiences that were relevant to topic. " Patient engaged in discussions of the consequences of his use, and how its effected his own life and loved ones around him. He completed treatment plan assignments. Patient appeared to be in the contemplation stage of change at the time of discharge.     DIMENSION 5 - RELAPSE, CONTINUED USE AND CONTINUED PROBLEM POTENTIAL   ADMISSION RISK RATIN  DISCHARGE RISK RATIN  Upon admission, patient reported attending five treatments prior to [facility]. Patient reported struggling for a long time with attempting to stop use or control it, but was unsuccessful. Patient reported due to continued use he has experienced many areas of her life being effected, such as family, work, relationships, and employment. Patient appears to have gained insight his use pattern and triggers for relapse, as evidenced by his participation and completion of program expectations.     DIMENSION 6 - RECOVERY ENVIRONMENT  ADMISSION RISK RATING: 3  DISCHARGE RISK RATIN  Upon admission, patient reported plans to return home after treatment. He met with his counselor and arranged to attend OP programming at [facility]. Patient attended weekly AA/NA meetings while in reatment. Patient was able to spend time with same gender peers, and engage in sober activities, and create routines with support network while in programming. Patient reported having plans to continue engagement with AA/NA meetings. Patient maintained contact with probation throughout the time in treatment. Patient plans to seek work when transitioned and stable.      STRENGTHS:  Patient denied to specify.  Patient also maintained a positive attitude while in treatment. Patient was an active participant in group therapy and was always open for feedback from his counselors and peers. Patient appears motivated for recovery at this time and willing to incorporate positive changes into his life.     LIVING ARRANGEMENTS AT DISCHARGE: Patient reported having having plans  to return home, living with his significant other at the time of discharge.     PROGNOSIS:  Prognosis for this patient is fair at this time.       CONTINUING CARE RECOMMENDATIONS AND REFERRALS:  1.  Abstain from all mood-altering chemicals unless prescribed by a licensed medical provider, and take all medications as prescribed.  2.  Attend a minimum of three AA/NA/Sober support groups weekly in the community.  3.  Obtain/Maintain regular contact with your sponsor.    4.  Admit into Outpatient Treatment and follow all recommendations.  5.  Continue to invest in building a sober support network.  6.  Continue to monitor and understand relapse triggers and stressors through the use and development of healthy coping skills.  7.  Obtain a mental health therapist and attend all scheduled programming  8. Attend all scheduled medical appointments.  9. Take medication as prescribed   10. Remain in contact with probation, follow all recommendations ( If applies)       DISCHARGE COMPLETED BY:        Jim[facility] on 4/17/2023 at 10:36 AM      This information has been disclosed to you from records protected by Federal confidentiality rules (42 CFR part 2). The Federal rules prohibit you from making any further disclosure of this information unless further disclosure is expressly permitted by the written consent of the person to whom it pertains or as otherwise permitted by 42 CFR part 2. A general authorization for the release of medical or other information is NOT sufficient for this purpose. The Federal rules restrict any use of the information to criminally investigate or prosecute any alcohol or drug abuse patient.

## 2023-04-17 NOTE — ADDENDUM NOTE
Encounter addended by: Jimmy Correa LADC on: 4/17/2023 4:53 PM   Actions taken: Clinical Note Signed

## 2023-04-17 NOTE — GROUP NOTE
Group Therapy Documentation    PATIENT'S NAME: Ravi Hankins  MRN:   [id number]  :   1990  ACCT. NUMBER: [id number]  DATE OF SERVICE: 4/15/23  START TIME: 12:30 PM  END TIME:  2:30 PM  FACILITATOR(S): Terry Singh LADC  TOPIC: BEH Group Therapy  Number of patients attending the group:  8  Group Length:  2 Hours    Group Therapy Type: Daily living/independence skills    Summary of Group / Topics Discussed:    Relationship/socialization      Group Attendance:  Attended group session    Patient's response to the group topic/interactions:  cooperative with task    Patient appeared to be Actively participating.        Client specific details:  Rodo participated and interacted appropriately with peers and staff in PM group. No triggers to use noted or discussed.

## 2023-05-15 DIAGNOSIS — F10.20 ALCOHOL DEPENDENCE, CONTINUOUS (H): ICD-10-CM

## 2023-05-15 RX ORDER — ATENOLOL 100 MG/1
100 TABLET ORAL DAILY
Qty: 90 TABLET | Refills: 0 | Status: SHIPPED | OUTPATIENT
Start: 2023-05-15 | End: 2023-08-16

## 2023-05-15 NOTE — TELEPHONE ENCOUNTER
Prescription approved per [facility] Refill Protocol.      Jose Roberto JIMENEZ RN   [facility]

## 2023-06-07 ENCOUNTER — ANCILLARY PROCEDURE (OUTPATIENT)
Dept: GENERAL RADIOLOGY | Facility: CLINIC | Age: 33
End: 2023-06-07
Attending: PREVENTIVE MEDICINE
Payer: COMMERCIAL

## 2023-06-07 ENCOUNTER — OFFICE VISIT (OUTPATIENT)
Dept: FAMILY MEDICINE | Facility: CLINIC | Age: 33
End: 2023-06-07
Payer: COMMERCIAL

## 2023-06-07 VITALS
SYSTOLIC BLOOD PRESSURE: 106 MMHG | BODY MASS INDEX: 22.24 KG/M2 | OXYGEN SATURATION: 98 % | HEART RATE: 57 BPM | WEIGHT: 167.8 LBS | HEIGHT: 73 IN | TEMPERATURE: 97.8 F | RESPIRATION RATE: 16 BRPM | DIASTOLIC BLOOD PRESSURE: 73 MMHG

## 2023-06-07 DIAGNOSIS — M77.8 TENDINITIS OF LEFT SHOULDER: ICD-10-CM

## 2023-06-07 DIAGNOSIS — M77.8 TENDINITIS OF LEFT SHOULDER: Primary | ICD-10-CM

## 2023-06-07 DIAGNOSIS — F10.20 ALCOHOL DEPENDENCE, UNCOMPLICATED (H): ICD-10-CM

## 2023-06-07 PROCEDURE — 73030 X-RAY EXAM OF SHOULDER: CPT | Mod: TC | Performed by: RADIOLOGY

## 2023-06-07 PROCEDURE — 99213 OFFICE O/P EST LOW 20 MIN: CPT | Performed by: PREVENTIVE MEDICINE

## 2023-06-07 ASSESSMENT — PAIN SCALES - GENERAL: PAINLEVEL: MODERATE PAIN (4)

## 2023-06-07 NOTE — LETTER
June 7, 2023      Ravi Hankins  [address] [address]        Dear ,    We are writing to inform you of your test results.    X-rays are not showing any bony abnormalities.  No fractures, dislocations, or arthritis seen.  Plan of care and follow-up as discussed in clinic.     Resulted Orders   XR Shoulder Left G/E 3 Views    Narrative    XR SHOULDER LEFT G/E 3 VIEWS 6/7/2023 9:51 AM    HISTORY: Tendinitis of left shoulder    COMPARISON: None.      Impression    IMPRESSION: Negative left shoulder. No fracture. No degenerative  changes.    NANCY CINTRON MD         SYSTEM ID:  [id number]       If you have any questions or concerns, please call the clinic at the number listed above.       Sincerely,      Denise Hopkins MD

## 2023-06-07 NOTE — RESULT ENCOUNTER NOTE
Please send a letter:    Dear Ravi Hankins,    X-rays are not showing any bony abnormalities.  No fractures, dislocations, or arthritis seen.  Plan of care and follow-up as discussed in clinic.    Regards,    Denise Hopkins MD MPH

## 2023-06-07 NOTE — PROGRESS NOTES
Assessment & Plan     Tendinitis of left shoulder  -Await results of imaging  -Physical therapy referral provided  -Rest, alternating ice and heat  - Physical Therapy Referral  - diclofenac (VOLTAREN) 50 MG EC tablet  Dispense: 20 tablet; Refill: 0, GI side effects of medication reviewed  -If symptoms are not better in 4 to 6 weeks, consider advanced imaging and referral to sports medicine  - XR Shoulder Left G/E 3 Views    Alcohol dependence, uncomplicated (H)  -Per PCP      Ordering of each unique test  Prescription drug management  15 minutes spent by me on the date of the encounter doing chart review, history and exam, documentation and further activities per the note        Denise Hopkins MD MPH    [facility] ANANYA Rodrigues is a 33 year old, presenting for the following health issues:  Shoulder Pain        6/7/2023     9:15 AM   Additional Questions   Roomed by Haley   Accompanied by Esperanza Spouse     Shoulder Pain    History of Present Illness       Reason for visit:  Shoulder  Symptom onset:  More than a month  Symptoms include:  Pain  Symptom intensity:  Severe  Symptom progression:  Staying the same    He eats 2-3 servings of fruits and vegetables daily.He consumes 2 sweetened beverage(s) daily.He exercises with enough effort to increase his heart rate 20 to 29 minutes per day.  He exercises with enough effort to increase his heart rate 3 or less days per week.   He is taking medications regularly.     Left shoulder  Injury about 2 years ago  Referees basketball  More pain with lifting+  Sleeping on that side hurts  Does not prevent sleep  No focal weakness  No focal numbness  Some clicking  No surgeries  No injections      Review of Systems   Constitutional, HEENT, cardiovascular, pulmonary, gi and gu systems are negative, except as otherwise noted.      Objective    /73 (BP Location: Left arm, Patient Position: Sitting, Cuff Size: Adult Regular)   Pulse 57  "  Temp 97.8  F (36.6  C) (Oral)   Resp 16   Ht 1.842 m (6' 0.5\")   Wt 76.1 kg (167 lb 12.8 oz)   SpO2 98%   BMI 22.44 kg/m    Body mass index is 22.44 kg/m .  Physical Exam   GENERAL APPEARANCE: healthy, alert and no distress  EYES: Eyes grossly normal to inspection and conjunctivae and sclerae normal  RESP: lungs clear to auscultation - no rales, rhonchi or wheezes  CV: regular rates and rhythm, normal S1 S2, no S3 or S4 and no murmur, click or rub  MS: extremities normal- no gross deformities noted and peripheral pulses normal  SKIN: no suspicious lesions or rashes  NEURO: Normal strength and tone, mentation intact and speech normal  PSYCH: mentation appears normal  Left shoulder:  Inspection:No evidence of gross abnormalities or deformities. No swelling of AC joint, erythema, rash, asymmetry or atrophy of supraspinatus, infraspinatus or deltoid muscles.  Palpation:Some tenderness over acromioclavicular joint, and bicipital groove+, none overlesser and greater tuberosities of the humerus.  No tenderness over spine of scapula, supraspinatus, infraspinatus and deltoid muscle.    Range of motion: Forward flexion to 180 degrees, Extension about 40 degrees,  Abduction intact, able to lift arms in a smooth painless arc and adduction is intact, External rotation about 45 degrees and intact internal rotation. Apley scratch test is normal.  Strength: 5/5 in flexion and extension, 5/5 in external rotation (infraspinatus), 5/5 in internal rotation (subscapularis), Empty can test normal (Supraspinatus), Lift off test normal (Subscapularis)  Special tests  Scarf test for AC joint negative   Drop arm test for supraspinatus normal  Hawkin s test for impingement Positive           "

## 2023-06-07 NOTE — PATIENT INSTRUCTIONS
At [facility], we strive to deliver an exceptional experience to you, every time we see you. If you receive a survey, please complete it as we do value your feedback.  If you have MyChart, you can expect to receive results automatically within 24 hours of their completion.  Your provider will send a note interpreting your results as well.   If you do not have MyChart, you should receive your results in about a week by mail.    Your care team:                            Family Medicine Internal Medicine   MD Mark Romano MD Shantel Branch-Fleming, MD Srinivasa Vaka, MD Katya Belousova, PAJAIRO Díaz CNP, MD ([facility]   Rodo Dumas, MD Shauna Castillo MD Amelia Massimini APRN JERRICA Mauro APRN MD Mj Bateman MD          Clinic hours: Monday - Thursday 7 am-6 pm; Fridays 7 am-5 pm.   Urgent care: Monday - Friday 10 am- 8 pm; Saturday and Sunday 9 am-5 pm.    Clinic: [phone]       [facility]: Monday - Thursday 8 am - 7 pm; Friday 8 am - 6 pm  [facility]: [phone]

## 2023-08-16 DIAGNOSIS — F10.20 ALCOHOL DEPENDENCE, CONTINUOUS (H): ICD-10-CM

## 2023-08-16 RX ORDER — ATENOLOL 100 MG/1
100 TABLET ORAL DAILY
Qty: 90 TABLET | Refills: 2 | Status: SHIPPED | OUTPATIENT
Start: 2023-08-16 | End: 2024-02-22

## 2023-08-16 NOTE — TELEPHONE ENCOUNTER
"Last Written Prescription Date:  5/15/23  Last Fill Quantity: 90,  # refills: 0   Last office visit provider:  6/7/23     Requested Prescriptions   Pending Prescriptions Disp Refills    atenolol (TENORMIN) 100 MG tablet 90 tablet 0     Sig: Take 1 tablet (100 mg) by mouth daily       Beta-Blockers Protocol Passed - 8/16/2023 11:42 AM        Passed - Blood pressure under 140/90 in past 12 months     BP Readings from Last 3 Encounters:   06/07/23 106/73   01/09/23 107/72   06/22/22 130/87                 Passed - Patient is age 6 or older        Passed - Recent (12 mo) or future (30 days) visit within the authorizing provider's specialty     Patient has had an office visit with the authorizing provider or a provider within the authorizing providers department within the previous 12 mos or has a future within next 30 days. See \"Patient Info\" tab in inbasket, or \"Choose Columns\" in Meds & Orders section of the refill encounter.              Passed - Medication is active on med list             Steffanie Anderson RN 08/16/23 6:58 PM  "

## 2023-08-17 NOTE — TELEPHONE ENCOUNTER
Pt called back regarding Medication refill request. States he is completely out.   Rx refill approved per Refill protocol.    Kirstie Anderson RN, BSN  8/16/2023 at 7:11 PM  [facility]

## 2024-02-22 ENCOUNTER — OFFICE VISIT (OUTPATIENT)
Dept: FAMILY MEDICINE | Facility: CLINIC | Age: 34
End: 2024-02-22
Payer: COMMERCIAL

## 2024-02-22 VITALS
DIASTOLIC BLOOD PRESSURE: 58 MMHG | OXYGEN SATURATION: 96 % | RESPIRATION RATE: 16 BRPM | TEMPERATURE: 97.9 F | HEIGHT: 72 IN | SYSTOLIC BLOOD PRESSURE: 94 MMHG | BODY MASS INDEX: 21.55 KG/M2 | HEART RATE: 58 BPM | WEIGHT: 159.1 LBS

## 2024-02-22 DIAGNOSIS — F39 MOOD DISORDER (H): ICD-10-CM

## 2024-02-22 DIAGNOSIS — Z13.220 SCREENING FOR LIPID DISORDERS: ICD-10-CM

## 2024-02-22 DIAGNOSIS — Z13.1 SCREENING FOR DIABETES MELLITUS: ICD-10-CM

## 2024-02-22 DIAGNOSIS — Z00.00 ROUTINE GENERAL MEDICAL EXAMINATION AT A HEALTH CARE FACILITY: Primary | ICD-10-CM

## 2024-02-22 DIAGNOSIS — F12.20 CANNABIS USE DISORDER, SEVERE, DEPENDENCE (H): ICD-10-CM

## 2024-02-22 DIAGNOSIS — I73.00 RAYNAUD'S PHENOMENON WITHOUT GANGRENE: ICD-10-CM

## 2024-02-22 DIAGNOSIS — F10.20 ALCOHOL DEPENDENCE, CONTINUOUS (H): ICD-10-CM

## 2024-02-22 DIAGNOSIS — I77.9 DISORDER OF AORTA (H): ICD-10-CM

## 2024-02-22 LAB
ALBUMIN SERPL BCG-MCNC: 4.4 G/DL (ref 3.5–5.2)
ALP SERPL-CCNC: 47 U/L (ref 40–150)
ALT SERPL W P-5'-P-CCNC: 12 U/L (ref 0–70)
ANION GAP SERPL CALCULATED.3IONS-SCNC: 9 MMOL/L (ref 7–15)
AST SERPL W P-5'-P-CCNC: 18 U/L (ref 0–45)
BILIRUB SERPL-MCNC: 0.4 MG/DL
BUN SERPL-MCNC: 11.5 MG/DL (ref 6–20)
CALCIUM SERPL-MCNC: 9.8 MG/DL (ref 8.6–10)
CHLORIDE SERPL-SCNC: 105 MMOL/L (ref 98–107)
CHOLEST SERPL-MCNC: 176 MG/DL
CREAT SERPL-MCNC: 1 MG/DL (ref 0.67–1.17)
DEPRECATED HCO3 PLAS-SCNC: 27 MMOL/L (ref 22–29)
EGFRCR SERPLBLD CKD-EPI 2021: >90 ML/MIN/1.73M2
FASTING STATUS PATIENT QL REPORTED: NO
GGT SERPL-CCNC: 22 U/L (ref 8–61)
GLUCOSE SERPL-MCNC: 88 MG/DL (ref 70–99)
HBA1C MFR BLD: 5.7 % (ref 0–5.6)
HDLC SERPL-MCNC: 61 MG/DL
LDLC SERPL CALC-MCNC: 97 MG/DL
NONHDLC SERPL-MCNC: 115 MG/DL
POTASSIUM SERPL-SCNC: 5 MMOL/L (ref 3.4–5.3)
PROT SERPL-MCNC: 7.2 G/DL (ref 6.4–8.3)
SODIUM SERPL-SCNC: 141 MMOL/L (ref 135–145)
TRIGL SERPL-MCNC: 91 MG/DL

## 2024-02-22 PROCEDURE — 36415 COLL VENOUS BLD VENIPUNCTURE: CPT | Performed by: FAMILY MEDICINE

## 2024-02-22 PROCEDURE — 82977 ASSAY OF GGT: CPT | Performed by: FAMILY MEDICINE

## 2024-02-22 PROCEDURE — 80053 COMPREHEN METABOLIC PANEL: CPT | Performed by: FAMILY MEDICINE

## 2024-02-22 PROCEDURE — 99395 PREV VISIT EST AGE 18-39: CPT | Performed by: FAMILY MEDICINE

## 2024-02-22 PROCEDURE — 83036 HEMOGLOBIN GLYCOSYLATED A1C: CPT | Performed by: FAMILY MEDICINE

## 2024-02-22 PROCEDURE — 80061 LIPID PANEL: CPT | Performed by: FAMILY MEDICINE

## 2024-02-22 RX ORDER — ATENOLOL 100 MG/1
100 TABLET ORAL DAILY
Qty: 90 TABLET | Refills: 2 | Status: SHIPPED | OUTPATIENT
Start: 2024-02-22 | End: 2024-05-15

## 2024-02-22 SDOH — HEALTH STABILITY: PHYSICAL HEALTH: ON AVERAGE, HOW MANY DAYS PER WEEK DO YOU ENGAGE IN MODERATE TO STRENUOUS EXERCISE (LIKE A BRISK WALK)?: 7 DAYS

## 2024-02-22 ASSESSMENT — PAIN SCALES - GENERAL: PAINLEVEL: NO PAIN (0)

## 2024-02-22 ASSESSMENT — SOCIAL DETERMINANTS OF HEALTH (SDOH): HOW OFTEN DO YOU GET TOGETHER WITH FRIENDS OR RELATIVES?: ONCE A WEEK

## 2024-02-22 NOTE — PROGRESS NOTES
Preventive Care Visit  [facility]  Monie Avilez MD, Family Medicine  Feb 22, 2024    Assessment & Plan     1. Routine general medical examination at a health care facility  Vaccinations reviewed and declined by py     2. Alcohol dependence, continuous (H)  Plan:- atenolol (TENORMIN) 100 MG tablet; Take 1 tablet (100 mg) by mouth daily  Dispense: 90 tablet; Refill: 2  - Comprehensive metabolic panel (BMP + Alb, Alk Phos, ALT, AST, Total. Bili, TP); Future  - GGT; Future    3. Disorder of aorta (H24)  Plan: 1/22-echo Borderline dilated aortic root (3.8 cm), normal ascending aorta.   Repeat echo is advised to monitor - Echocardiogram Complete; Future    4. Cannabis use disorder, severe, dependence (H)  Encouraged complete cessation.  History of CD- none for > 1 yr    5. Screening for diabetes mellitus  - Hemoglobin A1c; Future    6. Screening for lipid disorders  Non fasting lipid   - Lipid Profile (Chol, Trig, HDL, LDL calc); Future    7. Mood disorder (H24)  Stable     He does reports mild finger numbness in response to below freezing cold temp OUTSIDE & the numbness resolves on its own as he warms the hands   Sound like raynaud phenomenon  Advised to keep fingers warm n exspoure to cold temp  Follow up as needed - ronal if concerning symptoms worsning    Patient has been advised of split billing requirements and indicates understanding: Yes  Ordering of each unique test  Prescription drug management        Counseling  Appropriate preventive services were discussed with this patient, including applicable screening as appropriate for fall prevention, nutrition, physical activity, Tobacco-use cessation, weight loss and cognition.  Checklist reviewing preventive services available has been given to the patient.  Reviewed patient's diet, addressing concerns and/or questions.       Regular exercise    Wilfredo Rodrigues is a 33 year old, presenting for the following:  Physical         2024    10:42 AM   Additional Questions   Roomed by JUSTEN Paiz   Accompanied by Esperanza - partner        Health Care Directive  Patient does not have a Health Care Directive or Living Will: Discussed advance care planning with patient; information given to patient to review.    HPI          2024   General Health   How would you rate your overall physical health? Excellent         2024   Nutrition   Three or more servings of calcium each day? Yes   Diet: Low salt   How many servings of fruit and vegetables per day? (!) 0-1   How many sweetened beverages each day? 0-1         2023   Exercise   Frequency of exercise: 2-3 days/week            2023   Dental   Dentist two times every year? No              Today's PHQ-2 Score:       2024    10:37 AM   PHQ-2 (  [employer])   Q1: Little interest or pleasure in doing things 0   Q2: Feeling down, depressed or hopeless 0   PHQ-2 Score 0   Q1: Little interest or pleasure in doing things Not at all   Q2: Feeling down, depressed or hopeless Not at all   PHQ-2 Score 0         2023   Substance Use   Alcohol more than 3/day or more than 7/wk No     Social History     Tobacco Use    Smoking status: Former     Packs/day: 0.10     Years: 5.00     Additional pack years: 0.00     Total pack years: 0.50     Types: Cigarettes     Quit date:      Years since quittin.1    Smokeless tobacco: Never    Tobacco comments:     1-2 cigarettes a day for 5 years   Vaping Use    Vaping Use: Some days    Start date: 2021    Substances: Nicotine    Devices: Disposable   Substance Use Topics    Alcohol use: Yes     Alcohol/week: 0.0 standard drinks of alcohol     Comment: 2 times/week    Drug use: Yes     Types: Marijuana     Comment: Pt reports smoking a blunt today, pt reports smoking canabis everyday..              No data to display                 Reviewed and updated as needed this visit by Provider   Tobacco  Allergies  Meds  Problems  Med Hx  Surg Hx   "Fam Hx            BP Readings from Last 3 Encounters:   02/22/24 94/58   06/07/23 106/73   03/20/23 115/84    Wt Readings from Last 3 Encounters:   02/22/24 72.2 kg (159 lb 1.6 oz)   06/07/23 76.1 kg (167 lb 12.8 oz)   03/17/23 72.6 kg (160 lb)                      Review of Systems  Constitutional, HEENT, cardiovascular, pulmonary, GI, , musculoskeletal, neuro, skin, endocrine and psych systems are negative, except as otherwise noted.     Objective    Exam  BP 94/58 (BP Location: Left arm, Patient Position: Sitting, Cuff Size: Adult Regular)   Pulse 58   Temp 97.9  F (36.6  C) (Temporal)   Resp 16   Ht 1.835 m (6' 0.25\")   Wt 72.2 kg (159 lb 1.6 oz)   SpO2 96%   BMI 21.43 kg/m     Estimated body mass index is 21.43 kg/m  as calculated from the following:    Height as of this encounter: 1.835 m (6' 0.25\").    Weight as of this encounter: 72.2 kg (159 lb 1.6 oz).    Physical Exam  GENERAL: alert and no distress  EYES: Eyes grossly normal to inspection, PERRL and conjunctivae and sclerae normal  HENT: ear canals and TM's normal, nose and mouth without ulcers or lesions  NECK: no adenopathy, no asymmetry, masses, or scars  RESP: lungs clear to auscultation - no rales, rhonchi or wheezes  CV: regular rate and rhythm, normal S1 S2, no S3 or S4, no murmur, click or rub, no peripheral edema  ABDOMEN: soft, nontender, no hepatosplenomegaly, no masses and bowel sounds normal  MS: no gross musculoskeletal defects noted, no edema  SKIN: no suspicious lesions or rashes  NEURO: Normal strength and tone, mentation intact and speech normal  PSYCH: mentation appears normal, affect normal/bright    Signed Electronically by: Monie Avilez MD    "

## 2024-02-22 NOTE — LETTER
March 7, 2024      Ravi ZAZUETA Hankins  [address]   [facility] [address]        Dear ,    We are writing to inform you of your test results.    All labs look good , except A1c- that shows average blood sugar in past 3  months. It is normal at 5.6 or lower. Yours is only slightly high at 5.7- meaning you are very close to the normal range. I know you are striving to eat clean- meaning, its best to avoid processed sugar. Yo have a great routine of excercise and that's fantastic. We should repeat A1c every year.     Your cholesterol levels (LDL,HDL, Triglycerides) are normal.  ADVISE: rechecking in 1 year.    Your liver and gallbladder tests are normal (ALT,AST, Alk phos, bilirubin), kidney function is normal (Cr, GFR), sodium is normal, potassium is normal, calcium is normal, glucose is normal.     Keep up the good work     Please keep us posted with questions or concerns .        Best Regards,     Monie Avilez MD  [facility]  [phone]      Resulted Orders   Hemoglobin A1c   Result Value Ref Range    Hemoglobin A1C 5.7 (H) 0.0 - 5.6 %      Comment:      Normal <5.7%   Prediabetes 5.7-6.4%    Diabetes 6.5% or higher     Note: Adopted from ADA consensus guidelines.   Lipid Profile (Chol, Trig, HDL, LDL calc)   Result Value Ref Range    Cholesterol 176 <200 mg/dL    Triglycerides 91 <150 mg/dL    Direct Measure HDL 61 >=40 mg/dL    LDL Cholesterol Calculated 97 <=100 mg/dL    Non HDL Cholesterol 115 <130 mg/dL    Patient Fasting > 8hrs? No     Narrative    Cholesterol  Desirable:  <200 mg/dL    Triglycerides  Normal:  Less than 150 mg/dL  Borderline High:  150-199 mg/dL  High:  200-499 mg/dL  Very High:  Greater than or equal to 500 mg/dL    Direct Measure HDL  Female:  Greater than or equal to 50 mg/dL   Male:  Greater than or equal to 40 mg/dL    LDL Cholesterol  Desirable:  <100mg/dL  Above Desirable:  100-129 mg/dL   Borderline High:  130-159 mg/dL   High:  160-189 mg/dL   Very  High:  >= 190 mg/dL    Non HDL Cholesterol  Desirable:  130 mg/dL  Above Desirable:  130-159 mg/dL  Borderline High:  160-189 mg/dL  High:  190-219 mg/dL  Very High:  Greater than or equal to 220 mg/dL   Comprehensive metabolic panel (BMP + Alb, Alk Phos, ALT, AST, Total. Bili, TP)   Result Value Ref Range    Sodium 141 135 - 145 mmol/L      Comment:      Reference intervals for this test were updated on 09/26/2023 to more accurately reflect our healthy population. There may be differences in the flagging of prior results with similar values performed with this method. Interpretation of those prior results can be made in the context of the updated reference intervals.     Potassium 5.0 3.4 - 5.3 mmol/L    Carbon Dioxide (CO2) 27 22 - 29 mmol/L    Anion Gap 9 7 - 15 mmol/L    Urea Nitrogen 11.5 6.0 - 20.0 mg/dL    Creatinine 1.00 0.67 - 1.17 mg/dL    GFR Estimate >90 >60 mL/min/1.73m2    Calcium 9.8 8.6 - 10.0 mg/dL    Chloride 105 98 - 107 mmol/L    Glucose 88 70 - 99 mg/dL    Alkaline Phosphatase 47 40 - 150 U/L      Comment:      Reference intervals for this test were updated on 11/14/2023 to more accurately reflect our healthy population. There may be differences in the flagging of prior results with similar values performed with this method. Interpretation of those prior results can be made in the context of the updated reference intervals.    AST 18 0 - 45 U/L      Comment:      Reference intervals for this test were updated on 6/12/2023 to more accurately reflect our healthy population. There may be differences in the flagging of prior results with similar values performed with this method. Interpretation of those prior results can be made in the context of the updated reference intervals.    ALT 12 0 - 70 U/L      Comment:      Reference intervals for this test were updated on 6/12/2023 to more accurately reflect our healthy population. There may be differences in the flagging of prior results with similar  values performed with this method. Interpretation of those prior results can be made in the context of the updated reference intervals.      Protein Total 7.2 6.4 - 8.3 g/dL    Albumin 4.4 3.5 - 5.2 g/dL    Bilirubin Total 0.4 <=1.2 mg/dL   GGT   Result Value Ref Range    GGT 22 8 - 61 U/L       If you have any questions or concerns, please call the clinic at the number listed above.       Sincerely,      Monie Avilez MD

## 2024-02-24 NOTE — CONFIDENTIAL NOTE
2/24/2024-Spoke to pt. Does not need refill at this time due to failed transmission.  Lucia Jacob RN

## 2024-05-15 ENCOUNTER — OFFICE VISIT (OUTPATIENT)
Dept: FAMILY MEDICINE | Facility: CLINIC | Age: 34
End: 2024-05-15
Payer: COMMERCIAL

## 2024-05-15 VITALS
TEMPERATURE: 97.9 F | HEART RATE: 60 BPM | DIASTOLIC BLOOD PRESSURE: 68 MMHG | HEIGHT: 72 IN | BODY MASS INDEX: 20.05 KG/M2 | WEIGHT: 148 LBS | RESPIRATION RATE: 16 BRPM | OXYGEN SATURATION: 100 % | SYSTOLIC BLOOD PRESSURE: 112 MMHG

## 2024-05-15 DIAGNOSIS — R10.13 ABDOMINAL PAIN, EPIGASTRIC: Primary | ICD-10-CM

## 2024-05-15 DIAGNOSIS — F10.20 ALCOHOL DEPENDENCE, CONTINUOUS (H): ICD-10-CM

## 2024-05-15 DIAGNOSIS — N20.0 RENAL CALCULI: ICD-10-CM

## 2024-05-15 PROCEDURE — 82248 BILIRUBIN DIRECT: CPT | Performed by: FAMILY MEDICINE

## 2024-05-15 PROCEDURE — 83690 ASSAY OF LIPASE: CPT | Performed by: FAMILY MEDICINE

## 2024-05-15 PROCEDURE — 80053 COMPREHEN METABOLIC PANEL: CPT | Performed by: FAMILY MEDICINE

## 2024-05-15 PROCEDURE — 82150 ASSAY OF AMYLASE: CPT | Performed by: FAMILY MEDICINE

## 2024-05-15 PROCEDURE — 36415 COLL VENOUS BLD VENIPUNCTURE: CPT | Performed by: FAMILY MEDICINE

## 2024-05-15 PROCEDURE — 99213 OFFICE O/P EST LOW 20 MIN: CPT | Performed by: FAMILY MEDICINE

## 2024-05-15 PROCEDURE — 82977 ASSAY OF GGT: CPT | Performed by: FAMILY MEDICINE

## 2024-05-15 RX ORDER — ATENOLOL 100 MG/1
100 TABLET ORAL DAILY
Qty: 90 TABLET | Refills: 2 | Status: SHIPPED | OUTPATIENT
Start: 2024-05-15 | End: 2024-07-29

## 2024-05-15 ASSESSMENT — PAIN SCALES - GENERAL: PAINLEVEL: WORST PAIN (10)

## 2024-05-15 NOTE — PROGRESS NOTES
Assessment & Plan   1. Abdominal pain, epigastric  Plan: proceed with blood test to rule out possibility hepatic or pancreatic enzymes.  Ultrasound for gall bladder pathology    - US Abdomen Complete; Future  - Hepatic function panel; Future  - GGT; Future  - Amylase; Future  - Lipase; Future  - Hepatic function panel  - GGT  - Amylase  - Lipase  Ordering of each unique test  I spent a total of 22 minutes on the day of the visit.   Time spent by me doing chart review, history and exam, documentation and further activities per the noteThe longitudinal plan of care for the diagnosis(es)/condition(s) as documented were addressed during this visit. Due to the added complexity in care, I will continue to support Ravi in the subsequent management and with ongoing continuity of care.  2. Renal calculi  Plan: proceed with nephrology and stay well hydrated  - Adult Nephrology  Referral; Future  - Basic metabolic panel  (Ca, Cl, CO2, Creat, Gluc, K, Na, BUN); Future  - Basic metabolic panel  (Ca, Cl, CO2, Creat, Gluc, K, Na, BUN)    3. Alcohol dependence, continuous (H)  Plan: refill given  Has had only few shots over past 6 months  Advised complete cessation  - atenolol (TENORMIN) 100 MG tablet; Take 1 tablet (100 mg) by mouth daily  Dispense: 90 tablet; Refill: 2                    Subjective   Ravi is a 33 year old, presenting for the following health issues:  Gastrointestinal Problem        5/15/2024    11:42 AM   Additional Questions   Roomed by regina thompson   Accompanied by self     History of Present Illness       Reason for visit:  Stomach issues  Symptom onset:  More than a month (reoccurring and another episode 2 weeks ago)  Symptoms include:  Middle stomach pain throwing up, cramps, sweaty, any time of day, at any moment, no certain food or drink that he knows that causes pain.  Symptom intensity:  Severe  Symptom progression:  Staying the same  Had these symptoms before:  Yes    He eats 2-3  servings of fruits and vegetables daily.He consumes 2 sweetened beverage(s) daily.He exercises with enough effort to increase his heart rate 60 or more minutes per day.  He exercises with enough effort to increase his heart rate 6 days per week.   He is taking medications regularly.     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  The pain  resolved on its own, now able to eat egualr meals, just had [employer] roast beef sandwich.    Alchol intake in past 6 months only 6 shots   Smokes weed daily.              Review of Systems  Constitutional, HEENT, cardiovascular, pulmonary, GI, , musculoskeletal, neuro, skin, endocrine and psych systems are negative, except as otherwise noted.      Objective    Wt 67.1 kg (148 lb)   BMI 19.93 kg/m    Body mass index is 19.93 kg/m .  Physical Exam   GENERAL: alert and no distress  NECK: no adenopathy, no asymmetry, masses, or scars  RESP: lungs clear to auscultation - no rales, rhonchi or wheezes  CV: regular rate and rhythm, normal S1 S2, no S3 or S4, no murmur, click or rub, no peripheral edema  ABDOMEN: soft, nontender, no hepatosplenomegaly, no masses and bowel sounds normal  PSYCH: mentation appears normal, affect normal/bright            Signed Electronically by: Monie Avilez MD

## 2024-05-16 LAB
ALBUMIN SERPL BCG-MCNC: 4.8 G/DL (ref 3.5–5.2)
ALP SERPL-CCNC: 64 U/L (ref 40–150)
ALT SERPL W P-5'-P-CCNC: 15 U/L (ref 0–70)
AMYLASE SERPL-CCNC: 50 U/L (ref 28–100)
ANION GAP SERPL CALCULATED.3IONS-SCNC: 11 MMOL/L (ref 7–15)
AST SERPL W P-5'-P-CCNC: 22 U/L (ref 0–45)
BILIRUB DIRECT SERPL-MCNC: <0.2 MG/DL (ref 0–0.3)
BILIRUB SERPL-MCNC: 0.4 MG/DL
BUN SERPL-MCNC: 17.5 MG/DL (ref 6–20)
CALCIUM SERPL-MCNC: 9.6 MG/DL (ref 8.6–10)
CHLORIDE SERPL-SCNC: 104 MMOL/L (ref 98–107)
CREAT SERPL-MCNC: 1.1 MG/DL (ref 0.67–1.17)
DEPRECATED HCO3 PLAS-SCNC: 28 MMOL/L (ref 22–29)
EGFRCR SERPLBLD CKD-EPI 2021: >90 ML/MIN/1.73M2
GGT SERPL-CCNC: 20 U/L (ref 8–61)
GLUCOSE SERPL-MCNC: 68 MG/DL (ref 70–99)
LIPASE SERPL-CCNC: 27 U/L (ref 13–60)
POTASSIUM SERPL-SCNC: 4.3 MMOL/L (ref 3.4–5.3)
PROT SERPL-MCNC: 7.8 G/DL (ref 6.4–8.3)
SODIUM SERPL-SCNC: 143 MMOL/L (ref 135–145)

## 2024-05-20 ENCOUNTER — VIRTUAL VISIT (OUTPATIENT)
Dept: FAMILY MEDICINE | Facility: CLINIC | Age: 34
End: 2024-05-20
Payer: COMMERCIAL

## 2024-05-20 DIAGNOSIS — R10.13 EPIGASTRIC PAIN: Primary | ICD-10-CM

## 2024-05-20 DIAGNOSIS — F10.920: ICD-10-CM

## 2024-05-20 DIAGNOSIS — N20.0 KIDNEY STONE: ICD-10-CM

## 2024-05-20 PROCEDURE — 99214 OFFICE O/P EST MOD 30 MIN: CPT | Mod: 95 | Performed by: FAMILY MEDICINE

## 2024-05-20 NOTE — PROGRESS NOTES
Ravi is a 33 year old who is being evaluated via a billable video visit.    How would you like to obtain your AVS? Mail a copy  If the video visit is dropped, the invitation should be resent by: Text to cell phone: [phone]  Will anyone else be joining your video visit? No      Assessment & Plan     Epigastric pain  Episodic acute alcoholic intoxication without complication (H24)  Plan: advised complete cessation of alcohol & he is very willing.  Reports has had some only on cheat days .  He is not on NSAID  The patient denies abnormal bruising or abnormal bleeding from any body orifice such as bleeding from  stool, or melena, hemoptysis or hemetemesis.   - omeprazole (PRILOSEC) 20 MG DR capsule; Take 1 capsule (20 mg) by mouth daily  Potential medication side effects were discussed with the patient; let me know if any occur.     He is instructed to follow up on phone - or video visit should there be another episode of similar pain , with or without alcohol abuse   We also discussed the need for endoscopy to ensure that there are no gastric ulcer,bleeding, cancer- patient has deferred it for now and will to monitor symptoms     Episodic acute alcoholic intoxication without complication (H24)  As above & Please see patient instructions       The longitudinal plan of care for the diagnosis(es)/condition(s) as documented were addressed during this visit. Due to the added complexity in care, I will continue to support Ravi in the subsequent management and with ongoing continuity of care.    Kidney stone  Reviewed CT 2024  Non obstructing intrarenal calculi  Advised to stay well hydrated. Ok to se nepgrologist    MED REC REQUIRED  Post Medication Reconciliation Status:   Prescription drug management  I spent a total of 31 minutes on the day of the visit.   Time spent by me doing chart review, history and exam, documentation and further activities per the note    See Patient Instructions    Subjective    Ravi is a 33 year old, presenting for the following health issues:  Hospital F/U        5/20/2024     1:31 PM   Additional Questions   Roomed by DOMENIC Mallory   Accompanied by n/a       Video Start Time: 2:33 PM    [facility]       ED/UC Followup:    Facility:  [facility] EMERGENCY DEPARTMENT   Date of visit: 05/19/2024  Reason for visit: Abdominal pain  Current Status: [place] [facility] ED Saturday night (5/18/24) for same type of abdominal pain, was there for a couple hours, did ultrasound and found nothing.   - when had the pain it felt excruciating, right in the middle of stomach. Did not eat or drink anything abnormal (thinks it may be red meat)    Was at a referring game'after game when to eat out and had a few drinks  Moderate to sever epigastric pain, that settled only after getting an injection    Strongly believes that alcohol use is episodic and a few cheat day.  Reports since emergency department visit, has not taken any alcohol , and over all feeling better    Review of Systems  Constitutional, neuro, ENT, endocrine, pulmonary, cardiac, gastrointestinal, genitourinary, musculoskeletal, integument and psychiatric systems are negative, except as otherwise noted.      Objective    Vitals - Patient Reported  Pain Score: No Pain (0)      Vitals:  No vitals were obtained today due to virtual visit.    Physical Exam   GENERAL: alert and no distress  EYES: Eyes grossly normal to inspection.  No discharge or erythema, or obvious scleral/conjunctival abnormalities.  RESP: No audible wheeze, cough, or visible cyanosis.    SKIN: Visible skin clear. No significant rash, abnormal pigmentation or lesions.  NEURO: Cranial nerves grossly intact.  Mentation and speech appropriate for age.  PSYCH: Appropriate affect, tone, and pace of words          Video-Visit Details    Type of service:  Video Visit   Video End Time:2:47 PM  Originating Location (pt. Location): Home  Distant Location (provider location):   On-site  Platform used for Video Visit: Jay  Signed Electronically by: Monie Avilez MD

## 2024-05-20 NOTE — PATIENT INSTRUCTIONS
Alcohol level was high  Intoxication and episodic use of alcohol is causing the pain in stomach  I am concerned about stomach ulcer or cancer    0 alcohol policy for your own health benefits  let me know if you need help to prevent alcohol overuse     Omperazole once daily  And for now take for 3 months    Follow up in clinic for unresolved concerns in 4 -6 weeks.    Thanks

## 2024-07-26 NOTE — TELEPHONE ENCOUNTER
Action 2024 jtV 11:16 AM    Action Taken [facility] sent an urgent request to [facility] and [facility] for images.   [employer] is not working.    [facility] called [facility] and confirmed with [employer] that images will be pushed to [facility]. [facility] received and resolved images from [facility].  [facility] called [facility] and confirmed with Vijaya that images will be pushed to [facility].    MEDICAL RECORDS REQUEST   [facility]  U[facility]  [address] [place] [address]  PHONE: [phone]  Fax: [phone]        FUTURE VISIT INFORMATION                                                   Ravi Hankins, : 1990 scheduled for future visit at [facility]    APPOINTMENT INFORMATION:  Date: 2024  Provider:  Veronica Rogel CNP  Reason for Visit/Diagnosis: renal calculi/stone medical management    REFERRAL INFORMATION:  Referring provider:  Monie Avilez MD in [facility]      RECORDS REQUESTED FOR VISIT                                                     NOTES  STATUS/DETAILS   OFFICE NOTE from referring provider  yes, 2024, 05/15/2024 -- Monie Avilez MD in  FAMILY PRACTICE   OFFICE NOTE from other specialist  yes   DISCHARGE REPORT from the ER  yes   MEDICATION LIST  yes   LABS     URINALYSIS (UA)  yes   IMAGES  YES  [facility]   2024, 2024 -- CT ABD PELVIS    [facility]  2024 - US ABD     PRE-VISIT CHECKLIST      Joint diagnostic appointment coordinated correctly          (ensure right order & amount of time) Yes   RECORD COLLECTION COMPLETE YES

## 2024-07-29 ENCOUNTER — PRE VISIT (OUTPATIENT)
Dept: UROLOGY | Facility: CLINIC | Age: 34
End: 2024-07-29
Payer: COMMERCIAL

## 2024-07-29 DIAGNOSIS — F10.20 ALCOHOL DEPENDENCE, CONTINUOUS (H): ICD-10-CM

## 2024-07-29 RX ORDER — ATENOLOL 100 MG/1
100 TABLET ORAL DAILY
Qty: 90 TABLET | Refills: 2 | Status: SHIPPED | OUTPATIENT
Start: 2024-07-29

## 2024-07-29 NOTE — TELEPHONE ENCOUNTER
Reason for visit: stone prevention     Relevant information: referral originally said for nephrology    Records/imaging/labs/orders: all records available    Pt called: No need for a call    At Rooming: standard procedure    Inge Scott  7/29/2024  9:35 AM

## 2024-07-30 ENCOUNTER — PRE VISIT (OUTPATIENT)
Dept: UROLOGY | Facility: CLINIC | Age: 34
End: 2024-07-30

## 2024-07-30 ENCOUNTER — VIRTUAL VISIT (OUTPATIENT)
Dept: UROLOGY | Facility: CLINIC | Age: 34
End: 2024-07-30
Attending: FAMILY MEDICINE
Payer: COMMERCIAL

## 2024-07-30 DIAGNOSIS — N20.0 RENAL CALCULI: ICD-10-CM

## 2024-07-30 DIAGNOSIS — R10.9 RIGHT FLANK PAIN: Primary | ICD-10-CM

## 2024-07-30 PROCEDURE — 99442 PR PHYSICIAN TELEPHONE EVALUATION 11-20 MIN: CPT | Mod: 93 | Performed by: NURSE PRACTITIONER

## 2024-07-30 ASSESSMENT — PAIN SCALES - GENERAL: PAINLEVEL: NO PAIN (0)

## 2024-07-30 NOTE — PROGRESS NOTES
Virtual Visit Details    Type of service:  Video Visit - This was changed to telephone as we were unable to establish video connection using either VisConPro or [employer].    Duration of telephone call: 13 minutes    CC: Kidney stones    Assessment/Plan:  34 year old male with a long history of kidney stones, s/p URS in 2022, with stone composed of CaOx. CT from May showed a 4 mm stone in his right lower pole. Has been having right flank pain intermittently and we discussed pursuing a repeat scan now to look for obstruction on that side and he agrees with this. We discussed general stone prevention recommendations in brief. Recommend he complete a Litholink to help guide our prevention efforts and he is open to doing this.   -CT now to check for right-sided obstruction/stone movement  -Litholink x1 and follow up with me in a few months to review results.     Veronica Rogel, CNP  Department of Urology      Subjective:   34 year old male with PMH of polysubstance abuse, mood disorder, among others, who presents for virtual consult regarding kidney stones. Per chart review he has had stones for many years, dating back to imaging from 2010.     CT from 5/29/24 showed a 4 mm nonobstructing stone in his right lower pole. Other tiny bilateral stones noted on prior scan from February, though these were blurred on the May scan due to respiratory artifact.     Today, he states that he has been having intermittent right flank pain that comes and goes. This pain can be severe at nights and requires him to stop what he is doing and rest.     History of right URS with [facility] at [facility] two years ago for treatment of a 6 mm distal ureteral stone. Stone CaOx. No known family history of stones.

## 2024-07-30 NOTE — PATIENT INSTRUCTIONS
UROLOGY CLINIC VISIT PATIENT INSTRUCTIONS    -Will obtain a CT scan to check on your right kidney.   I have ordered the Litholink 24-hour urine collection study to be done. This kit will be sent to your home address. Your at-home kit will include everything you need to complete your 24-hour urine collection. Detailed instructions, collection supplies, return shipping box, and a pre-paid Fed-Ex label are being sent directly to you. If you do not receive the Litholink kit in 7-10 days, please call [phone] or notify us at the urology clinic.     If you are planning to begin your At-Home kit immediately upon receipt, note the  Followin. Eat and drink normally the day before you start your collection and during  the collection process.    2. Stop taking Vitamin C (pill form, vitamins, and/or supplements) that is  greater than 100 mg per day 5 days prior to the start of your At-Home kit.  Vitamin C occurring in foods and drinks can be ingested as normal.    If you would like more information on the Litholink process please watch this video:    [email or web address]      Or you can visit the website:    [email or web address]    Follow up with me in about two months to check in.     If you have any issues, questions or concerns in the meantime, do not hesitate to contact us at [phone] or via [employer].     Veronica Rogel, CNP  Department of Urology

## 2024-07-30 NOTE — LETTER
7/30/2024       RE: Ravi Hankins  [address]  [facility] [address]     Dear Colleague,    Thank you for referring your patient, Ravi Hankins, to the [facility] [place] [facility]. Please see a copy of my visit note below.    Virtual Visit Details    Type of service:  Video Visit - This was changed to telephone as we were unable to establish video connection using either MyGoodPoints or [employer].    Duration of telephone call: 13 minutes    CC: Kidney stones    Assessment/Plan:  34 year old male with a long history of kidney stones, s/p URS in 2022, with stone composed of CaOx. CT from May showed a 4 mm stone in his right lower pole. Has been having right flank pain intermittently and we discussed pursuing a repeat scan now to look for obstruction on that side and he agrees with this. We discussed general stone prevention recommendations in brief. Recommend he complete a Litholink to help guide our prevention efforts and he is open to doing this.   -CT now to check for right-sided obstruction/stone movement  -Litholink x1 and follow up with me in a few months to review results.     Veronica Rogel, CNP  Department of Urology      Subjective:   34 year old male with PMH of polysubstance abuse, mood disorder, among others, who presents for virtual consult regarding kidney stones. Per chart review he has had stones for many years, dating back to imaging from 2010.     CT from 5/29/24 showed a 4 mm nonobstructing stone in his right lower pole. Other tiny bilateral stones noted on prior scan from February, though these were blurred on the May scan due to respiratory artifact.     Today, he states that he has been having intermittent right flank pain that comes and goes. This pain can be severe at nights and requires him to stop what he is doing and rest.     History of right URS with [facility] at [facility] two years ago for  treatment of a 6 mm distal ureteral stone. Stone CaOx. No known family history of stones.       Again, thank you for allowing me to participate in the care of your patient.      Sincerely,    Veronica Rogel, CNP

## 2024-07-30 NOTE — NURSING NOTE
Current patient location: Patient declined to provide     Is the patient currently in the state of [place]? YES    Visit mode:VIDEO    If the visit is dropped, the patient can be reconnected by: VIDEO VISIT: Text to cell phone:   Telephone Information:   Mobile [phone]       Will anyone else be joining the visit? NO  (If patient encounters technical issues they should call [phone])    How would you like to obtain your AVS? MyChart    Are changes needed to the allergy or medication list? No    Are refills needed on medications prescribed by this physician? NO    Reason for visit: Consult ( NEW STONE PREVENTION)    Rivka BARRON

## 2024-08-06 ENCOUNTER — TELEPHONE (OUTPATIENT)
Dept: UROLOGY | Facility: CLINIC | Age: 34
End: 2024-08-06
Payer: COMMERCIAL

## 2024-08-06 NOTE — TELEPHONE ENCOUNTER
Patient confirmed scheduled appointment:  Date: 10/11   Time: 2:00  Visit type: RTN VV  Provider: Pebbles  Location: VV  Testing/imaging: NA  Additional notes: NIMESH

## 2024-08-09 ENCOUNTER — TELEPHONE (OUTPATIENT)
Dept: UROLOGY | Facility: CLINIC | Age: 34
End: 2024-08-09
Payer: COMMERCIAL

## 2024-08-09 NOTE — TELEPHONE ENCOUNTER
[facility] 24 hour [facility] x [facility]. Spoke with patient as he does not have an active mychart.     JUSTEN Tejeda  Care Coordinator- Urology   [phone]

## 2024-10-02 ENCOUNTER — PRE VISIT (OUTPATIENT)
Dept: UROLOGY | Facility: CLINIC | Age: 34
End: 2024-10-02
Payer: COMMERCIAL

## 2024-10-02 NOTE — TELEPHONE ENCOUNTER
Reason for visit: follow-up     Relevant information: 2 month, santi review and CT    Records/imaging/labs/orders: santi scheduled, CT submitted    Inge Scott  10/2/2024  10:32 AM

## 2024-10-11 ENCOUNTER — TELEPHONE (OUTPATIENT)
Dept: UROLOGY | Facility: CLINIC | Age: 34
End: 2024-10-11

## 2024-10-11 NOTE — TELEPHONE ENCOUNTER
Pt still needs to complete testing before meets with Veronica. Please reschedule after testing is complete    Patient needs to be rescheduled for their virtual visit due to Reason for Reschedule: Pt testing not completed    Scheduling team, please refer to service line late cancellation/no-show policies and reach out to patient at a later date for rescheduling.    Appointment mode: Video  Provider: Veronica Rogel CNP     - - -

## 2024-11-07 NOTE — PROGRESS NOTES
HPI and Plan:   See dictation    No orders of the defined types were placed in this encounter.      No orders of the defined types were placed in this encounter.      Encounter Diagnoses   Name Primary?     Vasovagal syncope      Aortic root dilatation (H) Yes       CURRENT MEDICATIONS:  Current Outpatient Medications   Medication Sig Dispense Refill     atenolol (TENORMIN) 100 MG tablet Take 1 tablet (100 mg) by mouth daily 90 tablet 3       ALLERGIES   No Known Allergies    PAST MEDICAL HISTORY:  History reviewed. No pertinent past medical history.    PAST SURGICAL HISTORY:  Past Surgical History:   Procedure Laterality Date     NO HISTORY OF SURGERY         FAMILY HISTORY:  Family History   Problem Relation Age of Onset     Heart Disease Brother      Abdominal Aortic Aneurysm Brother      Heart Disease Other         self     Diabetes No family hx of      Coronary Artery Disease No family hx of      Hypertension No family hx of      Hyperlipidemia No family hx of      Cerebrovascular Disease No family hx of      Breast Cancer No family hx of      Colon Cancer No family hx of      Prostate Cancer No family hx of      Other Cancer No family hx of      Depression No family hx of      Anxiety Disorder No family hx of      Mental Illness No family hx of      Substance Abuse No family hx of      Anesthesia Reaction No family hx of      Asthma No family hx of      Osteoporosis No family hx of      Genetic Disorder No family hx of      Thyroid Disease No family hx of      Obesity No family hx of      Unknown/Adopted No family hx of        SOCIAL HISTORY:  Social History     Socioeconomic History     Marital status: Single     Spouse name: None     Number of children: None     Years of education: None     Highest education level: None   Occupational History     None   Social Needs     Financial resource strain: None     Food insecurity:     Worry: None     Inability: None     Transportation needs:     Medical: None      "Non-medical: None   Tobacco Use     Smoking status: Current Every Day Smoker     Packs/day: 0.25     Smokeless tobacco: Never Used   Substance and Sexual Activity     Alcohol use: Yes     Alcohol/week: 0.0 standard drinks     Comment: Pt reports having \"2 shots \" today.  Pt reports drinking  everyday.     Drug use: Yes     Types: Marijuana     Comment: Pt reports smoking a blunt today, pt reports smoking canabis everyday..     Sexual activity: Yes   Lifestyle     Physical activity:     Days per week: None     Minutes per session: None     Stress: None   Relationships     Social connections:     Talks on phone: None     Gets together: None     Attends Orthodox service: None     Active member of club or organization: None     Attends meetings of clubs or organizations: None     Relationship status: None     Intimate partner violence:     Fear of current or ex partner: None     Emotionally abused: None     Physically abused: None     Forced sexual activity: None   Other Topics Concern     Parent/sibling w/ CABG, MI or angioplasty before 65F 55M? No   Social History Narrative     None       Review of Systems:  Skin:  Negative     Eyes:  Positive for glasses  ENT:  Negative    Respiratory:  Negative    Cardiovascular:  Negative    Gastroenterology: Negative    Genitourinary:  Negative    Musculoskeletal:  Negative    Neurologic:  Positive for migraine headaches  Psychiatric:  Positive for depression;anxiety;excessive stress  Heme/Lymph/Imm:  Negative    Endocrine:  Negative      Physical Exam:  Vitals: /85   Pulse 74   Ht 1.88 m (6' 2\")   Wt 72.9 kg (160 lb 12.8 oz)   BMI 20.65 kg/m      Constitutional:  cooperative, alert and oriented, well developed, well nourished, in no acute distress        Skin:  warm and dry to the touch, no apparent skin lesions or masses noted   pacemaker incision in the left infraclavicular area was well-healed      Head:  normocephalic, no masses or lesions        Eyes:  pupils " equal and round, conjunctivae and lids unremarkable, sclera white, no xanthalasma, EOMS intact, no nystagmus        Lymph:No Cervical lymphadenopathy present     ENT:  no pallor or cyanosis, dentition good        Neck:  carotid pulses are full and equal bilaterally, JVP normal, no carotid bruit        Respiratory:  normal breath sounds, clear to auscultation, normal A-P diameter, normal symmetry, normal respiratory excursion, no use of accessory muscles         Cardiac: regular rhythm, normal S1/S2, no S3 or S4, apical impulse not displaced, no murmurs, gallops or rubs                pulses full and equal, no bruits auscultated                                        GI:  abdomen soft, non-tender, BS normoactive, no mass, no HSM, no bruits        Extremities and Muscular Skeletal:  no deformities, clubbing, cyanosis, erythema observed              Neurological:  no gross motor deficits        Psych:  Alert and Oriented x 3        Recent Lab Results:  LIPID RESULTS:  No results found for: CHOL, HDL, LDL, TRIG, CHOLHDLRATIO    LIVER ENZYME RESULTS:  Lab Results   Component Value Date    AST 19 04/25/2019    ALT 20 04/25/2019       CBC RESULTS:  Lab Results   Component Value Date    WBC 4.0 10/09/2019    RBC 4.63 10/09/2019    HGB 14.3 10/09/2019    HCT 43.3 10/09/2019    MCV 94 10/09/2019    MCH 30.9 10/09/2019    MCHC 33.0 10/09/2019    RDW 13.2 10/09/2019     10/09/2019       BMP RESULTS:  Lab Results   Component Value Date     10/09/2019    POTASSIUM 3.8 10/09/2019    CHLORIDE 112 (H) 10/09/2019    CO2 24 10/09/2019    ANIONGAP 7 10/09/2019    GLC 85 10/09/2019    BUN 11 10/09/2019    CR 1.05 10/09/2019    GFRESTIMATED >90 10/09/2019    GFRESTBLACK >90 10/09/2019    ASHLEY 7.8 (L) 10/09/2019        A1C RESULTS:  No results found for: A1C    INR RESULTS:  No results found for: INR        CC  Lincoln Madison PA-C  [facility]  [address]  [place]  [address]                 wife

## 2024-11-20 ENCOUNTER — TELEPHONE (OUTPATIENT)
Dept: UROLOGY | Facility: CLINIC | Age: 34
End: 2024-11-20
Payer: COMMERCIAL

## 2024-11-20 NOTE — TELEPHONE ENCOUNTER
Writer called pt, pt didn't  and writer left a voicemail. Writer gave clinic triage number so that if pt hadn't finished their litholink kit, then they should call the number to talk to the nurse about rescheduling.

## 2024-12-02 ENCOUNTER — TELEPHONE (OUTPATIENT)
Dept: UROLOGY | Facility: CLINIC | Age: 34
End: 2024-12-02
Payer: COMMERCIAL

## 2024-12-14 ENCOUNTER — APPOINTMENT (OUTPATIENT)
Dept: CT IMAGING | Facility: CLINIC | Age: 34
End: 2024-12-14
Attending: FAMILY MEDICINE
Payer: COMMERCIAL

## 2024-12-14 ENCOUNTER — HOSPITAL ENCOUNTER (OUTPATIENT)
Facility: CLINIC | Age: 34
Setting detail: OBSERVATION
Discharge: HOME | End: 2024-12-18
Attending: FAMILY MEDICINE | Admitting: INTERNAL MEDICINE
Payer: COMMERCIAL

## 2024-12-14 DIAGNOSIS — F10.229 ALCOHOL DEPENDENCE WITH INTOXICATION WITH COMPLICATION (H): ICD-10-CM

## 2024-12-14 DIAGNOSIS — R10.84 GENERALIZED ABDOMINAL PAIN: ICD-10-CM

## 2024-12-14 DIAGNOSIS — K56.7 ILEUS (H): ICD-10-CM

## 2024-12-14 LAB
ALBUMIN SERPL BCG-MCNC: 4.7 G/DL (ref 3.5–5.2)
ALBUMIN UR-MCNC: 50 MG/DL
ALP SERPL-CCNC: 77 U/L (ref 40–150)
ALT SERPL W P-5'-P-CCNC: 15 U/L (ref 0–70)
AMPHETAMINES UR QL SCN: ABNORMAL
ANION GAP SERPL CALCULATED.3IONS-SCNC: 18 MMOL/L (ref 7–15)
APPEARANCE UR: CLEAR
AST SERPL W P-5'-P-CCNC: 29 U/L (ref 0–45)
ATRIAL RATE - MUSE: 70 BPM
BARBITURATES UR QL SCN: ABNORMAL
BASOPHILS # BLD AUTO: 0 10E3/UL (ref 0–0.2)
BASOPHILS NFR BLD AUTO: 0 %
BENZODIAZ UR QL SCN: ABNORMAL
BILIRUB SERPL-MCNC: 0.4 MG/DL
BILIRUB UR QL STRIP: NEGATIVE
BUN SERPL-MCNC: 10.6 MG/DL (ref 6–20)
BZE UR QL SCN: ABNORMAL
CALCIUM SERPL-MCNC: 9.5 MG/DL (ref 8.8–10.4)
CANNABINOIDS UR QL SCN: ABNORMAL
CHLORIDE SERPL-SCNC: 103 MMOL/L (ref 98–107)
COLOR UR AUTO: YELLOW
CREAT SERPL-MCNC: 0.98 MG/DL (ref 0.67–1.17)
DIASTOLIC BLOOD PRESSURE - MUSE: NORMAL MMHG
EGFRCR SERPLBLD CKD-EPI 2021: >90 ML/MIN/1.73M2
EOSINOPHIL # BLD AUTO: 0 10E3/UL (ref 0–0.7)
EOSINOPHIL NFR BLD AUTO: 0 %
ERYTHROCYTE [DISTWIDTH] IN BLOOD BY AUTOMATED COUNT: 13.1 % (ref 10–15)
ETHANOL SERPL-MCNC: 0.18 G/DL
FENTANYL UR QL: ABNORMAL
GLUCOSE SERPL-MCNC: 77 MG/DL (ref 70–99)
GLUCOSE UR STRIP-MCNC: NEGATIVE MG/DL
HCO3 SERPL-SCNC: 21 MMOL/L (ref 22–29)
HCT VFR BLD AUTO: 45.1 % (ref 40–53)
HGB BLD-MCNC: 15.3 G/DL (ref 13.3–17.7)
HGB UR QL STRIP: NEGATIVE
IMM GRANULOCYTES # BLD: 0 10E3/UL
IMM GRANULOCYTES NFR BLD: 0 %
INTERPRETATION ECG - MUSE: NORMAL
KETONES UR STRIP-MCNC: 10 MG/DL
LEUKOCYTE ESTERASE UR QL STRIP: NEGATIVE
LIPASE SERPL-CCNC: 13 U/L (ref 13–60)
LYMPHOCYTES # BLD AUTO: 2 10E3/UL (ref 0.8–5.3)
LYMPHOCYTES NFR BLD AUTO: 26 %
MAGNESIUM SERPL-MCNC: 2 MG/DL (ref 1.7–2.3)
MCH RBC QN AUTO: 31 PG (ref 26.5–33)
MCHC RBC AUTO-ENTMCNC: 33.9 G/DL (ref 31.5–36.5)
MCV RBC AUTO: 91 FL (ref 78–100)
MONOCYTES # BLD AUTO: 0.9 10E3/UL (ref 0–1.3)
MONOCYTES NFR BLD AUTO: 12 %
MUCOUS THREADS #/AREA URNS LPF: PRESENT /LPF
NEUTROPHILS # BLD AUTO: 4.5 10E3/UL (ref 1.6–8.3)
NEUTROPHILS NFR BLD AUTO: 61 %
NITRATE UR QL: NEGATIVE
NRBC # BLD AUTO: 0 10E3/UL
NRBC BLD AUTO-RTO: 0 /100
OPIATES UR QL SCN: ABNORMAL
P AXIS - MUSE: 70 DEGREES
PCP QUAL URINE (ROCHE): ABNORMAL
PH UR STRIP: 5.5 [PH] (ref 5–7)
PLATELET # BLD AUTO: 171 10E3/UL (ref 150–450)
POTASSIUM SERPL-SCNC: 3.6 MMOL/L (ref 3.4–5.3)
PR INTERVAL - MUSE: 160 MS
PROT SERPL-MCNC: 8.4 G/DL (ref 6.4–8.3)
QRS DURATION - MUSE: 92 MS
QT - MUSE: 418 MS
QTC - MUSE: 451 MS
R AXIS - MUSE: 64 DEGREES
RBC # BLD AUTO: 4.94 10E6/UL (ref 4.4–5.9)
RBC URINE: 4 /HPF
SODIUM SERPL-SCNC: 142 MMOL/L (ref 135–145)
SP GR UR STRIP: 1.03 (ref 1–1.03)
SYSTOLIC BLOOD PRESSURE - MUSE: NORMAL MMHG
T AXIS - MUSE: 65 DEGREES
UROBILINOGEN UR STRIP-MCNC: NORMAL MG/DL
VENTRICULAR RATE- MUSE: 70 BPM
WBC # BLD AUTO: 7.4 10E3/UL (ref 4–11)
WBC URINE: 9 /HPF

## 2024-12-14 PROCEDURE — 99285 EMERGENCY DEPT VISIT HI MDM: CPT | Performed by: FAMILY MEDICINE

## 2024-12-14 PROCEDURE — 96376 TX/PRO/DX INJ SAME DRUG ADON: CPT

## 2024-12-14 PROCEDURE — 96375 TX/PRO/DX INJ NEW DRUG ADDON: CPT | Performed by: FAMILY MEDICINE

## 2024-12-14 PROCEDURE — 80307 DRUG TEST PRSMV CHEM ANLYZR: CPT | Performed by: FAMILY MEDICINE

## 2024-12-14 PROCEDURE — 250N000011 HC RX IP 250 OP 636: Performed by: FAMILY MEDICINE

## 2024-12-14 PROCEDURE — 96374 THER/PROPH/DIAG INJ IV PUSH: CPT | Performed by: FAMILY MEDICINE

## 2024-12-14 PROCEDURE — 250N000013 HC RX MED GY IP 250 OP 250 PS 637: Performed by: INTERNAL MEDICINE

## 2024-12-14 PROCEDURE — 93005 ELECTROCARDIOGRAM TRACING: CPT

## 2024-12-14 PROCEDURE — 250N000013 HC RX MED GY IP 250 OP 250 PS 637: Performed by: PHYSICIAN ASSISTANT

## 2024-12-14 PROCEDURE — 74176 CT ABD & PELVIS W/O CONTRAST: CPT

## 2024-12-14 PROCEDURE — 99285 EMERGENCY DEPT VISIT HI MDM: CPT | Mod: 25 | Performed by: FAMILY MEDICINE

## 2024-12-14 PROCEDURE — 120N000002 HC R&B MED SURG/OB UMMC

## 2024-12-14 PROCEDURE — 82565 ASSAY OF CREATININE: CPT | Performed by: FAMILY MEDICINE

## 2024-12-14 PROCEDURE — 85004 AUTOMATED DIFF WBC COUNT: CPT | Performed by: FAMILY MEDICINE

## 2024-12-14 PROCEDURE — 80048 BASIC METABOLIC PNL TOTAL CA: CPT | Performed by: FAMILY MEDICINE

## 2024-12-14 PROCEDURE — 85048 AUTOMATED LEUKOCYTE COUNT: CPT | Performed by: FAMILY MEDICINE

## 2024-12-14 PROCEDURE — 258N000003 HC RX IP 258 OP 636: Performed by: FAMILY MEDICINE

## 2024-12-14 PROCEDURE — 36415 COLL VENOUS BLD VENIPUNCTURE: CPT | Performed by: FAMILY MEDICINE

## 2024-12-14 PROCEDURE — 81001 URINALYSIS AUTO W/SCOPE: CPT | Performed by: FAMILY MEDICINE

## 2024-12-14 PROCEDURE — 250N000013 HC RX MED GY IP 250 OP 250 PS 637: Performed by: FAMILY MEDICINE

## 2024-12-14 PROCEDURE — 83735 ASSAY OF MAGNESIUM: CPT | Performed by: FAMILY MEDICINE

## 2024-12-14 PROCEDURE — 82077 ASSAY SPEC XCP UR&BREATH IA: CPT | Performed by: FAMILY MEDICINE

## 2024-12-14 PROCEDURE — 250N000011 HC RX IP 250 OP 636: Performed by: INTERNAL MEDICINE

## 2024-12-14 PROCEDURE — 99222 1ST HOSP IP/OBS MODERATE 55: CPT | Performed by: INTERNAL MEDICINE

## 2024-12-14 PROCEDURE — 83690 ASSAY OF LIPASE: CPT | Performed by: FAMILY MEDICINE

## 2024-12-14 PROCEDURE — 96361 HYDRATE IV INFUSION ADD-ON: CPT | Performed by: FAMILY MEDICINE

## 2024-12-14 RX ORDER — AMOXICILLIN 250 MG
2 CAPSULE ORAL 2 TIMES DAILY PRN
Status: DISCONTINUED | OUTPATIENT
Start: 2024-12-14 | End: 2024-12-18 | Stop reason: HOSPADM

## 2024-12-14 RX ORDER — KETOROLAC TROMETHAMINE 30 MG/ML
30 INJECTION, SOLUTION INTRAMUSCULAR; INTRAVENOUS ONCE
Status: COMPLETED | OUTPATIENT
Start: 2024-12-14 | End: 2024-12-14

## 2024-12-14 RX ORDER — DIAZEPAM 5 MG/1
5-20 TABLET ORAL EVERY 30 MIN PRN
Status: DISCONTINUED | OUTPATIENT
Start: 2024-12-14 | End: 2024-12-18 | Stop reason: HOSPADM

## 2024-12-14 RX ORDER — CARBOXYMETHYLCELLULOSE SODIUM 5 MG/ML
1 SOLUTION/ DROPS OPHTHALMIC
Status: DISCONTINUED | OUTPATIENT
Start: 2024-12-14 | End: 2024-12-18 | Stop reason: HOSPADM

## 2024-12-14 RX ORDER — HYDROMORPHONE HYDROCHLORIDE 1 MG/ML
0.5 INJECTION, SOLUTION INTRAMUSCULAR; INTRAVENOUS; SUBCUTANEOUS
Status: COMPLETED | OUTPATIENT
Start: 2024-12-14 | End: 2024-12-15

## 2024-12-14 RX ORDER — NALOXONE HYDROCHLORIDE 0.4 MG/ML
0.2 INJECTION, SOLUTION INTRAMUSCULAR; INTRAVENOUS; SUBCUTANEOUS
Status: DISCONTINUED | OUTPATIENT
Start: 2024-12-14 | End: 2024-12-18 | Stop reason: HOSPADM

## 2024-12-14 RX ORDER — ATENOLOL 25 MG/1
100 TABLET ORAL DAILY
Status: DISCONTINUED | OUTPATIENT
Start: 2024-12-14 | End: 2024-12-18 | Stop reason: HOSPADM

## 2024-12-14 RX ORDER — LIDOCAINE 4 G/G
1 PATCH TOPICAL
Status: DISCONTINUED | OUTPATIENT
Start: 2024-12-14 | End: 2024-12-18 | Stop reason: HOSPADM

## 2024-12-14 RX ORDER — HYDROMORPHONE HYDROCHLORIDE 1 MG/ML
0.5 INJECTION, SOLUTION INTRAMUSCULAR; INTRAVENOUS; SUBCUTANEOUS ONCE
Status: COMPLETED | OUTPATIENT
Start: 2024-12-14 | End: 2024-12-14

## 2024-12-14 RX ORDER — ONDANSETRON 2 MG/ML
4 INJECTION INTRAMUSCULAR; INTRAVENOUS EVERY 6 HOURS PRN
Status: DISCONTINUED | OUTPATIENT
Start: 2024-12-14 | End: 2024-12-18 | Stop reason: HOSPADM

## 2024-12-14 RX ORDER — AMOXICILLIN 250 MG
1 CAPSULE ORAL 2 TIMES DAILY PRN
Status: DISCONTINUED | OUTPATIENT
Start: 2024-12-14 | End: 2024-12-18 | Stop reason: HOSPADM

## 2024-12-14 RX ORDER — ONDANSETRON 4 MG/1
4 TABLET, ORALLY DISINTEGRATING ORAL EVERY 6 HOURS PRN
Status: DISCONTINUED | OUTPATIENT
Start: 2024-12-14 | End: 2024-12-18 | Stop reason: HOSPADM

## 2024-12-14 RX ORDER — ACETAMINOPHEN 650 MG/1
975 SUPPOSITORY RECTAL EVERY 8 HOURS PRN
Status: DISCONTINUED | OUTPATIENT
Start: 2024-12-14 | End: 2024-12-18 | Stop reason: HOSPADM

## 2024-12-14 RX ORDER — NALOXONE HYDROCHLORIDE 0.4 MG/ML
0.4 INJECTION, SOLUTION INTRAMUSCULAR; INTRAVENOUS; SUBCUTANEOUS
Status: DISCONTINUED | OUTPATIENT
Start: 2024-12-14 | End: 2024-12-18 | Stop reason: HOSPADM

## 2024-12-14 RX ORDER — OXYCODONE HYDROCHLORIDE 5 MG/1
5 TABLET ORAL ONCE
Status: COMPLETED | OUTPATIENT
Start: 2024-12-14 | End: 2024-12-14

## 2024-12-14 RX ORDER — PROCHLORPERAZINE MALEATE 5 MG/1
10 TABLET ORAL EVERY 6 HOURS PRN
Status: DISCONTINUED | OUTPATIENT
Start: 2024-12-14 | End: 2024-12-18 | Stop reason: HOSPADM

## 2024-12-14 RX ORDER — MULTIPLE VITAMINS W/ MINERALS TAB 9MG-400MCG
1 TAB ORAL DAILY
Status: DISCONTINUED | OUTPATIENT
Start: 2024-12-14 | End: 2024-12-18 | Stop reason: HOSPADM

## 2024-12-14 RX ORDER — ACETAMINOPHEN 325 MG/1
650 TABLET ORAL EVERY 4 HOURS PRN
Status: DISCONTINUED | OUTPATIENT
Start: 2024-12-14 | End: 2024-12-18 | Stop reason: HOSPADM

## 2024-12-14 RX ORDER — FOLIC ACID 1 MG/1
1 TABLET ORAL DAILY
Status: DISCONTINUED | OUTPATIENT
Start: 2024-12-14 | End: 2024-12-18 | Stop reason: HOSPADM

## 2024-12-14 RX ADMIN — HYDROMORPHONE HYDROCHLORIDE 0.5 MG: 1 INJECTION, SOLUTION INTRAMUSCULAR; INTRAVENOUS; SUBCUTANEOUS at 18:37

## 2024-12-14 RX ADMIN — ONDANSETRON 4 MG: 4 TABLET, ORALLY DISINTEGRATING ORAL at 20:52

## 2024-12-14 RX ADMIN — ACETAMINOPHEN 650 MG: 325 TABLET, FILM COATED ORAL at 20:52

## 2024-12-14 RX ADMIN — CARBOXYMETHYLCELLULOSE SODIUM 1 DROP: 5 SOLUTION/ DROPS OPHTHALMIC at 22:59

## 2024-12-14 RX ADMIN — SODIUM CHLORIDE 1000 ML: 9 INJECTION, SOLUTION INTRAVENOUS at 08:38

## 2024-12-14 RX ADMIN — THIAMINE HCL TAB 100 MG 100 MG: 100 TAB at 10:24

## 2024-12-14 RX ADMIN — HYDROMORPHONE HYDROCHLORIDE 0.5 MG: 1 INJECTION, SOLUTION INTRAMUSCULAR; INTRAVENOUS; SUBCUTANEOUS at 21:33

## 2024-12-14 RX ADMIN — FOLIC ACID 1 MG: 1 TABLET ORAL at 10:24

## 2024-12-14 RX ADMIN — Medication 1 TABLET: at 10:24

## 2024-12-14 RX ADMIN — OMEPRAZOLE 20 MG: 20 CAPSULE, DELAYED RELEASE ORAL at 13:19

## 2024-12-14 RX ADMIN — HYDROMORPHONE HYDROCHLORIDE 0.5 MG: 1 INJECTION, SOLUTION INTRAMUSCULAR; INTRAVENOUS; SUBCUTANEOUS at 09:22

## 2024-12-14 RX ADMIN — ATENOLOL 100 MG: 25 TABLET ORAL at 13:19

## 2024-12-14 RX ADMIN — HYDROMORPHONE HYDROCHLORIDE 0.5 MG: 1 INJECTION, SOLUTION INTRAMUSCULAR; INTRAVENOUS; SUBCUTANEOUS at 15:14

## 2024-12-14 RX ADMIN — LIDOCAINE 4% 1 PATCH: 40 PATCH TOPICAL at 21:33

## 2024-12-14 RX ADMIN — KETOROLAC TROMETHAMINE 30 MG: 30 INJECTION, SOLUTION INTRAMUSCULAR at 08:38

## 2024-12-14 RX ADMIN — HYDROMORPHONE HYDROCHLORIDE 0.5 MG: 1 INJECTION, SOLUTION INTRAMUSCULAR; INTRAVENOUS; SUBCUTANEOUS at 11:13

## 2024-12-14 RX ADMIN — DIAZEPAM 5 MG: 5 TABLET ORAL at 15:14

## 2024-12-14 RX ADMIN — OXYCODONE HYDROCHLORIDE 5 MG: 5 TABLET ORAL at 22:37

## 2024-12-14 ASSESSMENT — ACTIVITIES OF DAILY LIVING (ADL)
ADLS_ACUITY_SCORE: 41
CONCENTRATING,_REMEMBERING_OR_MAKING_DECISIONS_DIFFICULTY: OTHER (SEE COMMENTS)
ADLS_ACUITY_SCORE: 20
ADLS_ACUITY_SCORE: 41
ADLS_ACUITY_SCORE: 24
ADLS_ACUITY_SCORE: 41
ADLS_ACUITY_SCORE: 24
ADLS_ACUITY_SCORE: 41

## 2024-12-14 ASSESSMENT — COLUMBIA-SUICIDE SEVERITY RATING SCALE - C-SSRS
1. IN THE PAST MONTH, HAVE YOU WISHED YOU WERE DEAD OR WISHED YOU COULD GO TO SLEEP AND NOT WAKE UP?: NO
2. HAVE YOU ACTUALLY HAD ANY THOUGHTS OF KILLING YOURSELF IN THE PAST MONTH?: NO
6. HAVE YOU EVER DONE ANYTHING, STARTED TO DO ANYTHING, OR PREPARED TO DO ANYTHING TO END YOUR LIFE?: NO

## 2024-12-14 NOTE — PHARMACY-ADMISSION MEDICATION HISTORY
"Pharmacist Admission Medication History    Admission medication history is complete. The information provided in this note is only as accurate as the sources available at the time of the update.    Information Source(s): patient in person in ED     Pertinent Information:   Patient states he has been taking atenolol for a \"dilated aorta\" for several years.   Denied OTC supplements     Allergies reviewed with patient and updates made in EHR: no    Prior to Admission medications    Medication Sig Last Dose       atenolol (TENORMIN) 100 MG tablet Take 1 tablet (100 mg) by mouth daily     12/13/2024                Cielo Walsh, Pharm.D., [facility]  Behavioral Health Inpatient Pharmacist  [facility] ([facility] Emergency Department  Contact via Kartela or Epic Messaging         "

## 2024-12-14 NOTE — ED TRIAGE NOTES
Patient seeking detox from alcohol. Reports last drink PTA. Drinking 6-8 shooters per day. Reports 2 seizures last 3-4 years ago.   Uses marijuana and rarely cocaine.       Patient has history dilated aortic root. He takes daily atenolol for this and was told he did not need surgery.

## 2024-12-14 NOTE — ED PROVIDER NOTES
ED Provider Note  [facility]      History     Chief Complaint   Patient presents with    Alcohol Problem     HPI  Ravi Hankins is a 34 year old male who presents the emergency room with concerns about ongoing alcohol dependence as well as flank pain and abdominal pain.  Patient is a chronic alcoholic is requesting detox but has medical concerns including ongoing flank pain and possible kidney stone.    Past Medical History  Past Medical History:   Diagnosis Date    Dilated aortic root (H)      Past Surgical History:   Procedure Laterality Date    NO HISTORY OF SURGERY       atenolol (TENORMIN) 100 MG tablet  omeprazole (PRILOSEC) 20 MG DR capsule      Allergies   Allergen Reactions    Contrast Dye      PN: LW CM1:  Reaction :  rash    Sulfa Antibiotics      PN: LW Reaction: unsure which kid has allergy per mom???     Family History  Family History   Problem Relation Age of Onset    Heart Disease Brother     Abdominal Aortic Aneurysm Brother     Heart Disease Other         self    Substance Abuse Mother     Substance Abuse Father     Substance Abuse Maternal Grandfather     Diabetes No family hx of     Coronary Artery Disease No family hx of     Hypertension No family hx of     Hyperlipidemia No family hx of     Cerebrovascular Disease No family hx of     Breast Cancer No family hx of     Colon Cancer No family hx of     Prostate Cancer No family hx of     Other Cancer No family hx of     Depression No family hx of     Anxiety Disorder No family hx of     Mental Illness No family hx of     Anesthesia Reaction No family hx of     Asthma No family hx of     Osteoporosis No family hx of     Genetic Disorder No family hx of     Thyroid Disease No family hx of     Obesity No family hx of     Unknown/Adopted No family hx of      Social History   Social History     Tobacco Use    Smoking status: Former     Current packs/day: 0.00     Average packs/day: 0.1 packs/day for 5.0 years (0.5 ttl  "pk-yrs)     Types: Cigarettes     Start date:      Quit date:      Years since quittin.9    Smokeless tobacco: Never    Tobacco comments:     1-2 cigarettes a day for 5 years   Vaping Use    Vaping status: Some Days    Start date: 2021    Substances: Nicotine    Devices: Disposable   Substance Use Topics    Alcohol use: Yes     Comment: daily    Drug use: Yes     Types: Marijuana     Comment: Pt reports smoking a blunt today, pt reports smoking canabis everyday..      A medically appropriate review of systems was performed with pertinent positives and negatives noted in the HPI, and all other systems negative.    Physical Exam   BP: (!) 136/93  Pulse: 83  Temp: 97.9  F (36.6  C)  Resp: 16  Height: 185.4 cm (6' 1\")  Weight: 70.3 kg (155 lb)  SpO2: 93 %  Physical Exam  Constitutional:       General: He is not in acute distress.     Appearance: Normal appearance. He is not toxic-appearing.   HENT:      Head: Atraumatic.   Eyes:      General: No scleral icterus.     Conjunctiva/sclera: Conjunctivae normal.   Cardiovascular:      Rate and Rhythm: Normal rate.      Heart sounds: Normal heart sounds.   Pulmonary:      Effort: Pulmonary effort is normal. No respiratory distress.      Breath sounds: Normal breath sounds.   Abdominal:      Palpations: Abdomen is soft.      Tenderness: There is abdominal tenderness in the left lower quadrant. There is left CVA tenderness.   Musculoskeletal:         General: No deformity.      Cervical back: Neck supple.   Skin:     General: Skin is warm.   Neurological:      General: No focal deficit present.      Mental Status: He is alert and oriented to person, place, and time.   Psychiatric:         Thought Content: Thought content does not include homicidal or suicidal ideation.           ED Course, Procedures, & Data      Procedures       Results for orders placed or performed during the hospital encounter of 24   CT Abdomen Pelvis w/o Contrast     Status: None    " Narrative    EXAM: CT ABDOMEN AND PELVIS WITHOUT CONTRAST  LOCATION: [facility]  DATE: 12/14/2024    INDICATION: Right flank pain.  COMPARISON: CT 05/29/2024.  TECHNIQUE: CT scan of the abdomen and pelvis was performed without IV contrast. Multiplanar reformats were obtained. Dose reduction techniques were used.  CONTRAST: None.    FINDINGS:   LOWER CHEST: Normal.    HEPATOBILIARY: No acute abnormality. Fatty liver. No calcified gallstones.    PANCREAS: Normal.    SPLEEN: Normal.    ADRENAL GLANDS: Normal.    KIDNEYS/BLADDER: Stable nonobstructing stone lower right kidney measuring 4 mm. A tiny 1 mm right intrarenal stone is suggested on image 134. 1-2 mm stone at the left kidney image 119. No hydronephrosis on either side. No visible ureter stone. No bladder   stone.    BOWEL: Normal appendix. No acute bowel abnormality. No obstruction. A few mid to distal small bowel loops are minimally distended with fluid and gas.    LYMPH NODES: Normal.    VASCULATURE: Normal.    PELVIC ORGANS: Normal.    MUSCULOSKELETAL: Normal.      Impression    IMPRESSION:   1.  Small bilateral nonobstructing intrarenal stones. No hydronephrosis or obstructing urolithiasis.  2.  A few mid to distal small bowel loops are minimally distended with fluid and gas. Correlate with an ileus versus enteritis.  3.  Fatty liver.     Comprehensive metabolic panel     Status: Abnormal   Result Value Ref Range    Sodium 142 135 - 145 mmol/L    Potassium 3.6 3.4 - 5.3 mmol/L    Carbon Dioxide (CO2) 21 (L) 22 - 29 mmol/L    Anion Gap 18 (H) 7 - 15 mmol/L    Urea Nitrogen 10.6 6.0 - 20.0 mg/dL    Creatinine 0.98 0.67 - 1.17 mg/dL    GFR Estimate >90 >60 mL/min/1.73m2    Calcium 9.5 8.8 - 10.4 mg/dL    Chloride 103 98 - 107 mmol/L    Glucose 77 70 - 99 mg/dL    Alkaline Phosphatase 77 40 - 150 U/L    AST 29 0 - 45 U/L    ALT 15 0 - 70 U/L    Protein Total 8.4 (H) 6.4 - 8.3 g/dL    Albumin 4.7 3.5 - 5.2 g/dL     Bilirubin Total 0.4 <=1.2 mg/dL   Lipase     Status: Normal   Result Value Ref Range    Lipase 13 13 - 60 U/L   Magnesium     Status: Normal   Result Value Ref Range    Magnesium 2.0 1.7 - 2.3 mg/dL   Alcohol level blood     Status: Abnormal   Result Value Ref Range    Alcohol ethyl 0.18 (H) <=0.01 g/dL   CBC with platelets and differential     Status: None   Result Value Ref Range    WBC Count 7.4 4.0 - 11.0 10e3/uL    RBC Count 4.94 4.40 - 5.90 10e6/uL    Hemoglobin 15.3 13.3 - 17.7 g/dL    Hematocrit 45.1 40.0 - 53.0 %    MCV 91 78 - 100 fL    MCH 31.0 26.5 - 33.0 pg    MCHC 33.9 31.5 - 36.5 g/dL    RDW 13.1 10.0 - 15.0 %    Platelet Count 171 150 - 450 10e3/uL    % Neutrophils 61 %    % Lymphocytes 26 %    % Monocytes 12 %    % Eosinophils 0 %    % Basophils 0 %    % Immature Granulocytes 0 %    NRBCs per 100 WBC 0 <1 /100    Absolute Neutrophils 4.5 1.6 - 8.3 10e3/uL    Absolute Lymphocytes 2.0 0.8 - 5.3 10e3/uL    Absolute Monocytes 0.9 0.0 - 1.3 10e3/uL    Absolute Eosinophils 0.0 0.0 - 0.7 10e3/uL    Absolute Basophils 0.0 0.0 - 0.2 10e3/uL    Absolute Immature Granulocytes 0.0 <=0.4 10e3/uL    Absolute NRBCs 0.0 10e3/uL   Urine Drug Screen Panel     Status: Abnormal   Result Value Ref Range    Amphetamines Urine Screen Negative Screen Negative    Barbituates Urine Screen Negative Screen Negative    Benzodiazepine Urine Screen Negative Screen Negative    Cannabinoids Urine Screen Positive (A) Screen Negative    Cocaine Urine Screen Positive (A) Screen Negative    Fentanyl Qual Urine Screen Negative Screen Negative    Opiates Urine Screen Negative Screen Negative    PCP Urine Screen Negative Screen Negative   UA with Microscopic     Status: Abnormal   Result Value Ref Range    Color Urine Yellow Colorless, Straw, Light Yellow, Yellow    Appearance Urine Clear Clear    Glucose Urine Negative Negative mg/dL    Bilirubin Urine Negative Negative    Ketones Urine 10 (A) Negative mg/dL    Specific Gravity Urine  1.026 1.003 - 1.035    Blood Urine Negative Negative    pH Urine 5.5 5.0 - 7.0    Protein Albumin Urine 50 (A) Negative mg/dL    Urobilinogen Urine Normal Normal, 2.0 mg/dL    Nitrite Urine Negative Negative    Leukocyte Esterase Urine Negative Negative    Mucus Urine Present (A) None Seen /LPF    RBC Urine 4 (H) <=2 /HPF    WBC Urine 9 (H) <=5 /HPF   EKG 12-lead, tracing only     Status: None   Result Value Ref Range    Systolic Blood Pressure  mmHg    Diastolic Blood Pressure  mmHg    Ventricular Rate 70 BPM    Atrial Rate 70 BPM    AZ Interval 160 ms    QRS Duration 92 ms     ms    QTc 451 ms    P Axis 70 degrees    R AXIS 64 degrees    T Axis 65 degrees    Interpretation ECG       Sinus rhythm  Moderate voltage criteria for LVH, may be normal variant  Borderline ECG  Unconfirmed report - interpretation of this ECG is computer generated - see medical record for final interpretation    Confirmed by - EMERGENCY ROOM, PHYSICIAN ([id number]),  Mabel Ruiz ([id number]) on 12/14/2024 11:45:03 AM     CBC with platelets differential     Status: None    Narrative    The following orders were created for panel order CBC with platelets differential.  Procedure                               Abnormality         Status                     ---------                               -----------         ------                     CBC with platelets and d...[[id number]]                      Final result                 Please view results for these tests on the individual orders.   Urine Drug Screen     Status: Abnormal    Narrative    The following orders were created for panel order Urine Drug Screen.  Procedure                               Abnormality         Status                     ---------                               -----------         ------                     Urine Drug Screen Panel[[id number]]      Abnormal            Final result                 Please view results for these tests on the individual orders.      Medications   diazepam (VALIUM) tablet 5-20 mg (5 mg Oral $Given 12/14/24 1514)   thiamine (B-1) tablet 100 mg (100 mg Oral $Given 12/14/24 1024)   folic acid (FOLVITE) tablet 1 mg (1 mg Oral $Given 12/14/24 1024)   multivitamin w/minerals (THERA-VIT-M) tablet 1 tablet (1 tablet Oral $Given 12/14/24 1024)   HYDROmorphone (PF) (DILAUDID) injection 0.5 mg (0.5 mg Intravenous $Given 12/14/24 1514)   senna-docusate (SENOKOT-S/PERICOLACE) 8.6-50 MG per tablet 1 tablet (has no administration in time range)     Or   senna-docusate (SENOKOT-S/PERICOLACE) 8.6-50 MG per tablet 2 tablet (has no administration in time range)   ondansetron (ZOFRAN ODT) ODT tab 4 mg (has no administration in time range)     Or   ondansetron (ZOFRAN) injection 4 mg (has no administration in time range)   prochlorperazine (COMPAZINE) injection 10 mg (has no administration in time range)     Or   prochlorperazine (COMPAZINE) tablet 10 mg (has no administration in time range)   atenolol (TENORMIN) tablet 100 mg (100 mg Oral $Given 12/14/24 1319)   omeprazole (PriLOSEC) CR capsule 20 mg (20 mg Oral $Given 12/14/24 1319)   acetaminophen (TYLENOL) tablet 650 mg (has no administration in time range)     Or   acetaminophen (TYLENOL) Suppository 975 mg (has no administration in time range)   sodium chloride 0.9% BOLUS 1,000 mL (0 mLs Intravenous Stopped 12/14/24 1025)   ketorolac (TORADOL) injection 30 mg (30 mg Intravenous $Given 12/14/24 0838)   HYDROmorphone (PF) (DILAUDID) injection 0.5 mg (0.5 mg Intravenous $Given 12/14/24 0922)   HYDROmorphone (PF) (DILAUDID) injection 0.5 mg (0.5 mg Intravenous $Given 12/14/24 1113)     Labs Ordered and Resulted from Time of ED Arrival to Time of ED Departure   COMPREHENSIVE METABOLIC PANEL - Abnormal       Result Value    Sodium 142      Potassium 3.6      Carbon Dioxide (CO2) 21 (*)     Anion Gap 18 (*)     Urea Nitrogen 10.6      Creatinine 0.98      GFR Estimate >90      Calcium 9.5      Chloride 103       Glucose 77      Alkaline Phosphatase 77      AST 29      ALT 15      Protein Total 8.4 (*)     Albumin 4.7      Bilirubin Total 0.4     ETHYL ALCOHOL LEVEL - Abnormal    Alcohol ethyl 0.18 (*)    URINE DRUG SCREEN PANEL - Abnormal    Amphetamines Urine Screen Negative      Barbituates Urine Screen Negative      Benzodiazepine Urine Screen Negative      Cannabinoids Urine Screen Positive (*)     Cocaine Urine Screen Positive (*)     Fentanyl Qual Urine Screen Negative      Opiates Urine Screen Negative      PCP Urine Screen Negative     ROUTINE UA WITH MICROSCOPIC - Abnormal    Color Urine Yellow      Appearance Urine Clear      Glucose Urine Negative      Bilirubin Urine Negative      Ketones Urine 10 (*)     Specific Gravity Urine 1.026      Blood Urine Negative      pH Urine 5.5      Protein Albumin Urine 50 (*)     Urobilinogen Urine Normal      Nitrite Urine Negative      Leukocyte Esterase Urine Negative      Mucus Urine Present (*)     RBC Urine 4 (*)     WBC Urine 9 (*)    LIPASE - Normal    Lipase 13     MAGNESIUM - Normal    Magnesium 2.0     CBC WITH PLATELETS AND DIFFERENTIAL    WBC Count 7.4      RBC Count 4.94      Hemoglobin 15.3      Hematocrit 45.1      MCV 91      MCH 31.0      MCHC 33.9      RDW 13.1      Platelet Count 171      % Neutrophils 61      % Lymphocytes 26      % Monocytes 12      % Eosinophils 0      % Basophils 0      % Immature Granulocytes 0      NRBCs per 100 WBC 0      Absolute Neutrophils 4.5      Absolute Lymphocytes 2.0      Absolute Monocytes 0.9      Absolute Eosinophils 0.0      Absolute Basophils 0.0      Absolute Immature Granulocytes 0.0      Absolute NRBCs 0.0       CT Abdomen Pelvis w/o Contrast   Final Result   IMPRESSION:    1.  Small bilateral nonobstructing intrarenal stones. No hydronephrosis or obstructing urolithiasis.   2.  A few mid to distal small bowel loops are minimally distended with fluid and gas. Correlate with an ileus versus enteritis.   3.  Fatty  liver.                Critical care was not performed.     Medical Decision Making  The patient's presentation was of high complexity (a chronic illness severe exacerbation, progression, or side effect of treatment).    The patient's evaluation involved:  ordering and/or review of 3+ test(s) in this encounter (see separate area of note for details)    The patient's management necessitated high risk (a decision regarding hospitalization).    Assessment & Plan        I have reviewed the nursing notes. I have reviewed the findings, diagnosis, plan and need for follow up with the patient.        Final diagnoses:   Alcohol dependence with intoxication with complication (H)   Generalized abdominal pain   Ileus (H)         [facility] EMERGENCY DEPARTMENT  12/14/2024     Rell Schumacher MD  12/14/24 [id number]

## 2024-12-14 NOTE — H&P
[facility] History [facility] Physical  Department of Internal Medicine    Patient Name: Ravi Hankins MRN# [id number]   Age: 34 year old YOB: 1990     Date of Admission:12/14/2024      Primary care provider: Monie Avilez  Date of Service: 12/14/2024  Admitting Team: [facility]         Assessment and Plan:   Ravi Hankins is a 34 year old male with history of EtOH use disorder, seizures, recreational drug and nicotine use who presents to the ED seeking alcohol detox treatment.    Assessment and plan  EtOH use disorder, on placed on MSSA protocol.  Does not exhibits signs of EtOH withdrawal at this moment.  Today would not accept him due to all pain complaints while in the ED.  Patient wishes to undergo inpatient alcohol treatment.  Rested and lodging plus.  Will get chemical dependency consult.  History of seizures, not on antiseizure medications  Right flank pain.  Pain is now subsided with use of IV Toradol 30 mg x 2 and Dilaudid IV that he received in the ED.  History of nephrolithiasis  Recreational use of cocaine and, nicotine.     Disposition: Inpatient alcohol treatment/lodging plus       CODE: Full code  Diet/IVF: Regular diet  DVT ppx: Ambulate every shift  Disposition/Admission Status: Observation    Melva Valenzuela MD  Internal Medicine Staff Hospitalist  [facility]  Pager: [phone]       Chief Complaint:   Alcohol detox treatment         HPI:   This is a 34-year-old male with history of EtOH use disorder, recreational use of cocaine and cigarette smoking who presents to the ED seeking alcohol treatment.  The patient states he was drinking heavily last night and his last shot of alcohol was earlier this morning before he came to the ED.  He denies any vomiting or diarrhea.  Complains about right flank pain.  No hematuria.  Notes history of right nephrolithiasis and chronic right flank pain as a result.  Patient states he has high pain tolerance.  Denies  any past other medical histories.  Family history of alcoholism.  He was at [facility] in the past for alcohol treatment and wishes to go to inpatient chemotherapy treatment.  The ED was given Toradol 30 mg IV x 2, Dilaudid IV still was complaining of right flank pain.  I ordered an additional dose of Dilaudid 0.5 mg IV and when I came to see him he states his pain is pretty much subsided.  No vomiting.  Denies any nausea.  Lab work shows a normal CBC.  BMP also shows a normal WBC, hemoglobin and serum creatinine level.  Urine toxicology screen is positive for cocaine and cannabis.  His serum alcohol level was 0.18.  EKG done in the ED showed normal sinus rhythm  CT of the abdomen pelvis with contrast showed nonobstructing bilateral  intrarenal stones with no hydronephrosis.  He does have mid to distal small bowel loop that is distended with fluid and gas levels.  Abdominal exam is benign.           Past Medical History:     Past Medical History:   Diagnosis Date    Dilated aortic root (H)      Reviewed       Past Surgical History:     Past Surgical History:   Procedure Laterality Date    NO HISTORY OF SURGERY                Social History:   Reviewed  Social History     Socioeconomic History    Marital status: Single     Spouse name: Not on file    Number of children: Not on file    Years of education: Not on file    Highest education level: Not on file   Occupational History    Not on file   Tobacco Use    Smoking status: Former     Current packs/day: 0.00     Average packs/day: 0.1 packs/day for 5.0 years (0.5 ttl pk-yrs)     Types: Cigarettes     Start date:      Quit date:      Years since quittin.9    Smokeless tobacco: Never    Tobacco comments:     1-2 cigarettes a day for 5 years   Vaping Use    Vaping status: Some Days    Start date: 2021    Substances: Nicotine    Devices: Disposable   Substance and Sexual Activity    Alcohol use: Yes     Comment: daily    Drug use: Yes     Types:  Marijuana     Comment: Pt reports smoking a blunt today, pt reports smoking canabis everyday..    Sexual activity: Yes     Partners: Female   Other Topics Concern    Parent/sibling w/ CABG, MI or angioplasty before 65F 55M? No   Social History Narrative    Not on file     Social Drivers of Health     Financial Resource Strain: Low Risk  (2/22/2024)    Financial Resource Strain     Within the past 12 months, have you or your family members you live with been unable to get utilities (heat, electricity) when it was really needed?: No   Food Insecurity: Low Risk  (2/22/2024)    Food Insecurity     Within the past 12 months, did you worry that your food would run out before you got money to buy more?: No     Within the past 12 months, did the food you bought just not last and you didn t have money to get more?: No   Transportation Needs: Low Risk  (2/22/2024)    Transportation Needs     Within the past 12 months, has lack of transportation kept you from medical appointments, getting your medicines, non-medical meetings or appointments, work, or from getting things that you need?: No   Physical Activity: Unknown (2/22/2024)    Exercise Vital Sign     Days of Exercise per Week: 7 days     Minutes of Exercise per Session: Not on file   Stress: No Stress Concern Present (2/22/2024)    [employer] - Occupational Stress Questionnaire     Feeling of Stress : Not at all   Social Connections: Unknown (2/22/2024)    Social Connection and Isolation Panel [NHANES]     Frequency of Communication with Friends and Family: Not on file     Frequency of Social Gatherings with Friends and Family: Once a week     Attends Bahai Services: Not on file     Active Member of Clubs or Organizations: Not on file     Attends Club or Organization Meetings: Not on file     Marital Status: Not on file   Interpersonal Safety: Not At Risk (6/12/2024)    Received from [facility]     Humiliation, Afraid, Rape,  and Kick questionnaire     Fear of Current or Ex-Partner: No     Emotionally Abused: No     Physically Abused: No     Sexually Abused: No   Housing Stability: Low Risk  (2/22/2024)    Housing Stability     Do you have housing? : Yes     Are you worried about losing your housing?: No            Family History:     Family History   Problem Relation Age of Onset    Heart Disease Brother     Abdominal Aortic Aneurysm Brother     Heart Disease Other         self    Substance Abuse Mother     Substance Abuse Father     Substance Abuse Maternal Grandfather     Diabetes No family hx of     Coronary Artery Disease No family hx of     Hypertension No family hx of     Hyperlipidemia No family hx of     Cerebrovascular Disease No family hx of     Breast Cancer No family hx of     Colon Cancer No family hx of     Prostate Cancer No family hx of     Other Cancer No family hx of     Depression No family hx of     Anxiety Disorder No family hx of     Mental Illness No family hx of     Anesthesia Reaction No family hx of     Asthma No family hx of     Osteoporosis No family hx of     Genetic Disorder No family hx of     Thyroid Disease No family hx of     Obesity No family hx of     Unknown/Adopted No family hx of      Reviewed         Immunizations:     Immunization History   Administered Date(s) Administered    HEPATITIS A (PEDS 12M-18Y) 08/02/2011    HIB (PRP-T) 1990, 04/17/1991, 09/10/1991    HPV Quadrivalent 10/25/2010, 07/21/2011, 07/24/2012    HepB 08/26/1999, 09/28/1999, 03/27/2000    Hepatitis A (ADULT 19+) 07/24/2012    Hepatitis B, Peds 08/26/1999, 09/28/1999, 03/27/2000    Historic Hib Hib-titer 1990, 04/17/1991, 09/10/1991    Historical DTP/aP 1990, 1990, 1990, 08/27/1992, 05/30/1995    Influenza (IIV3) PF 10/24/2008    MMR 09/10/1991, 08/26/1999    Meningococcal ACWY (Menactra ) 07/21/2011    OPV, trivalent, live 1990, 1990, 08/27/1992, 05/30/1995    TD,PF 7+ (Tenivac)  "01/29/2003    TDAP (Adacel,Boostrix) 09/20/2010    TDAP Vaccine (Adacel) 07/17/2020    Varicella 04/06/2011    Varicella Pt Report Hx of Varicella/Chicken Pox 01/01/1997              Allergies:      Allergies   Allergen Reactions    Contrast Dye      PN: LW CM1:  Reaction :  rash    Sulfa Antibiotics      PN: LW Reaction: unsure which kid has allergy per mom???            Medications:     Current Outpatient Medications   Medication Instructions    atenolol (TENORMIN) 100 mg, Oral, DAILY    omeprazole (PRILOSEC) 20 mg, Oral, DAILY            Review of Systems:   A complete ROS was performed and is negative other than what is stated in the HPI.          Physical Exam:   BP (!) 147/82   Pulse 69   Temp 98.4  F (36.9  C) (Oral)   Resp 16   Ht 1.854 m (6' 1\")   Wt 70.3 kg (155 lb)   SpO2 96%   BMI 20.45 kg/m       GENERAL: Alert and oriented x 3. NAD.   HEENT: Anicteric sclera. Mucous membranes moist.   CV: RRR. S1, S2. No murmurs appreciated.   RESPIRATORY: Effort normal on RA. Lungs CTAB with no wheezing, rales, rhonchi.   GI: Abdomen soft and non distended with normoactive bowel sounds present in all quadrants. No tenderness, rebound, guarding.   NEUROLOGICAL: No focal deficits. Moves all extremities.    EXTREMITIES: No peripheral edema. Intact bilateral pedal pulses.   SKIN: No jaundice. No rashes.          Data:   CBC RESULTS:   Recent Labs   Lab Test 12/14/24  0736   WBC 7.4   RBC 4.94   HGB 15.3   HCT 45.1   MCV 91   MCH 31.0   MCHC 33.9   RDW 13.1        Last Comprehensive Metabolic Panel:  Lab Results   Component Value Date     12/14/2024    POTASSIUM 3.6 12/14/2024    CHLORIDE 103 12/14/2024    CO2 21 (L) 12/14/2024    ANIONGAP 18 (H) 12/14/2024    GLC 77 12/14/2024    BUN 10.6 12/14/2024    CR 0.98 12/14/2024    GFRESTIMATED >90 12/14/2024    ASHLEY 9.5 12/14/2024       Liver Function Studies -   Recent Labs   Lab Test 12/14/24  0736   PROTTOTAL 8.4*   ALBUMIN 4.7   BILITOTAL 0.4   ALKPHOS 77 "   AST 29   ALT 15     CT Abdomen Pelvis w/o Contrast   Final Result   IMPRESSION:    1.  Small bilateral nonobstructing intrarenal stones. No hydronephrosis or obstructing urolithiasis.   2.  A few mid to distal small bowel loops are minimally distended with fluid and gas. Correlate with an ileus versus enteritis.   3.  Fatty liver.

## 2024-12-15 PROCEDURE — 250N000013 HC RX MED GY IP 250 OP 250 PS 637: Performed by: INTERNAL MEDICINE

## 2024-12-15 PROCEDURE — G0378 HOSPITAL OBSERVATION PER HR: HCPCS

## 2024-12-15 PROCEDURE — 96376 TX/PRO/DX INJ SAME DRUG ADON: CPT

## 2024-12-15 PROCEDURE — 250N000011 HC RX IP 250 OP 636: Performed by: INTERNAL MEDICINE

## 2024-12-15 PROCEDURE — 250N000013 HC RX MED GY IP 250 OP 250 PS 637: Performed by: PHYSICIAN ASSISTANT

## 2024-12-15 PROCEDURE — 99232 SBSQ HOSP IP/OBS MODERATE 35: CPT | Performed by: INTERNAL MEDICINE

## 2024-12-15 RX ORDER — OXYCODONE HYDROCHLORIDE 5 MG/1
5 TABLET ORAL EVERY 4 HOURS PRN
Status: DISCONTINUED | OUTPATIENT
Start: 2024-12-15 | End: 2024-12-18 | Stop reason: HOSPADM

## 2024-12-15 RX ORDER — KETOROLAC TROMETHAMINE 30 MG/ML
30 INJECTION, SOLUTION INTRAMUSCULAR; INTRAVENOUS ONCE
Status: COMPLETED | OUTPATIENT
Start: 2024-12-15 | End: 2024-12-15

## 2024-12-15 RX ADMIN — FOLIC ACID 1 MG: 1 TABLET ORAL at 08:09

## 2024-12-15 RX ADMIN — OMEPRAZOLE 20 MG: 20 CAPSULE, DELAYED RELEASE ORAL at 08:09

## 2024-12-15 RX ADMIN — OXYCODONE HYDROCHLORIDE 5 MG: 5 TABLET ORAL at 17:57

## 2024-12-15 RX ADMIN — HYDROMORPHONE HYDROCHLORIDE 0.5 MG: 1 INJECTION, SOLUTION INTRAMUSCULAR; INTRAVENOUS; SUBCUTANEOUS at 03:35

## 2024-12-15 RX ADMIN — OXYCODONE HYDROCHLORIDE 5 MG: 5 TABLET ORAL at 22:09

## 2024-12-15 RX ADMIN — HYDROMORPHONE HYDROCHLORIDE 0.5 MG: 1 INJECTION, SOLUTION INTRAMUSCULAR; INTRAVENOUS; SUBCUTANEOUS at 00:35

## 2024-12-15 RX ADMIN — DIAZEPAM 10 MG: 5 TABLET ORAL at 09:50

## 2024-12-15 RX ADMIN — LIDOCAINE 4% 1 PATCH: 40 PATCH TOPICAL at 20:40

## 2024-12-15 RX ADMIN — Medication 1 TABLET: at 08:09

## 2024-12-15 RX ADMIN — THIAMINE HCL TAB 100 MG 100 MG: 100 TAB at 08:09

## 2024-12-15 RX ADMIN — ATENOLOL 100 MG: 25 TABLET ORAL at 08:09

## 2024-12-15 RX ADMIN — HYDROMORPHONE HYDROCHLORIDE 0.5 MG: 1 INJECTION, SOLUTION INTRAMUSCULAR; INTRAVENOUS; SUBCUTANEOUS at 06:32

## 2024-12-15 RX ADMIN — OXYCODONE HYDROCHLORIDE 5 MG: 5 TABLET ORAL at 13:31

## 2024-12-15 ASSESSMENT — ACTIVITIES OF DAILY LIVING (ADL)
ADLS_ACUITY_SCORE: 24

## 2024-12-15 NOTE — PROGRESS NOTES
Admission Note    Reason for admission: alcohol detox, R flank pain     Primary team notified of pt arrival.  Admitted from: [facility] ED  Via: wheelchair  Accompanied by: transport staff  Belongings: valuables remain with pt room  Admission Required Doc Completed: Yes  Teaching: Orientation to unit and call light- call light within reach, use of console, meal times, when to call for the RN, and enforced importance of safety.  IV Access: RPIV SL  Telemetry: No  Ht./Wt.: Completed  Code Status verified on armband: Yes  2 RN Skin Assessment Completed with: Kate Hargrove RN, no significant skin issue.  Suction/Ambu bag/Flowmeter at bedside: Yes    Pt status:    Temp: 97.9  F (36.6  C) Temp src: Oral BP: (!) 156/110 Pulse: 53   Resp: 16 SpO2: 97 % O2 Device: None (Room air)   constant flank pain rated 8/10.

## 2024-12-15 NOTE — PROGRESS NOTES
[facility] - Internal Medicine Daily Note   Date of Service: 12/15/2024  Patient: Ravi Hankins  MRN: [id number]  Admission Date: 12/14/2024  Hospital Day # 1    Assessment & Plan      Ravi Hankins is a 34 year old male with history of EtOH use disorder, seizures, recreational drug and nicotine use who presents to the ED seeking alcohol detox treatment.     Assessment and plan  EtOH use disorder, on placed on MSSA protocol.  Does not exhibits signs of EtOH withdrawal at this moment.  Today would not accept him due to all pain complaints while in the ED.  Patient wishes to undergo inpatient alcohol treatment.  Rested and lodging plus.  Will get chemical dependency consult.  Patient is interested in getting into treatment at [facility] plus  History of seizures, not on antiseizure medications  Right flank pain.  Pain is now subsided with use of IV Toradol 30 mg x 2 and Dilaudid IV that he received in the ED. reports continued right flank pain at 8/10.  Asks for stronger pain medication.  Requests oxycodone  History of nephrolithiasis  Recreational use of cocaine and, nicotine.     Disposition: Inpatient alcohol treatment/lodging plus        CODE: Full code  Diet/IVF: Regular diet  DVT ppx: Ambulate every shift  Disposition/Admission Status: Observation     Melva Valenzuela MD  Internal Medicine Staff Hospitalist   [facility]   Pager: [phone]    Team: [facility]  Page Cross Cover after 5 pm: pager [phone]   ___________________________________________________________________    Subjective & Interval Hx:    Patient reports right flank pain, rates his pain at 8/10.  No any vomiting.  No diarrhea.  Eating well.  Interested in going to lodging plus    Last 24 hr care team notes reviewed.   ROS:  4 point ROS including Respiratory, CV, GI and , other than that noted in the HPI, is negative.    Medications: Reviewed in EPIC. List below for reference    Current  Facility-Administered Medications:     acetaminophen (TYLENOL) tablet 650 mg, 650 mg, Oral, Q4H PRN, 650 mg at 12/14/24 2052 **OR** acetaminophen (TYLENOL) Suppository 975 mg, 975 mg, Rectal, Q8H PRN, Melva Valenzuela MD    atenolol (TENORMIN) tablet 100 mg, 100 mg, Oral, Daily, Melva Valenzuela MD, 100 mg at 12/15/24 0809    carboxymethylcellulose PF (REFRESH PLUS) 0.5 % ophthalmic solution 1 drop, 1 drop, Both Eyes, Q1H PRN, Katrina Ma PA-C, 1 drop at 12/14/24 2259    diazepam (VALIUM) tablet 5-20 mg, 5-20 mg, Oral, Q30 Min PRN, Melva Valenzuela MD, 10 mg at 12/15/24 0950    folic acid (FOLVITE) tablet 1 mg, 1 mg, Oral, Daily, Melva Valenzuela MD, 1 mg at 12/15/24 0809    Lidocaine (LIDOCARE) 4 % Patch 1 patch, 1 patch, Transdermal, Q24h, Katrina Ma PA-C, 1 patch at 12/14/24 2133    multivitamin w/minerals (THERA-VIT-M) tablet 1 tablet, 1 tablet, Oral, Daily, Melva Valenzuela MD, 1 tablet at 12/15/24 0809    naloxone (NARCAN) injection 0.2 mg, 0.2 mg, Intravenous, Q2 Min PRN **OR** naloxone (NARCAN) injection 0.4 mg, 0.4 mg, Intravenous, Q2 Min PRN **OR** naloxone (NARCAN) injection 0.2 mg, 0.2 mg, Intramuscular, Q2 Min PRN **OR** naloxone (NARCAN) injection 0.4 mg, 0.4 mg, Intramuscular, Q2 Min PRN, Melva Valenzuela MD    omeprazole (PriLOSEC) CR capsule 20 mg, 20 mg, Oral, Daily, Melva Valenzuela MD, 20 mg at 12/15/24 0809    ondansetron (ZOFRAN ODT) ODT tab 4 mg, 4 mg, Oral, Q6H PRN, 4 mg at 12/14/24 2052 **OR** ondansetron (ZOFRAN) injection 4 mg, 4 mg, Intravenous, Q6H PRN, Melva Valenzuela MD    prochlorperazine (COMPAZINE) injection 10 mg, 10 mg, Intravenous, Q6H PRN **OR** prochlorperazine (COMPAZINE) tablet 10 mg, 10 mg, Oral, Q6H PRN, Melva Valenzuela MD    senna-docusate (SENOKOT-S/PERICOLACE) 8.6-50 MG per tablet 1 tablet, 1 tablet, Oral, BID PRN **OR** senna-docusate (SENOKOT-S/PERICOLACE) 8.6-50 MG per tablet 2 tablet, 2 tablet, Oral, BID PRN, Melva Valenzuela MD     "thiamine (B-1) tablet 100 mg, 100 mg, Oral, Daily, Melva Valenzuela MD, 100 mg at 12/15/24 0809    Facility-Administered Medications Ordered in Other Encounters:     Self Administer Medications: Behavioral Services, , Does not apply, See Admin Instructions, Stu Platt MD    Physical Exam:    BP (!) 145/113 (BP Location: Left arm, Patient Position: Semi-Carrillo's, Cuff Size: Adult Regular)   Pulse 62   Temp 97.8  F (36.6  C) (Oral)   Resp 16   Ht 1.854 m (6' 0.99\")   Wt 68.7 kg (151 lb 7.3 oz)   SpO2 97%   BMI 19.99 kg/m       GENERAL: Alert and oriented x 3. NAD.   HEENT: Anicteric sclera. Mucous membranes moist.   CV: RRR. S1, S2. No murmurs appreciated.   RESPIRATORY: Effort normal on RA. Lungs CTAB with no wheezing, rales, rhonchi.   GI: Abdomen soft and non distended with normoactive bowel sounds present in all quadrants. No tenderness, rebound, guarding.   NEUROLOGICAL: No focal deficits. Moves all extremities.    EXTREMITIES: No peripheral edema. Intact bilateral pedal pulses.   SKIN: No jaundice. No rashes.     Labs & Studies of Note: I personally reviewed the following studies:  CBC RESULTS:   Recent Labs   Lab Test 12/14/24  0736   WBC 7.4   RBC 4.94   HGB 15.3   HCT 45.1   MCV 91   MCH 31.0   MCHC 33.9   RDW 13.1        Last Comprehensive Metabolic Panel:  Lab Results   Component Value Date     12/14/2024    POTASSIUM 3.6 12/14/2024    CHLORIDE 103 12/14/2024    CO2 21 (L) 12/14/2024    ANIONGAP 18 (H) 12/14/2024    GLC 77 12/14/2024    BUN 10.6 12/14/2024    CR 0.98 12/14/2024    GFRESTIMATED >90 12/14/2024    ASHLEY 9.5 12/14/2024       /Liver Function Studies -   Recent Labs   Lab Test 12/14/24  0736   PROTTOTAL 8.4*   ALBUMIN 4.7   BILITOTAL 0.4   ALKPHOS 77   AST 29   ALT 15       "

## 2024-12-15 NOTE — PLAN OF CARE
Goal Outcome Evaluation:      Plan of Care Reviewed With: patient    Overall Patient Progress: no changeOverall Patient Progress: no change    Shift 2923-9245    No acute event overnight. Pt is pleasant. AxOx4, denies sob, cp, or cough. Constant right flank pain and R CVA tenderness, with PRN IV dilaudid. Pt endorsed nausea and poor appetite, and stated relief after PRN zofran administration. VSS except BP. Provider made known. CIWA scored from 0-3 overnight. R PIV SL. Ind in room and ambulating in the halllway. No other medical concern. POC ongoing.    Temp: 98  F (36.7  C) Temp src: Oral BP: (!) 128/93 Pulse: 62   Resp: 16 SpO2: 95 % O2 Device: None (Room air)

## 2024-12-15 NOTE — PROGRESS NOTES
"VS: BP (!) 138/105 (BP Location: Left arm, Patient Position: Semi-Carrillo's, Cuff Size: Adult Regular)   Pulse 62   Temp 97.8  F (36.6  C) (Oral)   Resp 16   Ht 1.854 m (6' 0.99\")   Wt 68.7 kg (151 lb 7.3 oz)   SpO2 97%   BMI 19.99 kg/m     O2: SpO2 stable on RA. LS clear and equal bilaterally. Denies chest pain and SOB.    Output: Voids spontaneously without difficulty to bathroom.   Last BM: unknown, denies abdominal discomfort. BS active / passing flatus.    Activity: Independent.    Skin: WDL   Pain: Pain managed with PRN Oxycodone.   CMS: Intact, AOx4. Denies numbness and tingling.   Dressing: No skin issues.   Diet: Regular diet. Denies nausea/vomiting.    LDA: Right PIV SL.   Equipment: IV pole and personal belongings at bedside.    Plan: Continue with plan of care. Call light within reach, pt able to make needs known.    Additional Info:        "

## 2024-12-15 NOTE — PLAN OF CARE
"Goal Outcome Evaluation:      Plan of Care Reviewed With: patient    Overall Patient Progress: no changeOverall Patient Progress: no change    A&O X 4, independent. Continent BB. Diet regular, thin, whole. Denies chest pains & SOB. No N/T. C/O pain R Lower Back  8/10 - managed w/ PRN oxy 2x (with relief)  & lidocaine patch applied this shift. Pt able to make needs known. Bed low, locked, & call light within reach. Will continue POC.    Additional info: CIWA score 1    BP (!) 143/106 (BP Location: Left arm)   Pulse 55   Temp 98.4  F (36.9  C) (Oral)   Resp 18   Ht 1.854 m (6' 0.99\")   Wt 68.7 kg (151 lb 7.3 oz)   SpO2 96%   BMI 19.99 kg/m      "

## 2024-12-16 ENCOUNTER — TELEPHONE (OUTPATIENT)
Dept: BEHAVIORAL HEALTH | Facility: CLINIC | Age: 34
End: 2024-12-16
Payer: COMMERCIAL

## 2024-12-16 DIAGNOSIS — F19.20 CHEMICAL DEPENDENCY (H): Primary | ICD-10-CM

## 2024-12-16 DIAGNOSIS — F10.10 ALCOHOL ABUSE: ICD-10-CM

## 2024-12-16 PROCEDURE — G0378 HOSPITAL OBSERVATION PER HR: HCPCS

## 2024-12-16 PROCEDURE — 250N000013 HC RX MED GY IP 250 OP 250 PS 637: Performed by: INTERNAL MEDICINE

## 2024-12-16 PROCEDURE — 99232 SBSQ HOSP IP/OBS MODERATE 35: CPT | Performed by: INTERNAL MEDICINE

## 2024-12-16 PROCEDURE — 250N000013 HC RX MED GY IP 250 OP 250 PS 637: Performed by: PHYSICIAN ASSISTANT

## 2024-12-16 RX ADMIN — FOLIC ACID 1 MG: 1 TABLET ORAL at 09:11

## 2024-12-16 RX ADMIN — THIAMINE HCL TAB 100 MG 100 MG: 100 TAB at 09:11

## 2024-12-16 RX ADMIN — LIDOCAINE 4% 1 PATCH: 40 PATCH TOPICAL at 21:13

## 2024-12-16 RX ADMIN — OXYCODONE HYDROCHLORIDE 5 MG: 5 TABLET ORAL at 02:08

## 2024-12-16 RX ADMIN — OXYCODONE HYDROCHLORIDE 5 MG: 5 TABLET ORAL at 11:52

## 2024-12-16 RX ADMIN — ATENOLOL 100 MG: 25 TABLET ORAL at 07:08

## 2024-12-16 RX ADMIN — OMEPRAZOLE 20 MG: 20 CAPSULE, DELAYED RELEASE ORAL at 09:11

## 2024-12-16 RX ADMIN — OXYCODONE HYDROCHLORIDE 5 MG: 5 TABLET ORAL at 07:07

## 2024-12-16 RX ADMIN — Medication 1 TABLET: at 09:11

## 2024-12-16 ASSESSMENT — ACTIVITIES OF DAILY LIVING (ADL)
ADLS_ACUITY_SCORE: 29
ADLS_ACUITY_SCORE: 24
ADLS_ACUITY_SCORE: 29
ADLS_ACUITY_SCORE: 24
ADLS_ACUITY_SCORE: 29
ADLS_ACUITY_SCORE: 24
ADLS_ACUITY_SCORE: 24
ADLS_ACUITY_SCORE: 29
ADLS_ACUITY_SCORE: 24
ADLS_ACUITY_SCORE: 29
ADLS_ACUITY_SCORE: 24
ADLS_ACUITY_SCORE: 24
DEPENDENT_IADLS:: INDEPENDENT
ADLS_ACUITY_SCORE: 24
ADLS_ACUITY_SCORE: 29
ADLS_ACUITY_SCORE: 24
ADLS_ACUITY_SCORE: 29
ADLS_ACUITY_SCORE: 29
ADLS_ACUITY_SCORE: 24
ADLS_ACUITY_SCORE: 29
ADLS_ACUITY_SCORE: 24

## 2024-12-16 NOTE — CONSULTS
Care Management Initial Consult    General Information  Assessment completed with: Rodo Gibson  Type of CM/SW Visit: Initial Assessment  Primary Care Provider verified and updated as needed: Yes - Dr. Avilez with [facility]  Readmission within the last 30 days: no previous admission in last 30 days   Reason for Consult: discharge planning  Advance Care Planning: Advance Care Planning Reviewed: no concerns identified        Communication Assessment  Patient's communication style: spoken language (English)    Hearing Difficulty or Deaf: no   Wear Glasses or Blind: other (see comments)    Cognitive  Cognitive/Neuro/Behavioral: WDL                      Living Environment:   People in home: alone     Current living Arrangements: apartment      Able to return to prior arrangements: yes     Family/Social Support:  Care provided by: self  Provides care for: no one  Support system: Significant Other, Parent          Description of Support System: Supportive, Involved    Support Assessment: Patient communicates needs well met    Current Resources:   Patient receiving home care services: No  Community Resources: [facility]  Equipment currently used at home: none  Supplies currently used at home: None    Employment/Financial:  Employment Status: seasonal worker     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  Yes     Which insurance plan 3 day waiver is available? Alternative insurance waiver    Will the waiver be used for post-acute placement? No    Lifestyle & Psychosocial Needs:  Social Drivers of Health     Food Insecurity: Low Risk  (12/14/2024)    Food Insecurity     Within the past 12 months, did you worry that your food would run out before you got money to buy more?: No     Within the past 12 months, did the food you bought just not last and you didn t  have money to get more?: No   Depression: Not at risk (11/18/2024)    Received from [facility]    PHQ-2     PHQ-2 TOTAL SCORE: 0   Housing Stability: Low Risk  (12/14/2024)    Housing Stability     Do you have housing? : Yes     Are you worried about losing your housing?: No   Tobacco Use: Medium Risk (12/14/2024)    Patient History     Smoking Tobacco Use: Former     Smokeless Tobacco Use: Never     Passive Exposure: Not on file   Financial Resource Strain: Low Risk  (12/14/2024)    Financial Resource Strain     Within the past 12 months, have you or your family members you live with been unable to get utilities (heat, electricity) when it was really needed?: No   Alcohol Use: Not on file   Transportation Needs: Low Risk  (12/14/2024)    Transportation Needs     Within the past 12 months, has lack of transportation kept you from medical appointments, getting your medicines, non-medical meetings or appointments, work, or from getting things that you need?: No   Physical Activity: Unknown (2/22/2024)    Exercise Vital Sign     Days of Exercise per Week: 7 days     Minutes of Exercise per Session: Not on file   Interpersonal Safety: Low Risk  (12/14/2024)    Interpersonal Safety     Do you feel physically and emotionally safe where you currently live?: Yes     Within the past 12 months, have you been hit, slapped, kicked or otherwise physically hurt by someone?: No     Within the past 12 months, have you been humiliated or emotionally abused in other ways by your partner or ex-partner?: No   Stress: No Stress Concern Present (2/22/2024)    [employer] - Occupational Stress Questionnaire     Feeling of Stress : Not at all   Social Connections: Unknown (2/22/2024)    Social Connection and Isolation Panel [NHANES]     Frequency of Communication with Friends and Family: Not on file     Frequency of Social Gatherings with Friends and Family: Once a week      Attends Adventism Services: Not on file     Active Member of Clubs or Organizations: Not on file     Attends Club or Organization Meetings: Not on file     Marital Status: Not on file   Health Literacy: Not on file     Functional Status:  Prior to admission patient needed assistance:   Dependent ADLs: Independent  Dependent IADLs: Independent  Assesssment of Functional Status: At functional baseline    Mental Health Status: Current concern        Chemical Dependency Status: Current concern        Values/Beliefs:  Spiritual, Cultural Beliefs, Adventism Practices, Values that affect care: other (see comments) (did not assess)             Discussed  Partnership in Safe Discharge Planning  document with patient/family: No    Additional Information: Spoke with [facility] (Nolan) who reported pt needs to be off oxycodone for 24 hours before going to [facility]. Additionally, Nolan reported pt's insurance is restricted, and he needs to get a referral from his PCP to be at [facility] for treatment. RENAN called [facility] Restricted Recipient line ([phone]), but due to hold time I left a message and asked for a return call to discuss how to get alcohol use disorder treatment covered for Rodo.    Met with Rodo, and he reported his mother works at the [facility] (where his PCP practices). He called his mother who was going to request that the nurse coordinator get the order entered.    Met with Rodo again as he was in the hallway. He confirmed that the referral from his PCP should be in the system. RENAN let Nolan at [facility] know that to our knowledge, the PCP has placed the appropriate referral.    Next Steps: Transfer to [facility] tomorrow, pending final acceptance.    Neela Jaime, [facility]   Covering for Jennifer Olvera - Phone: [phone]

## 2024-12-16 NOTE — PLAN OF CARE
Goal Outcome Evaluation:    Plan of Care Reviewed With: patient    Overall Patient Progress: improving    Outcome Evaluation: Pt's goal is to [facility] to [facility]       Healthy Active Living  An initiative of the American Academy of Pediatrics    Fact Sheet: Healthy Active Living for Families    Healthy nutrition starts as early as infancy with breastfeeding.  Once your baby begins eating solid foods, introduce nutritiou your child. Read a book together, talk about the day, or sing bedtime songs. At the 2-year checkup, the healthcare provider will examine your child and ask how things are going at home. At this age, checkups become less often.  So this may be your child’s provider which is best for your child. · Most of your child's calories should come from solid foods, not milk. · Besides drinking milk, water is best. Limit fruit juice. It should be100% juice and you may add water to it. Don’t give your toddler soda.   · Supervise the child on the stairs. · If you have a swimming pool, put a fence around it. Close and lock huizar or doors leading to the pool. · Plan ahead. At this age, children are very curious. They are likely to get into items that can be dangerous.  Jazlyn Gayle objects and describe your surroundings. Your child will  new words that he or she hears you say. And don’t say words around your child that you don’t want repeated! · Make an effort to understand what your child is saying.  At this age, children beg

## 2024-12-16 NOTE — DISCHARGE INSTRUCTIONS
[facility]  [address], [place] [address]  Admissions Phone: [phone]  [facility] waitlist: [phone]  [email or web address]

## 2024-12-16 NOTE — CONSULTS
12/16/2024  Pt completed his JESUS CA today. He would like to return to [facility]. A referral has been made to [facility] today.    [facility] Date [facility] [id number]    Recommendations:   1)  Complete a residential based or similar treatment program.  2)  Abstain from all mood-altering chemicals unless prescribed by a licensed provider.   3)  Attend, at minimum, 2 weekly support group meetings, such as 12 step based (AA/NA), SMART Recovery, Health Realizations, and/or Refuge Recovery meetings.     4)  Actively work with a male mentor/sponsor on a weekly basis.   5)  Follow all the recommendations of your treatment/medical providers.    Clinical Substantiation:    Pt recognizes that his alcohol use has started to spiral out of control. It is starting to impact his work and family. He is willing to return to an IP JESUS treatment to help him get back on track with his sobriety. He recognizes relapse triggers, but fails to implement healthy sober coping skills.    Referrals/ Alternatives:  [facility]  [address] [place] [address]  Admissions Phone: [phone]  [facility] waitlist: [phone]  [email or web address]     JESUS consult completed by: Amelie Tan [facility].  Phone Number: [phone]  E-mail Address: [email or web address] Evaluation Department  [address] [place] [address]     *Due to regulation of Title 42 of the Code of Federal Regulations (CFR) Part 2: Confidentiality laws apply to this note and the information wherein.  Thus, this note cannot be copy and pasted into any other health care staff's note nor can it be included in general medical records sent to ANY outside agency without the patient's written consent.

## 2024-12-16 NOTE — PROGRESS NOTES
"  Type Of Assessment: Inpatient Substance Use Comprehensive Assessment    Referral Source:  [facility]ly [facility], [facility]  MRN: [id number]    DATE OF SERVICE: December 16, 2024  Date of previous JESUS Assessment: 3/1/23  Patient confirmed identity through two factor verification: Full Legal Name and SSN    PATIENT'S NAME: Ravi Hankins  Age: 34 year old  Last 4 SSN: [id number]  Sex: male   Gender Identity: male  Sexual Orientation: Heterosexual  Cultural Background: \"Two or more. Black or White, Native\"  YOB: 1990  Current Address:   [address] [place] [id number]  Patient Phone Number:  [phone]   Patient's E-Mail Contact:  [email or web address]  Funding: [employer] [place]  PMI: [id number]   Emergency Contact: Extended Emergency Contact Information  Primary Emergency Contact: Christal Hankins   [facility]  Home Phone: [phone]  Work Phone: [phone]  Mobile Phone: [phone]  Relation: Mother    DAANES information was provided to patient and patient does not want a copy.     Telemedicine Visit: The patient's condition can be safely assessed and treated via synchronous audio and visual telemedicine encounter.    Reason for Telemedicine Visit: Services only offered telehealth  Originating Site (Patient Location): [address] [address]  Distant Site (Provider Location): Provider Remote Setting- Home Office  Consent:  The patient/guardian has verbally consented to: the potential risks and benefits of telemedicine (video visit) versus in person care; bill my insurance or make self-payment for services provided; and responsibility for payment of non-covered services.   Mode of Communication:  telephone    START TIME: 9:19am  END TIME: 9:39am    As the provider I attest to compliance with applicable laws and regulations related to telemedicine.   Ravi Hankins was seen for a substance " "use disorder consult on 12/16/2024 by Amelie Tan [facility].    Reason for Substance Use Disorder Consult:  Pt is interested in JESUS treatment at this time because \"my drinking started spiraling out of control again.\"    Are you currently having severe withdrawal symptoms that are putting yourself or others in danger? No  Are you currently having severe medical problems that require immediate attention? No  Are you currently having severe emotional or behavioral problems that are putting yourself or others at risk of harm? No    Have you participated in prior substance use disorder evaluations? Yes. When, Where, and What circumstances: 2023   Have you ever been to detox, inpatient or outpatient treatment for substance related use? List previous treatment: Yes. When, Where, and What circumstances: [facility] in 2023   Have you ever had a gambling problem or had treatment for compulsive gambling? No  Have you ever felt the need to bet more and more money? No  Have you ever had to lie to people important to you about how much you gambled? No    Patient does not appear to be in severe withdrawal, an imminent safety risk to self or others, or requiring immediate medical attention and may proceed with the assessment interview.       Substance Age of first use Pattern and duration of use (include amounts and frequency) Date of last use       Withdrawal potential Route of administration   Has used Alcohol 17 Per 12/16/24 JESUS CA:  Past year: drinking 4-5 days per week and he will have 3-8 shots per day.    Per 3/18/23 JESUS CA:  Pt reports that he has been drinking on a daily basis in the past two months.  Last use: 3/17/23    12/14/24  1 shot No oral   Has  used Marijuana    17 Per 12/16/24 JESUS CA:  Past year: smoking daily, mostly at night. He does not mix marijuana and alcohol.    Per 3/18/23 JESUS CA:  Pt reports a history of smoking cannabis and notes that he smokes a couple days a week.  Last use: 3/10/23   " "12/14/24     No smoked      Has not used Amphetamines       No NA   Has not used Cocaine/ crack     22 Per 12/16/24 JESUS CA:  Past year:  not often. He will use it to help him sober.  Last use: 1 week ago    Per 3/18/23 JESUS CA:  Pt reports  About using cocaine about 10 times in his life time.   Last use: 2022 1 week ago No snorted   Has not used Hallucinogens       NA  NA   Has not used Inhalants       NA  NA   Has  used Heroin 25 Per 12/16/24 JESUS CA:  No use since 2017    Per 3/18/23 JESUS CA:  Pt reports that he was using heroin on a daily basis for about 1.5 years 2017 No snorted   Has not used Other Opiates       NA  NA   Has not used Benzodiazepine       NA  NA   Has not used Barbiturates       No NA   Has not used Over the counter meds.       No NA   Has not used Caffeine       No NA   Has used Nicotine  16 Per 12/16/24 JESUS CA:  Past year: When heavily drinking, once or twice a month.    Per 3/18/23 JESUS CA:  Pt reports a history of smoking cigarettes noting about a 1/2 pack a day. Pt reports hasn't smoked a cigarette in over a year 4/14/24 No Smoked   Has not used other substances not listed above:  Identify:        NA  NA      Withdrawal symptoms: Have you had any of the following withdrawal symptoms?  \"I don't really have no withdrawal. Just a little body aches. No shakes shakes.\"    Have you experienced any cravings?  Yes    Have you had periods of abstinence?  Yes   What was your longest period? 2 months  Any circumstances that lead to relapse? \"I stopped going to my AA. 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 I am super busy.\"    What activities have you engaged in when using alcohol/other drugs that could be hazardous to you or others?  The patient denied engaging in any of the above dangerous activities when using alcohol and/or drugs.    A description of any risk-taking behavior, including behavior that puts the client at risk of exposure to blood-borne " "or sexually transmitted diseases: none reported    Arrests and legal interventions related to substance use: Pt reports 3 DWIs. The last one was 2 years ago. He thinks he is off probation. He reports a theft charge from about age 19.    A description of how the patient's use affected their ability to function appropriately in a work setting: \"some days I drank a little bit too much the night before and I start my day a little later. It effects my attitude.\"     A description of how the patient's use affected their ability to function appropriately in an educational setting: it did not.    Leisure time activities that are associated with substance use: He drinks at home.    Do you think your substance use has become a problem for you? He agrees he has a substance abuse problem.    MEDICAL HISTORY  Physical or medical concerns or diagnoses:   Patient Active Problem List   Diagnosis    CARDIOVASCULAR SCREENING; LDL GOAL LESS THAN 160    Disorder of aorta (H)    Heroin dependence (H)    Aortic root dilatation (H)    Tobacco abuse    Opioid use disorder, moderate, dependence (H)    Alcohol use disorder, severe, dependence (H)    Cocaine use disorder, severe, dependence (H)    Cannabis use disorder, severe, dependence (H)    Tobacco use disorder, moderate, dependence    Chemical dependency (H)    Gastroesophageal reflux disease without esophagitis    Mood disorder (H)    Elevated BP without diagnosis of hypertension    Dysfunctional grieving    Other migraine without status migrainosus, not intractable    Neck muscle spasm    Alcoholism /alcohol abuse    Alcohol dependence, continuous (H)    Raynaud's phenomenon without gangrene    Kidney stone    Epigastric pain    Episodic acute alcoholic intoxication without complication (H)    Ileus (H)    Generalized abdominal pain    Alcohol dependence with intoxication with complication (H)     Do you have any current medical treatment needs not being addressed by inpatient " treatment?  no    Do you need a referral for a medical provider? no    Current medications:   Current Facility-Administered Medications   Medication Dose Route Frequency Provider Last Rate Last Admin    acetaminophen (TYLENOL) tablet 650 mg  650 mg Oral Q4H PRN Melva Valenzuela MD   650 mg at 12/14/24 2052    Or    acetaminophen (TYLENOL) Suppository 975 mg  975 mg Rectal Q8H PRN Melva Valenzuela MD        atenolol (TENORMIN) tablet 100 mg  100 mg Oral Daily Melva Valenzuela MD   100 mg at 12/16/24 0708    carboxymethylcellulose PF (REFRESH PLUS) 0.5 % ophthalmic solution 1 drop  1 drop Both Eyes Q1H PRN Katrina Ma PA-C   1 drop at 12/14/24 2259    diazepam (VALIUM) tablet 5-20 mg  5-20 mg Oral Q30 Min PRN Melva Valenzuela MD   10 mg at 12/15/24 0950    folic acid (FOLVITE) tablet 1 mg  1 mg Oral Daily Melva Valenzuela MD   1 mg at 12/16/24 0911    Lidocaine (LIDOCARE) 4 % Patch 1 patch  1 patch Transdermal Q24h Katrina Ma PA-C   1 patch at 12/15/24 2040    multivitamin w/minerals (THERA-VIT-M) tablet 1 tablet  1 tablet Oral Daily Melva Valenzuela MD   1 tablet at 12/16/24 0911    naloxone (NARCAN) injection 0.2 mg  0.2 mg Intravenous Q2 Min PRN Melva Valenzuela MD        Or    naloxone (NARCAN) injection 0.4 mg  0.4 mg Intravenous Q2 Min PRN Melva Valenzuela MD        Or    naloxone (NARCAN) injection 0.2 mg  0.2 mg Intramuscular Q2 Min PRN Melva Valenzuela MD        Or    naloxone (NARCAN) injection 0.4 mg  0.4 mg Intramuscular Q2 Min PRN Melva Valenzuela MD        omeprazole (PriLOSEC) CR capsule 20 mg  20 mg Oral Daily Melva Valenzuela MD   20 mg at 12/16/24 0911    ondansetron (ZOFRAN ODT) ODT tab 4 mg  4 mg Oral Q6H PRN Melva Valenzuela MD   4 mg at 12/14/24 2052    Or    ondansetron (ZOFRAN) injection 4 mg  4 mg Intravenous Q6H PRN Melva Valenzuela MD        oxyCODONE (ROXICODONE) tablet 5 mg  5 mg Oral Q4H PRN Melva Valenzuela MD   5 mg at 12/16/24 0707    prochlorperazine  "(COMPAZINE) injection 10 mg  10 mg Intravenous Q6H PRN Melva Valenzuela MD        Or    prochlorperazine (COMPAZINE) tablet 10 mg  10 mg Oral Q6H PRN Melva Valenzuela MD senna-docusate (SENOKOT-S/PERICOLACE) 8.6-50 MG per tablet 1 tablet  1 tablet Oral BID PRN Melva Valenzuela MD        Or    senna-docusate (SENOKOT-S/PERICOLACE) 8.6-50 MG per tablet 2 tablet  2 tablet Oral BID PRN Melva Valenzuela MD        thiamine (B-1) tablet 100 mg  100 mg Oral Daily Melva Valenzuela MD   100 mg at 12/16/24 0911     Facility-Administered Medications Ordered in Other Encounters   Medication Dose Route Frequency Provider Last Rate Last Admin    Self Administer Medications: Behavioral Services   Does not apply See Admin Instructions Stu Platt MD           Are you pregnant? NA, Male    Do you have any specific physical needs/accommodations? No    MENTAL HEALTH HISTORY:  Have you ever had  hospitalizations or treatment for mental health illness: No    Mental health history, including diagnosis and symptoms, and the effect on the client's ability to function: \"no\"    Current mental health treatment including psychotropic medication needed to maintain stability: (Note: The assessment must utilize screening tools approved by the commissioner pursuant to section 245.4863 to identify whether the client screens positive for co-occurring disorders): none reported.  GAIN-SS Tool:      3/18/2023    11:00 AM 12/16/2024     9:00 AM   When was the last time that you had significant problems...   with feeling very trapped, lonely, sad, blue, depressed or hopeless about the future? Never Never   with sleep trouble, such as bad dreams, sleeping restlessly, or falling asleep during the day? Past Month Never   with feeling very anxious, nervous, tense, scared, panicked or like something bad was going to happen? Never Past month   with becoming very distressed & upset when something reminded you of the past? Past month 1+ " "years ago   with thinking about ending your life or committing suicide? Never Never         3/18/2023    11:00 AM 12/16/2024     9:00 AM   When was the last time that you did the following things 2 or more times?   Lied or conned to get things you wanted or to avoid having to do something? 2 to 12 months ago Past month   Had a hard time paying attention at school, work or home? Never Never   Had a hard time listening to instructions at school, work or home? Never Never   Were a bully or threatened other people? Never Never   Started physical fights with other people? Never Never     Have you ever been verbally, emotionally, physically or sexually abused?   No    Family history of substance use and misuse: \"father, brother, aunties, uncles.\"    The patient's desire for family involvement in the treatment program: \"my girlfriend.\"  Level of family support: \"I got a lot of support.\"    Social network in relation to expected support for recovery: He has attended AA meetings.    Are you currently in a significant relationship? Yes.  4B. How long? Going on three years.              Please describe your significant other's use of mood altering chemicals? No alcohol, but she uses marijuana.    Do you have any children (include living arrangements/custody/contact)?:  yes, 1 biological child and 3 step children.    What is your current living situation? He lives with his girlfriend and 4 kids.    Are you employed/attending school? Yes, he has his own [employer] business.    SUMMARY:  Ability to understand written treatment materials: Yes  Ability to understand patient rules and patient rights: Yes  Does the patient recognize needs related to substance use and is willing to follow treatment recommendations: Yes  Does the patient have an opioid use disorder:  does not have a history of opiate use.    ASAM Dimension Scale Ratings:    Dimension 1 -  Acute Intoxication/Withdrawal: 0 - No Problem  Dimension 2 - Biomedical: 0 - No " Problem  Dimension 3 - Emotional/Behavioral/Cognitive Conditions: 1 - Minor Problem  Dimension 4 - Readiness to Change:  1 - Minor Problem  Dimension 5 - Relapse/Continued Use/ Continued Problem Potential: 4 - Extreme Problem  Dimension 6 - Recovery Environment:  2 - Moderate Problem    Category of Substance Severity (ICD-10 Code / DSM 5 Code)     Alcohol Use Disorder Severe  (10.20) (303.90)   Cannabis Use Disorder Mild  (F12.10) (305.20)   Hallucinogen Use Disorder The patient does not meet the criteria for a Hallucinogen use disorder.   Inhalant Use Disorder The patient does not meet the criteria for an Inhalant use disorder.   Opioid Use Disorder The patient does not meet the criteria for an Opioid use disorder.   Sedative, Hypnotic, or Anxiolytic Use Disorder The patient does not meet the criteria for a Sedative/Hypnotic use disorder.   Stimulant Related Disorder The patient does not meet the criteria for a Stimulant use disorder.   Tobacco Use Disorder The patient does not meet the criteria for a Tobacco use disorder.   Other (or unknown) Substance Use Disorder The patient does not meet the criteria for a Other (or unknown) Substance use disorder.     A problematic pattern of alcohol/drug use leading to clinically significant impairment or distress, as manifested by at least two of the following, occurring within a 12-month period:    2.) There is a persistent desire or unsuccessful efforts to cut down or control alcohol/drug use  3.) A great deal of time is spent in activities necessary to obtain alcohol, use alcohol, or recover from its effects.  4.) Craving, or a strong desire or urge to use alcohol/drug  6.) Continued alcohol use despite having persistent or recurrent social or interpersonal problems caused or exacerbated by the effects of alcohol/drug.  9.) Alcohol/drug use is continued despite knowledge of having a persistent or recurrent physical or psychological problem that is likely to have been  caused or exacerbated by alcohol.  10.) Tolerance, as defined by either of the following: A need for markedly increased amounts of alcohol/drug to achieve intoxication or desired effect.  11.) Withdrawal, as manifested by either of the following: The characteristic withdrawal syndrome for alcohol/drug (refer to Criteria A and B of the criteria set for alcohol/drug withdrawal).    Specify if: In early remission:  After full criteria for alcohol/drug use disorder were previously met, none of the criteria for alcohol/drug use disorder have been met for at least 3 months but for less than 12 months (with the exception that Criterion A4,  Craving or a strong desire or urge to use alcohol/drug  may be met).     In sustained remission:   After full criteria for alcohol use disorder were previously met, non of the criteria for alcohol/drug use disorder have been met at any time during a period of 12 months or longer (with the exception that Criterion A4,  Craving or strong desire or urge to use alcohol/drug  may be met).     Specify if:   This additional specifier is used if the individual is in an environment where access to alcohol is restricted.    Mild: Presence of 2-3 symptoms  Moderate: Presence of 4-5 symptoms  Severe: Presence of 6 or more symptoms    Collateral information:   The patient's medical record at [facility] was reviewed and the information contained in the medical record supported the patient's account of his chemical use history and chemical use consequences.    Recommendations:   1)  Complete a residential based or similar treatment program.  2)  Abstain from all mood-altering chemicals unless prescribed by a licensed provider.   3)  Attend, at minimum, 2 weekly support group meetings, such as 12 step based (AA/NA), SMART Recovery, Health Realizations, and/or Refuge Recovery meetings.     4)  Actively work with a male mentor/sponsor on a weekly basis.   5)  Follow all the recommendations of your  treatment/medical providers.    Clinical Substantiation:    Pt recognizes that his alcohol use has started to spiral out of control. It is starting to impact his work and family. He is willing to return to an IP JESUS treatment to help him get back on track with his sobriety. He recognizes relapse triggers, but fails to implement healthy sober coping skills.    Referrals/ Alternatives:  [facility]  [address] [place] [address]  Admissions Phone: [phone]  [facility] waitlist: [phone]  [email or web address]     JESUS consult completed by: Amelie Tan [facility].  Phone Number: [phone]  E-mail Address: [email or web address]  [address] [place] [address]     *Due to regulation of Title 42 of the Code of Federal Regulations (CFR) Part 2: Confidentiality laws apply to this note and the information wherein.  Thus, this note cannot be copy and pasted into any other health care staff's note nor can it be included in general medical records sent to ANY outside agency without the patient's written consent.

## 2024-12-16 NOTE — TELEPHONE ENCOUNTER
12/16/2024  Pt would like to return to [facility]. He would like to start programing ASAP.    LP SCREEN TELEPHONE NOTE   Ravi Hankins paperwork was reviewed by FERNANDA [place] and the patient was deemed ELIGIBLE for the LP program.     Inpatient or Community Referral: Inpatient referral     Medical Screening (As Needed): pt is on 8MS     Regular use of benzodiazapine's (other than detox): The patient is ONLY using benzodiazepines while on the detox unit or other medical or mental health unit.     Insurance: The [facility] Department is responsible for obtaining ALL authorizations for treatment services at the EOP, DOP and LP levels of care.           IV use or Pregnant: This patient is not pregnant or an IV drug user.     Business office: [phone]          Group: Men's Group or Mixed Group     Additional Info as needed: NA     The best current contact telephone number for the patient is: [phone]          Resulted

## 2024-12-16 NOTE — PLAN OF CARE
Goal Outcome Evaluation:      Plan of Care Reviewed With: patient    Overall Patient Progress: improving    Outcome Evaluation: Pt's pain is well managed with PO meds.    O2: On RA   Output: Continent of B/B   Activity: Independent with ADLs, seen ambulating the hallway   Skin: WDL   Pain: Managed with PO oxycodone x1 earlier this shift. Pt weaning off oxycodone to quality for placement at [facility] tomorrow.   CMS: Intact, A/O x4. Able to make needs known   Dressing: None    Diet: Regular with good appetite   LDA: None   Equipment: None   Plan: Con't POC.    Additional Info: Pt is calm and cooperative with cares, Scored zero on CIWR-Ar. Pt has some home meds in the med locker on the unit to be given at discharge,

## 2024-12-16 NOTE — TELEPHONE ENCOUNTER
Date: 12/16/2024    To: SHELLEY RN    Please call this patient at [phone] to complete a medical screening to clarify his medications and medical condition and to complete a COVID-19 screening.      The patient has a history of:  Currently hospitalized on 8 M/S  ETOH withdrawal  Flank Pain using toradol for pain but is asking for stronger meds.     INSIDE: This patient had a substance abuse assessment with Amelie Real MA, FERNANDA , so no paperwork will be sent up to the 6th floor because all of the clinical documentation is in the patient's electronic medical record in [facility].      Thanks,  FERNANDA Carroll

## 2024-12-16 NOTE — TELEPHONE ENCOUNTER
"  December 16, 2024    Referral Medical Screening:  Per client self report and EPIC    1) Medications?  All medications approved for the exception of Oxycodone.  Last dose administered on 12/16/24 at 1152.  Pt must be able to manage pain on none controlled substance medication for 24 hours prior to admission to   For [facility] or any facility \"door to door\"...Nurse to nurse report required and \"too soon to refill\" medication issues must be resolved prior to pt admission.  We have no rounding provider at [facility] to asess medical issues or trouble shoot medication issues.    2) Medical conditions?  EtOH use disorder, on placed on MSSA protocol.  Does not exhibits signs of EtOH withdrawal at this moment.  Today would not accept him due to all pain complaints while in the ED.  Patient wishes to undergo inpatient alcohol treatment.  [facility] and lodging [facility].  Will get chemical dependency consult.  Patient is interested in getting into treatment at [facility]  History of seizures, not on antiseizure medications  Right flank pain.  Pain is now subsided with use of IV Toradol 30 mg x 2 and Dilaudid IV that he received in the ED. reports continued right flank pain at 8/10.  Asks for stronger pain medication.  Requests oxycodone  History of nephrolithiasis  Recreational use of cocaine and, nicotine.     Respiratory Condition? (Asthma, COPD, RAD, Bronchitis, Emphysema, Cystic fibrosis, SANAM, etc.)  None    3) Independent with ADL'S?  Yes     4) Assistive devices (ambulatory, orthotics, hearing, c-pap etc.)?  None    5) Fall risk?  None    6) Pain management concerns?  Back pain to be managed by OTC    7) Dental health concerns?  None    8) Skin integrity concerns (wounds, rash, etc)?  None    9) Infectious disease concerns (MRSA, ESBL, VRE, C-Diff, TB, etc.)  None    10) Mental health concerns /suicidal ideation?  None    11) COVID-19 Symptom Screening Tool:     Do you have any of the following NEW or worsening symptoms " NOT attributed to pre-existing conditions?    No,     Fever of 100.0  F (37.8 C) or over  Chills  Cough  Shortness of Breath  Loss of taste or smell  Generalized body aches  Persistent headache  Sore throat (or trouble eating or drinking in young children?)  Nausea, Vomiting, or diarrhea (loose stools)    Did you test positive for COVID-19 in the last 14  days or are you waiting on the test results due to an exposure or symptoms?  No,     Has anyone told you to self-quarantine due to exposure to someone with COVID-19?  No,      Positive screen - LPRN to reach out to [facility] Infection Prevention for advisement/recommendations     Based on above assessment: Client meets medical criteria for admission to [facility] PENDING no oxycodone use for 24 hours from last dose.  Pt would be approved for 12/17 at 12-noon for admission to LP if no more doses are taken    IS THIS PT CONSIDERED COMPLEX? No,       IF patient is APPROVED for Admission to LP, they are informed of the following (below) by LPRN:    1) No drug or alcohol use for a minimum of 24 hours prior to admission to LP.  2) Should you acquire COVID or influenza symptoms, you may not admit to LP.  Call LPRN PRIOR TO ADMISSION for assessment / recommendations at [phone].  3)Structured outdoor activities may involve walking several city blocks. If you think you may have difficulty safely navigating this distance please speak with the LP Nurse.    4) Bring 30 day supply of all medications.  All OTC's must be sealed for use at   5) All medication issues must be resolved prior to admission.  We have no rounding provider at [facility] to assess medical issues or trouble shoot medication issues.    [facility]  Nurse Liaison / CD Adult [facility]  O: [phone]  Fax:[phone]  LPRN Pool [id number]  M-F: 7AM to 5:30PM   Sat-Sun: 7AM to 3PM  After hours: [phone]

## 2024-12-16 NOTE — PROGRESS NOTES
Gold Service - Internal Medicine Daily Note   Date of Service: 12/15/2024  Patient: Ravi Hankins  MRN: [id number]  Admission Date: 12/14/2024  Hospital Day # 1    Assessment & Plan      Ravi Hankins is a 34 year old male with history of EtOH use disorder, seizures, recreational drug and nicotine use who presents to the ED seeking alcohol detox treatment.     Assessment and plan  EtOH use disorder, on placed on MSSA protocol.  Does not exhibits signs of EtOH withdrawal at this moment.  Today would not accept him due to all pain complaints while in the ED.  Patient wishes to undergo inpatient alcohol treatment.  [facility]ested and lodging [facility].  Will get chemical dependency consult.  Patient is interested in getting into treatment at [facility], on waiting list .   History of seizures, not on antiseizure medications  Right flank pain.  Pain is now subsided with use of IV Toradol 30 mg x 2 and Dilaudid IV that he received in the ED. reports continued right flank pain at 8/10.  Asks for stronger pain medication.  Requests oxycodone  History of nephrolithiasis  Recreational use of cocaine and, nicotine.     Disposition: Inpatient alcohol treatment/[facility]ging [facility]        CODE: Full code  Diet/IVF: Regular diet  DVT ppx: Ambulate every shift  Disposition/Admission Status: Observation     Melva Valenzuela MD  Internal Medicine Staff Hospitalist   [facility]   Pager: [phone]    Team: Cipriano Wyatt 19  Page Cross Cover after 5 pm: pager [phone]   ___________________________________________________________________    Subjective & Interval Hx:    Observation status. Low CIWA score. Calm and composed   Patient reports right flank pain, rates his pain at 8/10.  No any vomiting.  No diarrhea.  Eating well.  Interested in going to [facility]    Last 24 hr care team notes reviewed.   ROS:  4 point ROS including Respiratory, CV, GI and , other than that noted in the HPI, is  negative.    Medications: Reviewed in EPIC. List below for reference    Current Facility-Administered Medications:     acetaminophen (TYLENOL) tablet 650 mg, 650 mg, Oral, Q4H PRN, 650 mg at 12/14/24 2052 **OR** acetaminophen (TYLENOL) Suppository 975 mg, 975 mg, Rectal, Q8H PRN, Melva Valenzuela MD    atenolol (TENORMIN) tablet 100 mg, 100 mg, Oral, Daily, Melva Valenzuela MD, 100 mg at 12/16/24 0708    carboxymethylcellulose PF (REFRESH PLUS) 0.5 % ophthalmic solution 1 drop, 1 drop, Both Eyes, Q1H PRN, Katrina Ma PA-C, 1 drop at 12/14/24 2259    diazepam (VALIUM) tablet 5-20 mg, 5-20 mg, Oral, Q30 Min PRN, Melva Valenzuela MD, 10 mg at 12/15/24 0950    folic acid (FOLVITE) tablet 1 mg, 1 mg, Oral, Daily, Melva Valenzuela MD, 1 mg at 12/15/24 0809    Lidocaine (LIDOCARE) 4 % Patch 1 patch, 1 patch, Transdermal, Q24h, Katrina Ma PA-C, 1 patch at 12/15/24 2040    multivitamin w/minerals (THERA-VIT-M) tablet 1 tablet, 1 tablet, Oral, Daily, Melva Valenzuela MD, 1 tablet at 12/15/24 0809    naloxone (NARCAN) injection 0.2 mg, 0.2 mg, Intravenous, Q2 Min PRN **OR** naloxone (NARCAN) injection 0.4 mg, 0.4 mg, Intravenous, Q2 Min PRN **OR** naloxone (NARCAN) injection 0.2 mg, 0.2 mg, Intramuscular, Q2 Min PRN **OR** naloxone (NARCAN) injection 0.4 mg, 0.4 mg, Intramuscular, Q2 Min PRN, Melva Valenzuela MD    omeprazole (PriLOSEC) CR capsule 20 mg, 20 mg, Oral, Daily, Melva Valenzuela MD, 20 mg at 12/15/24 0809    ondansetron (ZOFRAN ODT) ODT tab 4 mg, 4 mg, Oral, Q6H PRN, 4 mg at 12/14/24 2052 **OR** ondansetron (ZOFRAN) injection 4 mg, 4 mg, Intravenous, Q6H PRN, Melva Valenzuela MD    oxyCODONE (ROXICODONE) tablet 5 mg, 5 mg, Oral, Q4H PRN, Melva Valenzuela MD, 5 mg at 12/16/24 0707    prochlorperazine (COMPAZINE) injection 10 mg, 10 mg, Intravenous, Q6H PRN **OR** prochlorperazine (COMPAZINE) tablet 10 mg, 10 mg, Oral, Q6H PRN, Melva Valenzuela MD    senna-docusate  "(SENOKOT-S/PERICOLACE) 8.6-50 MG per tablet 1 tablet, 1 tablet, Oral, BID PRN **OR** senna-docusate (SENOKOT-S/PERICOLACE) 8.6-50 MG per tablet 2 tablet, 2 tablet, Oral, BID PRN, Melva Valenzuela MD    thiamine (B-1) tablet 100 mg, 100 mg, Oral, Daily, Melva Valenzuela MD, 100 mg at 12/15/24 0809    Facility-Administered Medications Ordered in Other Encounters:     Self Administer Medications: Behavioral Services, , Does not apply, See Admin Instructions, Stu Platt MD    Physical Exam:    BP (!) 137/94 (BP Location: Left arm)   Pulse 55   Temp 98.8  F (37.1  C) (Oral)   Resp 18   Ht 1.854 m (6' 0.99\")   Wt 68.7 kg (151 lb 7.3 oz)   SpO2 94%   BMI 19.99 kg/m       GENERAL: Alert and oriented x 3. NAD.   HEENT: Anicteric sclera. Mucous membranes moist.   CV: RRR. S1, S2. No murmurs appreciated.   RESPIRATORY: Effort normal on RA. Lungs CTAB with no wheezing, rales, rhonchi.   GI: Abdomen soft and non distended with normoactive bowel sounds present in all quadrants. No tenderness, rebound, guarding.   NEUROLOGICAL: No focal deficits. Moves all extremities.    EXTREMITIES: No peripheral edema. Intact bilateral pedal pulses.   SKIN: No jaundice. No rashes.     Labs & Studies of Note: I personally reviewed the following studies:  CBC RESULTS:   Recent Labs   Lab Test 12/14/24  0736   WBC 7.4   RBC 4.94   HGB 15.3   HCT 45.1   MCV 91   MCH 31.0   MCHC 33.9   RDW 13.1        Last Comprehensive Metabolic Panel:  Lab Results   Component Value Date     12/14/2024    POTASSIUM 3.6 12/14/2024    CHLORIDE 103 12/14/2024    CO2 21 (L) 12/14/2024    ANIONGAP 18 (H) 12/14/2024    GLC 77 12/14/2024    BUN 10.6 12/14/2024    CR 0.98 12/14/2024    GFRESTIMATED >90 12/14/2024    ASHLEY 9.5 12/14/2024       /Liver Function Studies -   Recent Labs   Lab Test 12/14/24  0736   PROTTOTAL 8.4*   ALBUMIN 4.7   BILITOTAL 0.4   ALKPHOS 77   AST 29   ALT 15       "

## 2024-12-16 NOTE — PLAN OF CARE
Goal Outcome Evaluation:      Plan of Care Reviewed With: patient    Overall Patient Progress: no changeOverall Patient Progress: no change    Shift [id number]    AxOx4, denies sob, cp, or cough. No n/v. R flank pain rated from 7/10, managed with PRN oxy. CIWA score 0 overnight. POC ongoing. Awaiting for disposition.    Temp: 97.7  F (36.5  C) Temp src: Oral BP: (!) 143/108 Pulse: 54   Resp: 18 SpO2: 97 % O2 Device: None (Room air)

## 2024-12-17 VITALS
OXYGEN SATURATION: 99 % | HEART RATE: 60 BPM | SYSTOLIC BLOOD PRESSURE: 157 MMHG | TEMPERATURE: 98.4 F | RESPIRATION RATE: 18 BRPM | BODY MASS INDEX: 20.07 KG/M2 | WEIGHT: 151.46 LBS | DIASTOLIC BLOOD PRESSURE: 101 MMHG | HEIGHT: 73 IN

## 2024-12-17 PROCEDURE — 99231 SBSQ HOSP IP/OBS SF/LOW 25: CPT | Performed by: STUDENT IN AN ORGANIZED HEALTH CARE EDUCATION/TRAINING PROGRAM

## 2024-12-17 PROCEDURE — 250N000013 HC RX MED GY IP 250 OP 250 PS 637: Performed by: INTERNAL MEDICINE

## 2024-12-17 PROCEDURE — 250N000013 HC RX MED GY IP 250 OP 250 PS 637: Performed by: PHYSICIAN ASSISTANT

## 2024-12-17 PROCEDURE — G0378 HOSPITAL OBSERVATION PER HR: HCPCS

## 2024-12-17 RX ADMIN — LIDOCAINE 4% 1 PATCH: 40 PATCH TOPICAL at 21:10

## 2024-12-17 RX ADMIN — Medication 1 TABLET: at 09:01

## 2024-12-17 RX ADMIN — OMEPRAZOLE 20 MG: 20 CAPSULE, DELAYED RELEASE ORAL at 09:01

## 2024-12-17 RX ADMIN — FOLIC ACID 1 MG: 1 TABLET ORAL at 09:01

## 2024-12-17 RX ADMIN — ATENOLOL 100 MG: 25 TABLET ORAL at 09:03

## 2024-12-17 RX ADMIN — THIAMINE HCL TAB 100 MG 100 MG: 100 TAB at 09:01

## 2024-12-17 ASSESSMENT — ACTIVITIES OF DAILY LIVING (ADL)
ADLS_ACUITY_SCORE: 30
ADLS_ACUITY_SCORE: 29
ADLS_ACUITY_SCORE: 29
ADLS_ACUITY_SCORE: 30
ADLS_ACUITY_SCORE: 29
ADLS_ACUITY_SCORE: 29
ADLS_ACUITY_SCORE: 32
ADLS_ACUITY_SCORE: 29
ADLS_ACUITY_SCORE: 30
ADLS_ACUITY_SCORE: 30
ADLS_ACUITY_SCORE: 29
ADLS_ACUITY_SCORE: 32
ADLS_ACUITY_SCORE: 29
ADLS_ACUITY_SCORE: 32
ADLS_ACUITY_SCORE: 29
ADLS_ACUITY_SCORE: 29
ADLS_ACUITY_SCORE: 30

## 2024-12-17 NOTE — UTILIZATION REVIEW
Concurrent stay review; Secondary Review Determination - [facility]        Under the authority of the Utilization Management Committee, the utilization review process indicated a secondary review on the above patient.  The review outcome is based on review of the medical records, discussions with staff, and applying clinical experience noted on the date of the review.        (x) Outpatient [facility] status is appropriate       RATIONALE FOR DETERMINATION:     Patient delayed discharge is related to disposition, there is no medical necessity for inpatient admission at the time of this review. If there is a change in patient status, please resend for review.    34 year old male with history of EtOH use disorder, seizures, recreational drug and nicotine use who presents to the ED seeking alcohol detox treatment. Awaiting forms from his PCP to be processed and then he will [facility]  No medical changes today    The information on this document is developed by the utilization review team in order for the business office to ensure compliance.  This only denotes the appropriateness of proper admission status and does not reflect the quality of care rendered.       The definitions of Inpatient Status and Observation Status used in making the determination above are those provided in the CMS Coverage Manual, Chapter 1 and Chapter 6, section 70.4.       Sincerely,    Ivana Bower MD

## 2024-12-17 NOTE — PROGRESS NOTES
"Care Management Follow Up    Length of Stay (days): 1    Expected Discharge Date: 12/18/2024     Concerns to be Addressed: discharge planning       Patient plan of care discussed at interdisciplinary rounds: Yes    Anticipated Discharge Disposition: Inpatient Chemical Dependency     Anticipated Discharge Services: Chemical Dependency Resources    Anticipated Discharge DME: None    Patient/family educated on Medicare website which has current facility and service quality ratings: no    Education Provided on the Discharge Plan: Yes    Patient/Family in Agreement with the Plan: yes    Referrals Placed by CM/SW: Internal Clinic Care Coordination    Private pay costs discussed: Not applicable    Discussed  Partnership in Safe Discharge Planning  document with patient/family: No     Handoff Completed: Yes, [facility] PCP: Internal handoff referral completed    Additional Information: RENAN heard from Nolan at [facility] that she received my response (that we believe that the referral from Rodo' PCP was sent). Nolan reported that she needs to still hear from a different team to make sure the auth is in place.    Nolan tells me later that \"the Behavioral [facility]oc[facility] Team\" informed her that no referral was received.    RENAN left  for [employer] Restricted Recipient line ([phone]) to ask if they need me to do anything else to assist with Rodo getting accepted into treatment.     Sunflower from [employer] that inpatient treatment is on \"referral bypass\" meaning there isn't a special form that the PCP needs to do.    Sunflower from Nolan that per their verification team, there is no such thing as referral bypass. To be safe, a PCP referral should be faxed to [facility] for [facility], [phone] NPI, and address of [address] [place] [id number].    RENAN called [employer] again. They faxed me the form that the PCP needs to fill out.    RENAN called Dr. Avilez's clinic; spoke with Leslie who gave me her email address " [email or web address]) and reported I can forward her the form that Dr. Avilez needs to authorize. SW forwarded it to Leslie.    At 2:54 pm, I asked Leslie if the doctor has had a chance to complete Rodo' form yet. She reported that Dr. Avilez has not been able to get to it yet.    Next Steps: Will continue to assist with above insurance issue.    Neela Jaime [facility]   Covering for Jennifer Olvera - Phone: [phone]

## 2024-12-17 NOTE — TELEPHONE ENCOUNTER
Forms/Letter Request    Type of form/letter: OTHER: managed care referral form       Do we have the form/letter: Yes: in [facility] pod basket for rooming staff to review then to A.S. to review. Don't see PCP signature required on form    Who is the form from?  emergency room/[facility] (if other please explain)    Where did/will the form come from? form was faxed in    When is form/letter needed by: asap    How would you like the form/letter returned: Fax : [phone]

## 2024-12-17 NOTE — PLAN OF CARE
Goal Outcome Evaluation:      Plan of Care Reviewed With: patient    Overall Patient Progress: improving    No acute changes during the shift, alert and oriented x4, stable on room air, denies chest pain/discomfort and SOB. Patient is hoping to be discharge to [facility], no other concerns/needs at this moment, in bed resting, calling within reach. Plan of care ongoing.

## 2024-12-17 NOTE — PROGRESS NOTES
Gold Service - Internal Medicine Daily Note   Date of Service: 12/15/2024  Patient: Ravi Hankins  MRN: [id number]  Admission Date: 12/14/2024    Assessment & Plan      Ravi Hankins is a 34 year old male with history of EtOH use disorder, seizures, recreational drug and nicotine use who presents to the ED seeking alcohol detox treatment.    Updates  -Awaiting forms from his PCP to be processed and then he will transfer to [facility]  -No medical changes today  -No additional albs     Assessment and plan  EtOH use disorder, on placed on MSSA protocol.  Does not exhibits signs of EtOH withdrawal at this moment.  Today would not accept him due to all pain complaints while in the ED.  Patient wishes to undergo inpatient alcohol treatment.  [facility] and [facility].  Will get chemical dependency consult.  Patient is interested in getting into treatment at [facility], on waiting list .   History of seizures, not on antiseizure medications  Right flank pain.  Pain is now subsided with use of IV Toradol 30 mg x 2 and Dilaudid IV that he received in the ED. reports continued right flank pain at 8/10.  Asks for stronger pain medication.  Requests oxycodone  History of nephrolithiasis  Recreational use of cocaine and, nicotine.     Disposition: Inpatient alcohol treatment/[facility]        CODE: Full code  Diet/IVF: Regular diet  DVT ppx: Ambulate every shift  Disposition/Admission Status: Observation     Rob Hernandez MD  Internal Medicine, Hospitalist Service    ___________________________________________________________________    Subjective & Interval Hx:    -Observation status  -Doing well, no acute events overnight  -Eager to go to [facility]    Medications: Reviewed in EPIC. List below for reference    Current Facility-Administered Medications:     acetaminophen (TYLENOL) tablet 650 mg, 650 mg, Oral, Q4H PRN, 650 mg at 12/14/24 2052 **OR** acetaminophen (TYLENOL) Suppository 975 mg, 975 mg,  Rectal, Q8H PRN, Melva Valenzuela MD    atenolol (TENORMIN) tablet 100 mg, 100 mg, Oral, Daily, Melva Valenzuela MD, 100 mg at 12/17/24 0903    carboxymethylcellulose PF (REFRESH PLUS) 0.5 % ophthalmic solution 1 drop, 1 drop, Both Eyes, Q1H PRN, Katrina Ma PA-C, 1 drop at 12/14/24 2259    diazepam (VALIUM) tablet 5-20 mg, 5-20 mg, Oral, Q30 Min PRN, Melva Valenzuela MD, 10 mg at 12/15/24 0950    folic acid (FOLVITE) tablet 1 mg, 1 mg, Oral, Daily, Melva Valenzuela MD, 1 mg at 12/17/24 0901    Lidocaine (LIDOCARE) 4 % Patch 1 patch, 1 patch, Transdermal, Q24h, Katrina Ma PA-C, 1 patch at 12/16/24 2113    multivitamin w/minerals (THERA-VIT-M) tablet 1 tablet, 1 tablet, Oral, Daily, Melva Valenzuela MD, 1 tablet at 12/17/24 0901    naloxone (NARCAN) injection 0.2 mg, 0.2 mg, Intravenous, Q2 Min PRN **OR** naloxone (NARCAN) injection 0.4 mg, 0.4 mg, Intravenous, Q2 Min PRN **OR** naloxone (NARCAN) injection 0.2 mg, 0.2 mg, Intramuscular, Q2 Min PRN **OR** naloxone (NARCAN) injection 0.4 mg, 0.4 mg, Intramuscular, Q2 Min PRN, Melva Valenzuela MD    omeprazole (PriLOSEC) CR capsule 20 mg, 20 mg, Oral, Daily, Melva Valenzuela MD, 20 mg at 12/17/24 0901    ondansetron (ZOFRAN ODT) ODT tab 4 mg, 4 mg, Oral, Q6H PRN, 4 mg at 12/14/24 2052 **OR** ondansetron (ZOFRAN) injection 4 mg, 4 mg, Intravenous, Q6H PRN, Melva Valenzuela MD    oxyCODONE (ROXICODONE) tablet 5 mg, 5 mg, Oral, Q4H PRN, Melva Valenzuela MD, 5 mg at 12/16/24 1152    prochlorperazine (COMPAZINE) injection 10 mg, 10 mg, Intravenous, Q6H PRN **OR** prochlorperazine (COMPAZINE) tablet 10 mg, 10 mg, Oral, Q6H PRN, Melva Valenzuela MD    senna-docusate (SENOKOT-S/PERICOLACE) 8.6-50 MG per tablet 1 tablet, 1 tablet, Oral, BID PRN **OR** senna-docusate (SENOKOT-S/PERICOLACE) 8.6-50 MG per tablet 2 tablet, 2 tablet, Oral, BID PRN, Melva Valenzuela MD    thiamine (B-1) tablet 100 mg, 100 mg, Oral, Daily, Melva Valenzuela MD, 100 mg at  "12/17/24 0901    Facility-Administered Medications Ordered in Other Encounters:     Self Administer Medications: Behavioral Services, , Does not apply, See Admin Instructions, Stu Platt MD    Physical Exam:    BP (!) 140/103 (BP Location: Left arm)   Pulse 66   Temp 98  F (36.7  C) (Oral)   Resp 18   Ht 1.854 m (6' 0.99\")   Wt 68.7 kg (151 lb 7.3 oz)   SpO2 99%   BMI 19.99 kg/m       Exam  Gen: Awake and alert, appears comfortable. Ambulating around the room  CV: Regular rhythm and rate, extremities warm and well perfused, no lower extremity edema.  Pulm: Normal work of breathing, lungs clear to auscultation, no crackles or wheezing.  GI: Soft, nontender, nondistended.  Skin: Warm, dry, no jaundice.    "

## 2024-12-18 PROCEDURE — 99207 PR NO BILLABLE SERVICE THIS VISIT: CPT | Performed by: STUDENT IN AN ORGANIZED HEALTH CARE EDUCATION/TRAINING PROGRAM

## 2024-12-18 PROCEDURE — G0378 HOSPITAL OBSERVATION PER HR: HCPCS

## 2024-12-18 ASSESSMENT — ACTIVITIES OF DAILY LIVING (ADL): ADLS_ACUITY_SCORE: 32

## 2024-12-18 NOTE — PLAN OF CARE
"  VS: BP (!) 157/101 (BP Location: Left arm)   Pulse 66   Temp 98.3  F (36.8  C) (Oral)   Resp 18   Ht 1.854 m (6' 0.99\")   Wt 68.7 kg (151 lb 7.3 oz)   SpO2 99%   BMI 19.99 kg/m       O2: On RA   Output: Uses toilet independently without difficulty, denies dysuria or urgency    Last BM: 12/17/24   reports stools loose    Activity: Ind   Up for meals? Yes   Skin: CDI   Pain: Denies   CMS: A&O x 4    Dressing: N/A   Diet: Regular, thin, whole   LDA: N/A   Equipment:    Plan: Awaiting placement to the [facility].  Insurance authorization pending.   Additional Info: Pt. States that if he can't get into the [facility] then he would rather discharge home but is ok with waiting on insurance.    Goal Outcome Evaluation:                        "

## 2024-12-18 NOTE — PROGRESS NOTES
At 0050, patient decided he's leaving AMA, alert and oriented, writer explained the risks of leaving now and the benefits of staying for further care. Pt. verbalized understanding and signed the AMA form, MD notified, patient home medication given ( Atenolol), no PIV present.

## 2024-12-18 NOTE — DISCHARGE SUMMARY
[facility]  Hospitalist Discharge Summary      Date of Admission:  12/14/2024  Date of Discharge:  12/18/2024 12:50 AM  Discharging Provider: Rob Hernandez MD  Discharge Service: Hospitalist Service, GOLD TEAM 22    Discharge Diagnoses   Primary Diagnosis   Alcohol Use Disorder    Clinically Significant Risk Factors          Follow-ups Needed After Discharge   Follow-up Appointments       Follow Up and recommended labs and tests      Follow up with primary care provider  when discharged from in patient [facility] treatment              Discharge Disposition   Patient left against medical advice    Hospital Course   Ravi Hankins is a 34 year old male with history of EtOH use disorder, seizures, recreational drug and nicotine use who presented on 12/14/24 seeking alcohol detoxication treatment. He received some pain medication early in his stay for right sided flank pain thought secondary to nephrolithiasis (toradol and dilaudid). He was awaiting for his paperwork to be transferred to [facility] to be processed and then left against medical advice early in the morning of 12/18/24.      Assessment and plan  EtOH use disorder, on placed on MSSA protocol.  Does not exhibits signs of EtOH withdrawal at this moment.  Today would not accept him due to all pain complaints while in the ED.  Patient wishes to undergo inpatient alcohol treatment.  Rested and lodging plus.  Will get chemical dependency consult.  Patient is interested in getting into treatment at [facility], on waiting list .   History of seizures, not on antiseizure medications  Right flank pain.  Pain is now subsided with use of IV Toradol 30 mg x 2 and Dilaudid IV that he received in the ED. reports continued right flank pain at 8/10.  Asks for stronger pain medication.  Requests oxycodone  History of nephrolithiasis  Recreational use of cocaine and, nicotine.          Consultations This Hospital Stay    CHEMICAL DEPENDENCY IP CONSULT  SOCIAL WORK IP CONSULT    Code Status   Prior    Time Spent on this Encounter   I, Rob Hernandez MD, discharged this patient today but I did not personally see the patient today and will not be billing for the patient's discharge.       Rob Hernandez MD  [facility] [address]  On license of [facility] [address]  Phone: [phone]  Fax: [phone]  ______________________________________________________________________    Physical Exam   Vital Signs: Temp: 98.4  F (36.9  C) Temp src: Oral BP: (!) 157/101 Pulse: 60   Resp: 18 SpO2: 99 % O2 Device: None (Room air)    Weight: 151 lbs 7.3 oz  No exam, patient left against medical advice       Primary Care Physician   Monie Avilez    Discharge Orders      General info for SNF    Length of Stay Estimate: Short Term Care: Estimated # of Days <30  Condition at Discharge: Improving  Level of care:skilled   Rehabilitation Potential: Good  Admission H&P remains valid and up-to-date: Yes  Recent Chemotherapy: N/A  Use Nursing Home Standing Orders: Yes     Mantoux instructions    Give two-step Mantoux (PPD) Per Facility Policy Yes     Follow Up and recommended labs and tests    Follow up with primary care provider  when discharged from in patient [facility] treatment     Reason for your hospital stay    Alcohol use disorder, right flank pain     Activity - Up ad shine     Diet    Follow this diet upon discharge: Current Diet:Orders Placed This Encounter      Regular Diet Adult       Significant Results and Procedures   Most Recent 3 CBC's:  Recent Labs   Lab Test 12/14/24  0736 03/17/23  1231 08/20/21  1108   WBC 7.4 4.7 3.5*   HGB 15.3 14.4 13.5   MCV 91 93 97    187 156     Most Recent 3 BMP's:  Recent Labs   Lab Test 12/14/24  0736 05/15/24  1237 02/22/24  1110    143 141   POTASSIUM 3.6 4.3 5.0   CHLORIDE 103 104 105   CO2 21* 28 27   BUN 10.6 17.5 11.5   CR 0.98 1.10 1.00   ANIONGAP 18* 11 9   ASHLEY 9.5 9.6 9.8   GLC 77 68* 88   ,    Results for orders placed or performed during the hospital encounter of 12/14/24   CT Abdomen Pelvis w/o Contrast    Narrative    EXAM: CT ABDOMEN AND PELVIS WITHOUT CONTRAST  LOCATION: [facility]  DATE: 12/14/2024    INDICATION: Right flank pain.  COMPARISON: CT 05/29/2024.  TECHNIQUE: CT scan of the abdomen and pelvis was performed without IV contrast. Multiplanar reformats were obtained. Dose reduction techniques were used.  CONTRAST: None.    FINDINGS:   LOWER CHEST: Normal.    HEPATOBILIARY: No acute abnormality. Fatty liver. No calcified gallstones.    PANCREAS: Normal.    SPLEEN: Normal.    ADRENAL GLANDS: Normal.    KIDNEYS/BLADDER: Stable nonobstructing stone lower right kidney measuring 4 mm. A tiny 1 mm right intrarenal stone is suggested on image 134. 1-2 mm stone at the left kidney image 119. No hydronephrosis on either side. No visible ureter stone. No bladder   stone.    BOWEL: Normal appendix. No acute bowel abnormality. No obstruction. A few mid to distal small bowel loops are minimally distended with fluid and gas.    LYMPH NODES: Normal.    VASCULATURE: Normal.    PELVIC ORGANS: Normal.    MUSCULOSKELETAL: Normal.      Impression    IMPRESSION:   1.  Small bilateral nonobstructing intrarenal stones. No hydronephrosis or obstructing urolithiasis.  2.  A few mid to distal small bowel loops are minimally distended with fluid and gas. Correlate with an ileus versus enteritis.  3.  Fatty liver.         Discharge Medications   Discharge Medication List as of 12/18/2024  1:26 AM        CONTINUE these medications which have NOT CHANGED    Details   atenolol (TENORMIN) 100 MG tablet Take 1 tablet (100 mg) by mouth daily, Disp-90 tablet, R-2, E-Prescribe           STOP taking these medications       omeprazole (PRILOSEC) 20 MG DR capsule Comments:   Reason for Stopping:             Allergies   Allergies   Allergen Reactions    Contrast Dye      PN: LW CM1:   Reaction :  rash    Sulfa Antibiotics      PN: LW Reaction: unsure which kid has allergy per mom???

## 2024-12-20 ENCOUNTER — TELEPHONE (OUTPATIENT)
Dept: BEHAVIORAL HEALTH | Facility: CLINIC | Age: 34
End: 2024-12-20

## 2024-12-20 NOTE — TELEPHONE ENCOUNTER
Writer contacted pt to inquire about desire for treatment and if so, pt would have to obtain a referral from his primary provider for [facility]- Lucia Jimenez.     Pt stated he is not interested in treatment at this time.     Pt removed from LP wait list.

## 2024-12-24 NOTE — TELEPHONE ENCOUNTER
Problem: Prexisting or High Potential for Compromised Skin Integrity  Goal: Skin integrity is maintained or improved  Description: INTERVENTIONS:  - Identify patients at risk for skin breakdown  - Assess and monitor skin integrity  - Assess and monitor nutrition and hydration status  - Monitor labs   - Assess for incontinence   - Turn and reposition patient  - Assist with mobility/ambulation  - Relieve pressure over bony prominences  - Avoid friction and shearing  - Provide appropriate hygiene as needed including keeping skin clean and dry  - Evaluate need for skin moisturizer/barrier cream  - Collaborate with interdisciplinary team   - Patient/family teaching  - Consider wound care consult   Outcome: Progressing     Problem: PAIN - ADULT  Goal: Verbalizes/displays adequate comfort level or baseline comfort level  Description: Interventions:  - Encourage patient to monitor pain and request assistance  - Assess pain using appropriate pain scale  - Administer analgesics based on type and severity of pain and evaluate response  - Implement non-pharmacological measures as appropriate and evaluate response  - Consider cultural and social influences on pain and pain management  - Notify physician/advanced practitioner if interventions unsuccessful or patient reports new pain  Outcome: Progressing      Reason for Call:  Medication or medication refill:    Do you use a [facility]?  Name of the pharmacy and phone number for the current request:  [facility] on [address]  [place]    Name of the medication requested: metoprolol (TOPROL-XL) 50 MG 24 hr tablet   He didn't realize he only has one left, can an rx be sent in today     Other request:     Can we leave a detailed message on this number? YES    Phone number patient can be reached at: Other phone number:  [phone]  Best Time:   Call taken on 12/22/2017 at 4:26 PM by Ludy Guy

## 2025-01-07 ENCOUNTER — HOSPITAL ENCOUNTER (INPATIENT)
Facility: CLINIC | Age: 35
LOS: 1 days | Discharge: OTHER INSTITUTION | End: 2025-01-09
Attending: EMERGENCY MEDICINE | Admitting: PSYCHIATRY & NEUROLOGY
Payer: COMMERCIAL

## 2025-01-07 ENCOUNTER — TELEPHONE (OUTPATIENT)
Dept: BEHAVIORAL HEALTH | Facility: CLINIC | Age: 35
End: 2025-01-07
Payer: COMMERCIAL

## 2025-01-07 ENCOUNTER — APPOINTMENT (OUTPATIENT)
Dept: CT IMAGING | Facility: CLINIC | Age: 35
End: 2025-01-07
Attending: EMERGENCY MEDICINE
Payer: COMMERCIAL

## 2025-01-07 DIAGNOSIS — G47.00 INSOMNIA, UNSPECIFIED TYPE: ICD-10-CM

## 2025-01-07 DIAGNOSIS — F41.9 ANXIETY: ICD-10-CM

## 2025-01-07 DIAGNOSIS — G89.29 CHRONIC LEFT-SIDED LOW BACK PAIN WITHOUT SCIATICA: ICD-10-CM

## 2025-01-07 DIAGNOSIS — F10.10 ALCOHOL ABUSE: ICD-10-CM

## 2025-01-07 DIAGNOSIS — F10.20 ALCOHOL USE DISORDER, SEVERE, DEPENDENCE (H): Primary | ICD-10-CM

## 2025-01-07 DIAGNOSIS — M54.50 CHRONIC LEFT-SIDED LOW BACK PAIN WITHOUT SCIATICA: ICD-10-CM

## 2025-01-07 DIAGNOSIS — F39 MOOD DISORDER: ICD-10-CM

## 2025-01-07 LAB
ALBUMIN SERPL BCG-MCNC: 4.8 G/DL (ref 3.5–5.2)
ALBUMIN UR-MCNC: 30 MG/DL
ALCOHOL BREATH TEST: 0.14 (ref 0–0.01)
ALP SERPL-CCNC: 71 U/L (ref 40–150)
ALT SERPL W P-5'-P-CCNC: 13 U/L (ref 0–70)
AMPHETAMINES UR QL SCN: ABNORMAL
ANION GAP SERPL CALCULATED.3IONS-SCNC: 18 MMOL/L (ref 7–15)
APPEARANCE UR: CLEAR
AST SERPL W P-5'-P-CCNC: 25 U/L (ref 0–45)
BACTERIA #/AREA URNS HPF: ABNORMAL /HPF
BARBITURATES UR QL SCN: ABNORMAL
BASOPHILS # BLD AUTO: 0 10E3/UL (ref 0–0.2)
BASOPHILS NFR BLD AUTO: 1 %
BENZODIAZ UR QL SCN: ABNORMAL
BILIRUB SERPL-MCNC: 0.4 MG/DL
BILIRUB UR QL STRIP: NEGATIVE
BUN SERPL-MCNC: 8.4 MG/DL (ref 6–20)
BZE UR QL SCN: ABNORMAL
CALCIUM SERPL-MCNC: 9.2 MG/DL (ref 8.8–10.4)
CANNABINOIDS UR QL SCN: ABNORMAL
CHLORIDE SERPL-SCNC: 102 MMOL/L (ref 98–107)
COLOR UR AUTO: YELLOW
CREAT SERPL-MCNC: 0.9 MG/DL (ref 0.67–1.17)
EGFRCR SERPLBLD CKD-EPI 2021: >90 ML/MIN/1.73M2
EOSINOPHIL # BLD AUTO: 0 10E3/UL (ref 0–0.7)
EOSINOPHIL NFR BLD AUTO: 1 %
ERYTHROCYTE [DISTWIDTH] IN BLOOD BY AUTOMATED COUNT: 12.4 % (ref 10–15)
FENTANYL UR QL: ABNORMAL
GLUCOSE SERPL-MCNC: 77 MG/DL (ref 70–99)
GLUCOSE UR STRIP-MCNC: NEGATIVE MG/DL
HCO3 SERPL-SCNC: 23 MMOL/L (ref 22–29)
HCT VFR BLD AUTO: 43.9 % (ref 40–53)
HGB BLD-MCNC: 14.7 G/DL (ref 13.3–17.7)
HGB UR QL STRIP: NEGATIVE
IMM GRANULOCYTES # BLD: 0 10E3/UL
IMM GRANULOCYTES NFR BLD: 0 %
KETONES UR STRIP-MCNC: ABNORMAL MG/DL
LEUKOCYTE ESTERASE UR QL STRIP: NEGATIVE
LYMPHOCYTES # BLD AUTO: 2.7 10E3/UL (ref 0.8–5.3)
LYMPHOCYTES NFR BLD AUTO: 51 %
MAGNESIUM SERPL-MCNC: 2.2 MG/DL (ref 1.7–2.3)
MCH RBC QN AUTO: 30.8 PG (ref 26.5–33)
MCHC RBC AUTO-ENTMCNC: 33.5 G/DL (ref 31.5–36.5)
MCV RBC AUTO: 92 FL (ref 78–100)
MONOCYTES # BLD AUTO: 0.6 10E3/UL (ref 0–1.3)
MONOCYTES NFR BLD AUTO: 11 %
MUCOUS THREADS #/AREA URNS LPF: PRESENT /LPF
NEUTROPHILS # BLD AUTO: 1.9 10E3/UL (ref 1.6–8.3)
NEUTROPHILS NFR BLD AUTO: 36 %
NITRATE UR QL: NEGATIVE
NRBC # BLD AUTO: 0 10E3/UL
NRBC BLD AUTO-RTO: 0 /100
OPIATES UR QL SCN: ABNORMAL
PCP QUAL URINE (ROCHE): ABNORMAL
PH UR STRIP: 5.5 [PH] (ref 5–7)
PLATELET # BLD AUTO: 199 10E3/UL (ref 150–450)
POTASSIUM SERPL-SCNC: 3.9 MMOL/L (ref 3.4–5.3)
PROT SERPL-MCNC: 8.4 G/DL (ref 6.4–8.3)
RBC # BLD AUTO: 4.78 10E6/UL (ref 4.4–5.9)
RBC URINE: 1 /HPF
SODIUM SERPL-SCNC: 143 MMOL/L (ref 135–145)
SP GR UR STRIP: 1.03 (ref 1–1.03)
SQUAMOUS EPITHELIAL: 1 /HPF
UROBILINOGEN UR STRIP-MCNC: NORMAL MG/DL
WBC # BLD AUTO: 5.3 10E3/UL (ref 4–11)
WBC URINE: 4 /HPF

## 2025-01-07 PROCEDURE — 85025 COMPLETE CBC W/AUTO DIFF WBC: CPT | Performed by: EMERGENCY MEDICINE

## 2025-01-07 PROCEDURE — 83036 HEMOGLOBIN GLYCOSYLATED A1C: CPT | Performed by: STUDENT IN AN ORGANIZED HEALTH CARE EDUCATION/TRAINING PROGRAM

## 2025-01-07 PROCEDURE — 84460 ALANINE AMINO (ALT) (SGPT): CPT | Performed by: EMERGENCY MEDICINE

## 2025-01-07 PROCEDURE — 83735 ASSAY OF MAGNESIUM: CPT | Performed by: EMERGENCY MEDICINE

## 2025-01-07 PROCEDURE — 74176 CT ABD & PELVIS W/O CONTRAST: CPT

## 2025-01-07 PROCEDURE — 84443 ASSAY THYROID STIM HORMONE: CPT | Performed by: STUDENT IN AN ORGANIZED HEALTH CARE EDUCATION/TRAINING PROGRAM

## 2025-01-07 PROCEDURE — 82977 ASSAY OF GGT: CPT | Performed by: STUDENT IN AN ORGANIZED HEALTH CARE EDUCATION/TRAINING PROGRAM

## 2025-01-07 PROCEDURE — 250N000013 HC RX MED GY IP 250 OP 250 PS 637: Performed by: EMERGENCY MEDICINE

## 2025-01-07 PROCEDURE — 85018 HEMOGLOBIN: CPT | Performed by: EMERGENCY MEDICINE

## 2025-01-07 PROCEDURE — 81001 URINALYSIS AUTO W/SCOPE: CPT | Performed by: EMERGENCY MEDICINE

## 2025-01-07 PROCEDURE — 80307 DRUG TEST PRSMV CHEM ANLYZR: CPT | Performed by: EMERGENCY MEDICINE

## 2025-01-07 PROCEDURE — 36415 COLL VENOUS BLD VENIPUNCTURE: CPT | Performed by: EMERGENCY MEDICINE

## 2025-01-07 RX ORDER — LIDOCAINE 4 G/G
1 PATCH TOPICAL ONCE
Status: COMPLETED | OUTPATIENT
Start: 2025-01-07 | End: 2025-01-08

## 2025-01-07 RX ORDER — FOLIC ACID 1 MG/1
1 TABLET ORAL ONCE
Status: COMPLETED | OUTPATIENT
Start: 2025-01-07 | End: 2025-01-07

## 2025-01-07 RX ORDER — IBUPROFEN 600 MG/1
600 TABLET, FILM COATED ORAL ONCE
Status: COMPLETED | OUTPATIENT
Start: 2025-01-07 | End: 2025-01-07

## 2025-01-07 RX ORDER — CYCLOBENZAPRINE HCL 10 MG
10 TABLET ORAL ONCE
Status: COMPLETED | OUTPATIENT
Start: 2025-01-07 | End: 2025-01-07

## 2025-01-07 RX ORDER — ACETAMINOPHEN 325 MG/1
650 TABLET ORAL ONCE
Status: COMPLETED | OUTPATIENT
Start: 2025-01-07 | End: 2025-01-07

## 2025-01-07 RX ORDER — MULTIPLE VITAMINS W/ MINERALS TAB 9MG-400MCG
1 TAB ORAL ONCE
Status: COMPLETED | OUTPATIENT
Start: 2025-01-07 | End: 2025-01-07

## 2025-01-07 RX ORDER — DIAZEPAM 5 MG/1
5-20 TABLET ORAL EVERY 30 MIN PRN
Status: DISCONTINUED | OUTPATIENT
Start: 2025-01-07 | End: 2025-01-08

## 2025-01-07 RX ADMIN — Medication 1 TABLET: at 18:02

## 2025-01-07 RX ADMIN — LIDOCAINE 1 PATCH: 4 PATCH TOPICAL at 21:59

## 2025-01-07 RX ADMIN — ACETAMINOPHEN 650 MG: 325 TABLET, FILM COATED ORAL at 22:06

## 2025-01-07 RX ADMIN — THIAMINE HCL TAB 100 MG 100 MG: 100 TAB at 18:02

## 2025-01-07 RX ADMIN — FOLIC ACID 1 MG: 1 TABLET ORAL at 18:02

## 2025-01-07 RX ADMIN — CYCLOBENZAPRINE 10 MG: 10 TABLET, FILM COATED ORAL at 21:58

## 2025-01-07 RX ADMIN — IBUPROFEN 600 MG: 600 TABLET, FILM COATED ORAL at 18:02

## 2025-01-07 ASSESSMENT — ACTIVITIES OF DAILY LIVING (ADL)
ADLS_ACUITY_SCORE: 50

## 2025-01-07 ASSESSMENT — LIFESTYLE VARIABLES: INTOXICATION: 1

## 2025-01-07 ASSESSMENT — COLUMBIA-SUICIDE SEVERITY RATING SCALE - C-SSRS
1. IN THE PAST MONTH, HAVE YOU WISHED YOU WERE DEAD OR WISHED YOU COULD GO TO SLEEP AND NOT WAKE UP?: NO
6. HAVE YOU EVER DONE ANYTHING, STARTED TO DO ANYTHING, OR PREPARED TO DO ANYTHING TO END YOUR LIFE?: NO
2. HAVE YOU ACTUALLY HAD ANY THOUGHTS OF KILLING YOURSELF IN THE PAST MONTH?: NO

## 2025-01-07 NOTE — ED TRIAGE NOTES
Triage Assessment (Adult)       Row Name 01/07/25 1423          Triage Assessment    Airway WDL WDL        Respiratory WDL    Respiratory WDL WDL        Skin Circulation/Temperature WDL    Skin Circulation/Temperature WDL WDL        Cardiac WDL    Cardiac WDL WDL        Peripheral/Neurovascular WDL    Peripheral Neurovascular WDL WDL        Cognitive/Neuro/Behavioral WDL    Cognitive/Neuro/Behavioral WDL X;mood/behavior     Mood/Behavior sad        Mi Coma Scale    Best Eye Response 4-->(E4) spontaneous     Best Motor Response 6-->(M6) obeys commands     Best Verbal Response 5-->(V5) oriented     Mi Coma Scale Score 15

## 2025-01-07 NOTE — ED PROVIDER NOTES
[facility] [place]GENCY DE[facility]MENT ([facility])    1/07/25      ED PROVIDER NOTE     History     Chief Complaint   Patient presents with    Alcohol Intoxication     Seeking detox from 5 shots daily of tequila for the past two months. Denies hx of withdrawal seizures/Dts. Last drink immediately before arrival    Flank Pain     Right 9/10 flank pain started 2 days ago, feels like his previous kidney stone pain.       Alcohol Intoxication    Ravi Hankins is a 34 year old male with history of alcohol use disorder, seizure disorder who presents the emergency department seeking detox from alcohol as well as right-sided flank pain.  He reports drinking 5 shots of tequila daily for the past 2 months.  He last drank just prior to coming to the ED.  He also reports right sided flank pain that started 2 days ago, states that this feels similar to prior kidney stone pain. Pain is constant and not as bad as previous stones. Patient has been learning to deal with the pain.      Patient denied history of withdrawal seizures, or DTs. Last drank right before arrival. No drug use. No SI/HI.     Past Medical History  Past Medical History:   Diagnosis Date    Dilated aortic root      Past Surgical History:   Procedure Laterality Date    NO HISTORY OF SURGERY       atenolol (TENORMIN) 100 MG tablet  [START ON 1/10/2025] folic acid (FOLVITE) 1 MG tablet  hydrOXYzine HCl (ATARAX) 25 MG tablet  [START ON 1/10/2025] multivitamin w/minerals (THERA-VIT-M) tablet  [START ON 1/10/2025] thiamine (B-1) 100 MG tablet  traZODone (DESYREL) 50 MG tablet  acetaminophen (TYLENOL) 500 MG tablet  alum & mag hydroxide-simethicone (MAALOX) 200-200-20 MG/5ML SUSP suspension  benzocaine-menthol (CHLORASEPTIC) 6-10 MG lozenge  guaiFENesin 200 MG/10ML LIQD  ibuprofen (ADVIL/MOTRIN) 200 MG tablet  loratadine (CLARITIN) 10 MG tablet  melatonin 5 MG tablet  senna-docusate (SENOKOT-S/PERICOLACE) 8.6-50 MG tablet      Allergies   Allergen Reactions     Contrast Dye      PN: LW CM1:  Reaction :  rash    Sulfa Antibiotics      PN: LW Reaction: unsure which kid has allergy per mom???     Family History  Family History   Problem Relation Age of Onset    Heart Disease Brother     Abdominal Aortic Aneurysm Brother     Heart Disease Other         self    Substance Abuse Mother     Substance Abuse Father     Substance Abuse Maternal Grandfather     Diabetes No family hx of     Coronary Artery Disease No family hx of     Hypertension No family hx of     Hyperlipidemia No family hx of     Cerebrovascular Disease No family hx of     Breast Cancer No family hx of     Colon Cancer No family hx of     Prostate Cancer No family hx of     Other Cancer No family hx of     Depression No family hx of     Anxiety Disorder No family hx of     Mental Illness No family hx of     Anesthesia Reaction No family hx of     Asthma No family hx of     Osteoporosis No family hx of     Genetic Disorder No family hx of     Thyroid Disease No family hx of     Obesity No family hx of     Unknown/Adopted No family hx of      Social History   Social History     Tobacco Use    Smoking status: Former     Current packs/day: 0.00     Average packs/day: 0.1 packs/day for 5.0 years (0.5 ttl pk-yrs)     Types: Cigarettes     Start date: 2017     Quit date: 2022     Years since quitting: 3.0    Smokeless tobacco: Never    Tobacco comments:     1-2 cigarettes a day for 5 years   Vaping Use    Vaping status: Some Days    Start date: 2/22/2021    Substances: Nicotine    Devices: Disposable   Substance Use Topics    Alcohol use: Yes     Comment: daily    Drug use: Yes     Types: Marijuana     Comment: Pt reports smoking a blunt today, pt reports smoking canabis everyday..      A medically appropriate review of systems was performed with pertinent positives and negatives noted in the HPI, and all other systems negative.    Physical Exam   BP: 128/84  Pulse: 82  Temp: 97.8  F (36.6  C)  Resp: 18  Height: 185.4 cm  "(6' 1\")  SpO2: 95 %  Physical Exam  Vitals and nursing note reviewed.   Constitutional:       General: He is not in acute distress.     Appearance: Normal appearance. He is well-developed. He is not ill-appearing or diaphoretic.   HENT:      Head: Normocephalic and atraumatic.      Nose: Nose normal.      Mouth/Throat:      Mouth: Mucous membranes are moist.   Eyes:      General: No scleral icterus.     Conjunctiva/sclera: Conjunctivae normal.   Cardiovascular:      Rate and Rhythm: Normal rate.   Pulmonary:      Effort: Pulmonary effort is normal. No respiratory distress.      Breath sounds: No stridor.   Abdominal:      General: There is no distension.   Musculoskeletal:         General: No deformity or signs of injury. Normal range of motion.      Cervical back: Normal range of motion and neck supple. No rigidity.   Skin:     General: Skin is warm and dry.      Coloration: Skin is not jaundiced or pale.      Findings: No rash.   Neurological:      General: No focal deficit present.      Mental Status: He is alert and oriented to person, place, and time.   Psychiatric:         Behavior: Behavior normal.         Thought Content: Thought content normal.           ED Course, Procedures, & Data      Procedures                Results for orders placed or performed during the hospital encounter of 01/07/25   CT Abdomen Pelvis w/o Contrast     Status: None    Narrative    EXAM: CT ABDOMEN PELVIS W/O CONTRAST  LOCATION: [facility]  DATE: 1/7/2025    INDICATION: right flank pain, eval stone  COMPARISON: None.  TECHNIQUE: CT scan of the abdomen and pelvis was performed without IV contrast. Multiplanar reformats were obtained. Dose reduction techniques were used.  CONTRAST: None.    FINDINGS:   LOWER CHEST: Normal.    HEPATOBILIARY: Liver and gallbladder within normal limits.    PANCREAS: Normal.    SPLEEN: Normal.    ADRENAL GLANDS: Normal.    KIDNEYS/BLADDER: 4 mm stone noted " within the inferior pole of the right kidney. Few additional punctate stones noted within the right kidney. Possible tiny 1 mm punctate noted within the left kidney.    BOWEL: Diverticulosis of the colon. No acute inflammatory change. No obstruction.     LYMPH NODES: Normal.    VASCULATURE: Normal.    PELVIC ORGANS: Bladder decompressed.    MUSCULOSKELETAL: Degenerative changes of the spine.      Impression    IMPRESSION:   1.  Small stones are noted within the right kidney measuring up to 4 mm. Possible punctate 1 mm stones noted within the left kidney. No hydronephrosis. No stones identified within the ureters or bladder.     Comprehensive metabolic panel     Status: Abnormal   Result Value Ref Range    Sodium 143 135 - 145 mmol/L    Potassium 3.9 3.4 - 5.3 mmol/L    Carbon Dioxide (CO2) 23 22 - 29 mmol/L    Anion Gap 18 (H) 7 - 15 mmol/L    Urea Nitrogen 8.4 6.0 - 20.0 mg/dL    Creatinine 0.90 0.67 - 1.17 mg/dL    GFR Estimate >90 >60 mL/min/1.73m2    Calcium 9.2 8.8 - 10.4 mg/dL    Chloride 102 98 - 107 mmol/L    Glucose 77 70 - 99 mg/dL    Alkaline Phosphatase 71 40 - 150 U/L    AST 25 0 - 45 U/L    ALT 13 0 - 70 U/L    Protein Total 8.4 (H) 6.4 - 8.3 g/dL    Albumin 4.8 3.5 - 5.2 g/dL    Bilirubin Total 0.4 <=1.2 mg/dL   UA with Microscopic reflex to Culture     Status: Abnormal    Specimen: Urine, Midstream   Result Value Ref Range    Color Urine Yellow Colorless, Straw, Light Yellow, Yellow    Appearance Urine Clear Clear    Glucose Urine Negative Negative mg/dL    Bilirubin Urine Negative Negative    Ketones Urine Trace (A) Negative mg/dL    Specific Gravity Urine 1.034 1.003 - 1.035    Blood Urine Negative Negative    pH Urine 5.5 5.0 - 7.0    Protein Albumin Urine 30 (A) Negative mg/dL    Urobilinogen Urine Normal Normal, 2.0 mg/dL    Nitrite Urine Negative Negative    Leukocyte Esterase Urine Negative Negative    Bacteria Urine Few (A) None Seen /HPF    Mucus Urine Present (A) None Seen /LPF    RBC Urine  1 <=2 /HPF    WBC Urine 4 <=5 /HPF    Squamous Epithelials Urine 1 <=1 /HPF    Narrative    Urine Culture not indicated   Magnesium     Status: Normal   Result Value Ref Range    Magnesium 2.2 1.7 - 2.3 mg/dL   CBC with platelets and differential     Status: None   Result Value Ref Range    WBC Count 5.3 4.0 - 11.0 10e3/uL    RBC Count 4.78 4.40 - 5.90 10e6/uL    Hemoglobin 14.7 13.3 - 17.7 g/dL    Hematocrit 43.9 40.0 - 53.0 %    MCV 92 78 - 100 fL    MCH 30.8 26.5 - 33.0 pg    MCHC 33.5 31.5 - 36.5 g/dL    RDW 12.4 10.0 - 15.0 %    Platelet Count 199 150 - 450 10e3/uL    % Neutrophils 36 %    % Lymphocytes 51 %    % Monocytes 11 %    % Eosinophils 1 %    % Basophils 1 %    % Immature Granulocytes 0 %    NRBCs per 100 WBC 0 <1 /100    Absolute Neutrophils 1.9 1.6 - 8.3 10e3/uL    Absolute Lymphocytes 2.7 0.8 - 5.3 10e3/uL    Absolute Monocytes 0.6 0.0 - 1.3 10e3/uL    Absolute Eosinophils 0.0 0.0 - 0.7 10e3/uL    Absolute Basophils 0.0 0.0 - 0.2 10e3/uL    Absolute Immature Granulocytes 0.0 <=0.4 10e3/uL    Absolute NRBCs 0.0 10e3/uL   Urine Drug Screen Panel     Status: Abnormal   Result Value Ref Range    Amphetamines Urine Screen Positive (A) Screen Negative    Barbituates Urine Screen Negative Screen Negative    Benzodiazepine Urine Screen Negative Screen Negative    Cannabinoids Urine Screen Positive (A) Screen Negative    Cocaine Urine Screen Negative Screen Negative    Fentanyl Qual Urine Screen Negative Screen Negative    Opiates Urine Screen Negative Screen Negative    PCP Urine Screen Negative Screen Negative   GGT     Status: Normal   Result Value Ref Range    GGT 29 8 - 61 U/L   TSH with free T4 reflex     Status: Normal   Result Value Ref Range    TSH 0.48 0.30 - 4.20 uIU/mL   Hemoglobin A1c     Status: Normal   Result Value Ref Range    Estimated Average Glucose 114 <117 mg/dL    Hemoglobin A1C 5.6 <5.7 %   Comprehensive metabolic panel     Status: Normal   Result Value Ref Range    Sodium 140 135 -  145 mmol/L    Potassium 4.2 3.4 - 5.3 mmol/L    Carbon Dioxide (CO2) 26 22 - 29 mmol/L    Anion Gap 11 7 - 15 mmol/L    Urea Nitrogen 12.5 6.0 - 20.0 mg/dL    Creatinine 1.00 0.67 - 1.17 mg/dL    GFR Estimate >90 >60 mL/min/1.73m2    Calcium 9.6 8.8 - 10.4 mg/dL    Chloride 103 98 - 107 mmol/L    Glucose 98 70 - 99 mg/dL    Alkaline Phosphatase 64 40 - 150 U/L    AST 21 0 - 45 U/L    ALT 14 0 - 70 U/L    Protein Total 7.6 6.4 - 8.3 g/dL    Albumin 4.3 3.5 - 5.2 g/dL    Bilirubin Total 0.8 <=1.2 mg/dL   Extra Tube     Status: None    Narrative    The following orders were created for panel order Extra Tube.  Procedure                               Abnormality         Status                     ---------                               -----------         ------                     Extra Purple Top Tube[[id number]]                            Final result                 Please view results for these tests on the individual orders.   Extra Purple Top Tube     Status: None   Result Value Ref Range    Hold Specimen [facility]    Alcohol breath test POCT     Status: Abnormal   Result Value Ref Range    Alcohol Breath Test 0.138 (A) 0.00 - 0.01   CBC with platelets differential     Status: None    Narrative    The following orders were created for panel order CBC with platelets differential.  Procedure                               Abnormality         Status                     ---------                               -----------         ------                     CBC with platelets and d...[[id number]]                      Final result                 Please view results for these tests on the individual orders.   Urine Drug Screen     Status: Abnormal    Narrative    The following orders were created for panel order Urine Drug Screen.  Procedure                               Abnormality         Status                     ---------                               -----------         ------                     Urine Drug Screen  Panel[[id number]]      Abnormal            Final result                 Please view results for these tests on the individual orders.   Results for orders placed or performed during the hospital encounter of 01/09/25   Urine Creatinine for Drug Screen Panel     Status: None   Result Value Ref Range    Creatinine Urine for Drug Screen 261 mg/dL   Drug Confirmation Panel Urine with Creat     Status: None (In process)    Narrative    The following orders were created for panel order Drug Confirmation Panel Urine with Creat.  Procedure                               Abnormality         Status                     ---------                               -----------         ------                     Urine Drug Confirmation ...[[id number]]                      In process                 Urine Creatinine for Richard...[[id number]]                      Final result                 Please view results for these tests on the individual orders.     Medications   ibuprofen (ADVIL/MOTRIN) tablet 600 mg (600 mg Oral $Given 1/7/25 1802)   thiamine (B-1) tablet 100 mg (100 mg Oral $Given 1/7/25 1802)   folic acid (FOLVITE) tablet 1 mg (1 mg Oral $Given 1/7/25 1802)   multivitamin w/minerals (THERA-VIT-M) tablet 1 tablet (1 tablet Oral $Given 1/7/25 1802)   cyclobenzaprine (FLEXERIL) tablet 10 mg (10 mg Oral $Given 1/7/25 2158)   acetaminophen (TYLENOL) tablet 650 mg (650 mg Oral $Given 1/7/25 2206)   Lidocaine (LIDOCARE) 4 % Patch 1 patch (1 patch Transdermal Patch/Med Removed 1/8/25 3620)     Labs Ordered and Resulted from Time of ED Arrival to Time of ED Departure   COMPREHENSIVE METABOLIC PANEL - Abnormal       Result Value    Sodium 143      Potassium 3.9      Carbon Dioxide (CO2) 23      Anion Gap 18 (*)     Urea Nitrogen 8.4      Creatinine 0.90      GFR Estimate >90      Calcium 9.2      Chloride 102      Glucose 77      Alkaline Phosphatase 71      AST 25      ALT 13      Protein Total 8.4 (*)     Albumin 4.8      Bilirubin Total  0.4     ROUTINE UA WITH MICROSCOPIC REFLEX TO CULTURE - Abnormal    Color Urine Yellow      Appearance Urine Clear      Glucose Urine Negative      Bilirubin Urine Negative      Ketones Urine Trace (*)     Specific Gravity Urine 1.034      Blood Urine Negative      pH Urine 5.5      Protein Albumin Urine 30 (*)     Urobilinogen Urine Normal      Nitrite Urine Negative      Leukocyte Esterase Urine Negative      Bacteria Urine Few (*)     Mucus Urine Present (*)     RBC Urine 1      WBC Urine 4      Squamous Epithelials Urine 1     URINE DRUG SCREEN PANEL - Abnormal    Amphetamines Urine Screen Positive (*)     Barbituates Urine Screen Negative      Benzodiazepine Urine Screen Negative      Cannabinoids Urine Screen Positive (*)     Cocaine Urine Screen Negative      Fentanyl Qual Urine Screen Negative      Opiates Urine Screen Negative      PCP Urine Screen Negative     ALCOHOL BREATH TEST POCT - Abnormal    Alcohol Breath Test 0.138 (*)    MAGNESIUM - Normal    Magnesium 2.2     CBC WITH PLATELETS AND DIFFERENTIAL    WBC Count 5.3      RBC Count 4.78      Hemoglobin 14.7      Hematocrit 43.9      MCV 92      MCH 30.8      MCHC 33.5      RDW 12.4      Platelet Count 199      % Neutrophils 36      % Lymphocytes 51      % Monocytes 11      % Eosinophils 1      % Basophils 1      % Immature Granulocytes 0      NRBCs per 100 WBC 0      Absolute Neutrophils 1.9      Absolute Lymphocytes 2.7      Absolute Monocytes 0.6      Absolute Eosinophils 0.0      Absolute Basophils 0.0      Absolute Immature Granulocytes 0.0      Absolute NRBCs 0.0       CT Abdomen Pelvis w/o Contrast   Final Result   IMPRESSION:    1.  Small stones are noted within the right kidney measuring up to 4 mm. Possible punctate 1 mm stones noted within the left kidney. No hydronephrosis. No stones identified within the ureters or bladder.                    Assessment & Plan    Ravi Hankins is a 34 year old male with history of alcohol use  disorder, seizure disorder who presents the emergency department seeking detox from alcohol as well as right-sided flank pain.      Was normal on arrival.  No evidence of active alcohol withdrawal.  Patient with chronic left flank/back pain and h/o previous kidney stones. Pain is not consistent with acute ureterolithiasis, not as severe. Breath alcohol 0.138 on arrival.  Creatinine normal.  Liver enzymes and bilirubin normal.  Magnesium normal.  CBC normal.  Urinalysis with no evidence of infection.  U tox positive for cannabinoids and amphetamines.  CT shows intrarenal stones without obstruction. UA w/o infection. No urologic cause for flank pain. Treated for MSK pain with lido patch, NSAIDS, and flexeril. Patient accepted to detox unit for etoh abuse.       I have reviewed the nursing notes. I have reviewed the findings, diagnosis, plan and need for follow up with the patient.          Final diagnoses:   Chronic left-sided low back pain without sciatica   Alcohol abuse       Brenda Sierra MD  [facility] EMERGENCY DEPARTMENT  1/7/2025        Brenda Sierra MD  01/09/25 1943

## 2025-01-08 PROBLEM — F10.929 ALCOHOL INTOXICATION: Status: ACTIVE | Noted: 2025-01-08

## 2025-01-08 LAB
EST. AVERAGE GLUCOSE BLD GHB EST-MCNC: 114 MG/DL
GGT SERPL-CCNC: 29 U/L (ref 8–61)
HBA1C MFR BLD: 5.6 %
TSH SERPL DL<=0.005 MIU/L-ACNC: 0.48 UIU/ML (ref 0.3–4.2)

## 2025-01-08 PROCEDURE — 128N000004 HC R&B CD ADULT

## 2025-01-08 PROCEDURE — 99222 1ST HOSP IP/OBS MODERATE 55: CPT | Performed by: NURSE PRACTITIONER

## 2025-01-08 PROCEDURE — 99221 1ST HOSP IP/OBS SF/LOW 40: CPT | Performed by: PHYSICIAN ASSISTANT

## 2025-01-08 PROCEDURE — 250N000013 HC RX MED GY IP 250 OP 250 PS 637: Performed by: NURSE PRACTITIONER

## 2025-01-08 PROCEDURE — HZ2ZZZZ DETOXIFICATION SERVICES FOR SUBSTANCE ABUSE TREATMENT: ICD-10-PCS | Performed by: PSYCHIATRY & NEUROLOGY

## 2025-01-08 PROCEDURE — 250N000013 HC RX MED GY IP 250 OP 250 PS 637: Performed by: STUDENT IN AN ORGANIZED HEALTH CARE EDUCATION/TRAINING PROGRAM

## 2025-01-08 RX ORDER — IBUPROFEN 600 MG/1
600 TABLET, FILM COATED ORAL EVERY 6 HOURS PRN
Status: DISCONTINUED | OUTPATIENT
Start: 2025-01-08 | End: 2025-01-09 | Stop reason: HOSPADM

## 2025-01-08 RX ORDER — OLANZAPINE 10 MG/2ML
10 INJECTION, POWDER, FOR SOLUTION INTRAMUSCULAR 3 TIMES DAILY PRN
Status: DISCONTINUED | OUTPATIENT
Start: 2025-01-08 | End: 2025-01-08

## 2025-01-08 RX ORDER — AMOXICILLIN 250 MG
1 CAPSULE ORAL 2 TIMES DAILY PRN
Status: DISCONTINUED | OUTPATIENT
Start: 2025-01-08 | End: 2025-01-09 | Stop reason: HOSPADM

## 2025-01-08 RX ORDER — LOPERAMIDE HYDROCHLORIDE 2 MG/1
2 CAPSULE ORAL 4 TIMES DAILY PRN
Status: DISCONTINUED | OUTPATIENT
Start: 2025-01-08 | End: 2025-01-09 | Stop reason: HOSPADM

## 2025-01-08 RX ORDER — MAGNESIUM HYDROXIDE/ALUMINUM HYDROXICE/SIMETHICONE 120; 1200; 1200 MG/30ML; MG/30ML; MG/30ML
30 SUSPENSION ORAL EVERY 4 HOURS PRN
Status: DISCONTINUED | OUTPATIENT
Start: 2025-01-08 | End: 2025-01-09 | Stop reason: HOSPADM

## 2025-01-08 RX ORDER — ACETAMINOPHEN 325 MG/1
650 TABLET ORAL EVERY 4 HOURS PRN
Status: DISCONTINUED | OUTPATIENT
Start: 2025-01-08 | End: 2025-01-09 | Stop reason: HOSPADM

## 2025-01-08 RX ORDER — FOLIC ACID 1 MG/1
1 TABLET ORAL DAILY
Status: DISCONTINUED | OUTPATIENT
Start: 2025-01-08 | End: 2025-01-09 | Stop reason: HOSPADM

## 2025-01-08 RX ORDER — ONDANSETRON 4 MG/1
4 TABLET, ORALLY DISINTEGRATING ORAL EVERY 6 HOURS PRN
Status: DISCONTINUED | OUTPATIENT
Start: 2025-01-08 | End: 2025-01-09 | Stop reason: HOSPADM

## 2025-01-08 RX ORDER — HYDROXYZINE HYDROCHLORIDE 25 MG/1
25 TABLET, FILM COATED ORAL EVERY 4 HOURS PRN
Status: DISCONTINUED | OUTPATIENT
Start: 2025-01-08 | End: 2025-01-09 | Stop reason: HOSPADM

## 2025-01-08 RX ORDER — MULTIPLE VITAMINS W/ MINERALS TAB 9MG-400MCG
1 TAB ORAL DAILY
Status: DISCONTINUED | OUTPATIENT
Start: 2025-01-08 | End: 2025-01-09 | Stop reason: HOSPADM

## 2025-01-08 RX ORDER — DIAZEPAM 5 MG/1
5-20 TABLET ORAL EVERY 30 MIN PRN
Status: DISCONTINUED | OUTPATIENT
Start: 2025-01-08 | End: 2025-01-09

## 2025-01-08 RX ORDER — ATENOLOL 100 MG/1
100 TABLET ORAL DAILY
Status: DISCONTINUED | OUTPATIENT
Start: 2025-01-08 | End: 2025-01-09 | Stop reason: HOSPADM

## 2025-01-08 RX ORDER — ATENOLOL 50 MG/1
50 TABLET ORAL DAILY PRN
Status: DISCONTINUED | OUTPATIENT
Start: 2025-01-08 | End: 2025-01-08

## 2025-01-08 RX ORDER — TRAZODONE HYDROCHLORIDE 50 MG/1
50 TABLET, FILM COATED ORAL
Status: DISCONTINUED | OUTPATIENT
Start: 2025-01-08 | End: 2025-01-09 | Stop reason: HOSPADM

## 2025-01-08 RX ORDER — OLANZAPINE 10 MG/1
10 TABLET ORAL 3 TIMES DAILY PRN
Status: DISCONTINUED | OUTPATIENT
Start: 2025-01-08 | End: 2025-01-08

## 2025-01-08 RX ADMIN — THIAMINE HCL TAB 100 MG 100 MG: 100 TAB at 08:26

## 2025-01-08 RX ADMIN — HYDROXYZINE HYDROCHLORIDE 25 MG: 25 TABLET, FILM COATED ORAL at 16:55

## 2025-01-08 RX ADMIN — Medication 1 TABLET: at 08:26

## 2025-01-08 RX ADMIN — HYDROXYZINE HYDROCHLORIDE 25 MG: 25 TABLET, FILM COATED ORAL at 21:05

## 2025-01-08 RX ADMIN — FOLIC ACID 1 MG: 1 TABLET ORAL at 08:26

## 2025-01-08 RX ADMIN — TRAZODONE HYDROCHLORIDE 50 MG: 50 TABLET ORAL at 21:05

## 2025-01-08 RX ADMIN — HYDROXYZINE HYDROCHLORIDE 25 MG: 25 TABLET, FILM COATED ORAL at 12:05

## 2025-01-08 RX ADMIN — ATENOLOL 100 MG: 100 TABLET ORAL at 13:44

## 2025-01-08 ASSESSMENT — ACTIVITIES OF DAILY LIVING (ADL)
DRESS: INDEPENDENT
ADLS_ACUITY_SCORE: 27
ADLS_ACUITY_SCORE: 50
ADLS_ACUITY_SCORE: 27
ADLS_ACUITY_SCORE: 50
ADLS_ACUITY_SCORE: 27
DRESS: INDEPENDENT
ADLS_ACUITY_SCORE: 27
LAUNDRY: WITH SUPERVISION
ADLS_ACUITY_SCORE: 50
ADLS_ACUITY_SCORE: 27
ADLS_ACUITY_SCORE: 27
ORAL_HYGIENE: INDEPENDENT
ADLS_ACUITY_SCORE: 27
ADLS_ACUITY_SCORE: 50
ADLS_ACUITY_SCORE: 50
ORAL_HYGIENE: INDEPENDENT
ADLS_ACUITY_SCORE: 27
ADLS_ACUITY_SCORE: 27
HYGIENE/GROOMING: INDEPENDENT
LAUNDRY: WITH SUPERVISION
HYGIENE/GROOMING: INDEPENDENT
ADLS_ACUITY_SCORE: 50

## 2025-01-08 ASSESSMENT — LIFESTYLE VARIABLES: SKIP TO QUESTIONS 9-10: 0

## 2025-01-08 NOTE — PROGRESS NOTES
01/08/25 0514   Patient Belongings   Did you bring any home meds/supplements to the hospital?  Yes   Patient Belongings locker;sent to security per site process   Patient Belongings Put in Hospital Secure Location (Security or Locker, etc.) glasses;cash/credit card;cell phone/electronics;clothing;money (see comment);shoes;wallet;other (see comments)   Belongings Search Yes   Clothing Search Yes     Pt Valuables include:  [employer], lighter, credit cards (5), 1 giftcard, [place] drivers license, clothes, shoes, mobile phone, phone , wallet, backpack, medication    *11 unidentified meds destroyed per pt consent (Pt does not recognize the meds)    A               Admission:  I am responsible for any personal items that are not sent to the safe or pharmacy.  [facility] is not responsible for loss, theft or damage of any property in my possession.    Signature:  _________________________________ Date: _______  Time: _____                                              Staff Signature:  ____________________________ Date: ________  Time: _____      2nd Staff person, if patient is unable/unwilling to sign:    Signature: ________________________________ Date: ________  Time: _____     Discharge:  [facility] has returned all of my personal belongings:    Signature: _________________________________ Date: ________  Time: _____                                          Staff Signature:  ____________________________ Date: ________  Time: _____

## 2025-01-08 NOTE — DISCHARGE INSTRUCTIONS
Behavioral Discharge Planning and Instructions  THANK YOU FOR CHOOSING [facility]  3A  [phone]    Summary: You were admitted to Station 3A on 1/8/25 for detoxification from alcohol.  A medical exam was performed that included lab work. You have met with a [facility] Counselor and opted to attend residential CD treatment at [facility].  Please take care and make your recovery a daily priority, Ravi!  It was a pleasure working with you and the entire treatment team here wishes you the very best in your recovery!     Recommendation:  Residential CD treatment at [facility]. Complete program and follow all aftercare recommendations. Abstain from using mood altering substances.     Main Diagnoses:    303.90 (F10.20) Alcohol Use Disorder Severe, dependence  Alcohol withdrawal  Cannabis use  Opiate use disorder in sustained full remission  History of 2 seizures of unknown etiology over 4 years ago  Nephrolithiasis   Dilated aortic root       Major Treatments, Procedures and Findings:  You were treated for alcohol detoxification using valium. As an outpatient you will be prescribed medication, please take this medication as prescribed until it is gone. You have met with a [facility] counselor to develop a treatment plan for discharge. You had labs drawn and those results were reviewed with you. Please take a copy of your lab work with you to your next primary care provider appointment.    Symptoms to Report:  If you experience more anxiety, confusion, sleeplessness, deep sadness or thoughts of suicide, notify your treatment team or notify your primary care provider. IF ANY OF THE SYMPTOMS YOU ARE EXPERIENCING ARE A MEDICAL EMERGENCY CALL 911 IMMEDIATELY.     Lifestyle Adjustment: Adjust your lifestyle to get enough sleep, relaxation, exercise and good nutrition. Continue to develop healthy coping skills to decrease stress and promote a sober living environment. Do not use mood altering  substances including alcohol, illegal drugs or addictive medications other than what is currently prescribed.   Health Action Plan:  1.Create a daily schedule  2. Eat Healthy  3. Plan Enjoyable Sober Activities  4. Use Problem Solving Skills and Deal with Issues as they Arise.   5. Be Physically Active  6. Take your medications as prescribed  7. Get enough restful sleep  8. Practice Relaxation  9. Spend time with Supportive People  10. No use of alcohol, illegal drugs or addictive medications other than what is currently prescribed.   11.AA, NA Sponsor are excellent resources for support  12. Explore how  you can utilize spirituality in your recovery     Disposition: [facility] - 3pm admission on 1/9/25    Facts about COVID19 at www.cdc.gov/COVID19 and www.[place].gov/covid19    Keeping hands clean is one of the most important steps we can take to avoid getting sick and spreading germs to others.  Please wash your hands frequently and lather with soap for at least 20 seconds!    Follow-up Appointment:   Appointment Date/Time: MINERVA    Psychiatrist/Primary Care Giver: MINERVA    Address: [facility]    Phone Number: TBD      You are aware you should make a follow up appointment with your primary care doctor for medical and medication management     Recovery apps for your phone for educational purposes and to locate in person and zoom recovery meetings  Everything AA -  suman is a great resource  12 Step Toolkit - educational purpose learning about the 12 steps to recovery  [place] Cloud - meeting suman  AA  - meeting suman  Meeting guide - meeting suman  Quick NA meeting - meeting suman  Isabel- has various apps    Resources:  AA/NA meetings have returned to in-person or a hybrid combination of zoom/in-person therefore please check online to verify*  Need Support Now? If you or someone you know is struggling or in crisis, help is available. Call or text 12CapLinked or chat i-[employer]ics.[employer]  AA meetings search for them at:  "https://aa-intergroup.org (worldwide meeting listings)  AA meetings for [place] area can be found online at: https://a[place].org (click local online meetings listings)  NA meetings for [place] area can be found online at: https://www.nam[place].org  (click find a meeting)  AA and NA Sponsors are excellent resources for support and you can find one at any support group meeting.   Alcoholics Anonymous (https://aa.org/): for information 24 hours/day  AA Intergroup service office in [employer] (http://www.aastpaul.org/) 811.971.9624  AA Intergroup service office in [facility]: [phone]. (http://www.aaUni-Power Group.org/)  Narcotics Anonymous (www.nam[place].org) (266) 749-1756  https://aafairviewriverside.org/meetings  SMART Recovery - self management for addiction recovery:  www.Apogee Informaticsrecovery.org  Pathways ~ A Health Crisis Resource & Support Center:  646.857.7003.  https://prescribetoprevent.org/patient-education/videos/  http://www.harmreduction.org  Crisis Text Line  Text 490593  You will be connected with a trained live crisis counselor to provide support. Por espanol, texto  DANIELLE a 661923 o texto a 442-AYUDAME en WhatsApp  National Hope Line  1.800.SUICIDE [5856981]  [facility] [phone]  Support Group:  AA/NA and Sponsor/support.  Fast Tracker  Linking people to mental health and substance use disorder resources  MuxlimtrackWebcollagen.org   [place] Mental Health Warm Line  Peer to peer support  Monday thru Saturday, 12 pm to 10 pm  480.975.3218 or 5.739.109.4692  Text \"Support\" to 74276  National May on Mental Illness (MIRA)  244.898.2949 or 1.888.MIRA.HELPS  Alcoholics Anonymous (www.alcoholics-anonymous.org): Check your phone book for your local chapter.  Suicide Awareness Voices of Education (SAVE) (www.save.org): 009-368-MMIB (2502)  National Suicide Prevention Line (www.mentalhealthmn.org): 591-735-ZHKT (3644)  Mental Health Consumer/Survivor Network of [place] (www.mhcsn.net): " 119.103.3104 or 420-086-2883  Mental Health Association of [place] (www.mentalhealth.org): 673.339.7255 or 592-003-1758   Substance Abuse and Mental Health Services (www.samhsa.gov)  [place] Opioid Prevention Coalition: www.opioidcoalition.org    [place] Recovery Connection (MRC)  [facility] connects people seeking recovery to resources that help foster and sustain long-term recovery.  Whether you are seeking resources for treatment, transportation, housing, job training, education, health care or other pathways to recovery, [facility] is a great place to start.  487.177.9483.  www.minnesotarecWaynay."[employer]    Great Pod casts for nutrition and wellness  Listen on Apple Podcasts  Dishing Up Nutrition   [employer]   Nutrition       Understand the connection between what you eat and how you feel. Hosted by licensed nutritionists and dietitians from [employer] we share practical, real-life solutions for healthier living through nutrition.     General Medication Instructions:   See your medication sheet(s) for instructions.   Take all medications as prescribed.  Make no changes unless your primary care provider suggests them.   Go to all your primary care provider visits.  Be sure to have all your required lab tests. This way, your medicines can be refilled on time.  Do not use any forms of alcohol.    Please Note: If you have any questions at anytime after you discharged please call [facility] [place] detox unit 3A at [phone].    Here are a list of additional numbers you can call if you are wanting to resume services through [facility] [place]:  [facility] [place] Assessment Intake: [phone]  [facility] [place] Adult [facility] Intensive Outpatient  call: [phone]  [facility] Admissions [phone]    Recovery Clinic call: [phone]  [facility]: [phone]  Medical Records call: [phone]  Billing Department call: [phone]    Please remember  to take all of your behavioral discharge planning and lab paperwork to any follow up appointments, it contains your lab results, diagnosis, medication list and discharge recommendations.      THANK YOU FOR [facility]ING [facility]

## 2025-01-08 NOTE — TELEPHONE ENCOUNTER
S: [facility] , Provider Rigo calling at 11:32 PM with clinical on a 34 year old/Male presenting for alcohol detox.     B: Pt presents for ETOH detox.   Currently reports drinking 5 shots of tequila daily  for 3 months.    Patient reports last use was PTA.  Pt ERIK: 138  Pt  denies hx of DT  Pt  denies hx of seizures. Last seizure: N/A  Pt endorsing the following symptoms of withdrawal:  None  MSSA Score: 5    Pt denies acute mental health or medical concerns.   Pt denies other drug use: None Amount/frequency: N/A    Does Pt have a detox care plan in [facility]? No  Does pt present with specific needs, assistive devices, or exclusionary criteria? None  Is the patient ambulating, eating and drinking in the ED? Yes    A: Pt meets criteria to be presented for IP detox admission. Patient is voluntary    COVID Symptoms: No  If yes, COVID test required   Utox: Positive for Amphetamines, cannabis  Magnesium: WNL  CMP: WNL  CBC: WNL  HCG: N/A     R: Patient cleared and ready for behavioral bed placement: Yes    Pt is meeting criteria for presentation to 3A/CD/Katya    Does Patient need a Transfer Center request created? No, Pt is located within [facility] ED, [facility] ED, or [facility] ED    11:36 PM Intake called [facility] and spoke with rep.    12:07 AM Intake received a call from Dr. Ray Thomas. Provider is tent approving pending plan for management of kidney stones. Needs to be documented.     12:18 AM Intake called [facility] ED and spoke with Dr. Sierra. Per Dr Sierra, the kidney stones are located in the kidneys and they cannot treat. Pain is from muscular skeletal pain- receiving flexeril.     12:34 AM Intake called [facility] and spoke with Harmony bennett.     1:05 AM Intake received a call from Dr. Ray Thomas accepting this pt to st 3A/CD/Katya.    1:19 am [facility] complete.     1:21 AM Intake called st 3A and provided disposition to Jose BENNETT. Nurse report: Charge will call ED.     1:22 AM Intake called [facility] ED and provided  placement information to [facility].

## 2025-01-08 NOTE — PLAN OF CARE
Problem: Alcohol Withdrawal  Goal: Alcohol Withdrawal Symptom Control  Outcome: Progressing   Goal Outcome Evaluation:    Patient alert and oriented x 4. Patient was not exhibiting withdrawal symptoms. MSSA 1, 2. He was eating and drinking  well. He's visible in the milieu, social with staff and other patients. He denied anxiety, depression, SI/HI/AVH. He said he's waiting to be admitted at [facility]. He's visible in the lounge, social with staff and other patients.     PRN hydroxyzine given for anxiety 4/10 (effective).     Patient is a restricted recipient. Called insurance several times but no one answered. Left a voicemail to give a callback.

## 2025-01-08 NOTE — PSYCH
Staff called to request chemical detox admission patient who is a 34yoM with hx of AUD who presents for alcohol detox, endorses drinking 5 shots of tequila daily for the past 2 months, last drink was just prior to presenting to the ED, endorses a hx of withdrawal seizures. Orders have been signed and held.

## 2025-01-08 NOTE — CONSULTS
[facility]  Internal Medicine Consult     Ravi Hankins MRN# [id number]   Age: 34 year old YOB: 1990     Date of Admission: 1/7/2025  Date of Consult: 1/8/2025    Primary Care Provider: Monie Avilez    Requesting Service: Behavioral Health - All Aldana MD  Reason for Consult: General Medical Evaluation      SUBJECTIVE   CC:   Patient is a 34 year old male with a history of seizures presenting for detox   Assessment and Plan/Recommendations:     Ravi Hankins is a 34 year old male with PMHx significant for seizures and nephrolithiasis.     # Alcohol withdrawal, hx of alcohol use disorder   MSSA 1 this shift.  Has hx of withdrawal seizures, unsure of when last seizure occurred.  Pt reports drinking 5 shots of tequila daily. Blood EtOH on arrival was 0.138.   - Continue MSSA   - Folvite, multi-vites, thiamine supplementation   - Further management per Psychiatry   - Seizures precautions      #Anion Gap  - Most recent CMP showed anion gap of 18.  -Will continue to monitor for changes.     # Microalbuminuria  - Outpatient follow up     # right flank pain (resolved)   - CT scan in the ED shows non-obstructing stones up to 4 mm   - pain thought to be possibly MSK  - no complaints of pain today   - page medicine if recurrent pain    # Hx of aortic root daily   - takes daily atenolol     Medicine will follow       [facility]  Internal Medicine KARTHIKEYAN Hospitalist  Page job code [id number] (3B), 4470 (3A), or [phone] ([facility] and )  Text paging via [employer] is appreciated  January 8, 2025         HPI:   Ravi Hankins is a 34 year old male with PMHx significant for seizures and nephrolithiasis. Patient was admitted to the ED on 1/7/2025 with plans to detox from alcohol. Patient was drinking 5 tequila shots daily. Patient states he has a history of seizures but is unsure when the last seizure occurred. Patient feels anxious but has no other withdrawal  symptoms. Has no other medical concerns. Denies fever, chills, sweats, N/V, abdominal pain.        Past Medical History:     Past Medical History:   Diagnosis Date    Dilated aortic root         Reviewed and updated in [facility].     Past Surgical History:      Past Surgical History:   Procedure Laterality Date    NO HISTORY OF SURGERY           Social History:     Social History     Tobacco Use    Smoking status: Former     Current packs/day: 0.00     Average packs/day: 0.1 packs/day for 5.0 years (0.5 ttl pk-yrs)     Types: Cigarettes     Start date: 2017     Quit date: 2022     Years since quitting: 3.0    Smokeless tobacco: Never    Tobacco comments:     1-2 cigarettes a day for 5 years   Vaping Use    Vaping status: Some Days    Start date: 2/22/2021    Substances: Nicotine    Devices: Disposable   Substance Use Topics    Alcohol use: Yes     Comment: daily    Drug use: Yes     Types: Marijuana     Comment: Pt reports smoking a blunt today, pt reports smoking canabis everyday..        Family History:     Family History   Problem Relation Age of Onset    Heart Disease Brother     Abdominal Aortic Aneurysm Brother     Heart Disease Other         self    Substance Abuse Mother     Substance Abuse Father     Substance Abuse Maternal Grandfather     Diabetes No family hx of     Coronary Artery Disease No family hx of     Hypertension No family hx of     Hyperlipidemia No family hx of     Cerebrovascular Disease No family hx of     Breast Cancer No family hx of     Colon Cancer No family hx of     Prostate Cancer No family hx of     Other Cancer No family hx of     Depression No family hx of     Anxiety Disorder No family hx of     Mental Illness No family hx of     Anesthesia Reaction No family hx of     Asthma No family hx of     Osteoporosis No family hx of     Genetic Disorder No family hx of     Thyroid Disease No family hx of     Obesity No family hx of     Unknown/Adopted No family hx of          Allergies:  "    Allergies   Allergen Reactions    Contrast Dye      PN: LW CM1:  Reaction :  rash    Sulfa Antibiotics      PN: LW Reaction: unsure which kid has allergy per mom???         Medications:   Reviewed. Please see MAR     Review of Systems:   10 point ROS of systems including Constitutional, Eyes, Respiratory, Cardiovascular, Gastroenterology, Genitourinary, Integumentary, Muscularskeletal, Psychiatric were all negative except for pertinent positives noted in my HPI.    OBJECTIVE   Physical Exam:   Vitals were reviewed  Blood pressure (!) 155/116, pulse 74, temperature 98.5  F (36.9  C), temperature source Oral, resp. rate 16, height 1.854 m (6' 1\"), SpO2 96%.  General: Alert and oriented x3.  HEENT: Anicteric sclera, MMM  Cardiovascular: RRR, S1S2. No murmur noted  Lungs: CTAB without wheezing or crackles   GI: Abdomen soft, non-tender without rebound or guarding. + Bowel sounds.  Vascular: No peripheral edema, distal pulses palpable  Neurologic: No focal deficits, CN II-XII grossly intact  Skin: No jaundice, rashes, or lesions        Data:        Lab Results   Component Value Date     01/07/2025     05/11/2021    Lab Results   Component Value Date    CHLORIDE 102 01/07/2025    CHLORIDE 106 08/20/2021    CHLORIDE 109 05/11/2021    Lab Results   Component Value Date    BUN 8.4 01/07/2025    BUN 11 08/20/2021    BUN 11 05/11/2021      Lab Results   Component Value Date    POTASSIUM 3.9 01/07/2025    POTASSIUM 3.6 08/20/2021    POTASSIUM 3.9 05/11/2021    Lab Results   Component Value Date    CO2 23 01/07/2025    CO2 26 08/20/2021    CO2 27 05/11/2021    Lab Results   Component Value Date    CR 0.90 01/07/2025    CR 1.02 05/11/2021        Lab Results   Component Value Date    WBC 5.3 01/07/2025    HGB 14.7 01/07/2025    HCT 43.9 01/07/2025    MCV 92 01/07/2025     01/07/2025     Lab Results   Component Value Date    WBC 5.3 01/07/2025      \"I was present with the APRN/PA student who participated in " "the service and in the documentation of the note. I have verified the history and personally performed the physical exam and medical decision making. I agree with the findings and plan of care as documented in the note.\"    Total time personally spent on encounter:  45 (if billing based on time)    Deejay Ling PA-C  Internal Medicine KARTHIKEYAN Hospitalist  Page job code [id number] (3B), [phone] (3A), or [phone] ([facility] and )  Text paging via [facility] is appreciated  January 8, 2025       "

## 2025-01-08 NOTE — PHARMACY-ADMISSION MEDICATION HISTORY
Pharmacist Admission Medication History    Admission medication history is complete. The information provided in this note is only as accurate as the sources available at the time of the update.    Information Source(s): Hospital records and CareEverywhere/[facility]    Pertinent Information: See hospital discharge from 12/18/2024    Changes made to PTA medication list:  Added: None  Deleted: None  Changed: None    Medication History Completed By: Ileana Segura RPH 1/8/2025 8:17 AM    PTA Med List   Medication Sig Last Dose/Taking    atenolol (TENORMIN) 100 MG tablet Take 1 tablet (100 mg) by mouth daily Past Month

## 2025-01-08 NOTE — PROGRESS NOTES
S; Patient admitted via ED for alcohol withdrawal.    B: Patient has been drinking 5 shots of Tequila daily,last use 1/7/25@1900. Patient has a history of withdrawal related seizures.. Patient has right-sided flank painhat started 2 days ago, states that this feels similar to prior kidney stone pain.  Pt has a history of hypertension. Patient denies any current recent suicidal ideation or past attempts.    A: Patient does not appear in alcohol withdrawal on admission.    R; Monitor and treat per MSSA with valium protocol. Withdrawal and seizure precautions,as well as 15 min checks for safety.

## 2025-01-08 NOTE — PROGRESS NOTES
Patient has restrictions on their BCBS PMAP:    Restricted Recipient Program  Please call the [facility] unit for additional information. The telephone number is [phone] or [phone].  You are a provider for a Restricted Recipient for: Physician Services ,  Inpatient Hospital ,  Outpatient Hospital ,  Diagnostic Lab  Provider number [id number], GALO MUSTAFA MD is a provider for a Restricted Recipient for: Physician Services ,  Nurse Practitioner Services  Provider number [id number], [facility] is a provider for a Restricted Recipient for: Physician Services ,  Nurse Practitioner Services ,  Diagnostic Lab  Provider number [id number], [id number] is a provider for a Restricted Recipient for: Pharmacy  If you are providing services other than the restricted services listed above, you may bill DHS. If you have questions, please call [phone] or [phone].      **Per [facility] - Patient needs referral to [facility] placed by his Restricted PCP.     Nursing and provider updated

## 2025-01-08 NOTE — PROGRESS NOTES
[facility]     Case Management Encounter: Met with team, discussed patient progress, discharge plan and any impediments to discharge.    Writer and patient met briefly to discuss aftercare plans. Patient appears very motivated for [facility], completing his paperwork early this morning to get things moving. Patient is aware of his insurance restriction causing a barrier to accessing treatment. Writer will meet further with patient on 1/9. Patient plans to get to [facility] ASAP (has been communicating with them over the past month).     Insurance: [facility] [place] - Restricted Recipient     Legal Status:  vol   County: n/a  File Number: n/a  Start and expiration date of commitment: n/a    SUDs Assessment Status: Does not require - assessment done by Evy eRal on 12/16/24 referring to LP     ROIs on file: None     Living Situation: unknown at this time     Current Providers, Supports & Collateral:  Unknown at this time    Current Plan/Referral Status: [facility] - pending referral to LP from restricted provider at [facility] - nursing left a voicemail.          Azul Escalante  [facility] - Adult [facility] Addiction Psychiatry Unit

## 2025-01-08 NOTE — H&P
"History and Physical    Ravi Hankins MRN# [id number]   Age: 34 year old YOB: 1990     Date of Admission:  1/7/2025          Contacts:     PCP - Dr. Monie Avilez - [facility]    Urology - JAIRO Fry, CNP - [facility] [place]         Diagnoses:     Alcohol use disorder, severe, dependence  Alcohol withdrawal  Cannabis use  Opiate use disorder in sustained full remission  History of 2 seizures of unknown etiology over 4 years ago  Nephrolithiasis   Dilated aortic root         Recommendations:     Admit to Unit: [address] [place] [address]    Attending Physician: Dr. Aldana    Patient is voluntary.    Routine lab studies have been requested.    Monitor for target symptoms.     Provide a safe environment and therapeutic milieu.     Internal medicine consult.    MSSA with Valium.    Medications:  PRNs of Hydroxyzine and Trazodone are available.      Plan for discharge to [facility].  Afterwards, he would like to go to [facility] and attend AA.        Attestation:  Patient has been seen and evaluated by me, Dayna Wilcox, APRN CNP  The patient was counseled on nature of illness and treatment plan/options  Care was coordinated with treatment team         Chief Complaint:     History is obtained from the patient and electronic health record.  ED notes, outpatient records and records from previous hospitalizations were reviewed.     Alcohol withdrawal.         History of Present Illness:        Ravi \"Rodo\" Cr is a 34-year-old male admitted to [address] on 1/7/2025, arriving on the unit 1/8/2025.  He was admitted as a voluntary patient through the ED due to alcohol use disorder and withdrawal.  He was consuming approximately 5 shots of tequila daily x 5 months.  He smokes cannabis before he goes to bed.  He complained of right flank pain and was found to have kidney stones, which is a common occurrence for him.  " "Breathalyzer was 0.138.  UTOX was positive for amphetamines (patient denies use and cannot account for this) and cannabinoids.  Stressors include conflict with his girlfriend due to his alcohol use.  He reports taking Atenolol as prescribed.           Psychiatric Review of Systems:     His mood has been \"chris\" related to alcohol use, with some irritability.  He denies feeling depressed.  He often feels anxious.  He reports racing thoughts.  He denies restlessness.  He denies panic attacks.  He denies suicidal and homicidal ideation.  Sleep is good.  Appetite is good.  His concentration is good.  His energy is good.  He denies symptoms consistent with OCD, PTSD, manny and psychosis.  He denies excessive gambling.           Medical Review of Systems:     He reports right flank pain.  A 10-point review of systems was completed and is otherwise negative with the exception of HPI.            Psychiatric History:     He denies any history of mental illness.  He denies any history of psychiatric hospitalizations.  He denies any history of suicide attempts, self-injury or aggressive behaviors.   In the past he has taken Seroquel.           Substance Use History:     He reports alcohol use became problematic when he was 18.  He reports consuming 5 shots of tequila daily x 5 months.  He reports progressive use, loss of control, continued use in spite of consequences, tolerance, cravings and consuming more and longer than intended.  He denies a history of alcohol withdrawal seizures but states he has had 2 seizures of unknown etiology.  He denies any history of DTs.  He was most recently medically hospitalized for alcohol withdrawal at [facility] 12/14/2024 - 12/18/2024 and was [facility].  He was most recently on 3A detox in 3/2023.  He has a history of opiate use disorder including heroin and has not used opiates since 2016.   He has used cocaine a few times in the past.  No history of IV drug use.  He " "smokes cannabis before going to bed.  He rarely vapes nicotine.  He has a history of 3 DUIs.  He has been to outpatient treatment at [facility].  He went to [facility] in his early 20's.  He was in treatment at [facility] in 3/2023.          Past Medical History:     Dilated aortic root  Nephrolithiasis  Hypertension  GERD  Migraine headaches  2 seizures of unknown etiology, most recently over 4 years ago         Past Surgical History:     Ureteroscopy with laser lithotripsy         Allergies:      Contrast dye - rash  Sulfa antibiotics           Medications:      atenolol (TENORMIN) 100 MG tablet Take 1 tablet (100 mg) by mouth daily          Social History:     He grew up in [place].  He denies any history of abuse but describes a chaotic childhood due to his parents' drinking.  His parents  when the patient was 14.  Afterwards, he was homeless for 4-5 months and then moved in with his brother, \"in the streets selling drugs.\"  His other brother  in .  He lives with his girlfriend of 3 years.  She has 3 children ages 16, 10 and 7.  They have an 18-month-old.  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  He works as a  on weekends.  He has a history of 3 DUIs.  He is off probation but still has an Interlock in his vehicle.            Family History:     His father has a history of alcohol use disorder and continues to actively use.  His mother has a history of alcohol use disorder and has been sober 33 years.  His niece  after taking \"a bad Percocet.\"  His brother has a history of alcohol use disorder.  No known family history of mental illness.  His father's twin brother committed suicide.           Labs & Results:      Latest Reference Range & Units 25 18:05 25 18:07 25 22:02   Sodium 135 - 145 mmol/L 143     Potassium 3.4 - 5.3 mmol/L 3.9     Chloride 98 - 107 mmol/L 102     Carbon Dioxide (CO2) 22 - 29 " mmol/L 23     Urea Nitrogen 6.0 - 20.0 mg/dL 8.4     Creatinine 0.67 - 1.17 mg/dL 0.90     GFR Estimate >60 mL/min/1.73m2 >90     Calcium 8.8 - 10.4 mg/dL 9.2     Anion Gap 7 - 15 mmol/L 18 (H)     Magnesium 1.7 - 2.3 mg/dL 2.2     Albumin 3.5 - 5.2 g/dL 4.8     Protein Total 6.4 - 8.3 g/dL 8.4 (H)     Alkaline Phosphatase 40 - 150 U/L 71     ALT 0 - 70 U/L 13     AST 0 - 45 U/L 25     Bilirubin Total <=1.2 mg/dL 0.4     GGT 8 - 61 U/L 29     Glucose 70 - 99 mg/dL 77     Estimated Average Glucose <117 mg/dL 114     Hemoglobin A1C <5.7 % 5.6     TSH 0.30 - 4.20 uIU/mL 0.48     WBC 4.0 - 11.0 10e3/uL 5.3     Hemoglobin 13.3 - 17.7 g/dL 14.7     Hematocrit 40.0 - 53.0 % 43.9     Platelet Count 150 - 450 10e3/uL 199     RBC Count 4.40 - 5.90 10e6/uL 4.78     MCV 78 - 100 fL 92     MCH 26.5 - 33.0 pg 30.8     MCHC 31.5 - 36.5 g/dL 33.5     RDW 10.0 - 15.0 % 12.4     % Neutrophils % 36     % Lymphocytes % 51     % Monocytes % 11     % Eosinophils % 1     % Basophils % 1     Absolute Basophils 0.0 - 0.2 10e3/uL 0.0     Absolute Eosinophils 0.0 - 0.7 10e3/uL 0.0     Absolute Immature Granulocytes <=0.4 10e3/uL 0.0     Absolute Lymphocytes 0.8 - 5.3 10e3/uL 2.7     Absolute Monocytes 0.0 - 1.3 10e3/uL 0.6     % Immature Granulocytes % 0     Absolute Neutrophils 1.6 - 8.3 10e3/uL 1.9     Absolute NRBCs 10e3/uL 0.0     NRBCs per 100 WBC <1 /100 0     Color Urine Colorless, Straw, Light Yellow, Yellow    Yellow   Appearance Urine Clear    Clear   Glucose Urine Negative mg/dL   Negative   Bilirubin Urine Negative    Negative   Ketones Urine Negative mg/dL   Trace !   Specific Gravity Urine 1.003 - 1.035    1.034   pH Urine 5.0 - 7.0    5.5   Protein Albumin Urine Negative mg/dL   30 !   Urobilinogen mg/dL Normal, 2.0 mg/dL   Normal   Nitrite Urine Negative    Negative   Blood Urine Negative    Negative   Leukocyte Esterase Urine Negative    Negative   WBC Urine <=5 /HPF   4   RBC Urine <=2 /HPF   1   Bacteria Urine None Seen  /HPF   Few !   Squamous Epithelial /HPF Urine <=1 /HPF   1   Mucus Urine None Seen /LPF   Present !   Alcohol Breath Test 0.00 - 0.01   0.138 !    Amphetamine Qual Urine Screen Negative    Screen Positive !   Fentanyl Qual Urine Screen Negative    Screen Negative   Cocaine Urine Screen Negative    Screen Negative   Benzodiazepine Urine Screen Negative    Screen Negative   Opiates Qualitative Urine Screen Negative    Screen Negative   PCP Urine Screen Negative    Screen Negative   Cannabinoids Qual Urine Screen Negative    Screen Positive !   Barbiturates Qual Urine Screen Negative    Screen Negative     EXAM: CT ABDOMEN PELVIS W/O CONTRAST  LOCATION: [facility]  DATE: 1/7/2025     INDICATION: right flank pain, eval stone  COMPARISON: None.  TECHNIQUE: CT scan of the abdomen and pelvis was performed without IV contrast. Multiplanar reformats were obtained. Dose reduction techniques were used.  CONTRAST: None.     FINDINGS:   LOWER CHEST: Normal.     HEPATOBILIARY: Liver and gallbladder within normal limits.     PANCREAS: Normal.     SPLEEN: Normal.     ADRENAL GLANDS: Normal.     KIDNEYS/BLADDER: 4 mm stone noted within the inferior pole of the right kidney. Few additional punctate stones noted within the right kidney. Possible tiny 1 mm punctate noted within the left kidney.     BOWEL: Diverticulosis of the colon. No acute inflammatory change. No obstruction.      LYMPH NODES: Normal.     VASCULATURE: Normal.     PELVIC ORGANS: Bladder decompressed.     MUSCULOSKELETAL: Degenerative changes of the spine.                                                            IMPRESSION:   1.  Small stones are noted within the right kidney measuring up to 4 mm. Possible punctate 1 mm stones noted within the left kidney. No hydronephrosis. No stones identified within the ureters or bladder.         Psychiatric Examination:     Appearance:  awake, alert, adequately groomed, and dressed  "in hospital scrubs  Attitude:  cooperative  Eye Contact:  good  Mood:  \"chris,\" some irritability  Affect:  appropriate and in normal range  Speech:  clear, coherent  Psychomotor Behavior:  no evidence of tardive dyskinesia, dystonia, or tics  Thought Process:  linear and goal oriented  Associations:  no loose associations  Thought Content:  no evidence of suicidal ideation or homicidal ideation and no evidence of psychotic thought  Insight:  good  Judgment:  intact  Oriented to:  date, time, person, and place  Attention Span and Concentration:  intact  Recent and Remote Memory:  intact  Language:  intact, fluent English  Fund of Knowledge:  appropriate  Muscle Strength and Tone:  normal  Gait and Station:  normal    BP (!) 139/98 (BP Location: Left arm)   Pulse 69   Temp 97.6  F (36.4  C) (Tympanic)   Resp 16   Ht 1.854 m (6' 1\")   SpO2 96%   BMI 19.98 kg/m           Physical Exam:     Please refer to the physical exam completed by internal medicine 1/8/2025:    General: Alert and oriented x3.  HEENT: Anicteric sclera, MMM  Cardiovascular: RRR, S1S2. No murmur noted  Lungs: CTAB without wheezing or crackles   GI: Abdomen soft, non-tender without rebound or guarding. + Bowel sounds.  Vascular: No peripheral edema, distal pulses palpable  Neurologic: No focal deficits, CN II-XII grossly intact  Skin: No jaundice, rashes, or lesions  "

## 2025-01-09 ENCOUNTER — TELEPHONE (OUTPATIENT)
Dept: FAMILY MEDICINE | Facility: CLINIC | Age: 35
End: 2025-01-09
Payer: COMMERCIAL

## 2025-01-09 ENCOUNTER — HOSPITAL ENCOUNTER (OUTPATIENT)
Dept: BEHAVIORAL HEALTH | Facility: CLINIC | Age: 35
Discharge: HOME | End: 2025-01-09
Attending: FAMILY MEDICINE
Payer: COMMERCIAL

## 2025-01-09 VITALS
HEIGHT: 73 IN | HEART RATE: 80 BPM | BODY MASS INDEX: 19.98 KG/M2 | RESPIRATION RATE: 16 BRPM | TEMPERATURE: 97.8 F | DIASTOLIC BLOOD PRESSURE: 89 MMHG | SYSTOLIC BLOOD PRESSURE: 128 MMHG | OXYGEN SATURATION: 98 %

## 2025-01-09 DIAGNOSIS — F39 MOOD DISORDER: ICD-10-CM

## 2025-01-09 DIAGNOSIS — G47.00 INSOMNIA, UNSPECIFIED TYPE: ICD-10-CM

## 2025-01-09 DIAGNOSIS — F10.20 ALCOHOL DEPENDENCE, CONTINUOUS (H): ICD-10-CM

## 2025-01-09 DIAGNOSIS — F10.20 ALCOHOL USE DISORDER, SEVERE, DEPENDENCE (H): ICD-10-CM

## 2025-01-09 DIAGNOSIS — F41.9 ANXIETY: ICD-10-CM

## 2025-01-09 LAB
ALBUMIN SERPL BCG-MCNC: 4.3 G/DL (ref 3.5–5.2)
ALP SERPL-CCNC: 64 U/L (ref 40–150)
ALT SERPL W P-5'-P-CCNC: 14 U/L (ref 0–70)
ANION GAP SERPL CALCULATED.3IONS-SCNC: 11 MMOL/L (ref 7–15)
AST SERPL W P-5'-P-CCNC: 21 U/L (ref 0–45)
BILIRUB SERPL-MCNC: 0.8 MG/DL
BUN SERPL-MCNC: 12.5 MG/DL (ref 6–20)
CALCIUM SERPL-MCNC: 9.6 MG/DL (ref 8.8–10.4)
CHLORIDE SERPL-SCNC: 103 MMOL/L (ref 98–107)
CREAT SERPL-MCNC: 1 MG/DL (ref 0.67–1.17)
CREAT UR-MCNC: 261 MG/DL
EGFRCR SERPLBLD CKD-EPI 2021: >90 ML/MIN/1.73M2
GLUCOSE SERPL-MCNC: 98 MG/DL (ref 70–99)
HCO3 SERPL-SCNC: 26 MMOL/L (ref 22–29)
HOLD SPECIMEN: NORMAL
POTASSIUM SERPL-SCNC: 4.2 MMOL/L (ref 3.4–5.3)
PROT SERPL-MCNC: 7.6 G/DL (ref 6.4–8.3)
SODIUM SERPL-SCNC: 140 MMOL/L (ref 135–145)

## 2025-01-09 PROCEDURE — 84155 ASSAY OF PROTEIN SERUM: CPT | Performed by: PHYSICIAN ASSISTANT

## 2025-01-09 PROCEDURE — 36415 COLL VENOUS BLD VENIPUNCTURE: CPT | Performed by: PHYSICIAN ASSISTANT

## 2025-01-09 PROCEDURE — 250N000013 HC RX MED GY IP 250 OP 250 PS 637: Performed by: NURSE PRACTITIONER

## 2025-01-09 PROCEDURE — 250N000013 HC RX MED GY IP 250 OP 250 PS 637: Performed by: STUDENT IN AN ORGANIZED HEALTH CARE EDUCATION/TRAINING PROGRAM

## 2025-01-09 PROCEDURE — 80053 COMPREHEN METABOLIC PANEL: CPT | Performed by: PHYSICIAN ASSISTANT

## 2025-01-09 PROCEDURE — 82435 ASSAY OF BLOOD CHLORIDE: CPT | Performed by: PHYSICIAN ASSISTANT

## 2025-01-09 PROCEDURE — 99239 HOSP IP/OBS DSCHRG MGMT >30: CPT | Performed by: NURSE PRACTITIONER

## 2025-01-09 RX ORDER — LORATADINE 10 MG/1
10 TABLET ORAL DAILY
COMMUNITY

## 2025-01-09 RX ORDER — MULTIPLE VITAMINS W/ MINERALS TAB 9MG-400MCG
1 TAB ORAL DAILY
Qty: 30 TABLET | Refills: 0 | Status: SHIPPED | OUTPATIENT
Start: 2025-01-10

## 2025-01-09 RX ORDER — AMOXICILLIN 250 MG
2 CAPSULE ORAL DAILY PRN
COMMUNITY

## 2025-01-09 RX ORDER — TRAZODONE HYDROCHLORIDE 50 MG/1
50 TABLET, FILM COATED ORAL
Qty: 30 TABLET | Refills: 0 | Status: SHIPPED | OUTPATIENT
Start: 2025-01-09

## 2025-01-09 RX ORDER — LANOLIN ALCOHOL/MO/W.PET/CERES
100 CREAM (GRAM) TOPICAL DAILY
Qty: 30 TABLET | Refills: 0 | Status: SHIPPED | OUTPATIENT
Start: 2025-01-10

## 2025-01-09 RX ORDER — HYDROXYZINE HYDROCHLORIDE 25 MG/1
25 TABLET, FILM COATED ORAL EVERY 4 HOURS PRN
Qty: 90 TABLET | Refills: 1 | Status: SHIPPED | OUTPATIENT
Start: 2025-01-09

## 2025-01-09 RX ORDER — ACETAMINOPHEN 500 MG
500-1000 TABLET ORAL EVERY 8 HOURS PRN
COMMUNITY

## 2025-01-09 RX ORDER — FOLIC ACID 1 MG/1
1 TABLET ORAL DAILY
Qty: 30 TABLET | Refills: 0 | Status: SHIPPED | OUTPATIENT
Start: 2025-01-10

## 2025-01-09 RX ORDER — GUAIFENESIN 100 MG/5ML
200 SOLUTION ORAL EVERY 4 HOURS PRN
COMMUNITY

## 2025-01-09 RX ORDER — IBUPROFEN 200 MG
600 TABLET ORAL EVERY 6 HOURS PRN
COMMUNITY

## 2025-01-09 RX ORDER — MAGNESIUM HYDROXIDE/ALUMINUM HYDROXICE/SIMETHICONE 120; 1200; 1200 MG/30ML; MG/30ML; MG/30ML
30 SUSPENSION ORAL EVERY 6 HOURS PRN
COMMUNITY

## 2025-01-09 RX ADMIN — ATENOLOL 100 MG: 100 TABLET ORAL at 08:00

## 2025-01-09 RX ADMIN — FOLIC ACID 1 MG: 1 TABLET ORAL at 08:00

## 2025-01-09 RX ADMIN — Medication 1 TABLET: at 08:00

## 2025-01-09 RX ADMIN — THIAMINE HCL TAB 100 MG 100 MG: 100 TAB at 08:00

## 2025-01-09 RX ADMIN — HYDROXYZINE HYDROCHLORIDE 25 MG: 25 TABLET, FILM COATED ORAL at 08:01

## 2025-01-09 ASSESSMENT — ACTIVITIES OF DAILY LIVING (ADL)
ADLS_ACUITY_SCORE: 27
ADLS_ACUITY_SCORE: 27
ORAL_HYGIENE: INDEPENDENT
DRESS: SCRUBS (BEHAVIORAL HEALTH)
ADLS_ACUITY_SCORE: 27
HYGIENE/GROOMING: INDEPENDENT
ADLS_ACUITY_SCORE: 27
LAUNDRY: WITH SUPERVISION
ADLS_ACUITY_SCORE: 27

## 2025-01-09 NOTE — PLAN OF CARE
Problem: Sleep Disturbance  Goal: Adequate Sleep/Rest  Outcome: Progressing   Goal Outcome Evaluation:    Patient is out of detox observation for alcohol  withdrawal and is observed to sleep throughout the noc.

## 2025-01-09 NOTE — TELEPHONE ENCOUNTER
Called pt and LVM requesting a call back if possible. Pt is currently on the detox unit on the [facility] and has limited access to his phone.   Also sent a Teams message to Fairda Reyes requesting a call back to help coordinate VV for pt and to inquire about specific [facility] address chemical dependency referral to be sent to.    with aforementioned request also left at [phone], Farida's [email or web address] cell phone number.     Poornima SIMMONS RN

## 2025-01-09 NOTE — PLAN OF CARE
Goal Outcome Evaluation:    Plan of Care Reviewed With: patient      Patient discharged to [facility].  Discharge instructions and medications explained to patient with no question asked.  Patient verbalized understanding of the discharge instructions.  Medications and belongings sent with patient upon discharge.

## 2025-01-09 NOTE — PROGRESS NOTES
[facility] Nursing Health Assessment        Vital signs:      See flow sheets         Transfer from      Counselor: [facility]  Drug of Choice: ETOH  Last use: 1/7/25  Home clinic/MD:     Monie Avilez MD [facility]       Psychiatrist/therapist: none   pt is est with a therapist at the [facility]   Pt declined addiction med at this time    Medical history/current conditions:  HTN hx  Syncope hx  Aortic root enlargement, borderline    Nephrolithiasis  Following up with provider on 1/21/25     H&P Screen:  H&P within the last 90 days: Yes.  Date: 1/9/25 Location:         Mental Health diagnosis: none  Medication compliant?: yes  Recent sucidal thoughts? no                                                  When? na  Current thought of self-harm? no                                           Plan? na     Pain assessment:   Pt. Experiencing pain at this time?  No  [facility] Medical Screen for COVID-19       LP Falls assessment    Has patient had a fall(s) in the last 6 months while they were NOT under the influence of ETOH or drugs?  No  If yes, what were the circumstances surrounding the fall(s)? NA  Does patient have gait dysfunctions? (limping, dragging of toes, shuffling feet, unsteadiness, difficulty standing /walking)  No  Does patient appear to have deconditioning/muscle loss/malnutrition/fatigue? (due to inactivity, bedrest (long hospitalization), medical condition(s) No  Does patient experience confusion, dizziness or vertigo? No  Is patient visually impaired? No   Does patient have any adaptive equipment (hearing aids, wheelchair, walker, prosthesis, crutches, cane, etc..) No  Is patient taking 2 or more of the following medications?  anticonvulsants, anti-hypertensives, diuretics, laxatives, sedatives, and psychotropics (single-select) No  Is patient taking medication (s) that would cause urinary or bowel urgency (ex: lactulose/Furosemide)? No  Does patient have a medical condition (ex: Diabetes,  Liver Disease, Respiratory Diseases, Chronic Pain, Heart Disease, Neuropathy, Seizure like Disorder, etc...) that could affect balance/gait or risk for falls? No  ____________________________________________________________________________________________________________________________  RN Assessment of Infectious Disease concerns:    Infectious disease concerns None  ________________________________________________________________________________  RN Assessment of Patient's Ability to Safely Manage and Self-Administer Respiratory Treatments    Has experience in the management of Respiratory (If NA, indicate and move to Integrative Therapies):  NA  Pt understands LP drug toxicology screening process NA    _____________________________________________________________________________________________  Do you have any of the following NEW or worsening symptoms NOT attributed to pre-existing conditions?    No     Fever of 100.0  F (37.8 C) or over  Chills  Cough  Shortness of Breath  Loss of taste or smell  Generalized body aches  Persistent headache  Sore throat (or trouble eating or drinking in young children?)  Nausea, Vomiting, or diarrhea (loose stools)     Did you test positive for COVID-19 in the last 10 days or are you waiting on the test results due to an exposure or symptoms?  No     Has anyone told you to self-quarantine due to exposure to someone with COVID-19?  No        COVID-19 Test completed by LPRN ? no          RN Assessment of Patient's Ability to Safely Manage and Self-Administer Respiratory Treatments     Has experience in the management of Respiratory (If NA, indicate and move to Integrative Therapies): NA        Patient tobacco use: rarely smokes     Nutritional Assessment:     Have you ever purged, binged or restricted yourself as a way to control your weight?    No      Are you on a special diet?    No      Do you have any concerns regarding your nutritional status?    no      Have you had any  appetite changes in the last 3 months?    no   Have you had weight loss or weight gain of more than 10 lbs in the last 3 months?   If patient gained or lost more than 10 lbs, then refer to program RN / attending Physician for assessment.    no   Was the patient informed of BMI?     Normal, No Intervention    Yes   Have you engaged in any risk-taking behavior that would put you at risk for exposure to blood-borne or sexually transmitted diseases?    No   Do you have any dental problems?    No           LPRN reviewed with pt the following information:  Yes  Pt informed if leaving AMA, they will be directed to take medications with them.  Should pt's choose to leave medications at LP, ALL medications will be packaged and delivered to inpatient pharmacy for temporary storage.  Inpt pharmacy will follow protocol to reach out to pt.  If pharmacy unable to reach pt and/or pt does not retrieve medications, they will be destroyed per inpt pharmacy protocol.   In regards to 'medical concerns/medication refill expectations' while at LP:  Pt understands LP has no rounding/managing provider to assess medical issues or to refill medications.  Pt's instructed to make virtual/phone appt/s with community provider/s and notify LPRN of date and time  Pt's understand they may not leave LP to attend any medical appt's.   Pt's understand they are responsible for having a plan to refill medications and to allow time to troubleshoot.      Pt verbalizes understanding of the above criteria yes    [facility]  Nurse Liaison / CD [facility]  O: [phone]  Fax:[phone]  LPRN Sandy [id number]  M-F: 7AM to 5:30PM   Sat-Sun: 7AM to 3PM  After hours: [phone]   _________________________  Nursing Assessment Summary:  As above     On-going nursing intervention required?   No     Acute care visit recommended: no

## 2025-01-09 NOTE — TELEPHONE ENCOUNTER
Triage,   Patient called.   Requesting all medications transferred to [facility] pharmacy.   Pharmacy number [phone] so patient can be transferred to facility.   Please complete asap.  Thanks!  Leslie BONE

## 2025-01-09 NOTE — TELEPHONE ENCOUNTER
Spoke to Diamond at [facility]  Diamond states a referral is not needed from PCP at this time   Medication orders are not needed at this time either  While patient is at the residential program, the providers at the location will manage all care in collaboration with restricted recipient program    Plan to have the patient schedule follow up visit with PCP upon discharge from [facility] residential program     Thanks,  Marylu JUSTIN RN

## 2025-01-09 NOTE — TELEPHONE ENCOUNTER
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  He is interested in discharging directly to residential CD treatment at [facility], but because of his restricted status through insurance, he requires a referral from you to facilitate this.  He is no longer having withdrawal symptoms and would be eligible for discharge today if this referral can be placed.  Rekha, who is also included on this message, is assisting with case management and has more information regarding the situation.  She is available on Teams.  Feel free to contact me on Vocera, Teams or my cell phone [phone] if you have questions.    Thank you,    JAIRO Walters, CNP

## 2025-01-09 NOTE — TELEPHONE ENCOUNTER
Called [facility]  to inquire how to place necessary referral   Spoke to Nolan Juárez Admission Coordinator at [facility] directly  Call back: [phone]  Patient currently a patient at the detox unit at [address] Attending physician: Lucia Jimenez   [facility] is located on the 6th floor - patient would just be taken in a wheelchair if restricted recipient program confirms placement  Of note: Comprehensive Substance Use Evaluation needed in the past 30 days to be directly admitted to [facility]    Spoke to Collette at Behavioral care advocates team - [phone]  Obtained details needed for referral    tax id [id number], npi [id number], MD name Dr. Lucia Jimenez npi [id number]  [address] [address]  PCP needs to send to [facility] restricted recipient program directly:   Fax: [phone]  Phone: [phone]    PCP would need to call patient's [employer] insurance   Restricted Recipient Program  Please call the PCUR unit for additional information. The telephone number is [phone] or [phone].  You are a provider for a Restricted Recipient for: Physician Services , Inpatient Hospital , Outpatient Hospital , Diagnostic Lab  Provider number [id number], GALO MUSTAFA MD is a provider for a Restricted Recipient for: Physician Services , Nurse Practitioner Services  Provider number [id number], [facility] is a provider for a Restricted Recipient for: Physician Services , Nurse Practitioner Services , Diagnostic Lab  Provider number [id number], [id number] is a provider for a Restricted Recipient for: Pharmacy  If you are providing services other than the restricted services listed above, you may bill DHS. If you have questions, please call [phone] or [phone]    The PCP he is restricted to needs to call [employer] and let them know they are PCP  for a restricted recipient and want to refer him to [facility] Graciela Fields at [facility] - [phone]    Thanks,  Marylu JUSTIN RN

## 2025-01-09 NOTE — TELEPHONE ENCOUNTER
PCP advised RN to call Diamond at [facility] restricted recipient program   When Diamond at [facility] calls back, RN will assist with connecting call to PCP directly to review referral details  ThanksMarylu RN

## 2025-01-09 NOTE — PROGRESS NOTES
Name: Ravi Hankins  Date: 1/9/2025  Medical Record: [id number]    Envelope Number: [id number]    List of Contents (List each item separately in new row):   1 blk cell phone with     Admission:  I am responsible for any personal items that are not sent to the safe or pharmacy.  [facility] is not responsible for loss, theft or damage of any property in my possession.      Patient Signature:  ___________________________________________       Date/Time:__________________________    Staff Signature: __________________________________       Date/Time:__________________________    [facility] Staff person, if patient is unable/unwilling to sign:      __________________________________________________________       Date/Time: __________________________    **All medications are packaged by LP staff and securely stored on [facility]. Medications left by patients at discharge will be packaged by LP staff and transported by LP staff to inpatient pharmacy for storage.**    Discharge:  [facility] has returned all of my personal belongings:    Patient Signature: ________________________________________     Date/Time: ____________________________________    Staff Signature: ______________________________________     Date/Time:_____________________________________

## 2025-01-09 NOTE — PLAN OF CARE
"  Problem: Alcohol Withdrawal  Goal: Alcohol Withdrawal Symptom Control  Outcome: Suresh Rodrigues is currently not exhibiting any withdrawal symptoms this evening as he is out of detox and has isolated himself in his room, where he has been sleeping for most of the evening. He denies experiencing any significant mental health issues, such as SI/SIB/HI, and he does not report any hallucinations. He states that he feels safe.     While the patient has acknowledged experiencing mild depression, which he attributes to situational factors like listening to his 18 months old baby ask where he was, he also reports feeling happy and in good spirits overall.     His vital signs are stable and within normal limits for the evening. The patient has expressed positive feelings about being discharged to [facility] and has indicated that he does not wish to participate in any treatment this evening. Currently, there are no reports of acute distress or significant changes in his behavior.  BP (!) 139/100 (BP Location: Left arm, Patient Position: Sitting, Cuff Size: Adult Regular)   Pulse 68   Temp 97.9  F (36.6  C) (Oral)   Resp 16   Ht 1.854 m (6' 1\")   SpO2 99%   BMI 19.98 kg/m     "

## 2025-01-09 NOTE — TELEPHONE ENCOUNTER
Does not appear [facility] has a pharmacy  [facility] is a program - unsure what is needed for patient to be 'transferred to facility'  Pharmacy number provided below is [facility]  Appears all medications are already located at this pharmacy    Attempted to call patient to clarify request  No answer   Will recall at a later time     ThanksMarylu RN

## 2025-01-09 NOTE — PROGRESS NOTES
"[facility],  Psychiatric Progress Note      Impression:     Ravi \"Rodo\" Cr is a 34-year-old male admitted to [address] on 1/7/2025, arriving on the unit 1/8/2025.  He was admitted as a voluntary patient through the ED due to alcohol use disorder and withdrawal.  He was consuming approximately 5 shots of tequila daily x 5 months.  He smokes cannabis before he goes to bed.  He complained of right flank pain and was found to have kidney stones, which is a common occurrence for him.  Breathalyzer was 0.138.  UTOX was positive for amphetamines (patient denies use and cannot account for this) and cannabinoids.  Stressors include conflict with his girlfriend due to his alcohol use.  He reports taking Atenolol as prescribed.  Since admission, the MSSA was initiated, and he did not require treatment with Valium.  PRNs of Hydroxyzine and Trazodone were initiated.  He has been calm and cooperative and appears motivated for treatment and recovery.          Diagnoses:     Alcohol use disorder, severe, dependence  Cannabis use  Opiate use disorder in sustained full remission  History of 2 seizures of unknown etiology over 4 years ago  Nephrolithiasis   Dilated aortic root         Plan:     Medications:  Continue PRNs of Hydroxyzine and Trazodone.      Discontinue MSSA.      Plan for discharge to [facility].  Due to his status as a restricted recipient, his PCP must make a referral.  On 1/9 provider send an Epic inbox messaged to his PCP.  After [facility], he would like to go to [facility] and attend AA.          Attestation:  Patient has been seen and evaluated by me, Dayna Wilcox, JAIRO CNP  The patient was counseled on nature of illness and treatment plan/options  Care was coordinated with treatment team  Total time > 35 minutes          Interim History:     The patient's care was discussed with the treatment team and chart notes " "were reviewed.  Yesterday pt's MSSA scores were low and he did not require treatment with Valium.  He is out of detox.  He took PRN Hydroxyzine x 3 and PRN Trazodone x 1.  Due to his status as a restricted recipient, his PCP must make a referral for [facility].  Provider sent his PCP an Epic inbox message today.          Medications:     {:[id number]}          Allergies:     Allergies   Allergen Reactions    Contrast Dye      PN: LW CM1:  Reaction :  rash    Sulfa Antibiotics      PN: LW Reaction: unsure which kid has allergy per mom???          Psychiatric Examination:     BP (!) 139/100 (BP Location: Left arm, Patient Position: Sitting, Cuff Size: Adult Regular)   Pulse 68   Temp 97.9  F (36.6  C) (Oral)   Resp 16   Ht 1.854 m (6' 1\")   SpO2 99%   BMI 19.98 kg/m    Weight is 0 lbs 0 oz  Body mass index is 19.98 kg/m .    Appearance:  awake, alert, adequately groomed, and dressed in hospital scrubs  Attitude:  cooperative  Eye Contact:  good  Mood:   \"good,\" some anxiety  Affect:  appropriate and in normal range  Speech:  clear, coherent  Psychomotor Behavior:  no evidence of tardive dyskinesia, dystonia, or tics  Thought Process:  linear and goal oriented  Associations:  no loose associations  Thought Content:  no evidence of suicidal ideation or homicidal ideation and no evidence of psychotic thought  Insight:  good  Judgment:  intact  Oriented to:  date, time, person, and place  Attention Span and Concentration:  intact  Recent and Remote Memory:  intact  Language:  intact, fluent English  Fund of Knowledge:  appropriate  Muscle Strength and Tone:  normal  Gait and Station:  normal         Labs:     { :[id number]}  "

## 2025-01-09 NOTE — PROGRESS NOTES
Received call from Diamond at the [facility] ([phone]) about authorization to prescribe and fill the discharge medications. Per Diamond she is going to authorize Farida to prescribe and [facility] to fill the medications and she will call the [facility] directly to notify them to process the discharge medications.

## 2025-01-09 NOTE — PLAN OF CARE
"Goal Outcome Evaluation:    Plan of Care Reviewed With: patient      Patient is up and visible in the milieu. Patient is ambulating independently with no problem. Patient is alert and oriented x 4. Patient is attending/participating in unit programming. Pt is social with peers. Affect is bright, mood is anxious. Patient denies SI/SIB/HI. Pt denies auditory/visual hallucinations. Patient verbally contracts for safety on the unit.     This RN administered No PRN medications this shift.  Pt's is currently out of detox from alcohol. Patient reports a good appetite, and good sleep. Patient discharge plans to go to [facility] when everything is cleared.      Rest, fluids, and food encouraged. Status 15 checks remain. Patient is able to make needs known appropriately. Patient denies any unmet needs at this time.     Blood pressure 128/89, pulse 80, temperature 97.8  F (36.6  C), temperature source Oral, resp. rate 16, height 1.854 m (6' 1\"), SpO2 98%.   "

## 2025-01-09 NOTE — PROGRESS NOTES
Discharge:     Patient is scheduled to discharge to [facility] today, 1/9 at 3pm   (pending his insurance restrictions being lifted, and his restricted provider placing the referral to [facility])    AVS complete

## 2025-01-09 NOTE — DISCHARGE SUMMARY
"Psychiatric Discharge Summary    Ravi Hankins MRN# [id number]   Age: 34 year old YOB: 1990     Date of Admission:  1/7/2025  Date of Discharge:  1/9/2025  Admitting Physician:  All Aldana MD  Discharge Physician:  JAIRO Rodriguez CNP (Contact: [phone])         Event Leading to Hospitalization:      From H&P 1/8/2025:    Ravi \"Rodo\" Cr is a 34-year-old male admitted to [address] on 1/7/2025, arriving on the unit 1/8/2025.  He was admitted as a voluntary patient through the ED due to alcohol use disorder and withdrawal.  He was consuming approximately 5 shots of tequila daily x 5 months.  He smokes cannabis before he goes to bed.  He complained of right flank pain and was found to have kidney stones, which is a common occurrence for him.  Breathalyzer was 0.138.  UTOX was positive for amphetamines (patient denies use and cannot account for this) and cannabinoids.  Stressors include conflict with his girlfriend due to his alcohol use.  He reports taking Atenolol as prescribed.      His mood has been \"chris\" related to alcohol use, with some irritability.  He denies feeling depressed.  He often feels anxious.  He reports racing thoughts.  He denies restlessness.  He denies panic attacks.  He denies suicidal and homicidal ideation.  Sleep is good.  Appetite is good.  His concentration is good.  His energy is good.  He denies symptoms consistent with OCD, PTSD, manny and psychosis.  He denies excessive gambling.      See full admission note by Farida Wilcox NP 1/8/2025 for details.            Diagnoses:     Alcohol use disorder, severe, dependence  Cannabis use  Opiate use disorder in sustained full remission  History of 2 seizures of unknown etiology over 4 years ago  Nephrolithiasis   Dilated aortic root         Labs & Results:      Latest Reference Range & Units 01/07/25 18:05 01/07/25 18:07 01/07/25 22:02   Sodium 135 - 145 mmol/L 143 "     Potassium 3.4 - 5.3 mmol/L 3.9     Chloride 98 - 107 mmol/L 102     Carbon Dioxide (CO2) 22 - 29 mmol/L 23     Urea Nitrogen 6.0 - 20.0 mg/dL 8.4     Creatinine 0.67 - 1.17 mg/dL 0.90     GFR Estimate >60 mL/min/1.73m2 >90     Calcium 8.8 - 10.4 mg/dL 9.2     Anion Gap 7 - 15 mmol/L 18 (H)     Magnesium 1.7 - 2.3 mg/dL 2.2     Albumin 3.5 - 5.2 g/dL 4.8     Protein Total 6.4 - 8.3 g/dL 8.4 (H)     Alkaline Phosphatase 40 - 150 U/L 71     ALT 0 - 70 U/L 13     AST 0 - 45 U/L 25     Bilirubin Total <=1.2 mg/dL 0.4     GGT 8 - 61 U/L 29     Glucose 70 - 99 mg/dL 77     Estimated Average Glucose <117 mg/dL 114     Hemoglobin A1C <5.7 % 5.6     TSH 0.30 - 4.20 uIU/mL 0.48     WBC 4.0 - 11.0 10e3/uL 5.3     Hemoglobin 13.3 - 17.7 g/dL 14.7     Hematocrit 40.0 - 53.0 % 43.9     Platelet Count 150 - 450 10e3/uL 199     RBC Count 4.40 - 5.90 10e6/uL 4.78     MCV 78 - 100 fL 92     MCH 26.5 - 33.0 pg 30.8     MCHC 31.5 - 36.5 g/dL 33.5     RDW 10.0 - 15.0 % 12.4     % Neutrophils % 36     % Lymphocytes % 51     % Monocytes % 11     % Eosinophils % 1     % Basophils % 1     Absolute Basophils 0.0 - 0.2 10e3/uL 0.0     Absolute Eosinophils 0.0 - 0.7 10e3/uL 0.0     Absolute Immature Granulocytes <=0.4 10e3/uL 0.0     Absolute Lymphocytes 0.8 - 5.3 10e3/uL 2.7     Absolute Monocytes 0.0 - 1.3 10e3/uL 0.6     % Immature Granulocytes % 0     Absolute Neutrophils 1.6 - 8.3 10e3/uL 1.9     Absolute NRBCs 10e3/uL 0.0     NRBCs per 100 WBC <1 /100 0     Color Urine Colorless, Straw, Light Yellow, Yellow    Yellow   Appearance Urine Clear    Clear   Glucose Urine Negative mg/dL   Negative   Bilirubin Urine Negative    Negative   Ketones Urine Negative mg/dL   Trace !   Specific Gravity Urine 1.003 - 1.035    1.034   pH Urine 5.0 - 7.0    5.5   Protein Albumin Urine Negative mg/dL   30 !   Urobilinogen mg/dL Normal, 2.0 mg/dL   Normal   Nitrite Urine Negative    Negative   Blood Urine Negative    Negative   Leukocyte Esterase Urine  Negative    Negative   WBC Urine <=5 /HPF   4   RBC Urine <=2 /HPF   1   Bacteria Urine None Seen /HPF   Few !   Squamous Epithelial /HPF Urine <=1 /HPF   1   Mucus Urine None Seen /LPF   Present !   Alcohol Breath Test 0.00 - 0.01   0.138 !    Amphetamine Qual Urine Screen Negative    Screen Positive !   Fentanyl Qual Urine Screen Negative    Screen Negative   Cocaine Urine Screen Negative    Screen Negative   Benzodiazepine Urine Screen Negative    Screen Negative   Opiates Qualitative Urine Screen Negative    Screen Negative   PCP Urine Screen Negative    Screen Negative   Cannabinoids Qual Urine Screen Negative    Screen Positive !   Barbiturates Qual Urine Screen Negative    Screen Negative     EXAM: CT ABDOMEN PELVIS W/O CONTRAST  LOCATION: [facility]  DATE: 1/7/2025     INDICATION: right flank pain, eval stone  COMPARISON: None.  TECHNIQUE: CT scan of the abdomen and pelvis was performed without IV contrast. Multiplanar reformats were obtained. Dose reduction techniques were used.  CONTRAST: None.     FINDINGS:   LOWER CHEST: Normal.     HEPATOBILIARY: Liver and gallbladder within normal limits.     PANCREAS: Normal.     SPLEEN: Normal.     ADRENAL GLANDS: Normal.     KIDNEYS/BLADDER: 4 mm stone noted within the inferior pole of the right kidney. Few additional punctate stones noted within the right kidney. Possible tiny 1 mm punctate noted within the left kidney.     BOWEL: Diverticulosis of the colon. No acute inflammatory change. No obstruction.      LYMPH NODES: Normal.     VASCULATURE: Normal.     PELVIC ORGANS: Bladder decompressed.     MUSCULOSKELETAL: Degenerative changes of the spine.                                                                   IMPRESSION:   1.  Small stones are noted within the right kidney measuring up to 4 mm. Possible punctate 1 mm stones noted within the left kidney. No hydronephrosis. No stones identified within the ureters or  bladder.         Consults:     Internal Medicine Consult 1/8/2025:    Ravi Hankins is a 34 year old male with PMHx significant for seizures and nephrolithiasis.      # Alcohol withdrawal, hx of alcohol use disorder   MSSA 1 this shift.  Has hx of withdrawal seizures, unsure of when last seizure occurred.  Pt reports drinking 5 shots of tequila daily. Blood EtOH on arrival was 0.138.   - Continue MSSA   - Folvite, multi-vites, thiamine supplementation   - Further management per Psychiatry   - Seizures precautions       #Anion Gap  - Most recent CMP showed anion gap of 18.  -Will continue to monitor for changes.      # Microalbuminuria  - Outpatient follow up      # right flank pain (resolved)   - CT scan in the ED shows non-obstructing stones up to 4 mm   - pain thought to be possibly MSK  - no complaints of pain today   - page medicine if recurrent pain     # Hx of aortic root daily   - takes daily atenolol      Medicine will follow          Hospital Course:     Ravi Hankins was admitted to [facility] [place] with attending All Aldana MD as a voluntary patient.  The patient was placed under status 15 (15 minute checks) to ensure patient safety.     MSSA protocol was initiated due to the patient's history of alcohol abuse and concern for withdrawal symptoms.  MSSA scores were low, and he did not require treatment with Valium.    PRN Hydroxyzine 25 mg was initiated for anxiety.  PRN Trazodone 50 mg was initiated for insomnia.     Ravi Hankins did participate in groups and was visible in the milieu.  He was calm and cooperative.  He appeared motivated for treatment and recovery.  He reported some anxiety but otherwise stated his mood was euthymic.    Ravi Hankins was discharged to [facility].  At the time of discharge Ravi Hankins was determined to not be a danger to himself or others.          Discharge Medications:       Dose / Directions   folic acid 1 MG  "tablet  Commonly known as: FOLVITE     Dose: 1 mg  Take 1 tablet (1 mg) by mouth daily.  Quantity: 30 tablet  Refills: 0     hydrOXYzine HCl 25 MG tablet  Commonly known as: ATARAX        Dose: 25 mg  Take 1 tablet (25 mg) by mouth every 4 hours as needed for anxiety.  Quantity: 90 tablet  Refills: 1     multivitamin w/minerals tablet        Dose: 1 tablet  Take 1 tablet by mouth daily.  Quantity: 30 tablet  Refills: 0     thiamine 100 MG tablet  Commonly known as: B-1        Dose: 100 mg  Take 1 tablet (100 mg) by mouth daily.  Quantity: 30 tablet  Refills: 0     traZODone 50 MG tablet  Commonly known as: DESYREL        Dose: 50 mg  Take 1 tablet (50 mg) by mouth nightly as needed for sleep.  Quantity: 30 tablet  Refills: 0       atenolol 100 MG tablet  Commonly known as: TENORMIN      Dose: 100 mg  Take 1 tablet (100 mg) by mouth daily           These medications were sent to [facility] [address]  [address] [address]      Phone: [phone]   folic acid 1 MG tablet  hydrOXYzine HCl 25 MG tablet  multivitamin w/minerals tablet  thiamine 100 MG tablet  traZODone 50 MG tablet            Psychiatric Examination:     Appearance:  awake, alert, adequately groomed, and dressed in hospital scrubs  Attitude:  cooperative  Eye Contact:  good  Mood:  \"good,\" some anxiety  Affect:  appropriate and in normal range  Speech:  clear, coherent  Psychomotor Behavior:  no evidence of tardive dyskinesia, dystonia, or tics  Thought Process:  linear and goal oriented  Associations:  no loose associations  Thought Content:  no evidence of suicidal ideation or homicidal ideation and no evidence of psychotic thought  Insight:  good  Judgment:  intact  Oriented to:  date, time, person, and place  Attention Span and Concentration:  intact  Recent and Remote Memory:  intact  Language:  intact, fluent English  Fund of Knowledge:  appropriate  Muscle Strength and Tone:  " "normal  Gait and Station:  normal    BP (!) 139/100 (BP Location: Left arm, Patient Position: Sitting, Cuff Size: Adult Regular)   Pulse 68   Temp 97.9  F (36.6  C) (Oral)   Resp 16   Ht 1.854 m (6' 1\")   SpO2 99%   BMI 19.98 kg/m           Discharge Plan:     Per Discharge AVS:    Summary: You were admitted to [facility] on 1/8/25 for detoxification from alcohol.  A medical exam was performed that included lab work. You have met with a [facility] Counselor and opted to attend residential CD treatment at [facility].  Please take care and make your recovery a daily priority, Ravi!  It was a pleasure working with you and the entire treatment team here wishes you the very best in your recovery!     Recommendation:  Residential CD treatment at [facility]. Complete program and follow all aftercare recommendations. Abstain from using mood altering substances.     Major Treatments, Procedures and Findings:  You were treated for alcohol detoxification using Valium. You have met with a [facility] counselor to develop a treatment plan for discharge. You had labs drawn and those results were reviewed with you. Please take a copy of your lab work with you to your next primary care provider appointment.    Symptoms to Report:  If you experience more anxiety, confusion, sleeplessness, deep sadness or thoughts of suicide, notify your treatment team or notify your primary care provider. IF ANY OF THE SYMPTOMS YOU ARE EXPERIENCING ARE A MEDICAL EMERGENCY CALL 911 IMMEDIATELY.     Lifestyle Adjustment: Adjust your lifestyle to get enough sleep, relaxation, exercise and good nutrition. Continue to develop healthy coping skills to decrease stress and promote a sober living environment. Do not use mood altering substances including alcohol, illegal drugs or addictive medications other than what is currently prescribed.     Disposition: [facility] - 3pm admission on 1/9/25      Attestation:  The patient " has been seen and evaluated by me, JAIRO Rodriguez CNP  Discharge time > 30 minutes

## 2025-01-09 NOTE — PROGRESS NOTES
Brief medicine note. Chart reviewed. Labs normalized. Medicine will sign off. Please reach out with any questions or concerns.

## 2025-01-10 ENCOUNTER — HOSPITAL ENCOUNTER (OUTPATIENT)
Dept: BEHAVIORAL HEALTH | Facility: CLINIC | Age: 35
Discharge: HOME | End: 2025-01-10
Attending: FAMILY MEDICINE
Payer: COMMERCIAL

## 2025-01-10 PROCEDURE — H2035 A/D TX PROGRAM, PER HOUR: HCPCS

## 2025-01-10 PROCEDURE — H2035 A/D TX PROGRAM, PER HOUR: HCPCS | Mod: HQ

## 2025-01-10 PROCEDURE — 1002N00001 HC LODGING PLUS FACILITY CHARGE ADULT

## 2025-01-10 ASSESSMENT — ANXIETY QUESTIONNAIRES
4. TROUBLE RELAXING: SEVERAL DAYS
GAD7 TOTAL SCORE: 5
3. WORRYING TOO MUCH ABOUT DIFFERENT THINGS: SEVERAL DAYS
5. BEING SO RESTLESS THAT IT IS HARD TO SIT STILL: NOT AT ALL
GAD7 TOTAL SCORE: 5
GAD7 TOTAL SCORE: 5
8. IF YOU CHECKED OFF ANY PROBLEMS, HOW DIFFICULT HAVE THESE MADE IT FOR YOU TO DO YOUR WORK, TAKE CARE OF THINGS AT HOME, OR GET ALONG WITH OTHER PEOPLE?: SOMEWHAT DIFFICULT
6. BECOMING EASILY ANNOYED OR IRRITABLE: SEVERAL DAYS
2. NOT BEING ABLE TO STOP OR CONTROL WORRYING: SEVERAL DAYS
1. FEELING NERVOUS, ANXIOUS, OR ON EDGE: SEVERAL DAYS
7. FEELING AFRAID AS IF SOMETHING AWFUL MIGHT HAPPEN: NOT AT ALL
7. FEELING AFRAID AS IF SOMETHING AWFUL MIGHT HAPPEN: NOT AT ALL
IF YOU CHECKED OFF ANY PROBLEMS ON THIS QUESTIONNAIRE, HOW DIFFICULT HAVE THESE PROBLEMS MADE IT FOR YOU TO DO YOUR WORK, TAKE CARE OF THINGS AT HOME, OR GET ALONG WITH OTHER PEOPLE: SOMEWHAT DIFFICULT

## 2025-01-10 ASSESSMENT — PATIENT HEALTH QUESTIONNAIRE - PHQ9
SUM OF ALL RESPONSES TO PHQ QUESTIONS 1-9: 2
10. IF YOU CHECKED OFF ANY PROBLEMS, HOW DIFFICULT HAVE THESE PROBLEMS MADE IT FOR YOU TO DO YOUR WORK, TAKE CARE OF THINGS AT HOME, OR GET ALONG WITH OTHER PEOPLE: SOMEWHAT DIFFICULT
SUM OF ALL RESPONSES TO PHQ QUESTIONS 1-9: 2

## 2025-01-10 NOTE — GROUP NOTE
Group Therapy Documentation    PATIENT'S NAME: Ravi Hankins  MRN:   [id number]  :   1990  ACCT. NUMBER: [id number]  DATE OF SERVICE: 1/10/25  START TIME:  9:00 AM  END TIME: 11:00 AM  FACILITATOR(S): Zuhair Do LADC  TOPIC: BEH Group Therapy  Number of patients attending the group:  10  Group Length:  2 Hours    Dimensions addressed: 3, 4, and 5    Summary of Group / Topics Discussed:    Recovery Principles, Mindfulness/Relaxation, Coping/DBT informed care, Trauma informed care, Disease of addiction, Emotions/expression, Relapse prevention, and Self-care activities      Group Attendance:  Attended group session    Patient's response to the group topic/interactions:  cooperative with task    Patient appeared to be Attentive and Engaged.        Client specific details:  Ravi participated in morning group. He checked in and took part in the reading and discussion on the importance of friendship. For the second half of group he took part in a discussion on the 12 steps of AA.  He missed part of the group while he was finishing his admission.

## 2025-01-10 NOTE — PROGRESS NOTES
Initial Services Plan    Before your first treatment group, please do the following    Immediate health & safety concerns: Look for sober housing and a supportive social network.  Look for a support network (such as AA, NA, DBT group, a Confucianist group, etc.)    Suggestions for client during the time between intake & completion of treatment plan:  Tour your treatment center (unit or outpatient clinic).  Introduce yourself to the treatment group.  Spend time getting to know your peers.  Complete your psycho-social paperwork.  Complete the problem list for your treatment plan.  Start drug and alcohol use history.  Review your patient or client handbook.    Client issues to be addressed in the first treatment sessions:  Other Pt reports wanting to get into individual therapy as well as make sure he can set up virtual visits with his 18-month old daughter.     FERNANDA Herrera  1/10/2025

## 2025-01-10 NOTE — GROUP NOTE
Group Therapy Documentation    PATIENT'S NAME: Ravi Hankins  MRN:   [id number]  :   1990  ACCT. NUMBER: [id number]  DATE OF SERVICE: 1/10/25  START TIME: 12:30 PM  END TIME:  2:30 PM  FACILITATOR(S): Terry Singh LADC  TOPIC: BEH Group Therapy  Number of patients attending the group:  10  Group Length:  2 Hours    Dimensions addressed: 1, 2, 3, 4, 5, and 6    Summary of Group / Topics Discussed:    Recovery Principles and Disease of addiction      Group Attendance:  Attended group session    Patient's response to the group topic/interactions:  cooperative with task    Patient appeared to be Actively participating.        Client specific details:  Rodo participated and interacted appropriately with peers and staff in PM group. No triggers to use noted or discussed.
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
## 2025-01-10 NOTE — PROGRESS NOTES
Progress Note    This patient had a IP Substance Use Disorder assessment on 12/16/2024 completed by Amelie Real MA, LADC .  This patient was seen for a face to face update of the IP Substance Use Disorder assessment on 1/10/2025 by FERNANDA Herrera.  INSIDE: The patient's IP Substance Use Disorder assessment completed on 12/16/2024 is in the patient's electronic medical record in Epic in the Chart Review section under the Notes/Trans Tab.    Alcohol/Drug use since the last CD evaluation (include date of last use):     Pt reported last date of use as 1/8/2025 for alcohol     Please note any other clinical changes since the last CD evaluation (such as medication changes, additional legal charges, detoxification admissions, overdoses, etc.)     Pt was admitted to detox on 1/8/2025       ASAM Dimensions Original scores Current Scores   I.) Intoxication and Withdrawal: 0 0   II.) Biomedical:  0 0   III.) Emotional and Behavioral:  1 1   IV.) Readiness to Change:  1 1   V.) Relapse Potential: 4 4   VI.) Recovery Environmental: 2 2     Please list clinical justifications for the above ASAM score changes since the original comprehensive assessment:     None of the ASAM scores on the six dimensions had changed since the IP Substance Use Disorder assessment was completed on 12/16/2024.       Current ERIK: Current UA:     Pt admitted from  detox     Positive for THC and negative for all other screened drugs.       PHQ-9, REZA-7   PHQ-9 on 1/10/2025 REZA-7 on 1/10/2025   The patient's PHQ-9 score was 2 out of 27, indicating minimal depression.   The patient's REZA-7 score was 5 out of 21, indicating mild anxiety.       [place]-Suicide Severity Rating Scale Reassessment   Have you ever wished you were dead or that you could go to sleep and not wake up?  Past Month:  No     Have you actually had any thoughts of killing yourself?  Past Month:  No     Have you been thinking about how you might do this?     Past Month:  No    Lifetime:  No   Have you had these thoughts and had some intention of acting on them?     Past Month:  No   Lifetime:  No   Have you started to work out the details of how to kill yourself?   Past Month:  No   Lifetime:  No   Do you intend to carry out this plan?   No     When you have the thoughts how long do they last?  The patient denied ever having any suicidal thoughts in life.     Are there things - anyone or anything (i.e. family, Mormon, pain of death) that stopped you from wanting to die or acting on thoughts of suicide?  Does not apply       2008  [employer]  Used with permission by Leslie Gaines, PhD.       Guide to C-SSRS Risk Ratings   NO IDEATION:  with no active thoughts IDEATION: with a wish to die. IDEATION: with active thoughts. Risk Ratings   If Yes No No 0 - Very Low Risk   If NA Yes No 1 - Low Risk   If NA Yes Yes 2 - Low/moderate risk   IDEATION: associated thoughts of methods without intent or plan INTENT: Intent to follow through on suicide PLAN: Plan to follow through on suicide Risk Ratings cont...   If Yes No No 3 - Moderate Risk   If Yes Yes No 4 - High Risk   If Yes Yes Yes 5 - High Risk   The patient's ADDITIONAL RISK FACTORS and lack of PROTECTIVE FACTORS may increase their overall suicide risk ratings.     Additional Risk Factors:   Significant history of physical illness or chronic medical problems    History of impulsive or aggressive behaviors   Protective Factors:   Having people in his/her life that would prevent the patient from considering a suicide attempt (i.e. young children, spouse, parents, etc.)    An absence of mental health issues or stable and well treated mental health issues    A positive relationship with his/her clinical medical and/or mental health providers    Having easy access to supportive family members    Having a good community support network    Having cultural, Orthodoxy or spiritual beliefs that discourage suicide    " No significant protective factors     Risk Status   0. - Very Low Risk:  Evaluation Counselors:  Document in Epic / SBAR to counselor \"Very Low Risk\".      Treatment Counselors:  Reassess upon admission as applicable, assess weekly in progress notes under Dimension 3 and summarize in Discharge / Treatment summary under Dimension 3.     Additional information to support suicide risk rating: There was no additional information to provide at this time.      "
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
## 2025-01-11 ENCOUNTER — TELEPHONE (OUTPATIENT)
Dept: FAMILY MEDICINE | Facility: CLINIC | Age: 35
End: 2025-01-11
Payer: COMMERCIAL

## 2025-01-11 ENCOUNTER — HOSPITAL ENCOUNTER (OUTPATIENT)
Dept: BEHAVIORAL HEALTH | Facility: CLINIC | Age: 35
Discharge: HOME | End: 2025-01-11
Attending: FAMILY MEDICINE
Payer: COMMERCIAL

## 2025-01-11 PROCEDURE — 1002N00001 HC LODGING PLUS FACILITY CHARGE ADULT

## 2025-01-11 PROCEDURE — H2035 A/D TX PROGRAM, PER HOUR: HCPCS | Mod: HQ

## 2025-01-11 NOTE — GROUP NOTE
Group Therapy Documentation    PATIENT'S NAME: Ravi Hankins  MRN:   [id number]  :   1990  ACCT. NUMBER: [id number]  DATE OF SERVICE: 25  START TIME:  9:00 AM  END TIME: 11:00 AM  FACILITATOR(S): Tyra Mcdaniel LADC; Farida Watkins LADC  TOPIC: BEH Group Therapy  Number of patients attending the group:  23  Group Length:  2 Hours    Dimensions addressed: 4 and 5    Summary of Group / Topics Discussed:    Balanced lifestyle and Relapse prevention    Group Attendance:  Attended group session    Patient's response to the group topic/interactions:  cooperative with task    Patient appeared to be Actively participating, Attentive, and Engaged.        Client specific details: Pt listened respectfully to a presentation on communication styles and skills, and took part in the related activity.

## 2025-01-11 NOTE — TELEPHONE ENCOUNTER
"Greetings Dr Avilez,    This mutual pt is currently admitted to [facility], a 28-day residential [facility] treatment facility located inside of [facility]. Rodo is being treated for Alcohol Use Disorder, severe dependence..  This is not an inpatient unit. There is no rounding provider or access to hospital consult services.  [facility] nurses utilize community medical providers while patients are admitted for medical collaboration.     Pt has expressed to me that he is very happy to have you as his Primary Care Provider and seems to really trust this relationship and advisement that is offered.    Patient Rodo admission date was 1/9 and discharge date is 2/6/25.  He is expressing to writer this morning symptoms of anxiety and \"a little\" depression.  He states his family is encouraging him to address these symptoms which are more pronounced when he is not in active addiction.     Furthermore, this pt is restricted recipient [facility] which creates some barriers to other services like addiction medicine.  It is possible to place these referrals but would like your advisement first.  When I posed this Addiction Medicine provider offer to the pt, he is open... but leery as often the advisement is to start alcohol suppressing type medications.  Have you ever advised the pt or spoken to him about the more common of these medications? (Campral or naltrexone)     I tried to make an appt with you either virtual or by phone, but the  did not see any openings until 2/6/25.  (Pt is not allowed to come to clinic in person while here with us) I wonder if there is a sooner opening either virtually or phone that we can provide...at least to have his mental health symptoms addressed.    Pt is currently only taking Hydroxyzine PRN    I await your feedback/advisement    Betty Solano RN      [facility]  Nurse Liaison / CD Adult [facility]   [address], [place], " Monitor: The problem is improving with treatment.  Evaluation: No labs/tests required today.  Assessment/Treatment:  Continue current treatment/monitoring regimen.   [place]  O:[phone]  F:[phone]  RN Pool [id number]  LP After hours():[phone]

## 2025-01-11 NOTE — GROUP NOTE
Group Therapy Documentation    PATIENT'S NAME: Ravi Hankins  MRN:   [id number]  :   1990  ACCT. NUMBER: [id number]  DATE OF SERVICE: 25  START TIME: 12:30 PM  END TIME:  2:15 PM  FACILITATOR(S): Farida Watkins LADC; Tyra Mcdaniel LADC  TOPIC: BEH Group Therapy  Number of patients attending the group:  23  Group Length:  2 Hours    Dimensions addressed: 4 and 5    Summary of Group / Topics Discussed:    Sober coping skills and Relapse prevention    Group Attendance:  Attended group session    Patient's response to the group topic/interactions:  cooperative with task    Patient appeared to be Actively participating, Attentive, and Engaged.        Client specific details: Pt listened respectfully to a presentation on relapse prevention strategies and took part in a group discussion of specific plans.

## 2025-01-12 ENCOUNTER — HOSPITAL ENCOUNTER (OUTPATIENT)
Dept: BEHAVIORAL HEALTH | Facility: CLINIC | Age: 35
Discharge: HOME | End: 2025-01-12
Attending: FAMILY MEDICINE
Payer: COMMERCIAL

## 2025-01-12 PROCEDURE — 1002N00001 HC LODGING PLUS FACILITY CHARGE ADULT

## 2025-01-12 PROCEDURE — H2035 A/D TX PROGRAM, PER HOUR: HCPCS | Mod: HQ

## 2025-01-12 NOTE — GROUP NOTE
Group Therapy Documentation    PATIENT'S NAME: Ravi Hankins  MRN:   [id number]  :   1990  ACCT. NUMBER: [id number]  DATE OF SERVICE: 25  START TIME:  8:45 AM  END TIME: 10:45 AM  FACILITATOR(S): Heather Meraz; Farida Watkins LADC  TOPIC: BEH Group Therapy  Number of patients attending the group:  23  Group Length:  2 Hours    Dimensions addressed: 5 and 6    Summary of Group / Topics Discussed:    Recovery Principles, Sober coping skills, Relationship/socialization, Balanced lifestyle, Disease of addiction, Emotions/expression, Leisure explorations/use of leisure time, Relapse prevention, and Self-care activities      Group Attendance:  Attended group session    Patient's response to the group topic/interactions:  cooperative with task, expressed readiness to alter behaviors, and listened actively    Patient appeared to be Attentive and Engaged.        Client specific details:  Client attended group. Client appeared to be engaged throughout the duration of group due to using active listening skills and asking questions of facilitator as well as peers. Client was actively engaged in group activities throughout the duration of group.

## 2025-01-12 NOTE — GROUP NOTE
Group Therapy Documentation    PATIENT'S NAME: Ravi Hankins  MRN:   [id number]  :   1990  ACCT. NUMBER: [id number]  DATE OF SERVICE: 25  START TIME: 12:30 PM  END TIME:  1:30 PM  FACILITATOR(S): Heather Meraz; Farida Watkins LADC  TOPIC: BEH Group Therapy  Number of patients attending the group:  23  Group Length:  1 Hours    Dimensions addressed: 5 and 6    Summary of Group / Topics Discussed:    Recovery Principles, Relationship/socialization, Balanced lifestyle, Disease of addiction, Emotions/expression, Leisure explorations/use of leisure time, Relapse prevention, and Self-care activities      Group Attendance:  Attended group session    Patient's response to the group topic/interactions:  cooperative with task, expressed understanding of topic, gave appropriate feedback to peers, and listened actively    Patient appeared to be Attentive and Engaged.        Client specific details:  Client attended group. Client appeared to be engaged throughout the group with the topic as client used active listening skills and asked questions on topic.

## 2025-01-13 ENCOUNTER — HOSPITAL ENCOUNTER (OUTPATIENT)
Dept: BEHAVIORAL HEALTH | Facility: CLINIC | Age: 35
Discharge: HOME | End: 2025-01-13
Attending: FAMILY MEDICINE
Payer: COMMERCIAL

## 2025-01-13 ENCOUNTER — VIRTUAL VISIT (OUTPATIENT)
Dept: FAMILY MEDICINE | Facility: CLINIC | Age: 35
End: 2025-01-13
Payer: COMMERCIAL

## 2025-01-13 DIAGNOSIS — F19.20 CHEMICAL DEPENDENCY (H): Primary | ICD-10-CM

## 2025-01-13 DIAGNOSIS — G47.9 SLEEP DISORDER: ICD-10-CM

## 2025-01-13 DIAGNOSIS — F41.9 ANXIETY: ICD-10-CM

## 2025-01-13 DIAGNOSIS — F43.10 PTSD (POST-TRAUMATIC STRESS DISORDER): ICD-10-CM

## 2025-01-13 PROCEDURE — 98014 SYNCH AUDIO-ONLY EST MOD 30: CPT | Performed by: FAMILY MEDICINE

## 2025-01-13 PROCEDURE — 1002N00001 HC LODGING PLUS FACILITY CHARGE ADULT

## 2025-01-13 PROCEDURE — H2035 A/D TX PROGRAM, PER HOUR: HCPCS | Mod: HQ

## 2025-01-13 RX ORDER — VENLAFAXINE HYDROCHLORIDE 37.5 MG/1
37.5 CAPSULE, EXTENDED RELEASE ORAL DAILY
Qty: 30 CAPSULE | Refills: 1 | Status: SHIPPED | OUTPATIENT
Start: 2025-01-13

## 2025-01-13 ASSESSMENT — PATIENT HEALTH QUESTIONNAIRE - PHQ9: 5. POOR APPETITE OR OVEREATING: NEARLY EVERY DAY

## 2025-01-13 ASSESSMENT — ANXIETY QUESTIONNAIRES
2. NOT BEING ABLE TO STOP OR CONTROL WORRYING: NEARLY EVERY DAY
6. BECOMING EASILY ANNOYED OR IRRITABLE: MORE THAN HALF THE DAYS
5. BEING SO RESTLESS THAT IT IS HARD TO SIT STILL: MORE THAN HALF THE DAYS
7. FEELING AFRAID AS IF SOMETHING AWFUL MIGHT HAPPEN: SEVERAL DAYS
GAD7 TOTAL SCORE: 16
3. WORRYING TOO MUCH ABOUT DIFFERENT THINGS: NEARLY EVERY DAY
1. FEELING NERVOUS, ANXIOUS, OR ON EDGE: MORE THAN HALF THE DAYS
GAD7 TOTAL SCORE: 16
IF YOU CHECKED OFF ANY PROBLEMS ON THIS QUESTIONNAIRE, HOW DIFFICULT HAVE THESE PROBLEMS MADE IT FOR YOU TO DO YOUR WORK, TAKE CARE OF THINGS AT HOME, OR GET ALONG WITH OTHER PEOPLE: VERY DIFFICULT

## 2025-01-13 NOTE — TELEPHONE ENCOUNTER
Betty/nursing staff,   We can help schedule the patient for a virtual appointment with Dr. Avilez.   She has some virtual openings today: 9AM, 9:30AM, 11:30AM, and 3PM.   Please let us know what time works best for you/patient and then we can schedule the patient.   Thanks!  Leslie BONE
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
## 2025-01-13 NOTE — GROUP NOTE
Group Therapy Documentation    PATIENT'S NAME: Ravi Hankins  MRN:   [id number]  :   1990  ACCT. NUMBER: [id number]  DATE OF SERVICE: 25  START TIME: 12:30 PM  END TIME:  2:30 PM  FACILITATOR(S): Jimmy Correa LADC  TOPIC: BEH Group Therapy  Number of patients attending the group:  10  Group Length:  2 Hours    Dimensions addressed: 3, 4, 5, and 6    Summary of Group / Topics Discussed:    Sober coping skills, Balanced lifestyle, Leisure explorations/use of leisure time, and Self-care activities      Group began with goal develop ment activities, followed by an assignment that was shared by a group member. Group participants then discussed the importance of developing routines to support our goals.       Group Attendance:  Attended group session    Patient's response to the group topic/interactions:  cooperative with task    Patient appeared to be Actively participating.        Client specific details:  Patient actively engaged in group discussion.

## 2025-01-13 NOTE — PROGRESS NOTES
Virtual appt arranged with [facility] PCP Dr Avilez for 11:30AM today for pt to discuss MH symptoms as well as negative side effects to sleep med Trazodone.  Pt is relaying vivid and relentless dreams and would like to consider another sleep aid    Will review provider note post appt    Betty Solano RN

## 2025-01-13 NOTE — GROUP NOTE
Group Therapy Documentation    PATIENT'S NAME: Ravi Hankins  MRN:   [id number]  :   1990  ACCT. NUMBER: [id number]  DATE OF SERVICE: 25  START TIME:  9:00 AM  END TIME: 11:00 AM  FACILITATOR(S): Layne Pate LADC  TOPIC: BEH Group Therapy  Number of patients attending the group:  9    Group Length:  2 Hours    Dimensions addressed: 3, 4, and 5    Summary of Group / Topics Discussed:    Recovery Principles, Sober coping skills, Relationship/socialization, and Relapse prevention      Group Attendance:  Attended group session    Patient's response to the group topic/interactions:  cooperative with task    Patient appeared to be Actively participating, Attentive, and Engaged.        Client specific details:  Patient attended group session and was attentive and participative.

## 2025-01-14 ENCOUNTER — HOSPITAL ENCOUNTER (OUTPATIENT)
Dept: BEHAVIORAL HEALTH | Facility: CLINIC | Age: 35
Discharge: HOME | End: 2025-01-14
Attending: FAMILY MEDICINE
Payer: COMMERCIAL

## 2025-01-14 PROCEDURE — H2035 A/D TX PROGRAM, PER HOUR: HCPCS | Mod: HQ

## 2025-01-14 PROCEDURE — 1002N00001 HC LODGING PLUS FACILITY CHARGE ADULT

## 2025-01-14 PROCEDURE — H2035 A/D TX PROGRAM, PER HOUR: HCPCS

## 2025-01-14 NOTE — GROUP NOTE
Group Therapy Documentation    PATIENT'S NAME: Ravi Hankins  MRN:   [id number]  :   1990  ACCT. NUMBER: [id number]  DATE OF SERVICE: 25  START TIME:  3:00 PM  END TIME:  4:00 PM  FACILITATOR(S): Zuhair Do LADC; Layne Pate LADC; Jimmy Correa LADC  TOPIC: BEH Group Therapy  Number of patients attending the group:  20  Group Length:  1 Hours    Dimensions addressed: 3, 4, and 5    Summary of Group / Topics Discussed:    Balanced lifestyle      Group lecture was facilitated by counseling staff regarding the topic of CBT skills. Participants provided feedback throughout group session.       Group Attendance:  Attended group session    Patient's response to the group topic/interactions:  cooperative with task    Patient appeared to be Actively participating.        Client specific details:  Patient actively engaged in group discussion.

## 2025-01-14 NOTE — PROGRESS NOTES
[facility] Weekly Treatment Plan Review    Treatment plan review for the following date span:  1/10/2025-1/12/2025    ATTENDANCE  Patient did not have any absences during this time period.        Weekly Treatment Plan Review     Treatment Plan initiated on: Not yet completed.    Dimension1: Acute Intoxication/Withdrawal Potential -   Date of Last Use: 1/7/2025  Any reports of withdrawal symptoms - No      Dimension 2: Biomedical Conditions & Complications -   Medical Concerns:  None reported  Vitals:   BP Readings from Last 3 Encounters:   01/09/25 128/89   12/17/24 (!) 157/101   05/15/24 112/68     Pulse Readings from Last 3 Encounters:   01/09/25 80   12/17/24 60   05/15/24 60     Wt Readings from Last 3 Encounters:   12/14/24 68.7 kg (151 lb 7.3 oz)   05/15/24 67.1 kg (148 lb)   02/22/24 72.2 kg (159 lb 1.6 oz)     Temp Readings from Last 3 Encounters:   01/09/25 97.8  F (36.6  C) (Oral)   12/17/24 98.4  F (36.9  C) (Oral)   05/15/24 97.9  F (36.6  C) (Temporal)      Current Medications & Medication Changes:  Current Outpatient Medications   Medication Sig Dispense Refill    acetaminophen (TYLENOL) 500 MG tablet Take 500-1,000 mg by mouth every 8 hours as needed for mild pain.      alum & mag hydroxide-simethicone (MAALOX) 200-200-20 MG/5ML SUSP suspension Take 30 mLs by mouth every 6 hours as needed for indigestion.      atenolol (TENORMIN) 100 MG tablet Take 1 tablet (100 mg) by mouth daily 90 tablet 2    folic acid (FOLVITE) 1 MG tablet Take 1 tablet (1 mg) by mouth daily. 30 tablet 0    hydrOXYzine HCl (ATARAX) 25 MG tablet Take 1 tablet (25 mg) by mouth every 4 hours as needed for anxiety. 90 tablet 1    ibuprofen (ADVIL/MOTRIN) 200 MG tablet Take 600 mg by mouth every 6 hours as needed for pain.      loratadine (CLARITIN) 10 MG tablet Take 10 mg by mouth daily.      melatonin 3 MG tablet Take 1 tablet (3 mg) by mouth nightly as needed for sleep. 30 tablet 1    multivitamin w/minerals (THERA-VIT-M)  tablet Take 1 tablet by mouth daily. 30 tablet 0    thiamine (B-1) 100 MG tablet Take 1 tablet (100 mg) by mouth daily. 30 tablet 0    traZODone (DESYREL) 50 MG tablet Take 1 tablet (50 mg) by mouth nightly as needed for sleep. 30 tablet 0    venlafaxine (EFFEXOR XR) 37.5 MG 24 hr capsule Take 1 capsule (37.5 mg) by mouth daily. 30 capsule 1     No current facility-administered medications for this encounter.     Facility-Administered Medications Ordered in Other Encounters   Medication Dose Route Frequency Provider Last Rate Last Admin    Self Administer Medications: Behavioral Services   Does not apply See Admin Instructions Lucia Jimenez MD        Self Administer Medications: Behavioral Services   Does not apply See Admin Instructions Stu Platt MD         Taking meds as prescribed? Yes  Medication side effects or concerns:  None reported  Outside medical appointments this week (list provider and reason for visit):  None at this time.     Additional Narrative. Patient appears medically stable and able to seek medical services a necessary.       Dimension 3: Emotional/Behavioral Conditions & Complications -   Mental health diagnosis: No History    Date of last SIB:  No History   Date of  last SI:  No  History  Date of last HI: No History   Behavioral Targets:  Stabilize  and  maintain mental health. Utilize available resources.   Current MH Assignments:  None at this time.     PHQ2:       1/14/2025     4:00 PM 7/30/2024     2:16 PM 2/22/2024    10:37 AM 4/10/2023     1:00 PM 4/4/2023     9:00 AM 3/27/2023     1:00 PM 1/9/2023     2:16 PM   PHQ-2 ( 1999 [employer])   Q1: Little interest or pleasure in doing things 1 0 0 0 0 0 0    Q2: Feeling down, depressed or hopeless 1 0 0 0 0 0 0    PHQ-2 Score 2 0 0 0 0 0 0   Q1: Little interest or pleasure in doing things   Not at all    Not at all   Q2: Feeling down, depressed or hopeless   Not at all    Not at all   PHQ-2 Score   0    0       Proxy-reported     "  GAD2:       3/27/2023     1:00 PM 4/4/2023     9:00 AM 4/10/2023     1:00 PM 1/14/2025     4:00 PM   REZA-2   Feeling nervous, anxious, or on edge 0 1 0 2   Not being able to stop or control worrying 1 1 1 2   REZA-2 Total Score 1 2 1 4       Additional Narrative:  Current Mental Health symptoms include: None reported.    Active interventions to stabilize mental health symptoms this week : Patient has participated in group therapy and individual sessions with staff therapist during this period. He reported no significant change in his mood, and reported that his  stress level has decreased during this period. Patient reported that he has been utilizing therapy, breathing exercises, and meditation as coping skills to help manage stress and difficult emotions during this period.       Dimension 4: Treatment Acceptance / Resistance -   JESUS Diagnosis:  Alcohol Use Disorder   303.90 (F10.20) Severe In early remission,  and In a controlled environment  305.20 (F12.10) Cannabis Use Disorder Mild  In early remission,  and In a controlled environment  Commitment to tx process/Stage of change- Contemplation  JESUS assignments - None at this time.       Additional Narrative - Patient stated, \"myself. my family,\" as his motivation to stay in treatment and stay sober. Patient participated appropriately in scheduled programming during this treatment period.       Dimension 5: Relapse / Continued Problem Potential -   Relapses this week - None  Urges to use - None  UA results -   Recent Results (from the past week)   Comprehensive metabolic panel    Collection Time: 01/07/25  6:05 PM   Result Value Ref Range    Sodium 143 135 - 145 mmol/L    Potassium 3.9 3.4 - 5.3 mmol/L    Carbon Dioxide (CO2) 23 22 - 29 mmol/L    Anion Gap 18 (H) 7 - 15 mmol/L    Urea Nitrogen 8.4 6.0 - 20.0 mg/dL    Creatinine 0.90 0.67 - 1.17 mg/dL    GFR Estimate >90 >60 mL/min/1.73m2    Calcium 9.2 8.8 - 10.4 mg/dL    Chloride 102 98 - 107 mmol/L    Glucose " 77 70 - 99 mg/dL    Alkaline Phosphatase 71 40 - 150 U/L    AST 25 0 - 45 U/L    ALT 13 0 - 70 U/L    Protein Total 8.4 (H) 6.4 - 8.3 g/dL    Albumin 4.8 3.5 - 5.2 g/dL    Bilirubin Total 0.4 <=1.2 mg/dL   Magnesium    Collection Time: 01/07/25  6:05 PM   Result Value Ref Range    Magnesium 2.2 1.7 - 2.3 mg/dL   CBC with platelets and differential    Collection Time: 01/07/25  6:05 PM   Result Value Ref Range    WBC Count 5.3 4.0 - 11.0 10e3/uL    RBC Count 4.78 4.40 - 5.90 10e6/uL    Hemoglobin 14.7 13.3 - 17.7 g/dL    Hematocrit 43.9 40.0 - 53.0 %    MCV 92 78 - 100 fL    MCH 30.8 26.5 - 33.0 pg    MCHC 33.5 31.5 - 36.5 g/dL    RDW 12.4 10.0 - 15.0 %    Platelet Count 199 150 - 450 10e3/uL    % Neutrophils 36 %    % Lymphocytes 51 %    % Monocytes 11 %    % Eosinophils 1 %    % Basophils 1 %    % Immature Granulocytes 0 %    NRBCs per 100 WBC 0 <1 /100    Absolute Neutrophils 1.9 1.6 - 8.3 10e3/uL    Absolute Lymphocytes 2.7 0.8 - 5.3 10e3/uL    Absolute Monocytes 0.6 0.0 - 1.3 10e3/uL    Absolute Eosinophils 0.0 0.0 - 0.7 10e3/uL    Absolute Basophils 0.0 0.0 - 0.2 10e3/uL    Absolute Immature Granulocytes 0.0 <=0.4 10e3/uL    Absolute NRBCs 0.0 10e3/uL   GGT    Collection Time: 01/07/25  6:05 PM   Result Value Ref Range    GGT 29 8 - 61 U/L   TSH with free T4 reflex    Collection Time: 01/07/25  6:05 PM   Result Value Ref Range    TSH 0.48 0.30 - 4.20 uIU/mL   Hemoglobin A1c    Collection Time: 01/07/25  6:05 PM   Result Value Ref Range    Estimated Average Glucose 114 <117 mg/dL    Hemoglobin A1C 5.6 <5.7 %   Alcohol breath test POCT    Collection Time: 01/07/25  6:07 PM   Result Value Ref Range    Alcohol Breath Test 0.138 (A) 0.00 - 0.01   UA with Microscopic reflex to Culture    Collection Time: 01/07/25 10:02 PM    Specimen: Urine, Midstream   Result Value Ref Range    Color Urine Yellow Colorless, Straw, Light Yellow, Yellow    Appearance Urine Clear Clear    Glucose Urine Negative Negative mg/dL     Bilirubin Urine Negative Negative    Ketones Urine Trace (A) Negative mg/dL    Specific Gravity Urine 1.034 1.003 - 1.035    Blood Urine Negative Negative    pH Urine 5.5 5.0 - 7.0    Protein Albumin Urine 30 (A) Negative mg/dL    Urobilinogen Urine Normal Normal, 2.0 mg/dL    Nitrite Urine Negative Negative    Leukocyte Esterase Urine Negative Negative    Bacteria Urine Few (A) None Seen /HPF    Mucus Urine Present (A) None Seen /LPF    RBC Urine 1 <=2 /HPF    WBC Urine 4 <=5 /HPF    Squamous Epithelials Urine 1 <=1 /HPF   Urine Drug Screen Panel    Collection Time: 01/07/25 10:02 PM   Result Value Ref Range    Amphetamines Urine Screen Positive (A) Screen Negative    Barbituates Urine Screen Negative Screen Negative    Benzodiazepine Urine Screen Negative Screen Negative    Cannabinoids Urine Screen Positive (A) Screen Negative    Cocaine Urine Screen Negative Screen Negative    Fentanyl Qual Urine Screen Negative Screen Negative    Opiates Urine Screen Negative Screen Negative    PCP Urine Screen Negative Screen Negative   Comprehensive metabolic panel    Collection Time: 01/09/25  7:15 AM   Result Value Ref Range    Sodium 140 135 - 145 mmol/L    Potassium 4.2 3.4 - 5.3 mmol/L    Carbon Dioxide (CO2) 26 22 - 29 mmol/L    Anion Gap 11 7 - 15 mmol/L    Urea Nitrogen 12.5 6.0 - 20.0 mg/dL    Creatinine 1.00 0.67 - 1.17 mg/dL    GFR Estimate >90 >60 mL/min/1.73m2    Calcium 9.6 8.8 - 10.4 mg/dL    Chloride 103 98 - 107 mmol/L    Glucose 98 70 - 99 mg/dL    Alkaline Phosphatase 64 40 - 150 U/L    AST 21 0 - 45 U/L    ALT 14 0 - 70 U/L    Protein Total 7.6 6.4 - 8.3 g/dL    Albumin 4.3 3.5 - 5.2 g/dL    Bilirubin Total 0.8 <=1.2 mg/dL   Extra Purple Top Tube    Collection Time: 01/09/25  7:15 AM   Result Value Ref Range    Hold Specimen [facility]    Urine Creatinine for Drug Screen Panel    Collection Time: 01/09/25  3:51 PM   Result Value Ref Range    Creatinine Urine for Drug Screen 261 mg/dL     Identified triggers -  "None reported  Coping skills identified - Patient reported utilizing working out and mediation as coping skills to manage cravings.  Patient is able to utilize these skills when needed.    Additional Narrative- Patient rated his cravings/urges as a 2/10 during this period. Patient participated in relapse prevention group discussions.     Dimension 6: Recovery Environment -   Family Involvement - Patient reported that his family is supportive of his treatment at this time.   Community support group attendance - Patient attends daily AA/NA sobriety  support group meetings while in programming.   Recreational activities - Patient stated, \"skating, bowling, family events.\"       Additional Narrative - Patient reported that he has not gotten along well with with staff and peers during this period.     Progress made on transition planning goals: None at this time.     Justification for Continued Treatment at this Level of Care:  Patient was rated as a high risk for relapse upon admission and will remain at high risk throughout his time in programming.   Treatment coordination activities this week:   N/A  Need for peer recovery support referral? No    Discharge Planning:  Target Discharge Date/Timeframe:  2/6/2025   Med Mgmt Provider/Appt:  1/13/2025   Ind therapy Provider/Appt:  1/14/2025   Other referrals:  N/A        Dimension Scale Review     Prior ratings: Dim1 - 0 DIM2 - 0 DIM3 - 1 DIM4 - 1 DIM5 - 4 DIM6 -2     Current ratings: Dim1 - 0 DIM2 - 0 DIM3 - 1 DIM4 - 1 DIM5 - 4 DIM6 -2       If client is 18 or older, has vulnerable adult status change? No    Are Treatment Plan goals/objectives effective? Yes  *If no, list changes to treatment plan:    Are the current goals meeting client's needs? Yes  *If no, list the changes to treatment plan.      *Client agrees with any changes to the treatment plan: N/A  *Client received copy of changes: No  *Client is aware of right to access a treatment plan review: Yes       Jimmy " VADIM Correa [facility] on 1/14/2025 at 4:25 PM

## 2025-01-14 NOTE — GROUP NOTE
Group Therapy Documentation    PATIENT'S NAME: Ravi Hankins  MRN:   [id number]  :   1990  ACCT. NUMBER: [id number]  DATE OF SERVICE: 25  START TIME:  9:00 AM  END TIME: 11:00 AM  FACILITATOR(S): Jimmy Correa LADC  TOPIC: BEH Group Therapy  Number of patients attending the group:  10  Group Length:  2 Hours    Dimensions addressed: 3, 4, 5, and 6    Summary of Group / Topics Discussed:    Sober coping skills, Relationship/socialization, Balanced lifestyle, Disease of addiction, Emotions/expression, Relapse prevention, and Self-care activities      Group began with check-ins, followed by a reflection reading regarding taking care of ourselves and also being there for others. Group then discussed the importance of healthy boundaries, communication, and having an understanding of values. Feedback was provided by participants throughout group discussion.       Group Attendance:  Attended group session    Patient's response to the group topic/interactions:  cooperative with task    Patient appeared to be Actively participating.        Client specific details:  Patient actively engaged in group discussion.
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
## 2025-01-14 NOTE — GROUP NOTE
Group Therapy Documentation    PATIENT'S NAME: Ravi Hankins  MRN:   [id number]  :   1990  ACCT. NUMBER: [id number]  DATE OF SERVICE: 25  START TIME: 12:30 PM  END TIME:  2:30 PM  FACILITATOR(S): Farida Watkins LADC  TOPIC: BEH Group Therapy  Number of patients attending the group:  10  Group Length:  2 Hours    Dimensions addressed: 4 and 5    Summary of Group / Topics Discussed:    Sober coping skills and Relapse prevention    Group Attendance:  Attended group session and Excused from group session    Patient's response to the group topic/interactions:  cooperative with task    Patient appeared to be Actively participating, Attentive, and Engaged.        Client specific details: Pt took part in a group discussion of reservations and how to address them. He was excused from half of group for an appt.

## 2025-01-15 ENCOUNTER — HOSPITAL ENCOUNTER (OUTPATIENT)
Dept: BEHAVIORAL HEALTH | Facility: CLINIC | Age: 35
Discharge: HOME | End: 2025-01-15
Attending: FAMILY MEDICINE
Payer: COMMERCIAL

## 2025-01-15 PROCEDURE — H2035 A/D TX PROGRAM, PER HOUR: HCPCS | Mod: HQ

## 2025-01-15 PROCEDURE — 1002N00001 HC LODGING PLUS FACILITY CHARGE ADULT

## 2025-01-15 NOTE — PROGRESS NOTES
"2634 - Pt Rodo stated he has migraine.  Writer asked if pt accessed OTC and he had not yet.  Pt came to med room to self-administer medications and given permission to lay down for one hour.  Should \"Migraine\" not resolve, pt will be directed to ED. Counselor Bill Correa notified    Betty Solano RN    "

## 2025-01-15 NOTE — GROUP NOTE
Group Therapy Documentation    PATIENT'S NAME: Ravi Hankins  MRN:   [id number]  :   1990  ACCT. NUMBER: [id number]  DATE OF SERVICE: 1/15/25  START TIME:  9:30 AM  END TIME: 11:15 AM  FACILITATOR(S): Farida Watkins LADC  TOPIC: BEH Group Therapy  Number of patients attending the group:  9  Group Length:  2 Hours    Dimensions addressed: 3 and 4    Summary of Group / Topics Discussed:    Sober coping skills and Emotions/expression    Group Attendance:  Attended group session and Excused from group session    Patient's response to the group topic/interactions:  cooperative with task    Patient appeared to be Actively participating, Attentive, and Engaged.        Client specific details: Pt was excused from half of group due to not feeling well. He reported feeling anxious, lonely and frustrated, and shared that he's had the following new experience this week: he's dealt with being away from home. He took part in a group discussion of core beliefs that either support or undermine recovery, and how suspending disbelief can be beneficial.

## 2025-01-15 NOTE — GROUP NOTE
Group Therapy Documentation    PATIENT'S NAME: Ravi Hankins  MRN:   [id number]  :   1990  ACCT. NUMBER: [id number]  DATE OF SERVICE: 1/15/25  START TIME: 12:30 PM  END TIME:  2:30 PM  FACILITATOR(S): Antonella So; Jimmy Correa LADC  TOPIC: BEH Group Therapy  Number of patients attending the group:  10  Group Length:  2 Hours    Dimensions addressed: 4 and 5    Summary of Group / Topics Discussed:    Spiritual Care    Group was facilitated by Spiritual Services Staff regarding topics related to Spiritual Health and Recovery. Participants provided feedback throughout group session.       Group Attendance:  Attended group session    Patient's response to the group topic/interactions:  cooperative with task    Patient appeared to be Actively participating.        Client specific details:  Patient actively engaged in group discussion.

## 2025-01-15 NOTE — GROUP NOTE
Psychoeducation Group Documentation    PATIENT'S NAME: Ravi Hankins  MRN:   [id number]  :   1990  ACCT. NUMBER: [id number]  DATE OF SERVICE: 1/15/25  START TIME:  8:30 AM  END TIME:  9:30 AM  FACILITATOR(S): Neela Mccarty LADC  TOPIC: BEH Pyschoeducation  Number of patients attending the group:  17  Group Length:  1 Hours    Skills Group Therapy Type: Healthy behaviors development    Summary of Group / Topics Discussed:    Symptom management skills    Patients attended a lecture related to liver health, cirrhosis, and consequences of substance use.        Group Attendance:  Absent    Patient's response to the group topic/interactions:  Patient reported illness and was referred to nursing    Patient appeared to be Non-participatory.         Client specific details:  Patient absent, sent to nursing.

## 2025-01-15 NOTE — PROGRESS NOTES
Acknowledgement of Current Treatment Plan - Initial Treatment Plan     INITIAL TREATMENT PLAN:     1. I have participated in creating my treatment plan with my therapist / counselor on 1/15/25. I agree with the plan as it is written in the electronic health record.    Name Signature/Date   Patient     Name of Therapist / Counselor   Signature/Date   Counselor      2. I have completed and reviewed my Safety Plan with my counselor and signed this on 1/10/25. I have been given the hard copy of this plan.    Patient signature/date:      _____________________________________________________________________________    3. Last Use Date: Patient reports his date of last use as 1/7/25.     Patient signature/date:     _____________________________________________________________________________

## 2025-01-16 ENCOUNTER — APPOINTMENT (OUTPATIENT)
Dept: BEHAVIORAL HEALTH | Facility: CLINIC | Age: 35
End: 2025-01-16
Payer: COMMERCIAL

## 2025-01-16 ENCOUNTER — HOSPITAL ENCOUNTER (OUTPATIENT)
Dept: BEHAVIORAL HEALTH | Facility: CLINIC | Age: 35
Discharge: HOME | End: 2025-01-16
Attending: FAMILY MEDICINE
Payer: COMMERCIAL

## 2025-01-16 PROCEDURE — H2035 A/D TX PROGRAM, PER HOUR: HCPCS | Mod: HQ

## 2025-01-16 PROCEDURE — H2035 A/D TX PROGRAM, PER HOUR: HCPCS

## 2025-01-16 ASSESSMENT — PATIENT HEALTH QUESTIONNAIRE - PHQ9
10. IF YOU CHECKED OFF ANY PROBLEMS, HOW DIFFICULT HAVE THESE PROBLEMS MADE IT FOR YOU TO DO YOUR WORK, TAKE CARE OF THINGS AT HOME, OR GET ALONG WITH OTHER PEOPLE: NOT DIFFICULT AT ALL
SUM OF ALL RESPONSES TO PHQ QUESTIONS 1-9: 4
SUM OF ALL RESPONSES TO PHQ QUESTIONS 1-9: 4

## 2025-01-16 NOTE — GROUP NOTE
Group Therapy Documentation    PATIENT'S NAME: Ravi Hankins  MRN:   [id number]  :   1990  ACCT. NUMBER: [id number]  DATE OF SERVICE: 25  START TIME: 12:30 PM  END TIME:  2:30 PM  FACILITATOR(S): Farida Watkins LADC  TOPIC: BEH Group Therapy  Number of patients attending the group:  10  Group Length:  2 Hours    Dimensions addressed: 4 and 6    Summary of Group / Topics Discussed:    Recovery Principles, Relationship/socialization, and Relapse prevention    Group Attendance:  Attended group session    Patient's response to the group topic/interactions:  cooperative with task    Patient appeared to be Actively participating, Attentive, and Engaged.        Client specific details: Pt took part in a group discussion about the gifts and challenges of early recovery, acting with integrity regardless of outcome, and assigning meaning/finding purpose.

## 2025-01-16 NOTE — GROUP NOTE
Psychoeducation Group Documentation    PATIENT'S NAME: Ravi Hankins  MRN:   [id number]  :   1990  ACCT. NUMBER: [id number]  DATE OF SERVICE: 25  START TIME:  3:00 PM  END TIME:  4:00 PM  FACILITATOR(S): Farida Watkins LADC; Reshma Woodall LADC  TOPIC: BEH Pyschoeducation  Number of patients attending the group:  19  Group Length:  1 Hours    Skills Group Therapy Type: Healthy behaviors development    Summary of Group / Topics Discussed:    Symptom management skills    Group Attendance:  Attended group session    Patient's response to the group topic/interactions:  cooperative with task    Patient appeared to be Attentive and Engaged.         Client specific details: Pt listened respectfully to Dr. Lizama's presentation on the interaction between addiction and depression.

## 2025-01-16 NOTE — PROGRESS NOTES
INDIVIDUAL SESSION SUMMARY    D) Met with client on 1/16/25 from 9:00-9:45am. Client is in the [facility] program. Client discussed how his drinking can affect his mood, irritability, patience and tone when interactions with his S.O. and children; how it creates an environment where the kids are anxious and concerned. Client spoke of taking responsibility and wanting to improve his communication at home, the importance of having a peaceful home for his kids and how a re-dedication to his sobriety and aftercare will help. Client reported that he's interested in the OP program [facility] and has located a home group for AA. Client stated that he will also continue with individual therapy.     I)  Provided client with verbal interventions including validation, compassion, and support, Provided positive reinforcement for progress towards goals, gains in insight, and application of skills, and Discussed the importance of recovery behaviors such as forming/utilizing a sober support network, daily rituals, goal setting, and identifying relapse warning signs.    A)   Client appears to be gaining insights into their emotions and using healthy coping skills for managing stress, Client appears to be practicing impulse control and planning for their future, Client appears to understand the importance of a sober support network, and Client appears motivated to seek a new daily routine, meaningful activities, and a sense of purpose.    P) Next session is scheduled for 1/23/25.     BRIAN Ott  1/16/2025

## 2025-01-16 NOTE — GROUP NOTE
Group Therapy Documentation    PATIENT'S NAME: Ravi Hankins  MRN:   [id number]  :   1990  ACCT. NUMBER: [id number]  DATE OF SERVICE: 25  START TIME:  9:00 AM  END TIME: 11:00 AM  FACILITATOR(S): Jimmy Correa LADC  TOPIC: BEH Group Therapy  Number of patients attending the group:  10  Group Length:  2 Hours    Dimensions addressed: 3, 4, 5, and 6    Summary of Group / Topics Discussed:    Sober coping skills, Relationship/socialization, Emotions/expression, Relapse prevention, and Self-care activities      Group began with check-ins, followed by a reflection reading regarding being present with one's feelings. Group then discussed the topic of emotional intelligence. Feedback was provided by participants throughout group session.       Group Attendance:  Excused from group session    Patient's response to the group topic/interactions:   N/A    Patient appeared to be N/A.        Client specific details:  Patient was excused from group to attend a DA appointment .
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
## 2025-01-17 ENCOUNTER — HOSPITAL ENCOUNTER (OUTPATIENT)
Dept: BEHAVIORAL HEALTH | Facility: CLINIC | Age: 35
Discharge: HOME | End: 2025-01-17
Attending: FAMILY MEDICINE
Payer: COMMERCIAL

## 2025-01-17 PROCEDURE — H2035 A/D TX PROGRAM, PER HOUR: HCPCS | Mod: HQ

## 2025-01-17 PROCEDURE — 1002N00001 HC LODGING PLUS FACILITY CHARGE ADULT

## 2025-01-17 NOTE — GROUP NOTE
Group Therapy Documentation    PATIENT'S NAME: Ravi Hankins  MRN:   [id number]  :   1990  ACCT. NUMBER: [id number]  DATE OF SERVICE: 25  START TIME: 12:30 PM  END TIME:  2:30 PM  FACILITATOR(S): Farida Watkins LADC; Reshma Woodall LADC  TOPIC: BEH Group Therapy  Number of patients attending the group:  21  Group Length:  2 Hours    Dimensions addressed: 5 and 6    Summary of Group / Topics Discussed:    Relationship/socialization, Relapse prevention, and Self-care activities    Group Attendance:  Attended group session    Patient's response to the group topic/interactions:  cooperative with task   Patient appeared to be Actively participating, Attentive, and Engaged.        Client specific details: Pt watched a recovery-related film and took part in the subsequent discussion.

## 2025-01-17 NOTE — GROUP NOTE
Group Therapy Documentation    PATIENT'S NAME: Ravi Hankins  MRN:   [id number]  :   1990  ACCT. NUMBER: [id number]  DATE OF SERVICE: 25  START TIME:  9:00 AM  END TIME: 11:00 AM  FACILITATOR(S): Jimmy Correa LADC  TOPIC: BEH Group Therapy  Number of patients attending the group:  10  Group Length:  2 Hours    Dimensions addressed: 3, 4, 5, and 6    Summary of Group / Topics Discussed:    Sober coping skills, Disease of addiction, Emotions/expression, and Relapse prevention    Group began with check-ins, followed by a reflection reading regarding powerlessness, addiction, and codependency. Group then discussed common defense mechanisms as well as other barriers and ways to work through them. Participants provided feedback throughout group session.       Group Attendance:  Attended group session    Patient's response to the group topic/interactions:  cooperative with task    Patient appeared to be Actively participating.        Client specific details:  Patient actively engaged in group discussion.

## 2025-01-18 ENCOUNTER — HOSPITAL ENCOUNTER (OUTPATIENT)
Dept: BEHAVIORAL HEALTH | Facility: CLINIC | Age: 35
Discharge: HOME | End: 2025-01-18
Attending: FAMILY MEDICINE
Payer: COMMERCIAL

## 2025-01-18 PROCEDURE — H2035 A/D TX PROGRAM, PER HOUR: HCPCS | Mod: HQ

## 2025-01-18 PROCEDURE — 1002N00001 HC LODGING PLUS FACILITY CHARGE ADULT

## 2025-01-18 NOTE — GROUP NOTE
Group Therapy Documentation    PATIENT'S NAME: Ravi Hankins  MRN:   [id number]  :   1990  ACCT. NUMBER: [id number]  DATE OF SERVICE: 25  START TIME:  9:00 AM  END TIME: 11:00 AM  FACILITATOR(S): Zuhair Do LADC  TOPIC: BEH Group Therapy  Number of patients attending the group:  10  Group Length:  2 Hours    Dimensions addressed: 4, 5, and 6    Summary of Group / Topics Discussed:    Recovery Principles, Relationship/socialization, Balanced lifestyle, Leisure explorations/use of leisure time, Relapse prevention, and Self-care activities      Group Attendance:  Attended group session    Patient's response to the group topic/interactions:  cooperative with task    Patient appeared to be Attentive and Engaged.        Client specific details:  The patient participated in the morning lecture on recovery resources and where to find them.

## 2025-01-18 NOTE — GROUP NOTE
Group Therapy Documentation    PATIENT'S NAME: Ravi Hankins  MRN:   [id number]  :   1990  ACCT. NUMBER: [id number]  DATE OF SERVICE: 25  START TIME: 12:30 PM  END TIME:  2:30 PM  FACILITATOR(S): Zuhair Do LADC; Will Perales LADC  TOPIC: BEH Group Therapy  Number of patients attending the group:  10  Group Length:  2 Hours    Dimensions addressed: 3, 4, and 5    Summary of Group / Topics Discussed:    Recovery Principles, Relationship/socialization, and Balanced lifestyle      Group Attendance:  Attended group session    Patient's response to the group topic/interactions:  cooperative with task    Patient appeared to be Attentive and Engaged.        Client specific details:  The patient took part in the afternoon group which was a recovery activity followed by a discussion.

## 2025-01-19 ENCOUNTER — HOSPITAL ENCOUNTER (OUTPATIENT)
Dept: BEHAVIORAL HEALTH | Facility: CLINIC | Age: 35
Discharge: HOME | End: 2025-01-19
Attending: FAMILY MEDICINE
Payer: COMMERCIAL

## 2025-01-19 PROCEDURE — H2035 A/D TX PROGRAM, PER HOUR: HCPCS | Mod: HQ

## 2025-01-19 PROCEDURE — 1002N00001 HC LODGING PLUS FACILITY CHARGE ADULT

## 2025-01-19 NOTE — GROUP NOTE
Psychoeducation Group Documentation    PATIENT'S NAME: Ravi Hankins  MRN:   [id number]  :   1990  ACCT. NUMBER: [id number]  DATE OF SERVICE: 25  START TIME: 12:30 PM  END TIME:  1:30 PM  FACILITATOR(S): Zuhair Do LADC; Will Perales LADC  TOPIC: BEH Pyschoeducation  Number of patients attending the group:  10  Group Length:  1 Hours    Skills Group Therapy Type: Recovery skills and Emotion regulation skills    Summary of Group / Topics Discussed:    Relationship/social skills, Balanced lifestyle skills, Mindfulness/Relaxation skills, and Coping/DBT skills        Group Attendance:  Attended group session    Patient's response to the group topic/interactions:  cooperative with task    Patient appeared to be Attentive and Engaged.         Client specific details:  The patient took part in the afternoon lecture on Mindfulness and how it can impact recovery.

## 2025-01-19 NOTE — GROUP NOTE
Psychoeducation Group Documentation    PATIENT'S NAME: Ravi Hankins  MRN:   [id number]  :   1990  ACCT. NUMBER: [id number]  DATE OF SERVICE: 25  START TIME:  8:45 AM  END TIME: 10:45 AM  FACILITATOR(S): Zuhair Do LADC; Tommy Velazquez RN  TOPIC: BEH Pyschoeducation  Number of patients attending the group:  10  Group Length:  2 Hours    Skills Group Therapy Type: Daily living/independence skills, Healthy behaviors development, and Medication education    Summary of Group / Topics Discussed:    Balanced lifestyle skills and Medication management skills        Group Attendance:  Attended group session    Patient's response to the group topic/interactions:  cooperative with task    Patient appeared to be Attentive and Engaged.         Client specific details:  The patient participated in the morning nursing lecture on HIV/AIDS.       Pt. Resting comfortably in stretcher watching TV with mother and father at bedside. Pt. With no current pain. Pt.'s and family's physiological needs met.

## 2025-01-20 ENCOUNTER — HOSPITAL ENCOUNTER (OUTPATIENT)
Dept: BEHAVIORAL HEALTH | Facility: CLINIC | Age: 35
Discharge: HOME | End: 2025-01-20
Attending: FAMILY MEDICINE
Payer: COMMERCIAL

## 2025-01-20 PROCEDURE — H2035 A/D TX PROGRAM, PER HOUR: HCPCS | Mod: HQ

## 2025-01-20 PROCEDURE — 1002N00001 HC LODGING PLUS FACILITY CHARGE ADULT

## 2025-01-20 NOTE — PROGRESS NOTES
[facility] Weekly Treatment Plan Review    Treatment plan review for the following date span:  1/13/2025-1/19/2025    ATTENDANCE  Patient had the following absences during this time period:  1/13/2025 11:30AM - Dr. Avilez PCP/ Virtual         Weekly Treatment Plan Review     Treatment Plan initiated on: Not yet completed.    Dimension1: Acute Intoxication/Withdrawal Potential -   Date of Last Use: 1/7/2025  Any reports of withdrawal symptoms - No      Dimension 2: Biomedical Conditions & Complications -   Medical Concerns:  None reported  Vitals:   BP Readings from Last 3 Encounters:   01/09/25 128/89   12/17/24 (!) 157/101   05/15/24 112/68     Pulse Readings from Last 3 Encounters:   01/09/25 80   12/17/24 60   05/15/24 60     Wt Readings from Last 3 Encounters:   12/14/24 68.7 kg (151 lb 7.3 oz)   05/15/24 67.1 kg (148 lb)   02/22/24 72.2 kg (159 lb 1.6 oz)     Temp Readings from Last 3 Encounters:   01/09/25 97.8  F (36.6  C) (Oral)   12/17/24 98.4  F (36.9  C) (Oral)   05/15/24 97.9  F (36.6  C) (Temporal)      Current Medications & Medication Changes:  Current Outpatient Medications   Medication Sig Dispense Refill    acetaminophen (TYLENOL) 500 MG tablet Take 500-1,000 mg by mouth every 8 hours as needed for mild pain.      alum & mag hydroxide-simethicone (MAALOX) 200-200-20 MG/5ML SUSP suspension Take 30 mLs by mouth every 6 hours as needed for indigestion.      atenolol (TENORMIN) 100 MG tablet Take 1 tablet (100 mg) by mouth daily 90 tablet 2    folic acid (FOLVITE) 1 MG tablet Take 1 tablet (1 mg) by mouth daily. 30 tablet 0    hydrOXYzine HCl (ATARAX) 25 MG tablet Take 1 tablet (25 mg) by mouth every 4 hours as needed for anxiety. 90 tablet 1    ibuprofen (ADVIL/MOTRIN) 200 MG tablet Take 600 mg by mouth every 6 hours as needed for pain.      loratadine (CLARITIN) 10 MG tablet Take 10 mg by mouth daily.      melatonin 3 MG tablet Take 1 tablet (3 mg) by mouth nightly as needed for sleep. 30  tablet 1    multivitamin w/minerals (THERA-VIT-M) tablet Take 1 tablet by mouth daily. 30 tablet 0    thiamine (B-1) 100 MG tablet Take 1 tablet (100 mg) by mouth daily. 30 tablet 0    traZODone (DESYREL) 50 MG tablet Take 1 tablet (50 mg) by mouth nightly as needed for sleep. 30 tablet 0    venlafaxine (EFFEXOR XR) 37.5 MG 24 hr capsule Take 1 capsule (37.5 mg) by mouth daily. 30 capsule 1     No current facility-administered medications for this encounter.     Facility-Administered Medications Ordered in Other Encounters   Medication Dose Route Frequency Provider Last Rate Last Admin    Self Administer Medications: Behavioral Services   Does not apply See Admin Instructions Lucia Jimenez MD        Self Administer Medications: Behavioral Services   Does not apply See Admin Instructions Stu Platt MD         Taking meds as prescribed? Yes  Medication side effects or concerns:  None reported  Outside medical appointments this week:  1/13/2025 11:30AM - Dr. Avilez PCP/ Virtual    Additional Narrative. Patient appears medically stable and able to seek medical services a necessary.       Dimension 3: Emotional/Behavioral Conditions & Complications -   Mental health diagnosis: No History    Date of last SIB:  No History   Date of  last SI:  No  History  Date of last HI: No History   Behavioral Targets:  Stabilize  and  maintain mental health. Utilize available resources.   Current MH Assignments:  None at this time.     PHQ2:       1/20/2025     4:00 PM 1/14/2025     4:00 PM 7/30/2024     2:16 PM 2/22/2024    10:37 AM 4/10/2023     1:00 PM 4/4/2023     9:00 AM 3/27/2023     1:00 PM   PHQ-2 ( 1999 [employer])   Q1: Little interest or pleasure in doing things 0 1 0 0 0 0 0   Q2: Feeling down, depressed or hopeless 0 1 0 0 0 0 0   PHQ-2 Score 0 2 0 0 0 0 0   Q1: Little interest or pleasure in doing things    Not at all      Q2: Feeling down, depressed or hopeless    Not at all      PHQ-2 Score    0         GAD2:  "      3/27/2023     1:00 PM 4/4/2023     9:00 AM 4/10/2023     1:00 PM 1/14/2025     4:00 PM 1/16/2025     9:47 AM 1/20/2025     4:00 PM   REZA-2   Feeling nervous, anxious, or on edge 0 1 0 2 2 1   Not being able to stop or control worrying 1 1 1 2 1 1   REZA-2 Total Score 1 2 1 4 3  2       Patient-reported       Additional Narrative:  Current Mental Health symptoms include: None reported.    Active interventions to stabilize mental health symptoms this week : Patient has participated in group therapy and individual sessions with staff therapist during this period. He reported a significant change in his mood, stating, \"great mood\". He reported reported that his stress level has decreased during this period. Patient reported that working out and reading have been coping skills to help manage stress and difficult emotions during this period.       Dimension 4: Treatment Acceptance / Resistance -   JESUS Diagnosis:  Alcohol Use Disorder   303.90 (F10.20) Severe In early remission,  and In a controlled environment  305.20 (F12.10) Cannabis Use Disorder Mild  In early remission,  and In a controlled environment  Commitment to tx process/Stage of change- Contemplation  JESUS assignments - None at this time.       Additional Narrative - Patient stated, \"family,\" as his motivation to stay in treatment and stay sober. Patient participated appropriately in scheduled programming during this treatment period.       Dimension 5: Relapse / Continued Problem Potential -   Relapses this week - None  Urges to use - None  UA results - 1/15/2025 THC/POS  No results found for this or any previous visit (from the past week).    Identified triggers - None reported  Coping skills identified - Patient reported utilizing working out and reading as coping skills to manage cravings.  Patient is able to utilize these skills when needed.    Additional Narrative- Patient rated his cravings/urges as a 0/10 during this period. Patient participated in " "relapse prevention group discussions.     Dimension 6: Recovery Environment -   Family Involvement - Patient reported that his family is supportive of his treatment at this time.   Community support group attendance - Patient attends daily AA/NA sobriety  support group meetings while in programming.   Recreational activities - Patient stated, \"ping pong\"       Additional Narrative - Patient reported that he has not gotten along well with with staff and peers during this period.     Progress made on transition planning goals: None at this time.     Justification for Continued Treatment at this Level of Care:  Patient was rated as a high risk for relapse upon admission and will remain at high risk throughout his time in programming.   Treatment coordination activities this week:   N/A  Need for peer recovery support referral? No    Discharge Planning:  Target Discharge Date/Timeframe:  2/6/2025   Med Mgmt Provider/Appt:  1/13/2025   Ind therapy Provider/Appt:  1/14/2025   Other referrals:  N/A        Dimension Scale Review     Prior ratings: Dim1 - 0 DIM2 - 0 DIM3 - 1 DIM4 - 1 DIM5 - 4 DIM6 -2     Current ratings: Dim1 - 0 DIM2 - 0 DIM3 - 1 DIM4 - 1 DIM5 - 4 DIM6 -2       If client is 18 or older, has vulnerable adult status change? No    Are Treatment Plan goals/objectives effective? Yes  *If no, list changes to treatment plan:    Are the current goals meeting client's needs? Yes  *If no, list the changes to treatment plan.      *Client agrees with any changes to the treatment plan: N/A  *Client received copy of changes: No  *Client is aware of right to access a treatment plan review: Yes       FERNANDA Boone on 1/14/2025 at 4:25 PM   "

## 2025-01-20 NOTE — GROUP NOTE
"Group Therapy Documentation    PATIENT'S NAME: Ravi Hankins  MRN:   [id number]  :   1990  ACCT. NUMBER: [id number]  DATE OF SERVICE: 25  START TIME: 12:30 PM  END TIME:  2:30 PM  FACILITATOR(S): Jimmy Correa LADC  TOPIC: BEH Group Therapy  Number of patients attending the group:  9  Group Length:  2 Hours    Dimensions addressed: 3, 4, 5, and 6    Summary of Group / Topics Discussed:    Sober coping skills, Balanced lifestyle, Disease of addiction, Emotions/expression, and Relapse prevention      Group began with a presentation of a group members \"First Step\" assignment. Participants then discussed healthy habits to incorporate into one's lifestyle. Feedback was provided by participants throughout group session.       Group Attendance:  Attended group session    Patient's response to the group topic/interactions:  cooperative with task    Patient appeared to be Actively participating.        Client specific details:  Patient actively engaged in group discussion.    "

## 2025-01-20 NOTE — GROUP NOTE
"Group Therapy Documentation    PATIENT'S NAME: Ravi Hankins  MRN:   [id number]  :   1990  ACCT. NUMBER: [id number]  DATE OF SERVICE: 25  START TIME:  9:00 AM  END TIME: 11:00 AM  FACILITATOR(S): Farida Watkins LADC  TOPIC: BEH Group Therapy  Number of patients attending the group:  9  Group Length:  2 Hours    Dimensions addressed: 3 and 5    Summary of Group / Topics Discussed:    Emotions/expression and Relapse prevention    Group Attendance:  Attended group session    Patient's response to the group topic/interactions:  cooperative with task    Patient appeared to be Actively participating, Attentive, and Engaged.        Client specific details: Pt took part in his peer's graduation. He reported feeling \"too blessed to be stressed\" and shared his high and low from the weekend (seeing his daughter and a mistake on his dinner menu, respectively). He set goals for the week.  "

## 2025-01-21 ENCOUNTER — TELEPHONE (OUTPATIENT)
Dept: UROLOGY | Facility: CLINIC | Age: 35
End: 2025-01-21

## 2025-01-21 ENCOUNTER — HOSPITAL ENCOUNTER (OUTPATIENT)
Dept: BEHAVIORAL HEALTH | Facility: CLINIC | Age: 35
Discharge: HOME | End: 2025-01-21
Attending: FAMILY MEDICINE
Payer: COMMERCIAL

## 2025-01-21 PROCEDURE — H2035 A/D TX PROGRAM, PER HOUR: HCPCS | Mod: HQ

## 2025-01-21 PROCEDURE — 1002N00001 HC LODGING PLUS FACILITY CHARGE ADULT

## 2025-01-21 NOTE — GROUP NOTE
"Group Therapy Documentation    PATIENT'S NAME: Ravi Hankins  MRN:   [id number]  :   1990  ACCT. NUMBER: [id number]  DATE OF SERVICE: 25  START TIME: 12:30 PM  END TIME:  2:30 PM  FACILITATOR(S): Veronica Odell; Farida Watkins LADC  TOPIC: BEH Group Therapy  Number of patients attending the group:  9  Group Length:  2 Hours    Dimensions addressed: 3, 4, and 5    Summary of Group / Topics Discussed:    Recovery Principles, Disease of addiction, and Emotions/expression      Group Attendance:  Attended group session    Patient's response to the group topic/interactions:  cooperative with task    Patient appeared to be Actively participating and Attentive.        Client specific details:  Pt participated in discussion on the changing view of past behaviors during use that no longer feel normal after participating in treatment. Pt presented \"Step 1\" assignment and was receptive to feedback.    "

## 2025-01-21 NOTE — TELEPHONE ENCOUNTER
Pt has not completed the [employer] at-home-kit prior to today's appointment.  Left a voicemail to inform him that today's appointment should be rescheduled after the [employer] has been completed.

## 2025-01-21 NOTE — GROUP NOTE
Psychoeducation Group Documentation    PATIENT'S NAME: Ravi Hankins  MRN:   [id number]  :   1990  ACCT. NUMBER: [id number]  DATE OF SERVICE: 25  START TIME:  3:00 PM  END TIME:  4:00 PM  FACILITATOR(S): Farida Watkins LADC; Zuhair Do LADC  TOPIC: BEH Pyschoeducation  Number of patients attending the group:  24  Group Length:  1 Hours    Skills Group Therapy Type: Recovery skills    Summary of Group / Topics Discussed:    Balanced lifestyle skills and Relapse prevention skills    Group Attendance:  Attended group session     Patient's response to the group topic/interactions:  cooperative with task     Patient appeared to be Attentive and Engaged.          Client specific details: Pt listened respectfully to NAHUN Paiz's presentation on resources available in the recovery community.

## 2025-01-21 NOTE — GROUP NOTE
Group Therapy Documentation    PATIENT'S NAME: Ravi Hankins  MRN:   [id number]  :   1990  ACCT. NUMBER: [id number]  DATE OF SERVICE: 25  START TIME:  9:00 AM  END TIME: 11:00 AM  FACILITATOR(S): Jimmy Correa LADC  TOPIC: BEH Group Therapy  Number of patients attending the group:  9  Group Length:  2 Hours    Dimensions addressed: 3, 4, and 5    Summary of Group / Topics Discussed:    Sober coping skills, Trauma informed care, Disease of addiction, Emotions/expression, and Relapse prevention        Group began with check-ins, followed by a reflection reading regarding change. Group then discussed the relationship between addiction and mental health issues. Feedback was provided by participants throughout group session.       Group Attendance:  Attended group session    Patient's response to the group topic/interactions:  cooperative with task    Patient appeared to be Actively participating.        Client specific details:  Patient was excused from an hour of group for a medical appointment. Patient actively engaged in group discussion.

## 2025-01-22 ENCOUNTER — TELEPHONE (OUTPATIENT)
Dept: UROLOGY | Facility: CLINIC | Age: 35
End: 2025-01-22
Payer: COMMERCIAL

## 2025-01-22 ENCOUNTER — HOSPITAL ENCOUNTER (OUTPATIENT)
Dept: BEHAVIORAL HEALTH | Facility: CLINIC | Age: 35
Discharge: HOME | End: 2025-01-22
Attending: FAMILY MEDICINE
Payer: COMMERCIAL

## 2025-01-22 PROCEDURE — H2035 A/D TX PROGRAM, PER HOUR: HCPCS | Mod: HQ | Performed by: COUNSELOR

## 2025-01-22 PROCEDURE — 1002N00001 HC LODGING PLUS FACILITY CHARGE ADULT

## 2025-01-22 PROCEDURE — H2035 A/D TX PROGRAM, PER HOUR: HCPCS | Mod: HQ

## 2025-01-22 NOTE — GROUP NOTE
Group Therapy Documentation    PATIENT'S NAME: Ravi Hankins  MRN:   [id number]  :   1990  ACCT. NUMBER: [id number]  DATE OF SERVICE: 25  START TIME:  9:45 AM  END TIME: 11:15 AM  FACILITATOR(S): Layne Pate LADC  TOPIC: BEH Group Therapy  Number of patients attending the group:  8    Group Length:  1.5 Hours    Dimensions addressed: 3, 4, and 5    Summary of Group / Topics Discussed:    Recovery Principles, Sober coping skills, Relationship/socialization, and Relapse prevention      Group Attendance:  Attended group session    Patient's response to the group topic/interactions:  cooperative with task    Patient appeared to be Actively participating, Attentive, and Engaged.        Client specific details:  Patient attended group session and was attentive and participative.

## 2025-01-22 NOTE — TELEPHONE ENCOUNTER
Sent order for 24 hour urine kit x 1 to [facility] via [employer] [facility].      JUSTEN Tejeda  Care Coordinator- Urology   [phone]    
EOAE (evoked otoacoustic emission)

## 2025-01-22 NOTE — GROUP NOTE
Psychoeducation Group Documentation    PATIENT'S NAME: Ravi Hankins  MRN:   [id number]  :   1990  ACCT. NUMBER: [id number]  DATE OF SERVICE: 25  START TIME:  8:30 AM  END TIME:  9:30 AM  FACILITATOR(S): Neela Mccarty LADC; Zuhair Do LADC; Tyra Mcdaniel LADC  TOPIC: BEH Pyschoeducation  Number of patients attending the group:  24  Group Length:  2 Hours    Skills Group Therapy Type: Recovery skills and Healthy behaviors development    Summary of Group / Topics Discussed:    Relationship/social skills, Balanced lifestyle skills, and Relapse prevention skills    Patients participated in a psychoeducational lecture and exercise focused on creating healthy relationships with others and finding commonalities.          Group Attendance:  Attended group session    Patient's response to the group topic/interactions:  cooperative with task and listened actively    Patient appeared to be Actively participating, Attentive, and Engaged.         Client specific details:  Patient actively participated in group.

## 2025-01-23 ENCOUNTER — HOSPITAL ENCOUNTER (OUTPATIENT)
Dept: BEHAVIORAL HEALTH | Facility: CLINIC | Age: 35
Discharge: HOME | End: 2025-01-23
Attending: FAMILY MEDICINE
Payer: COMMERCIAL

## 2025-01-23 ENCOUNTER — PATIENT OUTREACH (OUTPATIENT)
Dept: CARE COORDINATION | Facility: CLINIC | Age: 35
End: 2025-01-23
Payer: COMMERCIAL

## 2025-01-23 PROCEDURE — H2035 A/D TX PROGRAM, PER HOUR: HCPCS | Mod: HQ

## 2025-01-23 PROCEDURE — H2035 A/D TX PROGRAM, PER HOUR: HCPCS

## 2025-01-23 PROCEDURE — 1002N00001 HC LODGING PLUS FACILITY CHARGE ADULT

## 2025-01-23 NOTE — GROUP NOTE
Group Therapy Documentation    PATIENT'S NAME: Ravi Hankins  MRN:   [id number]  :   1990  ACCT. NUMBER: [id number]  DATE OF SERVICE: 25  START TIME: 12:30 PM  END TIME:  2:30 PM  FACILITATOR(S): Layne Pate LADC  TOPIC: BEH Group Therapy  Number of patients attending the group:  8    Group Length:  2 Hours    Dimensions addressed: 3, 4, and 5    Summary of Group / Topics Discussed:    Recovery Principles, Sober coping skills, Relationship/socialization, and Relapse prevention      Group Attendance:  Attended group session    Patient's response to the group topic/interactions:  cooperative with task    Patient appeared to be Actively participating, Attentive, and Engaged.        Client specific details:  Patient attended group session and was attentive and participative.

## 2025-01-23 NOTE — GROUP NOTE
Group Therapy Documentation    PATIENT'S NAME: Ravi Hankins  MRN:   [id number]  :   1990  ACCT. NUMBER: [id number]  DATE OF SERVICE: 25  START TIME:  3:00 PM  END TIME:  4:00 PM  FACILITATOR(S): Terry Singh LADC  TOPIC: BEH Group Therapy  Number of patients attending the group:  8  Group Length:  1 Hours    Dimensions addressed: 1, 2, 3, 4, 5, and 6    Summary of Group / Topics Discussed:    Recovery Principles      Group Attendance:  Attended group session    Patient's response to the group topic/interactions:  cooperative with task    Patient appeared to be Actively participating.        Client specific details:  Rodo participated and interacted appropriately with peers and staff in skills group. No triggers to use noted or discussed.

## 2025-01-23 NOTE — GROUP NOTE
Group Therapy Documentation    PATIENT'S NAME: Ravi Hankins  MRN:   [id number]  :   1990  ACCT. NUMBER: [id number]  DATE OF SERVICE: 25  START TIME:  9:00 AM  END TIME: 11:00 AM  FACILITATOR(S): Veronica Odell; Farida Watkins LADC  TOPIC: BEH Group Therapy  Number of patients attending the group:  8  Group Length:  2 Hours    Dimensions addressed: 4, 5, and 6    Summary of Group / Topics Discussed:    Recovery Principles, Relationship/socialization, and Balanced lifestyle      Group Attendance:  Attended group session    Patient's response to the group topic/interactions:  cooperative with task    Patient appeared to be Actively participating and Attentive.        Client specific details:  Pt participated in discussion on the effects use has on family and how one can reconnect with them in recovery. Pt gave input on discussion about schooling/jobs.

## 2025-01-23 NOTE — PROGRESS NOTES
"INDIVIDUAL SESSION SUMMARY    D) Met with client on 1/23/25 from 9:00-9:45am. Client is in the [facility]. Client discussed his dad showing up for visiting at the wrong time, again, on Tuesday; his dad's drinking and how he is setting boundaries with all family members that are unhealthy. For aftercare, client reported that he will follow-up on individual therapy with [facility] in [place] and OP with [facility] in [place]. Client shared that he's taking \"responsibility\" for his actions and finding ways to prioritize his \"family, peace of mind and sobriety\".     I)  Provided client with verbal interventions including validation, compassion, and support, Provided positive reinforcement for progress towards goals, gains in insight, and application of skills, Discussed the importance of recovery behaviors such as forming/utilizing a sober support network, daily rituals, goal setting, and identifying relapse warning signs, and Provided psychoeducation on: healthy boundaries .    A)   Client appears to be gaining insights into their emotions and using healthy coping skills for managing stress, Client appears to be practicing impulse control and planning for their future, and Client appears to understand the importance of a sober support network.    P) Next session is scheduled for 1/30/25.       Jenny Cadet, BRIAN  1/23/2025    "

## 2025-01-25 ENCOUNTER — HOSPITAL ENCOUNTER (OUTPATIENT)
Dept: BEHAVIORAL HEALTH | Facility: CLINIC | Age: 35
Discharge: HOME | End: 2025-01-25
Attending: FAMILY MEDICINE
Payer: COMMERCIAL

## 2025-01-25 PROCEDURE — 1002N00001 HC LODGING PLUS FACILITY CHARGE ADULT

## 2025-01-25 PROCEDURE — H2035 A/D TX PROGRAM, PER HOUR: HCPCS | Mod: HQ

## 2025-01-25 NOTE — GROUP NOTE
Group Therapy Documentation    PATIENT'S NAME: Ravi Hankins  MRN:   [id number]  :   1990  ACCT. NUMBER: [id number]  DATE OF SERVICE: 25  START TIME: 12:30 PM  END TIME:  2:30 PM  FACILITATOR(S): Reshma Woodall LADC; Heather Meraz LADC; Jimmy Correa LADC  TOPIC: BEH Group Therapy  Number of patients attending the group:  27  Group Length:  2 Hours    Dimensions addressed: 3, 4, 5, and 6    Summary of Group / Topics Discussed:    Recovery Principles, Sober coping skills, Relationship/socialization, and Balanced lifestyle   DBT skills- D.E.A.R.M.A.N.      Group Attendance:  Attended group session    Patient's response to the group topic/interactions:  cooperative with task    Patient appeared to be Engaged.        Client specific details:  Ravi attended PM group and participated sufficiently in group discussion.

## 2025-01-26 ENCOUNTER — HOSPITAL ENCOUNTER (OUTPATIENT)
Dept: BEHAVIORAL HEALTH | Facility: CLINIC | Age: 35
Discharge: HOME | End: 2025-01-26
Attending: FAMILY MEDICINE
Payer: COMMERCIAL

## 2025-01-26 PROCEDURE — H2035 A/D TX PROGRAM, PER HOUR: HCPCS | Mod: HQ

## 2025-01-26 PROCEDURE — 1002N00001 HC LODGING PLUS FACILITY CHARGE ADULT

## 2025-01-26 NOTE — GROUP NOTE
Psychoeducation Group Documentation    PATIENT'S NAME: Ravi Hankins  MRN:   [id number]  :   1990  ACCT. NUMBER: [id number]  DATE OF SERVICE: 25  START TIME:  9:00 AM  END TIME: 11:00 AM  FACILITATOR(S): Will Perales LADC; Jimmy Correa LADC  TOPIC: BEH Pyschoeducation  Number of patients attending the group:  27  Group Length:  2 Hours    Skills Group Therapy Type: Healthy behaviors development and Medication education    Summary of Group / Topics Discussed:    Balanced lifestyle skills, Symptom management skills, and Medication management skills        Group Attendance:  Attended group session    Patient's response to the group topic/interactions:  cooperative with task and listened actively    Patient appeared to be Attentive and Engaged.         Client specific details:  Pt was attentive and engaged with group.

## 2025-01-26 NOTE — GROUP NOTE
Psychoeducation Group Documentation    PATIENT'S NAME: Ravi Hankins  MRN:   [id number]  :   1990  ACCT. NUMBER: [id number]  DATE OF SERVICE: 25  START TIME: 12:30 PM  END TIME:  1:30 PM  FACILITATOR(S): Will Perales LADC; Jimmy Correa LADC  TOPIC: BEH Pyschoeducation  Number of patients attending the group:  27  Group Length:  1 Hours    Skills Group Therapy Type: Recovery skills    Summary of Group / Topics Discussed:    Relapse prevention skills        Group Attendance:  Attended group session    Patient's response to the group topic/interactions:  listened actively    Patient appeared to be Attentive and Engaged.         Client specific details:  Pt was attentive to the speaker and engaged with the task.

## 2025-01-27 ENCOUNTER — HOSPITAL ENCOUNTER (OUTPATIENT)
Dept: BEHAVIORAL HEALTH | Facility: CLINIC | Age: 35
Discharge: HOME | End: 2025-01-27
Attending: FAMILY MEDICINE
Payer: COMMERCIAL

## 2025-01-27 PROCEDURE — 1002N00001 HC LODGING PLUS FACILITY CHARGE ADULT

## 2025-01-27 PROCEDURE — H2035 A/D TX PROGRAM, PER HOUR: HCPCS | Mod: HQ

## 2025-01-27 NOTE — GROUP NOTE
"Group Therapy Documentation    PATIENT'S NAME: Ravi Hankins  MRN:   [id number]  :   1990  ACCT. NUMBER: [id number]  DATE OF SERVICE: 25  START TIME: 12:30 PM  END TIME:  2:30 PM  FACILITATOR(S): Farida Watkins LADC; Veronica Odell  TOPIC: BEH Group Therapy  Number of patients attending the group:  8  Group Length:  2 Hours    Dimensions addressed: 4 and 5    Summary of Group / Topics Discussed:    Disease of addiction, Emotions/expression, and Relapse prevention    Group Attendance:  Attended group session    Patient's response to the group topic/interactions:  cooperative with task    Patient appeared to be Actively participating, Attentive, and Engaged.        Client specific details: Pt offered supportive feedback on his peer's \"What Does Addiction Mean To Me?\" assignment, and took part in his peer's graduation.  "

## 2025-01-27 NOTE — GROUP NOTE
Group Therapy Documentation    PATIENT'S NAME: Ravi Hankins  MRN:   [id number]  :   1990  ACCT. NUMBER: [id number]  DATE OF SERVICE: 25  START TIME:  9:00 AM  END TIME: 11:00 AM  FACILITATOR(S): Jimmy Correa LADC  TOPIC: BEH Group Therapy  Number of patients attending the group:  8  Group Length:  2 Hours    Dimensions addressed: 3, 4, 5, and 6    Summary of Group / Topics Discussed:    Sober coping skills, Relationship/socialization, Disease of addiction, Emotions/expression, Relapse prevention, and Self-care activities      Group began with check-ins, followed by a reflection reading regarding having personal standard and not imposing them on others. Group participants then shared assignments, including feedback from their peers.       Group Attendance:  Attended group session    Patient's response to the group topic/interactions:  cooperative with task    Patient appeared to be Actively participating.        Client specific details:  Patient actively engaged in group discussion.

## 2025-01-28 ENCOUNTER — HOSPITAL ENCOUNTER (OUTPATIENT)
Dept: BEHAVIORAL HEALTH | Facility: CLINIC | Age: 35
Discharge: HOME | End: 2025-01-28
Attending: FAMILY MEDICINE
Payer: COMMERCIAL

## 2025-01-28 PROCEDURE — H2035 A/D TX PROGRAM, PER HOUR: HCPCS | Mod: HQ

## 2025-01-28 PROCEDURE — 1002N00001 HC LODGING PLUS FACILITY CHARGE ADULT

## 2025-01-28 PROCEDURE — H2035 A/D TX PROGRAM, PER HOUR: HCPCS | Mod: HQ | Performed by: COUNSELOR

## 2025-01-28 NOTE — GROUP NOTE
"Group Therapy Documentation    PATIENT'S NAME: Ravi Hankins  MRN:   [id number]  :   1990  ACCT. NUMBER: [id number]  DATE OF SERVICE: 25  START TIME:  9:00 AM  END TIME: 11:00 AM  FACILITATOR(S): Veronica Odell; Farida Watkins LADC  TOPIC: BEH Group Therapy  Number of patients attending the group:  7  Group Length:  2 Hours    Dimensions addressed: 4, 5, and 6    Summary of Group / Topics Discussed:    Recovery Principles, Sober coping skills, and Emotions/expression      Group Attendance:  Attended group session    Patient's response to the group topic/interactions:  cooperative with task and listened actively    Patient appeared to be Actively participating and Attentive.        Client specific details:  Pt expressed today he's feeling excited to get out, continue sobriety, and relieve his significant other of her stress. Pt answered question of the day and participated in discussion about having compassion for those in recovery and self-compassion. Pt gave feedback to peer's assignment on \"Sober Coping Skills.\"     "

## 2025-01-28 NOTE — GROUP NOTE
Group Therapy Documentation    PATIENT'S NAME: Ravi Hankins  MRN:   [id number]  :   1990  ACCT. NUMBER: [id number]  DATE OF SERVICE: 25  START TIME: 12:30 PM  END TIME:  2:30 PM  FACILITATOR(S): Jimmy Correa LADC  TOPIC: BEH Group Therapy  Number of patients attending the group:  7  Group Length:  2 Hours    Dimensions addressed: 3, 4, 5, and 6    Summary of Group / Topics Discussed:    Sober coping skills, Relationship/socialization, Cognitive behavioral therapy skills, Disease of addiction, Emotions/expression, and Relapse prevention      Group discussion included the topics of core beliefs, cognitive distortions, and strategies to re-frame unhelpful thoughts. Feedback was provided by participants throughout group discussion.       Group Attendance:  Attended group session    Patient's response to the group topic/interactions:  cooperative with task    Patient appeared to be Actively participating.        Client specific details:  Patient actively engaged in group discussion.

## 2025-01-28 NOTE — ADDENDUM NOTE
Encounter addended by: Farida Watkins [facility] on: 1/28/2025 4:16 PM   Actions taken: Flowsheet accepted, Pend clinical note, Clinical Note Signed

## 2025-01-28 NOTE — GROUP NOTE
Psychoeducation Group Documentation    PATIENT'S NAME: Ravi Hankins  MRN:   [id number]  :   1990  ACCT. NUMBER: [id number]  DATE OF SERVICE: 25  START TIME:  3:00 PM  END TIME:  4:00 PM  FACILITATOR(S): Neela Mccarty LADC; Layne Pate LADC  TOPIC: BEH Pyschoeducation  Number of patients attending the group:  23  Group Length:  1 Hours    Skills Group Therapy Type: Healthy behaviors development    Summary of Group / Topics Discussed:    Symptom management skills    Patients were provided a lecture on gender differences and substance use patterns.          Group Attendance:  Attended group session    Patient's response to the group topic/interactions:  listened actively    Patient appeared to be Attentive.         Client specific details:  Patient actively listened to this presentation..

## 2025-01-28 NOTE — PROGRESS NOTES
[facility] Weekly Treatment Plan Review    Date span:  25 to 25    Patient did not have any absences during this time period (list absence dates and reason for absence).      Treatment Plan initiated on: 25.    Dimension1: Acute Intoxication/Withdrawal Potential -   Previous Dimension Ratin  Current Dimension Ratin  Date of Last Use: 25  Any reports of withdrawal symptoms - No    Dimension 2: Biomedical Conditions & Complications -   Previous Dimension Ratin  Current Dimension Ratin  Medical Concerns:  None reported.  Current Medications & Medication Changes:  Current Outpatient Medications   Medication Sig Dispense Refill    acetaminophen (TYLENOL) 500 MG tablet Take 500-1,000 mg by mouth every 8 hours as needed for mild pain.      alum & mag hydroxide-simethicone (MAALOX) 200-200-20 MG/5ML SUSP suspension Take 30 mLs by mouth every 6 hours as needed for indigestion.      atenolol (TENORMIN) 100 MG tablet Take 1 tablet (100 mg) by mouth daily 90 tablet 2    folic acid (FOLVITE) 1 MG tablet Take 1 tablet (1 mg) by mouth daily. 30 tablet 0    hydrOXYzine HCl (ATARAX) 25 MG tablet Take 1 tablet (25 mg) by mouth every 4 hours as needed for anxiety. 90 tablet 1    ibuprofen (ADVIL/MOTRIN) 200 MG tablet Take 600 mg by mouth every 6 hours as needed for pain.      loratadine (CLARITIN) 10 MG tablet Take 10 mg by mouth daily.      melatonin 3 MG tablet Take 1 tablet (3 mg) by mouth nightly as needed for sleep. 30 tablet 1    multivitamin w/minerals (THERA-VIT-M) tablet Take 1 tablet by mouth daily. 30 tablet 0    thiamine (B-1) 100 MG tablet Take 1 tablet (100 mg) by mouth daily. 30 tablet 0    traZODone (DESYREL) 50 MG tablet Take 1 tablet (50 mg) by mouth nightly as needed for sleep. 30 tablet 0    venlafaxine (EFFEXOR XR) 37.5 MG 24 hr capsule Take 1 capsule (37.5 mg) by mouth daily. 30 capsule 1     No current facility-administered medications for this encounter.      Facility-Administered Medications Ordered in Other Encounters   Medication Dose Route Frequency Provider Last Rate Last Admin    Self Administer Medications: Behavioral Services   Does not apply See Admin Instructions Lucia Jimenez MD        Self Administer Medications: Behavioral Services   Does not apply See Admin Instructions Stu Platt MD         Medication Prescriber: see chart  Taking meds as prescribed? Yes  Medication side effects or concerns:  None reported.  Outside medical appointments this week (list provider and reason for visit):  None reported.    Narrative: Pt seems fully functioning and seeks medical attention as needed. He attended lecture given by  nurse on Hepatitis C on 25.     Dimension 3: Emotional/Behavioral Conditions & Complications -   Previous Dimension Ratin  Current Dimension Ratin  PHQ2:       2025     3:00 PM 2025    10:00 AM 2025     4:00 PM 2025     4:00 PM 2024     2:16 PM 2024    10:37 AM 4/10/2023     1:00 PM   PHQ-2 (  [employer])   Q1: Little interest or pleasure in doing things 0 0 0 1 0 0 0   Q2: Feeling down, depressed or hopeless 0 0 0 1 0 0 0   PHQ-2 Score 0 0 0 2 0 0 0   Q1: Little interest or pleasure in doing things      Not at all    Q2: Feeling down, depressed or hopeless      Not at all    PHQ-2 Score      0       GAD2:       2023     9:00 AM 4/10/2023     1:00 PM 2025     4:00 PM 2025     9:47 AM 2025     4:00 PM 2025    10:00 AM 2025     3:00 PM   REZA-2   Feeling nervous, anxious, or on edge 1 0 2 2 1 1 1   Not being able to stop or control worrying 1 1 2 1 1 0 0   REZA-2 Total Score 2 1 4 3  2 1 1       Patient-reported     Mental health diagnosis: Patient denies.  Date of last SIB:  Patient denies.  Date of  last SI:  Patient denies.  Date of last HI: Patient denies.  Behavioral Targets: Follow recommendations of medical provider. Report any alcohol or drug use to counselor  "and any increase in withdrawal symptoms to nurse. Stabilize and maintain mental health.   Risk factors: The patient lacks relapse prevention, coping, and refusal skills, as well as a sober peer support network. He has a significant history of trauma, shame, grief, and loss issues.  Protective factors:  dedication to family/friends, safe and stable environment, regular physical activity, secure attachment, abstinence from substances, adherence with prescribed medication, agreement to use safety plan, living with other people, structured day, sense of meaning, positive social skills, healthy fear of risky behaviors or pain, financial stability, strong sense of self-worth/esteem, and access to a variety of clinical interventions  Current  Assignments:  \"Grief and Loss\"    Narrative: Current Mental Health symptoms include: none reported or observed. See below for suicide risk assessment. Pt does not endorse thoughts of self-harm or suicide ideation at this time. He reports that his stress level has decreased; coping skills to manage stress used were \"meditation and reading.\" Pt reported that his mood has significantly changed this past week and he is \"clearer-minded and [physically] feeling great.\"    [place] Suicide Severity Rating Scale (Short Version)      3/17/2023    11:55 AM 3/17/2023     5:00 PM 3/20/2023     1:00 PM 12/14/2024     7:10 AM 12/14/2024     8:00 PM 1/7/2025     2:23 PM 1/8/2025     6:46 AM   [place] Suicide Severity Rating (Short Version)   Over the past 2 weeks have you felt down, depressed, or hopeless? yes         Over the past 2 weeks have you had thoughts of killing yourself? no         Have you ever attempted to kill yourself? no         Q1 Wished to be Dead (Past Month)  no no 0-->no 0-->no 0-->no 0-->no   Q2 Suicidal Thoughts (Past Month)  no no 0-->no 0-->no 0-->no 0-->no   Q3 Suicidal Thought Method  no no       Q4 Suicidal Intent without Specific Plan  no no       Q5 Suicide Intent " "with Specific Plan  no no       Q6 Suicide Behavior (Lifetime)  no no 0-->no 0-->no 0-->no 0-->no   Level of Risk per Screen  low risk low risk no risks indicated no risks indicated no risks indicated no risks indicated       Dimension 4: Treatment Acceptance / Resistance -   Previous Dimension Ratin  Current Dimension Ratin  JESUS Diagnosis:  Alcohol Use Disorder   303.90 (F10.20) Severe In early remission,  and In a controlled environment  305.20 (F12.10) Cannabis Use Disorder Mild  In early remission,  and In a controlled environment  Commitment to tx process/Stage of change- Pt consistently attends and participates in groups and lectures. He appears to be in the contemplative stage of change at this time.  JESUS assignments - \"Own Reason To Quit \"    Narrative - Pt reports his motivation this week is \"myself and my family.\"\ Pt reports that his aftercare plans include \"outpatient and AA.\" He reports that he has gotten along \"great\" with peers and staff this week.     Dimension 5: Relapse / Continued Problem Potential -   Previous Dimension Ratin  Current Dimension Ratin  Relapses this week - None  Urges to use - None  UA results - negative for all screened drugs    Narrative- Pt reports no cravings this past week. He reported not experiencing any triggers to use, but used the following coping skill(s): \"working out and reading.\" Pt participated in the spirituality group, facilitated by [facility] and  counseling staff was present during group.    Dimension 6: Recovery Environment -   Previous Dimension Ratin  Current Dimension Ratin  Family Involvement - n/a  Summarize attendance at family groups and family sessions - n/a  Family supportive of treatment?  Yes  Recreational activities - \"working out, ping pong\"    Narrative - Pt is spending free time with same-gender peers and attending at least 3 virtual 12-step meetings weekly while in . He reports having had contact " with his girlfriend, parents and siblings this past week. Pt participated in weekend workshop on relationships and completed all activities.    Progress made on transition planning goals: none at this time    Justification for Continued Treatment at this Level of Care: Risk ratings indicate continued need for this level of care. Pt has been unable to maintain abstinence from alcohol while at the outpatient level of care, lacks long-term sober maintenance skills, lacks sober coping skills and has medical and mental health concerns which are exacerbated by substance use.   Treatment coordination activities this week:   none at this time  Need for peer recovery support referral? No    Discharge Planning:  Target Discharge Date/Timeframe:  2-6-25  Med Mgmt Provider/Appt:  TBD  Ind therapy Provider/Appt:  TBD  Family therapy Provider/Appt:  TBD  Other referrals:  none at this time.    Has vulnerable adult status changed? No    Interdisciplinary Clinical Supervision including: FERNANDA and RN    Are Treatment Plan goals/objectives effective? Yes  *If no, list changes to treatment plan:    Are the current goals meeting client's needs? Yes  *If no, list the changes to treatment plan.    Patient Input / Response: Pt contributed to treatment plan review.    *Client agrees with any changes to the treatment plan: N/A  *Client received copy of changes: N/A  *Client is aware of right to access a treatment plan review: Yes    FERNANDA Etienne

## 2025-01-29 ENCOUNTER — HOSPITAL ENCOUNTER (OUTPATIENT)
Dept: BEHAVIORAL HEALTH | Facility: CLINIC | Age: 35
Discharge: HOME | End: 2025-01-29
Attending: FAMILY MEDICINE
Payer: COMMERCIAL

## 2025-01-29 PROCEDURE — H2035 A/D TX PROGRAM, PER HOUR: HCPCS | Mod: HQ

## 2025-01-29 PROCEDURE — 1002N00001 HC LODGING PLUS FACILITY CHARGE ADULT

## 2025-01-29 NOTE — GROUP NOTE
Group Therapy Documentation    PATIENT'S NAME: Ravi Hankins  MRN:   [id number]  :   1990  ACCT. NUMBER: [id number]  DATE OF SERVICE: 25  START TIME:  8:30 AM  END TIME:  9:30 AM  FACILITATOR(S): Reshma Woodall LADC; Jimmy Correa LADC  TOPIC: BEH Group Therapy  Number of patients attending the group:  25  Group Length:  1 Hours    Dimensions addressed: 3, 4, 5, and 6    Summary of Group / Topics Discussed:    Recovery Principles, Sober coping skills, Relationship/socialization, Balanced lifestyle, Emotions/expression, and Leisure explorations/use of leisure time  [facility] tower skills activity. 4 groups created to build a highest, free standing tower. Patient engaged in sufficient discussion after the activity.      Group Attendance:  Attended group session    Patient's response to the group topic/interactions:  cooperative with task    Patient appeared to be Engaged.        Client specific details:  Rodo attended skills group and participated in the activity.

## 2025-01-29 NOTE — GROUP NOTE
Group Therapy Documentation    PATIENT'S NAME: Ravi Hankins  MRN:   [id number]  :   1990  ACCT. NUMBER: [id number]  DATE OF SERVICE: 25  START TIME:  9:00 AM  END TIME: 11:00 AM  FACILITATOR(S): Jimmy Correa LADC  TOPIC: BEH Group Therapy  Number of patients attending the group:  7  Group Length:  2 Hours    Dimensions addressed: 3, 4, 5, and 6    Summary of Group / Topics Discussed:    Sober coping skills, Disease of addiction, and Emotions/expression    Group began with check-ins, followed by a reflection reading regarding growth and expectations. Group participant then shard assignments, followed by a fears discussion/activity.       Group Attendance:  Attended group session    Patient's response to the group topic/interactions:  cooperative with task    Patient appeared to be Actively participating.        Client specific details:  Patient actively engaged in group discussion.

## 2025-01-29 NOTE — GROUP NOTE
Group Therapy Documentation    PATIENT'S NAME: Ravi Hankins  MRN:   [id number]  :   1990  ACCT. NUMBER: [id number]  DATE OF SERVICE: 25  START TIME: 12:30 PM  END TIME:  2:30 PM  FACILITATOR(S): Jimmy Correa LADC  TOPIC: BEH Group Therapy  Number of patients attending the group:  7  Group Length:  2 Hours    Dimensions addressed: 4 and 5    Summary of Group / Topics Discussed:    Spiritual Care      Group discussion was facilitated by [facility] regarding the importance of Spiritual Health in recovery. Participants provided feedback throughout group session.       Group Attendance:  Attended group session    Patient's response to the group topic/interactions:  cooperative with task    Patient appeared to be Actively participating.        Client specific details:  Patient actively engaged in group discussion.

## 2025-01-29 NOTE — GROUP NOTE
Group Therapy Documentation    PATIENT'S NAME: Ravi Hankins  MRN:   [id number]  :   1990  ACCT. NUMBER: [id number]  DATE OF SERVICE: 25  START TIME: 12:30 PM  END TIME:  2:30 PM  FACILITATOR(S): Farida Watkins LADC  TOPIC: BEH Group Therapy  Number of patients attending the group:  7  Group Length:  2 Hours    Dimensions addressed: 3    Summary of Group / Topics Discussed:    Spiritual Care    Group Attendance:  Attended group session    Patient's response to the group topic/interactions:  cooperative with task    Patient appeared to be Attentive and Engaged.        Client specific details: Pt took part in spiritual care group facilitated by [facility].

## 2025-01-30 ENCOUNTER — HOSPITAL ENCOUNTER (OUTPATIENT)
Dept: BEHAVIORAL HEALTH | Facility: CLINIC | Age: 35
Discharge: HOME | End: 2025-01-30
Attending: FAMILY MEDICINE
Payer: COMMERCIAL

## 2025-01-30 PROCEDURE — H2035 A/D TX PROGRAM, PER HOUR: HCPCS | Mod: HQ

## 2025-01-30 PROCEDURE — H2035 A/D TX PROGRAM, PER HOUR: HCPCS

## 2025-01-30 NOTE — GROUP NOTE
Group Therapy Documentation    PATIENT'S NAME: Ravi Hankins  MRN:   [id number]  :   1990  ACCT. NUMBER: [id number]  DATE OF SERVICE: 25  START TIME:  3:00 PM  END TIME:  4:00 PM  FACILITATOR(S): Tyra Mcdaniel LADC; Farida Watkins LADC; Terry Singh LADC  TOPIC: BEH Group Therapy  Number of patients attending the group:  24  Group Length:  1 Hours    Dimensions addressed: 3    Summary of Group / Topics Discussed:    Co-occurring illnesses symptom management      Facilitator FERNANDA Kent presented a 60 minute group therapy lecture on Stress and Anxiety. Patients engaged in lecture and were able to ask questions for group discussion.         Group Attendance:  Attended group session    Patient's response to the group topic/interactions:  cooperative with task    Patient appeared to be Actively participating.        Client specific details:  Rodo Attended small group therapy. Patient engaged in discussion and participated in sharing feedback to their peers.

## 2025-01-30 NOTE — PROGRESS NOTES
"INDIVIDUAL SESSION SUMMARY    D) Met with client on 1/30/25 from 9:00-9:45am. [facility] in [facility]. Client discussed feelings of \"excitement\" to return home next week, see his family, and start implementing his aftercare plan. Client reported that with his new medication, he's feeling less anxiety and noticing less reactivity. Client dicussed how he wants to show up as a healthier and attentive father and partner.     I)  Provided client with verbal interventions including validation, compassion, and support and Provided positive reinforcement for progress towards goals, gains in insight, and application of skills.    A)   Client appears to be gaining insights into their emotions and using healthy coping skills for managing stress, Client appears to be practicing impulse control and planning for their future, Client appears to understand the importance of a sober support network, and Client appears motivated to seek a new daily routine, meaningful activities, and a sense of purpose.    P) Next session is scheduled for 2/5/25.       Jenny Cadet, BRIAN  1/30/2025    "

## 2025-01-30 NOTE — GROUP NOTE
Group Therapy Documentation    PATIENT'S NAME: Ravi Hankins  MRN:   [id number]  :   1990  ACCT. NUMBER: [id number]  DATE OF SERVICE: 25  START TIME:  9:00 AM  END TIME: 11:00 AM  FACILITATOR(S): Jimmy Correa LADC  TOPIC: BEH Group Therapy  Number of patients attending the group:  5  Group Length:  2 Hours    Dimensions addressed: 3, 4, and 5    Summary of Group / Topics Discussed:    Disease of addiction, Emotions/expression, and Relapse prevention    Group began with check-ins, followed by a reflection reading regarding honesty and not having to lead double lives in recovery. A group participant then shared an assignment, followed by feedback from others.       Group Attendance:  Attended group session    Patient's response to the group topic/interactions:  cooperative with task    Patient appeared to be Actively participating.        Client specific details:  Patient actively engaged in group discussion.

## 2025-01-30 NOTE — GROUP NOTE
"Group Therapy Documentation    PATIENT'S NAME: Ravi Hankins  MRN:   [id number]  :   1990  ACCT. NUMBER: [id number]  DATE OF SERVICE: 25  START TIME: 12:30 PM  END TIME:  2:30 PM  FACILITATOR(S): Farida Watkins LADC  TOPIC: BEH Group Therapy  Number of patients attending the group:  5  Group Length:  2 Hours    Dimensions addressed: 4 and 5    Summary of Group / Topics Discussed:    Recovery Principles and Relapse prevention    Group Attendance:  Attended group session    Patient's response to the group topic/interactions:  cooperative with task    Patient appeared to be Actively participating, Attentive, and Engaged.        Client specific details: Pt shared his \"Drug Use History\" assignment and was receptive to feedback. He took part in a group conversation about legal issues and a peer's graduation.   "

## 2025-01-31 ENCOUNTER — HOSPITAL ENCOUNTER (OUTPATIENT)
Dept: BEHAVIORAL HEALTH | Facility: CLINIC | Age: 35
Discharge: HOME | End: 2025-01-31
Attending: FAMILY MEDICINE
Payer: COMMERCIAL

## 2025-01-31 PROCEDURE — 1002N00001 HC LODGING PLUS FACILITY CHARGE ADULT

## 2025-01-31 PROCEDURE — H2035 A/D TX PROGRAM, PER HOUR: HCPCS | Mod: HQ

## 2025-01-31 NOTE — GROUP NOTE
"Group Therapy Documentation    PATIENT'S NAME: Ravi Hankins  MRN:   [id number]  :   1990  ACCT. NUMBER: [id number]  DATE OF SERVICE: 25  START TIME: 12:30 PM  END TIME:  2:30 PM  FACILITATOR(S): Jimmy Correa LADC  TOPIC: BEH Group Therapy  Number of patients attending the group:  5  Group Length:  2 Hours    Dimensions addressed: 3, 4, 5, and 6    Summary of Group / Topics Discussed:    Sober coping skills, Balanced lifestyle, Disease of addiction, and Relapse prevention      Group participants viewed the film \"Ground Hogs Day\", followed by a discussion emphasizing delayed gratification.       Group Attendance:  Attended group session    Patient's response to the group topic/interactions:  cooperative with task    Patient appeared to be Actively participating.        Client specific details:  Patient actively engaged in group discussion.    "

## 2025-01-31 NOTE — GROUP NOTE
Group Therapy Documentation    PATIENT'S NAME: Ravi Hankins  MRN:   [id number]  :   1990  ACCT. NUMBER: [id number]  DATE OF SERVICE: 25  START TIME:  9:00 AM  END TIME: 11:00 AM  FACILITATOR(S): Will Perales LADC  TOPIC: BEH Group Therapy  Number of patients attending the group:  5  Group Length:  2 Hours    Dimensions addressed: 1, 3, 4, 5, and 6    Summary of Group / Topics Discussed:    Recovery Principles, Sober coping skills, Relationship/socialization, Balanced lifestyle, and Self-care activities      Group Attendance:  Attended group session    Patient's response to the group topic/interactions:  confronted peers appropriately, discussed personal experience with topic, and gave appropriate feedback to peers    Patient appeared to be Attentive and Engaged.        Client specific details:  Pt was attentive and provided appropriate feedback to peers. Pt needed minimal redirection.

## 2025-02-01 ENCOUNTER — HOSPITAL ENCOUNTER (OUTPATIENT)
Dept: BEHAVIORAL HEALTH | Facility: CLINIC | Age: 35
Discharge: HOME | End: 2025-02-01
Attending: FAMILY MEDICINE
Payer: COMMERCIAL

## 2025-02-01 DIAGNOSIS — G47.00 INSOMNIA, UNSPECIFIED TYPE: ICD-10-CM

## 2025-02-01 PROCEDURE — H2035 A/D TX PROGRAM, PER HOUR: HCPCS | Mod: HQ

## 2025-02-01 PROCEDURE — 1002N00001 HC LODGING PLUS FACILITY CHARGE ADULT

## 2025-02-01 NOTE — PROGRESS NOTES
Nursing Discharge Planning Meeting    Writer completed discharge planning meeting with patient. Discharge is planned for 2/6    Discussed appropriate follow up care to manage JESUS, MI and medical  and to obtain medication refills. Patient given a copy of their current medications for reference. Questions were answered at this time and the patient verbalized an understanding of the post-discharge follow up plan.    Patient will f/u with PCP as recommended and/or is aware of upcoming appt:  Restricted provider      Monie Avilez MD    PCP - [facility]   [phone]    Continue to support patient in discharge planning as needed to assure appropriate continuity of care.     Tobacco Cessation  Patient participated in the nicotine replacement therapy for tobacco cessation or reduction during their treatment programming: No, does not use tobacco products     The patient was provided with community resources for follow-up to continue tobacco cessation support once in the community. Also the patient was encouraged to discuss their tobacco cessation efforts with the primary care provider.    [facility]  Nurse Liaison / [facility]  O: [phone]  Fax:[phone]  [facility] [id number]  M-F: 7AM to 5:30PM   Sat-Sun: 7AM to 3PM  After hours: [phone]

## 2025-02-01 NOTE — GROUP NOTE
Group Therapy Documentation    PATIENT'S NAME: Ravi Hankins  MRN:   [id number]  :   1990  ACCT. NUMBER: [id number]  DATE OF SERVICE: 25  START TIME: 12:30 PM  END TIME:  2:30 PM  FACILITATOR(S): Layne Pate LADC  TOPIC: BEH Group Therapy  Number of patients attending the group:  22    Group Length:  2 Hours    Dimensions addressed: 3, 4, and 5    Summary of Group / Topics Discussed:    Recovery Principles, Spiritual Care, Relationship/socialization, and Relapse prevention      Group Attendance:  Attended group session    Patient's response to the group topic/interactions:  cooperative with task    Patient appeared to be Actively participating, Attentive, and Engaged.        Client specific details:  Patient attended voices of addiction and consequences vs rewards workshop and was attentive and participative..

## 2025-02-01 NOTE — GROUP NOTE
Group Therapy Documentation    PATIENT'S NAME: Ravi Hankins  MRN:   [id number]  :   1990  ACCT. NUMBER: [id number]  DATE OF SERVICE: 25  START TIME:  9:00 AM  END TIME: 11:00 AM  FACILITATOR(S): Farida Watkins LADC; Layne Pate LADC  TOPIC: BEH Group Therapy  Number of patients attending the group:  22  Group Length:  2 Hours    Dimensions addressed: 4 and 6    Summary of Group / Topics Discussed:    Balanced lifestyle, Disease of addiction, and Relapse prevention    Group Attendance:  Attended group session    Patient's response to the group topic/interactions:  cooperative with task    Patient appeared to be Attentive and Engaged.        Client specific details: Pt listened respectfully to presentations on quitting heroin and the risks of THC use.

## 2025-02-02 ENCOUNTER — HOSPITAL ENCOUNTER (OUTPATIENT)
Dept: BEHAVIORAL HEALTH | Facility: CLINIC | Age: 35
Discharge: HOME | End: 2025-02-02
Attending: FAMILY MEDICINE
Payer: COMMERCIAL

## 2025-02-02 PROCEDURE — H2035 A/D TX PROGRAM, PER HOUR: HCPCS | Mod: HQ | Performed by: COUNSELOR

## 2025-02-02 PROCEDURE — H2035 A/D TX PROGRAM, PER HOUR: HCPCS | Mod: HQ

## 2025-02-02 PROCEDURE — 1002N00001 HC LODGING PLUS FACILITY CHARGE ADULT

## 2025-02-02 RX ORDER — TRAZODONE HYDROCHLORIDE 50 MG/1
50 TABLET ORAL
Qty: 30 TABLET | Refills: 0 | Status: SHIPPED | OUTPATIENT
Start: 2025-02-02

## 2025-02-02 NOTE — GROUP NOTE
Psychoeducation Group Documentation    PATIENT'S NAME: Ravi Hankins  MRN:   [id number]  :   1990  ACCT. NUMBER: [id number]  DATE OF SERVICE: 25  START TIME:  8:45 AM  END TIME: 10:30 AM  FACILITATOR(S): Neela Mccarty LADC; Diamond Brizuela RN  TOPIC: BEH Pyschoeducation  Number of patients attending the group:  22  Group Length:  1.5 Hours    Skills Group Therapy Type: Daily living/independence skills and Healthy behaviors development    Summary of Group / Topics Discussed:    Balanced lifestyle skills, Symptom management skills, and Medication management skills    Staff RN provided a lecture on diet, exercise, sleep, and substance use in pregnancy.  Patients asked appropriate questions and gained insights into healthy life balance for recovery.        Group Attendance:  Attended group session    Patient's response to the group topic/interactions:  listened actively    Patient appeared to be Attentive.         Client specific details:  Patient actively attended to this lecture.

## 2025-02-02 NOTE — GROUP NOTE
Group Therapy Documentation    PATIENT'S NAME: Ravi Hankins  MRN:   [id number]  :   1990  ACCT. NUMBER: [id number]  DATE OF SERVICE: 25  START TIME: 12:30 PM  END TIME:  1:30 PM  FACILITATOR(S): Layne Pate LADC  TOPIC: BEH Group Therapy  Number of patients attending the group:  22    Group Length:  1 Hour    Dimensions addressed: 4 and 5    Summary of Group / Topics Discussed:    Recovery Principles and Relapse prevention      Group Attendance:  Attended group session    Patient's response to the group topic/interactions:  cooperative with task    Patient appeared to be Actively participating, Attentive, and Engaged.        Client specific details: Patient attended group lecture and was attentive and participative.

## 2025-02-03 ENCOUNTER — HOSPITAL ENCOUNTER (OUTPATIENT)
Dept: BEHAVIORAL HEALTH | Facility: CLINIC | Age: 35
Discharge: HOME | End: 2025-02-03
Attending: FAMILY MEDICINE
Payer: COMMERCIAL

## 2025-02-03 PROCEDURE — H2035 A/D TX PROGRAM, PER HOUR: HCPCS | Mod: HQ

## 2025-02-03 PROCEDURE — 1002N00001 HC LODGING PLUS FACILITY CHARGE ADULT

## 2025-02-03 NOTE — GROUP NOTE
Group Therapy Documentation    PATIENT'S NAME: Ravi Hankins  MRN:   [id number]  :   1990  ACCT. NUMBER: [id number]  DATE OF SERVICE: 25  START TIME:  9:00 AM  END TIME: 11:00 AM  FACILITATOR(S): Farida Watkins LADC; Veronica Odell  TOPIC: BEH Group Therapy  Number of patients attending the group:  5  Group Length:  2 Hours    Dimensions addressed: 3 and 5    Summary of Group / Topics Discussed:    Emotions/expression and Relapse prevention    Group Attendance:  Attended group session    Patient's response to the group topic/interactions:  cooperative with task    Patient appeared to be Actively participating, Attentive, and Engaged.        Client specific details: Pt reported feeling content and a little impatient, and set goals for the week (graduate and get his hair cut). He offered support to a peer struggling with a decision in early recovery.

## 2025-02-03 NOTE — PROGRESS NOTES
[facility] Weekly Treatment Plan Review    Date span:  25 to 2-3-25    Patient did not have any absences during this time period (list absence dates and reason for absence).     Treatment Plan initiated on: 25.    Dimension1: Acute Intoxication/Withdrawal Potential -   Previous Dimension Ratin  Current Dimension Ratin  Date of Last Use: 25  Any reports of withdrawal symptoms - No    Dimension 2: Biomedical Conditions & Complications -   Previous Dimension Ratin  Current Dimension Ratin  Medical Concerns:  None reported.  Current Medications & Medication Changes:  Current Outpatient Medications   Medication Sig Dispense Refill    atenolol (TENORMIN) 100 MG tablet Take 1 tablet (100 mg) by mouth daily 90 tablet 2    folic acid (FOLVITE) 1 MG tablet Take 1 tablet (1 mg) by mouth daily. 30 tablet 0    hydrOXYzine HCl (ATARAX) 25 MG tablet Take 1 tablet (25 mg) by mouth every 4 hours as needed for anxiety. 90 tablet 1    melatonin 3 MG tablet Take 1 tablet (3 mg) by mouth nightly as needed for sleep. 30 tablet 1    multivitamin w/minerals (THERA-VIT-M) tablet Take 1 tablet by mouth daily. 30 tablet 0    thiamine (B-1) 100 MG tablet Take 1 tablet (100 mg) by mouth daily. 30 tablet 0    traZODone (DESYREL) 50 MG tablet Take 1 tablet (50 mg) by mouth nightly as needed for sleep. 30 tablet 0    venlafaxine (EFFEXOR XR) 37.5 MG 24 hr capsule Take 1 capsule (37.5 mg) by mouth daily. 30 capsule 1     No current facility-administered medications for this encounter.     Facility-Administered Medications Ordered in Other Encounters   Medication Dose Route Frequency Provider Last Rate Last Admin    Self Administer Medications: Behavioral Services   Does not apply See Admin Instructions Lucia Jimenez MD        Self Administer Medications: Behavioral Services   Does not apply See Admin Instructions Stu Platt MD         Medication Prescriber: see chart  Taking meds as prescribed?  Yes  Medication side effects or concerns:  None reported.  Outside medical appointments this week (list provider and reason for visit):  None reported.    Narrative: Pt seems fully functioning and seeks medical attention as needed. He attended lecture given by LP nurse on self-care on 25.     Dimension 3: Emotional/Behavioral Conditions & Complications -   Previous Dimension Ratin  Current Dimension Ratin  PHQ2:       2/3/2025     2:00 PM 2025     3:00 PM 2025    10:00 AM 2025     4:00 PM 2025     4:00 PM 2024     2:16 PM 2024    10:37 AM   PHQ-2 (  [employer])   Q1: Little interest or pleasure in doing things 0 0 0 0 1 0 0   Q2: Feeling down, depressed or hopeless 0 0 0 0 1 0 0   PHQ-2 Score 0 0 0 0 2 0 0   Q1: Little interest or pleasure in doing things       Not at all   Q2: Feeling down, depressed or hopeless       Not at all   PHQ-2 Score       0      GAD2:       4/10/2023     1:00 PM 2025     4:00 PM 2025     9:47 AM 2025     4:00 PM 2025    10:00 AM 2025     3:00 PM 2/3/2025     2:00 PM   REZA-2   Feeling nervous, anxious, or on edge 0 2 2 1 1 1 1   Not being able to stop or control worrying 1 2 1 1 0 0 1   REZA-2 Total Score 1 4 3  2 1 1 2       Patient-reported     Mental health diagnosis: Patient denies.  Date of last SIB:  Patient denies.  Date of  last SI:  Patient denies.  Date of last HI: Patient denies.  Behavioral Targets: Follow recommendations of medical provider. Report any alcohol or drug use to counselor and any increase in withdrawal symptoms to nurse. Stabilize and maintain mental health.   Risk factors: The patient lacks relapse prevention, coping, and refusal skills, as well as a sober peer support network. He has a significant history of trauma, shame, grief, and loss issues.  Protective factors:  dedication to family/friends, safe and stable environment, regular physical activity, secure attachment, abstinence from  "substances, adherence with prescribed medication, agreement to use safety plan, living with other people, structured day, sense of meaning, positive social skills, healthy fear of risky behaviors or pain, financial stability, strong sense of self-worth/esteem, and access to a variety of clinical interventions  Current MH Assignments:  none at this time    Narrative: Current Mental Health symptoms include: none reported or observed. See below for suicide risk assessment. Pt does not endorse thoughts of self-harm or suicide ideation at this time. He reports that his stress level has decreased; coping skills to manage stress used were \"reading, breathing skills, working out.\" Pt reported that his mood has not significantly changed this past week.    [place] Suicide Severity Rating Scale (Short Version)      3/17/2023    11:55 AM 3/17/2023     5:00 PM 3/20/2023     1:00 PM 2024     7:10 AM 2024     8:00 PM 2025     2:23 PM 2025     6:46 AM   [place] Suicide Severity Rating (Short Version)   Over the past 2 weeks have you felt down, depressed, or hopeless? yes         Over the past 2 weeks have you had thoughts of killing yourself? no         Have you ever attempted to kill yourself? no         Q1 Wished to be Dead (Past Month)  no no 0-->no 0-->no 0-->no 0-->no   Q2 Suicidal Thoughts (Past Month)  no no 0-->no 0-->no 0-->no 0-->no   Q3 Suicidal Thought Method  no no       Q4 Suicidal Intent without Specific Plan  no no       Q5 Suicide Intent with Specific Plan  no no       Q6 Suicide Behavior (Lifetime)  no no 0-->no 0-->no 0-->no 0-->no   Level of Risk per Screen  low risk low risk no risks indicated no risks indicated no risks indicated no risks indicated       Dimension 4: Treatment Acceptance / Resistance -   Previous Dimension Ratin  Current Dimension Ratin  JESUS Diagnosis:  Alcohol Use Disorder   303.90 (F10.20) Severe In a controlled environment  305.20 (F12.10) Cannabis Use " "Disorder Mild  In a controlled environment  Commitment to tx process/Stage of change- Pt consistently attends and participates in groups and lectures. He appears to be in the contemplative stage of change at this time.  JESUS assignments - \"Own Reasons to Quit,\" \"Drug Use History\"    Narrative - Pt reports his motivation this week is \"myself and my family.\" Pt reports that his aftercare plans include AA, getting a sponsor, going to outpatient, and individual therapy. He reports that he has gotten along \"great\" with peers and staff this week.     Dimension 5: Relapse / Continued Problem Potential -   Previous Dimension Ratin  Current Dimension Ratin  Relapses this week - None  Urges to use - None  UA results - negative for all screened drugs    Narrative- Pt reports no cravings this past week. He reported not experiencing any triggers to use, and did not report using coping skill(s). Pt participated in the spirituality group, facilitated by [facility] and  counseling staff was present during group. He participated in weekend workshop on relapse prevention and completed all activities.     Dimension 6: Recovery Environment -   Previous Dimension Ratin  Current Dimension Ratin  Family Involvement - n/a  Summarize attendance at family groups and family sessions - n/a  Family supportive of treatment?  Yes  Recreational activities - \"working out, reading\"    Narrative - Pt is spending free time with same-gender peers and attending at least 3 virtual 12-step meetings weekly while in . He reports having had contact with his girlfriend, mother, cousins, friends, and children this past week.     Progress made on transition planning goals: Pt has initiated his own outpatient treatment and reports that he plans to initiate it after he has received a prescription for medical cannabis.    Justification for Continued Treatment at this Level of Care: Risk ratings indicate continued need for this " level of care. Pt has been unable to maintain abstinence from alcohol and drugs while at the outpatient level of care, lacks long-term sober maintenance skills, lacks sober coping skills and mental health concerns which are exacerbated by substance use.   Treatment coordination activities this week:  coordination with family for discharge planning and/or service referrals  Need for peer recovery support referral? No    Discharge Planning:  Target Discharge Date/Timeframe:  2-6-25  Med Mgmt Provider/Appt:  TBD  Ind therapy Provider/Appt:  TBD  Family therapy Provider/Appt:  TBD  Other referrals:  none at this time.    Has vulnerable adult status changed? No    Interdisciplinary Clinical Supervision including: FERNANDA and RN    Are Treatment Plan goals/objectives effective? Yes  *If no, list changes to treatment plan:    Are the current goals meeting client's needs? Yes  *If no, list the changes to treatment plan.    Patient Input / Response: Pt contributed to treatment plan review.    *Client agrees with any changes to the treatment plan: N/A  *Client received copy of changes: N/A  *Client is aware of right to access a treatment plan review: Yes    FERNANDA Etienne

## 2025-02-03 NOTE — GROUP NOTE
Group Therapy Documentation    PATIENT'S NAME: Ravi Hankins  MRN:   [id number]  :   1990  ACCT. NUMBER: [id number]  DATE OF SERVICE: 25  START TIME: 12:30 PM  END TIME:  2:30 PM  FACILITATOR(S): Jimmy Correa LADC  TOPIC: BEH Group Therapy  Number of patients attending the group:  6  Group Length:  2 Hours    Dimensions addressed: 3, 4, 5, and 6    Summary of Group / Topics Discussed:    Sober coping skills, Relationship/socialization, Disease of addiction, Emotions/expression, and Relapse prevention      Group participants shared assignments throughout group session. Feedback was provided by participants throughout group session.       Group Attendance:  Attended group session    Patient's response to the group topic/interactions:  cooperative with task    Patient appeared to be Actively participating.        Client specific details:  Patient actively engaged in group discussion.

## 2025-02-04 ENCOUNTER — HOSPITAL ENCOUNTER (OUTPATIENT)
Dept: BEHAVIORAL HEALTH | Facility: CLINIC | Age: 35
Discharge: HOME | End: 2025-02-04
Attending: FAMILY MEDICINE
Payer: COMMERCIAL

## 2025-02-04 PROCEDURE — H2035 A/D TX PROGRAM, PER HOUR: HCPCS | Mod: HQ | Performed by: COUNSELOR

## 2025-02-04 PROCEDURE — H2035 A/D TX PROGRAM, PER HOUR: HCPCS | Mod: HQ

## 2025-02-04 PROCEDURE — 1002N00001 HC LODGING PLUS FACILITY CHARGE ADULT

## 2025-02-04 NOTE — GROUP NOTE
Group Therapy Documentation    PATIENT'S NAME: Ravi Hankins  MRN:   [id number]  :   1990  ACCT. NUMBER: [id number]  DATE OF SERVICE: 25  START TIME: 12:30 PM  END TIME:  2:30 PM  FACILITATOR(S): Neela Mccarty LADC; Layne Pate LADC  TOPIC: BEH Group Therapy  Number of patients attending the group:  9  Group Length:  2 Hours    Dimensions addressed: 3, 4, 5, and 6    Summary of Group / Topics Discussed:    Sober coping skills, Disease of addiction, and Relapse prevention    Patients discussed the disease of addiction including the progressive, chronic, and fatal nature of the disease.  A patient discussed guilt and shame and group members contributed feedback and personalized insights.        Group Attendance:  Attended group session    Patient's response to the group topic/interactions:  cooperative with task, discussed personal experience with topic, and listened actively    Patient appeared to be Actively participating, Attentive, and Engaged.        Client specific details:  Patient actively participated in group.  Patient contributed feedback and personalized insights regarding the progression of his disease.

## 2025-02-04 NOTE — GROUP NOTE
Group Therapy Documentation    PATIENT'S NAME: Ravi Hankins  MRN:   [id number]  :   1990  ACCT. NUMBER: [id number]  DATE OF SERVICE: 25  START TIME:  3:00 PM  END TIME:  4:00 PM  FACILITATOR(S): Tyra Mcdaniel LADC; Zuhair Do LADC; Layne Pate LADC  TOPIC: BEH Group Therapy  Number of patients attending the group:  19  Group Length:  1 Hour    Dimensions addressed: 4    Summary of Group / Topics Discussed:    Recovery Principles and Research and Addiction      Guest Lecturer Dr. Ordonez presented a 60 minute presentation on Addiction Research. Exploring ( What is research? What's the point of research in the Addiction Field? What are the benefits? How long does it take? What is the overall goal of Addiction Study Research.) Patients were allowed to asked questions and process the presentation with peers and staff.        Group Attendance:  Attended group session    Patient's response to the group topic/interactions:  cooperative with task    Patient appeared to be Actively participating.        Client specific details:  Rodo attended afternoon skills group and participated in processing discussion. Patient remained engaged and participated throughout group session.       FERNANDA Singh

## 2025-02-05 ENCOUNTER — HOSPITAL ENCOUNTER (OUTPATIENT)
Dept: BEHAVIORAL HEALTH | Facility: CLINIC | Age: 35
Discharge: HOME | End: 2025-02-05
Attending: FAMILY MEDICINE
Payer: COMMERCIAL

## 2025-02-05 PROCEDURE — H2035 A/D TX PROGRAM, PER HOUR: HCPCS | Mod: HQ

## 2025-02-05 NOTE — PROGRESS NOTES
COUNSELOR S DISCHARGE SUMMARY    Date: 2025     Program Name:  [facility]    Client Name Ravi Hankins Date of Birth 1990      MR#  [id number]    Referred by: Amelie Real MA, [facility]          Admit date :  2025                       Discharge Date:  2025  # of Days Attended:  28                    Date Last Attended: 2025     Discharge Status:  With Staff Approval    PROBLEMS PRESENTED AT ADMISSION:    Ravi Hankins is a 34 year old year old male who transferred to [facility] on 2024 for Substance Use Disorder Treatment as a referral from [facility].   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        Admitting diagnosis:   Alcohol Use Disorder Severe (10.20) (303.90)   Cannabis Use Disorder Mild (F12.10) (305.20)     PROGRAM PARTICIPATION:  While at [facility], Ravi Hankins received the following services to address substance use and mental health disorders.  he participated in:  Group Therapy, Individual Therapy, Spirituality Groups, DBT Skills Groups, AA/NA meetings , and Psych-education Groups      PROGRESS:     Dimension 1 - Acute Intoxication/Withdrawal Potential:  Admission ASAM Risk Ratin Client displays full functioning with good ability to tolerate and cope with withdrawal discomfort. No signs or symptoms of intoxication or withdrawal or resolving signs or symptoms.  Discharge ASAM Risk Ratin Client displays full functioning with good ability to tolerate and cope with withdrawal discomfort. No signs or symptoms of intoxication or withdrawal or resolving signs or symptoms.    Treatment goal(s):  Manage withdrawal symptoms.     Progress toward goal(s):  Mr. Hankins self-monitored for withdrawal discomfort while in treatment, with no concerns reported. Client met goals in this area.       Dimension 2 -  Biomedical Conditions & Complications:  Admission ASAM Risk Ratin Client displays full functioning with good ability to cope with physical discomfort.  Discharge ASAM Risk Ratin Client displays full functioning with good ability to cope with physical discomfort.    Treatment goal(s):  Client will take all medications as prescribed.     Progress toward goal(s):  Mr. Hankins self-monitored his physical health. He utilized medical services while in programming. Client met goals in this area.       Dimension 3 - Emotional/Behavioral Conditions & Complications:  Admission ASAM Risk Ratin Client has impulse control and coping skills. Client presents a mild to moderate risk of harm to self or others or displays symptoms of emotional, behavioral or cognitive problems. Client has a mental health diagnosis and is stable. Client functions adequately in significant life areas.  Discharge ASAM Risk Ratin Client has impulse control and coping skills. Client presents a mild to moderate risk of harm to self or others or displays symptoms of emotional, behavioral or cognitive problems. Client has a mental health diagnosis and is stable. Client functions adequately in significant life areas.    Treatment goal(s):  Client will strengthen protective factors. Client will effectively address grief and loss, guilt and shame, and trauma.    Progress toward goal(s):  Mr. Hankins participated in group therapy, as well as met with staff therapist for individual sessions while in programming. It appeared that he gained a better understanding of the relationship between substance abuse and mental health issues while in programming. Client met goals in this area.       Dimension 4 - Treatment Acceptance/Resistance:  Admission ASAM Risk Ratin Client is motivated with active reinforcement, to explore treatment and strategies for change, but ambivalent about illness or need for change.  Discharge ASAM Risk Ratin Client is  motivated with active reinforcement, to explore treatment and strategies for change, but ambivalent about illness or need for change.    Treatment goal(s):  Client will fully engage in treatment and recovery process and begin to verbalize readiness for change.       Progress toward goal(s):  Mr. Hankins participated appropriately in scheduled programming. He acknowledged the effects that his substance use has had on himself and the people in his life. Patient completed assignments and met all goals in this area.       Dimension 5 - Relapse/Continued Problem Potential:  Admission ASAM Risk Ratin No awareness of the negative impact of mental health problems or substance abuse. No coping skills to arrest mental health or addiction illnesses, or prevent relapse.  Discharge ASAM Risk Ratin Client recognizes relapse issues and prevention strategies, but displays some vulnerability for further substance use or mental health problems.    Treatment goal(s):  Client has established and utilizes a relapse prevention plan. Acquire the necessary skills to maintain long-term sobriety.    Progress toward goal(s):  Mr. Hankins participated in relapse prevention group discussions and lectures. It appeared that he gained insight into his personal triggers and past pattern of use. Patient met goals in this area.       Dimension 6 - Recovery Environment: (family, recreation, legal, education, etc.)  Admission ASAM Risk Ratin Client is engaged in structured, meaningful activity, but peers, family, significant other, and living environment are unsupportive, or there is criminal justice involvement by the client or among the client's peers, significant others, or in the client's living environment.  Discharge ASAM Risk Ratin Client has passive social  or family and significant other are not interested in the client's recovery. The client is engaged in structured meaningful activity.    Treatment goal(s):  Establish a transition plan connecting to culturally informed services in the community for post-treatment follow up care. Establish sober support network.      Progress toward goal(s):  Mr. Hankins attended weekly AA/NA sobriety support group meeting throughout programming. It appeared that he understood the importance of attending sobriety support group meetings to help maintain a healthy recovery. He met with his counselors to develop aftercare plans, including outpatient treatment at [facility] in [place], [place].       Client strengths identified during treatment were: Patient reported his strengths as being a leader, self motivated and driven.       Client needs identified during treatment were: Stabilize and maintain mental and physical health. Utilize available resources.     Discharge Diagnosis:  Alcohol Use Disorder Severe (10.20) (303.90)                                          Cannabis Use Disorder Mild (F12.10) (305.20)     Discharge Medications:   Current Outpatient Medications   Medication Sig Dispense Refill    atenolol (TENORMIN) 100 MG tablet Take 1 tablet (100 mg) by mouth daily 90 tablet 2    folic acid (FOLVITE) 1 MG tablet Take 1 tablet (1 mg) by mouth daily. 30 tablet 0    hydrOXYzine HCl (ATARAX) 25 MG tablet Take 1 tablet (25 mg) by mouth every 4 hours as needed for anxiety. 90 tablet 1    melatonin 3 MG tablet Take 1 tablet (3 mg) by mouth nightly as needed for sleep. 30 tablet 1    multivitamin w/minerals (THERA-VIT-M) tablet Take 1 tablet by mouth daily. 30 tablet 0    thiamine (B-1) 100 MG tablet Take 1 tablet (100 mg) by mouth daily. 30 tablet 0    traZODone (DESYREL) 50 MG tablet Take 1 tablet (50 mg) by mouth nightly as needed for sleep. 30 tablet 0    venlafaxine (EFFEXOR XR) 37.5 MG 24 hr capsule Take 1 capsule (37.5 mg) by mouth daily. 30 capsule 1     No current facility-administered medications for this encounter.     Facility-Administered Medications Ordered in Other  Encounters   Medication Dose Route Frequency Provider Last Rate Last Admin    Self Administer Medications: Behavioral Services   Does not apply See Admin Instructions Lucia Jimenez MD        Self Administer Medications: Behavioral Services   Does not apply See Admin Instructions Stu Platt MD           Discharge Plan and Recommendations:    Living arrangements at discharge: Patient reported plans to return to his residence in [place], [place]. Patient terminated services due program completion. The following continued care recommendations have been made:    -Admit/Complete outpatient treatment, Follow all recommendations.   -Schedule/Attend Psychiatrist/ Medical Provider appointments as needed.  -Schedule/Attend psychotherapy weekly   -Attend 3+weekly Support Meetings (AA, NA, MICD)   -Obtain/Maintain weekly contact with a Sponsor  -Continue to expand sober network, attend sober events/activities.  -Continue to monitor personal triggers, and implement prevention strategies.     Prognosis:    Favorable        FERNANDA Boone on 2/5/2025 at 1:49 PM

## 2025-02-05 NOTE — ADDENDUM NOTE
Encounter addended by: Farida Watkins LADC on: 2/5/2025 9:55 AM   Actions taken: Clinical Note Signed, Charge Capture section accepted

## 2025-02-05 NOTE — PROGRESS NOTES
MICD Discharge/ Continuing Care Resources     Patient: Ravi Hankins          MRN: [id number]                   : 1990    Personal Safety/Management of Symptoms  - Contact local ER if symptoms increase or SI/HI ideation.     Call Crisis Lines As Needed  [facility] [phone]  Suicide Prevention [phone]  Crisis Connection [phone]   [facility] [phone]        [facility] [phone]                           National Suicide Prevention [phone]           Abstinence/Relapse Prevention  * Take all medicines as prescribed, contact provider with concerns.   * Use coping skills from treatment: Call someone, attend meeting, sober event, sponsor,   safe place, read big book, exercise, read prevention plan.     Community Resources/Supports   [facility] AA:  [phone] ( )  [place]  Alcoholics Anonymous : [phone]  Narcotics Anonymous : [address] ( [phone])  [place] [facility]: Free recovery resources.  [phone]    Discharge Recommendations:  -Admit/Complete outpatient treatment, Follow all recommendations.   -Schedule/Attend Psychiatrist/ Medical Provider appointments as needed.  -Schedule/Attend psychotherapy weekly   -Attend 3+weekly Support Meetings (AA, NA, MICNOELLE)   -Obtain/Maintain weekly contact with a Sponsor  -Continue to expand sober network, attend sober events/activities.  -Continue to monitor personal triggers, and implement prevention strategies.        Patient Signature:     Date        Clinician Signature:      Date

## 2025-02-05 NOTE — GROUP NOTE
"Group Therapy Documentation    PATIENT'S NAME: Ravi Hankins  MRN:   [id number]  :   1990  ACCT. NUMBER: [id number]  DATE OF SERVICE: 25  START TIME:  9:00 AM  END TIME: 11:00 AM  FACILITATOR(S): Farida Watkins LADC; Veronica Odell  TOPIC: BEH Group Therapy  Number of patients attending the group:  4  Group Length:  2 Hours    Dimensions addressed: 3 and 4    Summary of Group / Topics Discussed:    Sober coping skills, Emotions/expression, and Relapse prevention    Group Attendance:  Attended group session    Patient's response to the group topic/interactions:  cooperative with task    Patient appeared to be Actively participating, Attentive, and Engaged.        Client specific details: Pt reported feeling happy and excited, and shared that he knows he can trust someone due to his gut and observing them over time. He took part in a peer's graduation and shared his \"Identifying Triggers and Cues\" assignment.  "

## 2025-02-05 NOTE — GROUP NOTE
Group Therapy Documentation    PATIENT'S NAME: Ravi Hankins  MRN:   [id number]  :   1990  ACCT. NUMBER: [id number]  DATE OF SERVICE: 25  START TIME:  8:30 AM  END TIME:  9:15 AM  FACILITATOR(S): Terry Singh LADC; Jimmy Correa LADC  TOPIC: BEH Group Therapy  Number of patients attending the group:  19  Group Length:  2 Hours    Dimensions addressed: 3, 4, and 5    Summary of Group / Topics Discussed:    Sober coping skills and Relationship/socialization      Group lecture was presented by counseling staff regarding how technology can be a obstacle regarding interpersonal communication. Feedback was provided by participants throughout group session.        Group Attendance:  Attended group session    Patient's response to the group topic/interactions:  cooperative with task    Patient appeared to be Actively participating.        Client specific details:  Patient actively engaged in group discussion.

## 2025-02-05 NOTE — PROGRESS NOTES
[address] [place] [address]              Ravi Hankins, 1990 : was admitted for evaluation/treatment of chemical dependency at [facility].  He took part in these program(s):     ______ The Inpatient Program   ______ The Outpatient Program   ___X___ The Lodging Plus Program   ______ Lodging Day Outpatient         Date admitted: 1/9/2025    Date discharged: 2/6/2025         Type of discharge:     ___X___ Satisfactory - Successfully Completed Evaluation/Treatment   ______ Discharged Without Completing Treatment   ______ Behavioral Discharge   ______ Transferred to [facility] Program   ______ Transferred to another type of service   ______ Left against medical advice (AMA) / Eloped               Clinicians: FERNANDA Mcclelland & FERNANDA Etienne     Date: 2/5/2025

## 2025-02-06 ENCOUNTER — PATIENT OUTREACH (OUTPATIENT)
Dept: CARE COORDINATION | Facility: CLINIC | Age: 35
End: 2025-02-06
Payer: COMMERCIAL

## 2025-02-28 ENCOUNTER — HOSPITAL ENCOUNTER (EMERGENCY)
Facility: CLINIC | Age: 35
Discharge: HOME | End: 2025-02-28
Attending: EMERGENCY MEDICINE | Admitting: EMERGENCY MEDICINE
Payer: COMMERCIAL

## 2025-02-28 ENCOUNTER — APPOINTMENT (OUTPATIENT)
Dept: CT IMAGING | Facility: CLINIC | Age: 35
End: 2025-02-28
Attending: EMERGENCY MEDICINE
Payer: COMMERCIAL

## 2025-02-28 VITALS
SYSTOLIC BLOOD PRESSURE: 112 MMHG | HEART RATE: 87 BPM | RESPIRATION RATE: 22 BRPM | TEMPERATURE: 97.6 F | BODY MASS INDEX: 21.2 KG/M2 | OXYGEN SATURATION: 97 % | HEIGHT: 73 IN | WEIGHT: 160 LBS | DIASTOLIC BLOOD PRESSURE: 69 MMHG

## 2025-02-28 DIAGNOSIS — M54.50 ACUTE RIGHT-SIDED LOW BACK PAIN WITHOUT SCIATICA: ICD-10-CM

## 2025-02-28 LAB
ALBUMIN UR-MCNC: 50 MG/DL
ANION GAP SERPL CALCULATED.3IONS-SCNC: 23 MMOL/L (ref 7–15)
APPEARANCE UR: ABNORMAL
BASOPHILS # BLD AUTO: 0 10E3/UL (ref 0–0.2)
BASOPHILS NFR BLD AUTO: 0 %
BILIRUB UR QL STRIP: NEGATIVE
BUN SERPL-MCNC: 14 MG/DL (ref 6–20)
CALCIUM SERPL-MCNC: 9.5 MG/DL (ref 8.8–10.4)
CHLORIDE SERPL-SCNC: 101 MMOL/L (ref 98–107)
COLOR UR AUTO: YELLOW
CREAT SERPL-MCNC: 0.97 MG/DL (ref 0.67–1.17)
EGFRCR SERPLBLD CKD-EPI 2021: >90 ML/MIN/1.73M2
EOSINOPHIL # BLD AUTO: 0 10E3/UL (ref 0–0.7)
EOSINOPHIL NFR BLD AUTO: 0 %
ERYTHROCYTE [DISTWIDTH] IN BLOOD BY AUTOMATED COUNT: 13 % (ref 10–15)
GLUCOSE SERPL-MCNC: 63 MG/DL (ref 70–99)
GLUCOSE UR STRIP-MCNC: NEGATIVE MG/DL
HCO3 SERPL-SCNC: 21 MMOL/L (ref 22–29)
HCT VFR BLD AUTO: 42.3 % (ref 40–53)
HGB BLD-MCNC: 14 G/DL (ref 13.3–17.7)
HGB UR QL STRIP: NEGATIVE
IMM GRANULOCYTES # BLD: 0 10E3/UL
IMM GRANULOCYTES NFR BLD: 0 %
KETONES UR STRIP-MCNC: 40 MG/DL
LEUKOCYTE ESTERASE UR QL STRIP: NEGATIVE
LYMPHOCYTES # BLD AUTO: 1.2 10E3/UL (ref 0.8–5.3)
LYMPHOCYTES NFR BLD AUTO: 16 %
MCH RBC QN AUTO: 30.6 PG (ref 26.5–33)
MCHC RBC AUTO-ENTMCNC: 33.1 G/DL (ref 31.5–36.5)
MCV RBC AUTO: 93 FL (ref 78–100)
MONOCYTES # BLD AUTO: 0.7 10E3/UL (ref 0–1.3)
MONOCYTES NFR BLD AUTO: 9 %
MUCOUS THREADS #/AREA URNS LPF: PRESENT /LPF
NEUTROPHILS # BLD AUTO: 5.8 10E3/UL (ref 1.6–8.3)
NEUTROPHILS NFR BLD AUTO: 75 %
NITRATE UR QL: NEGATIVE
NRBC # BLD AUTO: 0 10E3/UL
NRBC BLD AUTO-RTO: 0 /100
PH UR STRIP: 5.5 [PH] (ref 5–7)
PLATELET # BLD AUTO: 198 10E3/UL (ref 150–450)
POTASSIUM SERPL-SCNC: 3.8 MMOL/L (ref 3.4–5.3)
RBC # BLD AUTO: 4.57 10E6/UL (ref 4.4–5.9)
RBC URINE: 1 /HPF
SODIUM SERPL-SCNC: 145 MMOL/L (ref 135–145)
SP GR UR STRIP: 1.03 (ref 1–1.03)
SQUAMOUS EPITHELIAL: <1 /HPF
UROBILINOGEN UR STRIP-MCNC: NORMAL MG/DL
WBC # BLD AUTO: 7.8 10E3/UL (ref 4–11)
WBC URINE: 4 /HPF

## 2025-02-28 PROCEDURE — 76705 ECHO EXAM OF ABDOMEN: CPT | Performed by: EMERGENCY MEDICINE

## 2025-02-28 PROCEDURE — 74176 CT ABD & PELVIS W/O CONTRAST: CPT

## 2025-02-28 PROCEDURE — 76705 ECHO EXAM OF ABDOMEN: CPT | Mod: 26 | Performed by: EMERGENCY MEDICINE

## 2025-02-28 PROCEDURE — 96360 HYDRATION IV INFUSION INIT: CPT | Mod: 59 | Performed by: EMERGENCY MEDICINE

## 2025-02-28 PROCEDURE — 82374 ASSAY BLOOD CARBON DIOXIDE: CPT | Performed by: EMERGENCY MEDICINE

## 2025-02-28 PROCEDURE — 96361 HYDRATE IV INFUSION ADD-ON: CPT | Performed by: EMERGENCY MEDICINE

## 2025-02-28 PROCEDURE — 80048 BASIC METABOLIC PNL TOTAL CA: CPT | Performed by: EMERGENCY MEDICINE

## 2025-02-28 PROCEDURE — 99284 EMERGENCY DEPT VISIT MOD MDM: CPT | Mod: 25 | Performed by: EMERGENCY MEDICINE

## 2025-02-28 PROCEDURE — 250N000011 HC RX IP 250 OP 636: Performed by: EMERGENCY MEDICINE

## 2025-02-28 PROCEDURE — 36415 COLL VENOUS BLD VENIPUNCTURE: CPT | Performed by: EMERGENCY MEDICINE

## 2025-02-28 PROCEDURE — 250N000013 HC RX MED GY IP 250 OP 250 PS 637: Performed by: EMERGENCY MEDICINE

## 2025-02-28 PROCEDURE — 85004 AUTOMATED DIFF WBC COUNT: CPT | Performed by: EMERGENCY MEDICINE

## 2025-02-28 PROCEDURE — 81003 URINALYSIS AUTO W/O SCOPE: CPT | Performed by: EMERGENCY MEDICINE

## 2025-02-28 PROCEDURE — 99285 EMERGENCY DEPT VISIT HI MDM: CPT | Mod: 25 | Performed by: EMERGENCY MEDICINE

## 2025-02-28 PROCEDURE — 258N000003 HC RX IP 258 OP 636: Performed by: EMERGENCY MEDICINE

## 2025-02-28 PROCEDURE — 96374 THER/PROPH/DIAG INJ IV PUSH: CPT | Performed by: EMERGENCY MEDICINE

## 2025-02-28 PROCEDURE — 76706 US ABDL AORTA SCREEN AAA: CPT | Mod: 26 | Performed by: EMERGENCY MEDICINE

## 2025-02-28 PROCEDURE — 76706 US ABDL AORTA SCREEN AAA: CPT | Performed by: EMERGENCY MEDICINE

## 2025-02-28 RX ORDER — ACETAMINOPHEN 500 MG
1000 TABLET ORAL ONCE
Status: COMPLETED | OUTPATIENT
Start: 2025-02-28 | End: 2025-02-28

## 2025-02-28 RX ORDER — KETOROLAC TROMETHAMINE 15 MG/ML
15 INJECTION, SOLUTION INTRAMUSCULAR; INTRAVENOUS ONCE
Status: COMPLETED | OUTPATIENT
Start: 2025-02-28 | End: 2025-02-28

## 2025-02-28 RX ORDER — KETOROLAC TROMETHAMINE 30 MG/ML
30 INJECTION, SOLUTION INTRAMUSCULAR; INTRAVENOUS ONCE
Status: DISCONTINUED | OUTPATIENT
Start: 2025-02-28 | End: 2025-02-28

## 2025-02-28 RX ORDER — OXYCODONE HYDROCHLORIDE 5 MG/1
5 TABLET ORAL ONCE
Status: COMPLETED | OUTPATIENT
Start: 2025-02-28 | End: 2025-02-28

## 2025-02-28 RX ADMIN — KETOROLAC TROMETHAMINE 15 MG: 15 INJECTION, SOLUTION INTRAMUSCULAR; INTRAVENOUS at 08:59

## 2025-02-28 RX ADMIN — ACETAMINOPHEN 1000 MG: 500 TABLET ORAL at 10:37

## 2025-02-28 RX ADMIN — OXYCODONE 5 MG: 5 TABLET ORAL at 10:57

## 2025-02-28 RX ADMIN — SODIUM CHLORIDE 1000 ML: 9 INJECTION, SOLUTION INTRAVENOUS at 11:00

## 2025-02-28 ASSESSMENT — COLUMBIA-SUICIDE SEVERITY RATING SCALE - C-SSRS
6. HAVE YOU EVER DONE ANYTHING, STARTED TO DO ANYTHING, OR PREPARED TO DO ANYTHING TO END YOUR LIFE?: NO
2. HAVE YOU ACTUALLY HAD ANY THOUGHTS OF KILLING YOURSELF IN THE PAST MONTH?: NO
1. IN THE PAST MONTH, HAVE YOU WISHED YOU WERE DEAD OR WISHED YOU COULD GO TO SLEEP AND NOT WAKE UP?: NO

## 2025-02-28 ASSESSMENT — ACTIVITIES OF DAILY LIVING (ADL)
ADLS_ACUITY_SCORE: 50

## 2025-02-28 NOTE — DISCHARGE INSTRUCTIONS
Return to the emergency department for any worsening back pain, abdominal pain, fevers or chills, burning with urination, nausea or vomiting, weakness or any other concerns as given or discussed.    Return to the emergency department immediately for any chest pain, back pain, shortness of breath, weakness, abdominal pain nausea, vomiting, or any other concerns as given or discussed

## 2025-02-28 NOTE — ED PROVIDER NOTES
ED Provider Note  February 28, 2025  [facility]      History     Chief Complaint: Flank Pain (Pt has history of kidney stones. Pt having right flank pain. )      HPI  Robertmellissa Hankins is a 34 year old male presenting to the ED with right flank pain.  Began at approximately 3 AM this morning, patient believes this woke him from sleep.  Feels similar to prior kidney stones of which she has had multiple.  Was seen by urology recently with plan for urine collection to better identify stone type and help with management.    History of polysubstance abuse alcoholism.  Patient denies any recent drug use.  Was drinking yesterday.  Denies any falls or trauma.  Yesterday evening otherwise feeling well without pain, fever no chest pain or shortness of breath.    History of dilated aortic root as a child, told that this normalized when he got older did not need surgery or any other specific follow-up per patient.        Past Medical History  Past Medical History:   Diagnosis Date    Dilated aortic root      Past Surgical History:   Procedure Laterality Date    NO HISTORY OF SURGERY       atenolol (TENORMIN) 100 MG tablet  folic acid (FOLVITE) 1 MG tablet  hydrOXYzine HCl (ATARAX) 25 MG tablet  melatonin 3 MG tablet  multivitamin w/minerals (THERA-VIT-M) tablet  thiamine (B-1) 100 MG tablet  traZODone (DESYREL) 50 MG tablet  venlafaxine (EFFEXOR XR) 37.5 MG 24 hr capsule      Allergies   Allergen Reactions    Contrast Dye      PN: LW CM1:  Reaction :  rash    Sulfa Antibiotics      PN: LW Reaction: unsure which kid has allergy per mom???     Family History  Family History   Problem Relation Age of Onset    Heart Disease Brother     Abdominal Aortic Aneurysm Brother     Heart Disease Other         self    Substance Abuse Mother     Substance Abuse Father     Substance Abuse Maternal Grandfather     Diabetes No family hx of     Coronary Artery Disease No family hx of     Hypertension No family  "hx of     Hyperlipidemia No family hx of     Cerebrovascular Disease No family hx of     Breast Cancer No family hx of     Colon Cancer No family hx of     Prostate Cancer No family hx of     Other Cancer No family hx of     Depression No family hx of     Anxiety Disorder No family hx of     Mental Illness No family hx of     Anesthesia Reaction No family hx of     Asthma No family hx of     Osteoporosis No family hx of     Genetic Disorder No family hx of     Thyroid Disease No family hx of     Obesity No family hx of     Unknown/Adopted No family hx of      Social History   Social History     Tobacco Use    Smoking status: Former     Current packs/day: 0.00     Average packs/day: 0.1 packs/day for 5.0 years (0.5 ttl pk-yrs)     Types: Cigarettes     Start date: 2017     Quit date: 2022     Years since quitting: 3.1    Smokeless tobacco: Never    Tobacco comments:     1-2 cigarettes a day for 5 years   Vaping Use    Vaping status: Some Days    Start date: 2/22/2021    Substances: Nicotine    Devices: Disposable   Substance Use Topics    Alcohol use: Yes     Comment: daily    Drug use: Yes     Types: Marijuana     Comment: Pt reports smoking a blunt today, pt reports smoking canabis everyday..        Past medical history, past surgical history, medications, allergies, family history, and social history were reviewed with the patient. No additional pertinent items.      A medically appropriate review of systems was performed with pertinent positives and negatives noted in the HPI, and all other systems negative.    Physical Exam   /69   Pulse 87   Temp 97.6  F (36.4  C) (Oral)   Resp 22   Ht 1.854 m (6' 1\")   Wt 72.6 kg (160 lb)   SpO2 97%   BMI 21.11 kg/m      GEN: Well appearing but uncomfortable, non toxic, cooperative and conversant.   HEENT: The head is normocephalic and atraumatic. Pupils are equal round and reactive to light. Extraocular motions are intact. There is no facial swelling. The neck is " nontender and supple.   CV: Regular rate and rhythm. 2+ radial pulses bilaterally.  PULM: Clear to auscultation bilaterally.  ABD: Soft, nontender, nondistended.   EXT: Full range of motion.  No edema.  NEURO: Cranial nerves II through XII are intact and symmetric. Bilateral upper and lower extremities grossly show full range of motion without any focal deficits.   PSYCH: Calm and cooperative, interactive.       ED Course, Procedures, & Data      Procedures  Results for orders placed during the hospital encounter of 02/28/25    POC US ABDOMEN LIMITED    Impression  Bedside limited renal ultrasound performed and interpreted by me.    Indication: R flank pain    Images of the the right and left kidney were acquired in long and short axis. They were evaluated for hydronephrosis. A short and long axis of the bladder was evaluated for stones and urinary jets.    There is no evidence of hydronephrosis in bilateral kidneys. No urinary bladder stones. Normal bilateral urinary jets.    Impression: no evidence of hydronephrosis to suggest renal colic.    _____    Bedside limited aortic ultrasound, performed and interpreted by me.    Indication: R flank pain, hx of aortic root enlargement    Continuous images of the aorta were collected from the area of the xiphoid to the aortic bifurcation. Examination performed in short and long axis. The aorta was measured in largest diameter and screened for evidence of aneurysm (> 3 cm outer wall to outer wall).  No area of aortic aneurysm was identified.    Impression: No evidence of abdominal aortic aneurysm.                    Results for orders placed or performed during the hospital encounter of 02/28/25   POC US ABDOMEN LIMITED     Status: None    Impression    Bedside limited renal ultrasound performed and interpreted by me.    Indication: R flank pain     Images of the the right and left kidney were acquired in long and short axis. They were evaluated for hydronephrosis. A short  and long axis of the bladder was evaluated for stones and urinary jets.     There is no evidence of hydronephrosis in bilateral kidneys. No urinary bladder stones. Normal bilateral urinary jets.    Impression: no evidence of hydronephrosis to suggest renal colic.     _____    Bedside limited aortic ultrasound, performed and interpreted by me.    Indication: R flank pain, hx of aortic root enlargement     Continuous images of the aorta were collected from the area of the xiphoid to the aortic bifurcation. Examination performed in short and long axis. The aorta was measured in largest diameter and screened for evidence of aneurysm (> 3 cm outer wall to outer wall).     No area of aortic aneurysm was identified.    Impression: No evidence of abdominal aortic aneurysm.         Abd/pelvis CT no contrast - Stone Protocol     Status: None    Narrative    EXAM: CT ABDOMEN PELVIS W/O CONTRAST  LOCATION: [facility]  DATE: 2/28/2025    INDICATION: R flank pain  COMPARISON: CT AP 1/17/2025 and multiple prior studies  TECHNIQUE: CT scan of the abdomen and pelvis was performed without IV contrast. Multiplanar reformats were obtained. Dose reduction techniques were used.  CONTRAST: None.    FINDINGS:   LOWER CHEST: Unremarkable    HEPATOBILIARY: Diffuse fatty infiltration of the liver.    PANCREAS: Normal.    SPLEEN: Normal.    ADRENAL GLANDS: Normal.    KIDNEYS/BLADDER: No change in the 2 punctate nonobstructing intrarenal calculi on the right than one on the left. Largest measures approximately 2 mm. No inflammatory changes. No ureteral or bladder calculi.    BOWEL: No inflammatory changes. Few calcified fecaliths at developed in the tip of the appendix. No inflammatory changes. Redundant colon. Mild sigmoid diverticulosis.    LYMPH NODES: Normal.    VASCULATURE: Normal.    PELVIC ORGANS: Normal.    MUSCULOSKELETAL: Normal.      Impression    IMPRESSION:   1.  No abnormalities noted  to explain pain.  2.  No change in the punctate nonobstructing intrarenal calculi, 2 on the right and one on the left.  3.  The tip of the appendix has developed a few punctate fecaliths but there is no evidence of appendicitis.  4.  Hepatic steatosis.     Basic metabolic panel     Status: Abnormal   Result Value Ref Range    Sodium 145 135 - 145 mmol/L    Potassium 3.8 3.4 - 5.3 mmol/L    Chloride 101 98 - 107 mmol/L    Carbon Dioxide (CO2) 21 (L) 22 - 29 mmol/L    Anion Gap 23 (H) 7 - 15 mmol/L    Urea Nitrogen 14.0 6.0 - 20.0 mg/dL    Creatinine 0.97 0.67 - 1.17 mg/dL    GFR Estimate >90 >60 mL/min/1.73m2    Calcium 9.5 8.8 - 10.4 mg/dL    Glucose 63 (L) 70 - 99 mg/dL   UA with Microscopic reflex to Culture     Status: Abnormal    Specimen: Urine, Midstream   Result Value Ref Range    Color Urine Yellow Colorless, Straw, Light Yellow, Yellow    Appearance Urine Slightly Cloudy (A) Clear    Glucose Urine Negative Negative mg/dL    Bilirubin Urine Negative Negative    Ketones Urine 40 (A) Negative mg/dL    Specific Gravity Urine 1.034 1.003 - 1.035    Blood Urine Negative Negative    pH Urine 5.5 5.0 - 7.0    Protein Albumin Urine 50 (A) Negative mg/dL    Urobilinogen Urine Normal Normal, 2.0 mg/dL    Nitrite Urine Negative Negative    Leukocyte Esterase Urine Negative Negative    Mucus Urine Present (A) None Seen /LPF    RBC Urine 1 <=2 /HPF    WBC Urine 4 <=5 /HPF    Squamous Epithelials Urine <1 <=1 /HPF    Narrative    Urine Culture not indicated   CBC with platelets and differential     Status: None   Result Value Ref Range    WBC Count 7.8 4.0 - 11.0 10e3/uL    RBC Count 4.57 4.40 - 5.90 10e6/uL    Hemoglobin 14.0 13.3 - 17.7 g/dL    Hematocrit 42.3 40.0 - 53.0 %    MCV 93 78 - 100 fL    MCH 30.6 26.5 - 33.0 pg    MCHC 33.1 31.5 - 36.5 g/dL    RDW 13.0 10.0 - 15.0 %    Platelet Count 198 150 - 450 10e3/uL    % Neutrophils 75 %    % Lymphocytes 16 %    % Monocytes 9 %    % Eosinophils 0 %    % Basophils 0 %     % Immature Granulocytes 0 %    NRBCs per 100 WBC 0 <1 /100    Absolute Neutrophils 5.8 1.6 - 8.3 10e3/uL    Absolute Lymphocytes 1.2 0.8 - 5.3 10e3/uL    Absolute Monocytes 0.7 0.0 - 1.3 10e3/uL    Absolute Eosinophils 0.0 0.0 - 0.7 10e3/uL    Absolute Basophils 0.0 0.0 - 0.2 10e3/uL    Absolute Immature Granulocytes 0.0 <=0.4 10e3/uL    Absolute NRBCs 0.0 10e3/uL   CBC with platelets differential     Status: None    Narrative    The following orders were created for panel order CBC with platelets differential.  Procedure                               Abnormality         Status                     ---------                               -----------         ------                     CBC with platelets and d...[[id number]]                      Final result                 Please view results for these tests on the individual orders.     Medications   ketorolac (TORADOL) injection 15 mg (15 mg Intravenous $Given 2/28/25 0859)   acetaminophen (TYLENOL) tablet 1,000 mg (1,000 mg Oral $Given 2/28/25 1037)   oxyCODONE (ROXICODONE) tablet 5 mg (5 mg Oral $Given 2/28/25 1057)   sodium chloride 0.9% BOLUS 1,000 mL (1,000 mLs Intravenous $New Bag 2/28/25 1100)     Labs Ordered and Resulted from Time of ED Arrival to Time of ED Departure   BASIC METABOLIC PANEL - Abnormal       Result Value    Sodium 145      Potassium 3.8      Chloride 101      Carbon Dioxide (CO2) 21 (*)     Anion Gap 23 (*)     Urea Nitrogen 14.0      Creatinine 0.97      GFR Estimate >90      Calcium 9.5      Glucose 63 (*)    ROUTINE UA WITH MICROSCOPIC REFLEX TO CULTURE - Abnormal    Color Urine Yellow      Appearance Urine Slightly Cloudy (*)     Glucose Urine Negative      Bilirubin Urine Negative      Ketones Urine 40 (*)     Specific Gravity Urine 1.034      Blood Urine Negative      pH Urine 5.5      Protein Albumin Urine 50 (*)     Urobilinogen Urine Normal      Nitrite Urine Negative      Leukocyte Esterase Urine Negative      Mucus Urine Present  (*)     RBC Urine 1      WBC Urine 4      Squamous Epithelials Urine <1     CBC WITH PLATELETS AND DIFFERENTIAL    WBC Count 7.8      RBC Count 4.57      Hemoglobin 14.0      Hematocrit 42.3      MCV 93      MCH 30.6      MCHC 33.1      RDW 13.0      Platelet Count 198      % Neutrophils 75      % Lymphocytes 16      % Monocytes 9      % Eosinophils 0      % Basophils 0      % Immature Granulocytes 0      NRBCs per 100 WBC 0      Absolute Neutrophils 5.8      Absolute Lymphocytes 1.2      Absolute Monocytes 0.7      Absolute Eosinophils 0.0      Absolute Basophils 0.0      Absolute Immature Granulocytes 0.0      Absolute NRBCs 0.0       Abd/pelvis CT no contrast - Stone Protocol   Final Result   IMPRESSION:    1.  No abnormalities noted to explain pain.   2.  No change in the punctate nonobstructing intrarenal calculi, 2 on the right and one on the left.   3.  The tip of the appendix has developed a few punctate fecaliths but there is no evidence of appendicitis.   4.  Hepatic steatosis.         POC US ABDOMEN LIMITED   Final Result   Bedside limited renal ultrasound performed and interpreted by me.      Indication: R flank pain       Images of the the right and left kidney were acquired in long and short axis. They were evaluated for hydronephrosis. A short and long axis of the bladder was evaluated for stones and urinary jets.       There is no evidence of hydronephrosis in bilateral kidneys. No urinary bladder stones. Normal bilateral urinary jets.      Impression: no evidence of hydronephrosis to suggest renal colic.       _____      Bedside limited aortic ultrasound, performed and interpreted by me.      Indication: R flank pain, hx of aortic root enlargement       Continuous images of the aorta were collected from the area of the xiphoid to the aortic bifurcation. Examination performed in short and long axis. The aorta was measured in largest diameter and screened for evidence of aneurysm (> 3 cm outer wall to  outer wall).      No area of aortic aneurysm was identified.      Impression: No evidence of abdominal aortic aneurysm.                        Medical Decision Making Matrix    Problems Addressed   MDM High: 1 acute or chronic illness or injury that poses a threat to life or bodily function     Data   Considered   Data Extensive: Order of (or considering) each unique test (Cat 1), Review of the results of each unique test (Cat 1), and Independent interpretation of a test performed by another practitioner (Cat 2)     Risk of Patient Management                  Assessment & Plan    34-year-old male with history of substance abuse alcoholism recurrent ureteral stones presents with right flank pain.    DDx includes urinary tract infection, ureteral colic, nephric infarct, retroperitoneal hematoma, abscess, SBO, among other causes    Clinically the patient is fairly well-appearing and nontoxic.  Pain primarily localized in the right low back along the paraspinous musculature.  Onset of symptoms somewhat acute but also on exam very positional, with tenderness to palpation.    Laboratories show no concerning findings  Urinalysis without evidence of infection he was also no hematuria making perinephric infarct, which would not be appreciated on a Noncon CT quite unlikely  Bedside ultrasound with no clear evidence of hydronephrosis.  Abdominal aorta of normal caliber.  CT abdomen pelvis shows no evidence of ureteral colic or other explanation for the patient's pain    Throughout ED stay he appeared to improve and continued to be hemodynamically normal appearing.    A broad ddx was considered as above. Given the pts presentation, clinical course, and workup, my suspicion for an emergent process is low. Pt improved on serial exams. Appropriate for outpt follow up with SERP.      I have reviewed the nursing notes. I have reviewed the findings, diagnosis, plan and need for follow up with the patient.    New Prescriptions    No  medications on file       Final diagnoses:   Acute right-sided low back pain without sciatica       Norman Velasquez MD  [facility] EMERGENCY DEPARTMENT  February 28, 2025       Norman Velasquez MD  02/28/25 6010

## 2025-02-28 NOTE — ED NOTES
Bed: ED18  Expected date:   Expected time:   Means of arrival:   Comments:  N 713: 33 y/o, M, Kidney Pain, possible stones

## 2025-02-28 NOTE — ED TRIAGE NOTES
Pt arrived via ems with right flank pain. Pt in 10/10 pain given 15 mg of Toradol IV by ems. Pt has a history of kidney stones and had emergency surgery to remove a stone 2 years ago per pt.      Triage Assessment (Adult)       Row Name 02/28/25 0754          Triage Assessment    Airway WDL WDL        Respiratory WDL    Respiratory WDL WDL        Skin Circulation/Temperature WDL    Skin Circulation/Temperature WDL WDL        Cardiac WDL    Cardiac WDL WDL        Peripheral/Neurovascular WDL    Peripheral Neurovascular WDL WDL        Cognitive/Neuro/Behavioral WDL    Cognitive/Neuro/Behavioral WDL WDL        Mi Coma Scale    Best Eye Response 4-->(E4) spontaneous     Best Motor Response 6-->(M6) obeys commands     Best Verbal Response 5-->(V5) oriented     Mi Coma Scale Score 15

## 2025-03-01 ENCOUNTER — HOSPITAL ENCOUNTER (EMERGENCY)
Facility: CLINIC | Age: 35
Discharge: HOME | End: 2025-03-01
Attending: EMERGENCY MEDICINE | Admitting: EMERGENCY MEDICINE
Payer: COMMERCIAL

## 2025-03-01 VITALS
DIASTOLIC BLOOD PRESSURE: 74 MMHG | TEMPERATURE: 98 F | HEART RATE: 79 BPM | OXYGEN SATURATION: 100 % | SYSTOLIC BLOOD PRESSURE: 128 MMHG | RESPIRATION RATE: 16 BRPM

## 2025-03-01 DIAGNOSIS — F41.0 ANXIETY ATTACK: ICD-10-CM

## 2025-03-01 DIAGNOSIS — R07.89 CHEST TIGHTNESS: ICD-10-CM

## 2025-03-01 LAB
ALBUMIN SERPL BCG-MCNC: 4.5 G/DL (ref 3.5–5.2)
ALP SERPL-CCNC: 63 U/L (ref 40–150)
ALT SERPL W P-5'-P-CCNC: 15 U/L (ref 0–70)
ANION GAP SERPL CALCULATED.3IONS-SCNC: 13 MMOL/L (ref 7–15)
AST SERPL W P-5'-P-CCNC: 26 U/L (ref 0–45)
ATRIAL RATE - MUSE: 74 BPM
BASOPHILS # BLD AUTO: 0 10E3/UL (ref 0–0.2)
BASOPHILS NFR BLD AUTO: 0 %
BILIRUB SERPL-MCNC: 0.9 MG/DL
BUN SERPL-MCNC: 9.1 MG/DL (ref 6–20)
CALCIUM SERPL-MCNC: 9.5 MG/DL (ref 8.8–10.4)
CHLORIDE SERPL-SCNC: 103 MMOL/L (ref 98–107)
CREAT SERPL-MCNC: 0.91 MG/DL (ref 0.67–1.17)
DIASTOLIC BLOOD PRESSURE - MUSE: NORMAL MMHG
EGFRCR SERPLBLD CKD-EPI 2021: >90 ML/MIN/1.73M2
EOSINOPHIL # BLD AUTO: 0 10E3/UL (ref 0–0.7)
EOSINOPHIL NFR BLD AUTO: 0 %
ERYTHROCYTE [DISTWIDTH] IN BLOOD BY AUTOMATED COUNT: 12.3 % (ref 10–15)
GLUCOSE SERPL-MCNC: 90 MG/DL (ref 70–99)
HCO3 SERPL-SCNC: 25 MMOL/L (ref 22–29)
HCT VFR BLD AUTO: 39.1 % (ref 40–53)
HGB BLD-MCNC: 13.5 G/DL (ref 13.3–17.7)
IMM GRANULOCYTES # BLD: 0 10E3/UL
IMM GRANULOCYTES NFR BLD: 0 %
INTERPRETATION ECG - MUSE: NORMAL
LIPASE SERPL-CCNC: 16 U/L (ref 13–60)
LYMPHOCYTES # BLD AUTO: 1.3 10E3/UL (ref 0.8–5.3)
LYMPHOCYTES NFR BLD AUTO: 36 %
MCH RBC QN AUTO: 31.5 PG (ref 26.5–33)
MCHC RBC AUTO-ENTMCNC: 34.5 G/DL (ref 31.5–36.5)
MCV RBC AUTO: 91 FL (ref 78–100)
MONOCYTES # BLD AUTO: 0.5 10E3/UL (ref 0–1.3)
MONOCYTES NFR BLD AUTO: 14 %
NEUTROPHILS # BLD AUTO: 1.8 10E3/UL (ref 1.6–8.3)
NEUTROPHILS NFR BLD AUTO: 49 %
NRBC # BLD AUTO: 0 10E3/UL
NRBC BLD AUTO-RTO: 0 /100
P AXIS - MUSE: 65 DEGREES
PLATELET # BLD AUTO: 172 10E3/UL (ref 150–450)
POTASSIUM SERPL-SCNC: 3.4 MMOL/L (ref 3.4–5.3)
PR INTERVAL - MUSE: 148 MS
PROT SERPL-MCNC: 7.7 G/DL (ref 6.4–8.3)
QRS DURATION - MUSE: 92 MS
QT - MUSE: 406 MS
QTC - MUSE: 450 MS
R AXIS - MUSE: 58 DEGREES
RBC # BLD AUTO: 4.28 10E6/UL (ref 4.4–5.9)
SODIUM SERPL-SCNC: 141 MMOL/L (ref 135–145)
SYSTOLIC BLOOD PRESSURE - MUSE: NORMAL MMHG
T AXIS - MUSE: 65 DEGREES
TROPONIN T SERPL HS-MCNC: <6 NG/L
VENTRICULAR RATE- MUSE: 74 BPM
WBC # BLD AUTO: 3.7 10E3/UL (ref 4–11)

## 2025-03-01 PROCEDURE — 84484 ASSAY OF TROPONIN QUANT: CPT | Performed by: EMERGENCY MEDICINE

## 2025-03-01 PROCEDURE — 82374 ASSAY BLOOD CARBON DIOXIDE: CPT | Performed by: EMERGENCY MEDICINE

## 2025-03-01 PROCEDURE — 96374 THER/PROPH/DIAG INJ IV PUSH: CPT | Performed by: EMERGENCY MEDICINE

## 2025-03-01 PROCEDURE — 85025 COMPLETE CBC W/AUTO DIFF WBC: CPT | Performed by: EMERGENCY MEDICINE

## 2025-03-01 PROCEDURE — 99284 EMERGENCY DEPT VISIT MOD MDM: CPT | Mod: 25 | Performed by: EMERGENCY MEDICINE

## 2025-03-01 PROCEDURE — 93005 ELECTROCARDIOGRAM TRACING: CPT | Performed by: EMERGENCY MEDICINE

## 2025-03-01 PROCEDURE — 83690 ASSAY OF LIPASE: CPT | Performed by: EMERGENCY MEDICINE

## 2025-03-01 PROCEDURE — 250N000011 HC RX IP 250 OP 636: Performed by: EMERGENCY MEDICINE

## 2025-03-01 PROCEDURE — 93010 ELECTROCARDIOGRAM REPORT: CPT | Performed by: EMERGENCY MEDICINE

## 2025-03-01 PROCEDURE — 36415 COLL VENOUS BLD VENIPUNCTURE: CPT | Performed by: EMERGENCY MEDICINE

## 2025-03-01 PROCEDURE — 99284 EMERGENCY DEPT VISIT MOD MDM: CPT | Performed by: EMERGENCY MEDICINE

## 2025-03-01 PROCEDURE — 84155 ASSAY OF PROTEIN SERUM: CPT | Performed by: EMERGENCY MEDICINE

## 2025-03-01 RX ORDER — OLANZAPINE 10 MG/1
2.5 INJECTION, POWDER, LYOPHILIZED, FOR SOLUTION INTRAMUSCULAR ONCE
Status: COMPLETED | OUTPATIENT
Start: 2025-03-01 | End: 2025-03-01

## 2025-03-01 RX ADMIN — OLANZAPINE 2.5 MG: 10 INJECTION, POWDER, FOR SOLUTION INTRAMUSCULAR at 10:55

## 2025-03-01 ASSESSMENT — COLUMBIA-SUICIDE SEVERITY RATING SCALE - C-SSRS
2. HAVE YOU ACTUALLY HAD ANY THOUGHTS OF KILLING YOURSELF IN THE PAST MONTH?: NO
1. IN THE PAST MONTH, HAVE YOU WISHED YOU WERE DEAD OR WISHED YOU COULD GO TO SLEEP AND NOT WAKE UP?: NO
6. HAVE YOU EVER DONE ANYTHING, STARTED TO DO ANYTHING, OR PREPARED TO DO ANYTHING TO END YOUR LIFE?: NO

## 2025-03-01 ASSESSMENT — ACTIVITIES OF DAILY LIVING (ADL)
ADLS_ACUITY_SCORE: 50
ADLS_ACUITY_SCORE: 50

## 2025-03-01 NOTE — ED NOTES
Bed: ESTER-A  Expected date: 3/1/25  Expected time: 9:51 AM  Means of arrival:   Comments:  Theodore 716    35 yo M  panic

## 2025-03-01 NOTE — ED NOTES
03/01/25 1112   Respiratory WDL   Respiratory WDL WDL   Cardiac WDL   Cardiac WDL chest pain   Peripheral/Neurovascular WDL   Peripheral Neurovascular WDL WDL   Cognitive/Neuro/Behavioral WDL   Cognitive/Neuro/Behavioral WDL mood/behavior;level of consciousness;arousability   Level of Consciousness other (see comments);alert;lethargic  (pt is alert at this time. pt dosed off in the ambulace due to medication given by EMS.)   Arousal Level opens eyes spontaneously;arouses to voice  (pt alert. when the pt attempts to rest pt is able to be aroused by voice.)   Mood/Behavior calm   General Appearance WDL   General Appearance WDL WDL   Behavior WDL   Behavior WDL interactions;motor movement   Interactions eye contact appropriate;appropriate to situation;cooperative   Motor Movement unsteady   Emotion Mood WDL   Emotion/Mood/Affect WDL X;emotion mood;affect   Affect affect consistent with mood   Emotion/Mood anxious   Perceptual State WDL   Perceptual State WDL WDL   Skin WDL   Skin WDL WDL   Coping   Observed Emotional State cooperative;sad;anxious   Verbalized Emotional State acceptance   Additional Documentation Trust Relationship/Rapport   Trust Relationship/Rapport care explained;choices provided   Safety WDL   Safety WDL WDL   Chest Pain Assessment   Chest Pain Location midsternal   Precipitating Factors at rest   Alleviating Factors other (comment)  (anxiety medication)   Associated Signs/Symptoms anxiety   Chest Pain Intervention cardiac monitoring continued;cardiac biomarkers drawn;cardiac monitor placed;12-lead ECG obtained   Character tightness   Duration this morning has gotten better when anxiety meds were given by EMS   Chest Pain Radiation   (no radiation of chest pain)   Motor Response   RLE Motor Response purposeful motor response   LUE Motor Response purposeful motor response   LLE Motor Response purposeful motor response   RUE Motor Response purposeful motor response

## 2025-03-01 NOTE — ED PROVIDER NOTES
History     Chief Complaint   Patient presents with    Drug / Alcohol Assessment     Pt having panic attack at home pt was sober for weeks and started drinking yesterday. Pt given 2mg of versed IV for anxiety. 20 g RAC.     Anxiety     Pt reports anxiety and feeling chest pain      HPI  Ravi Hankins is a 34 year old male with PMH notable for polysubstance use disorder, opioids, cocaine, cannabinoids, and alcohol)  who presents to the ED with chest tightness.  Patient reports waking up this morning and feeling very anxious.  He had a strong tight feeling in his chest.  He felt like his hands and feet were numb.  Patient reports significant stressors recently with his girlfriend asking for a break.  He lives with her and now needs to find out her housing.  He reports drinking alcohol a few days ago, had just gotten out of treatment about 2 weeks ago.      Patient also notes continued pain in his right flank.  Patient attributes it to his kidneys.  He states he was in the emergency department yesterday for that pain.    EMS reports giving 2 mg midazolam for symptoms.  Patient reports that is helping.    Physical Exam   BP: 120/79  Pulse: 80  Temp: 98  F (36.7  C)  Resp: 18  SpO2: 98 %    Physical Exam  General: Appears very anxious. Appears stated age.   HENT: MMM, no oropharyngeal lesions  Eyes: PERRL, normal sclerae   Cardio: Regular rate. Regular rhythm. Extremities well perfused  Resp: Normal work of breathing, Elevated respiratory rate.   Abdomen: no tenderness, non-distended, no rebound, no guarding.  There is tenderness over the right flank, no CVA tenderness.  Neuro: alert without signs of confusion. CN II-XII grossly intact. Grossly normal strength and sensation in all extremities.   Psych: normal affect, normal behavior      ED Course      Procedures              Labs Ordered and Resulted from Time of ED Arrival to Time of ED Departure   CBC WITH PLATELETS AND DIFFERENTIAL - Abnormal       Result  Value    WBC Count 3.7 (*)     RBC Count 4.28 (*)     Hemoglobin 13.5      Hematocrit 39.1 (*)     MCV 91      MCH 31.5      MCHC 34.5      RDW 12.3      Platelet Count 172      % Neutrophils 49      % Lymphocytes 36      % Monocytes 14      % Eosinophils 0      % Basophils 0      % Immature Granulocytes 0      NRBCs per 100 WBC 0      Absolute Neutrophils 1.8      Absolute Lymphocytes 1.3      Absolute Monocytes 0.5      Absolute Eosinophils 0.0      Absolute Basophils 0.0      Absolute Immature Granulocytes 0.0      Absolute NRBCs 0.0     TROPONIN T, HIGH SENSITIVITY - Normal    Troponin T, High Sensitivity <6     COMPREHENSIVE METABOLIC PANEL - Normal    Sodium 141      Potassium 3.4      Carbon Dioxide (CO2) 25      Anion Gap 13      Urea Nitrogen 9.1      Creatinine 0.91      GFR Estimate >90      Calcium 9.5      Chloride 103      Glucose 90      Alkaline Phosphatase 63      AST 26      ALT 15      Protein Total 7.7      Albumin 4.5      Bilirubin Total 0.9     LIPASE - Normal    Lipase 16       Chest XR,  PA & LAT    (Results Pending)        Medical Decision Making  The patient's presentation was of high complexity (an acute health issue posing potential threat to life or bodily function).    The patient's evaluation involved:  review of 3+ test result(s) ordered prior to this encounter (see separate area of note for details)  ordering and/or review of 3+ test(s) in this encounter (see separate area of note for details)    The patient's management necessitated moderate risk (limitations due to social determinants of health (EtOH use disorder)).      Assessments & Plan   Patient presenting with chest tightness, anxiety. Vitals in the ED unremarkable. Nursing notes reviewed.     Chart review notable for patient being evaluated in the emergency department yesterday for back pains.  Patient had labs, aorta ultrasound, and CT abdomen/pelvis which were unremarkable.  Patient was subsequently discharged home.    EMS  EKG demonstrated normal sinus rhythm without evidence of acute ischemia.  ED EKG also showing sinus rhythm without evidence of acute ischemia, high-sensitivity troponin undetectably low - ACS very unlikely.  No lipase elevation to suggest pancreatitis.  No LFT elevations to suggest hepatobiliary pathology.  PE risk factor profile very low, PERC met.  Chest pain and quality, and risk factor profile not suggestive of aortic dissection.    In the ED, the patient's symptoms were managed with olanzapine, with improvement in symptoms upon reassessment.     Patient had initially requested a mental health assessment after noting increased stressors and anxiety recently.  While in ED, he did speak with his girlfriend.  After the conversation and medications in the ED patient felt symptoms had resolved.  He no longer wanted to speak with an .  Patient also declined chest x-ray.    The complete clinical picture is most consistent with chest tightness secondary to anxiety attack. After counseling on the diagnosis, work-up, and treatment plan, the patient was discharged to home. The patient was advised to follow-up with his therapist in a few days as soon as he is able, additional community mental health and EtOH/substance use resources were provided. The patient was advised to return to the ED if worsening symptoms, or any urgent health concerns.     Final diagnoses:   Anxiety attack   Chest tightness     New Prescriptions    No medications on file     --  Saul Corea MD   Emergency Medicine   [facility] EMERGENCY DEPARTMENT  3/1/2025       Saul Corea MD  03/01/25 2828

## 2025-03-01 NOTE — ED NOTES
IV pulled. Vitals taken and AVS given to pt. Pt had no questions for the writer at the time of discharge.

## 2025-03-01 NOTE — DISCHARGE INSTRUCTIONS
Please make an appointment to follow up with your therapist and/or Psychiatric Clinic (phone: [phone]) within 1-3 days.     Return to the ED if you are having worsening thoughts/feelings of harming yourself or others, or any urgent/life-threatening concerns.     DISCHARGE RESOURCES:  -[facility] [phone]   -[facility] [phone] or [phone].  -Crisis Intervention: [phone] or [phone] (TTY: [phone]).  Call anytime.  -Suicide Awareness Voices of Education (SAVE) (www.save.org): 966-332-SRNI (4489)  -National Suicide Prevention Line (www.mentalhealthmn.org): 719-737-GTZQ (0856)  -National [place] on Mental Illness (www.mn.dale.org): 434.644.4067 or 394-463-1960.  -Qwap7kkvp: text the word LIFE to 19383 for immediate support and crisis intervention  -Mental Health Consumer/Survivor Network of [place] (www.mhcsn.net): 982.129.7300 or 036-209-1027  -Mental Health Association of [place] (www.mentalhealth.org): 618.301.6223 or 261-091-1062  Please use the below resources and your primary care physician to safely cease alcohol and/or substance use.     Return to the ED if you are having any urgent/life-threatening concerns.     DISCHARGE RESOURCES:  -[facility]: [phone] (detox), [phone] (outpatient & [facility])  -[facility] ([address] [place] [address]): [phone]  -[facility]: [phone]  -[facility]: [phone]  -SMART Recovery - self management for addiction recovery:  www.smartrecovery.org    -Pathways ~ A Health Crisis Resource & Support Center: 166.291.1359.  -[facility] [phone]   -Substance Abuse and Mental Health Services (www.samhsa.gov)  -Harm Reduction Coalition (www. Harmreduction.org)  -[place] Opioid Prevention Coalition: www.opioidcoalition.org  -Poison control 7-298-560-8358       Sober Support Group Information:  AA/NA &  Sponsor/Support  -Alcoholics Anonymous (www.alcoholics-anonymous.org): for local information 24 hours/day  -AA Intergroup service office in [place] (http://www.aastpaul.org/) [phone]  -AA Intergroup service office in [facility]: [phone]. (http://www.a[place].org/)  -Narcotics Anonymous (www.nam[place].org) (544) 278-6173   -Sober Fun Activities: www.soberIndependaactivitiesWaveDeck/[facility]//[facility] Recovery Connection ([facility])  [facility] connects people seeking recovery to resources that help foster and sustain long-term recovery.  Whether you are seeking resources for treatment, transportation, housing, job training, education, health care or other pathways to recovery, [facility] is a great place to start.   Phone: [phone]. www.[employer].VoiceGem (Great listing of all types of recovery and non-recovery related resources)

## 2025-03-19 DIAGNOSIS — F41.9 ANXIETY: ICD-10-CM

## 2025-03-20 RX ORDER — VENLAFAXINE HYDROCHLORIDE 37.5 MG/1
37.5 CAPSULE, EXTENDED RELEASE ORAL DAILY
Qty: 30 CAPSULE | Refills: 1 | Status: SHIPPED | OUTPATIENT
Start: 2025-03-20

## 2025-04-01 ENCOUNTER — APPOINTMENT (OUTPATIENT)
Dept: CT IMAGING | Facility: CLINIC | Age: 35
End: 2025-04-01
Attending: EMERGENCY MEDICINE
Payer: COMMERCIAL

## 2025-04-01 ENCOUNTER — HOSPITAL ENCOUNTER (EMERGENCY)
Facility: CLINIC | Age: 35
Discharge: HOME | End: 2025-04-01
Attending: EMERGENCY MEDICINE | Admitting: EMERGENCY MEDICINE
Payer: COMMERCIAL

## 2025-04-01 VITALS
DIASTOLIC BLOOD PRESSURE: 75 MMHG | RESPIRATION RATE: 20 BRPM | SYSTOLIC BLOOD PRESSURE: 120 MMHG | OXYGEN SATURATION: 98 % | HEART RATE: 60 BPM | TEMPERATURE: 98 F

## 2025-04-01 DIAGNOSIS — R11.2 CANNABINOID HYPEREMESIS SYNDROME: ICD-10-CM

## 2025-04-01 DIAGNOSIS — K52.9 ENTEROCOLITIS: ICD-10-CM

## 2025-04-01 DIAGNOSIS — F12.90 CANNABINOID HYPEREMESIS SYNDROME: ICD-10-CM

## 2025-04-01 LAB
ALBUMIN SERPL BCG-MCNC: 4.4 G/DL (ref 3.5–5.2)
ALBUMIN UR-MCNC: 30 MG/DL
ALP SERPL-CCNC: 62 U/L (ref 40–150)
ALT SERPL W P-5'-P-CCNC: 17 U/L (ref 0–70)
AMPHETAMINES UR QL SCN: ABNORMAL
ANION GAP SERPL CALCULATED.3IONS-SCNC: 18 MMOL/L (ref 7–15)
APPEARANCE UR: ABNORMAL
AST SERPL W P-5'-P-CCNC: 22 U/L (ref 0–45)
BARBITURATES UR QL SCN: ABNORMAL
BASOPHILS # BLD AUTO: 0 10E3/UL (ref 0–0.2)
BASOPHILS NFR BLD AUTO: 0 %
BENZODIAZ UR QL SCN: ABNORMAL
BILIRUB SERPL-MCNC: 0.6 MG/DL
BILIRUB UR QL STRIP: NEGATIVE
BUN SERPL-MCNC: 12.6 MG/DL (ref 6–20)
BZE UR QL SCN: ABNORMAL
CALCIUM SERPL-MCNC: 9.1 MG/DL (ref 8.8–10.4)
CANNABINOIDS UR QL SCN: ABNORMAL
CHLORIDE SERPL-SCNC: 103 MMOL/L (ref 98–107)
COLOR UR AUTO: YELLOW
CREAT SERPL-MCNC: 0.98 MG/DL (ref 0.67–1.17)
EGFRCR SERPLBLD CKD-EPI 2021: >90 ML/MIN/1.73M2
EOSINOPHIL # BLD AUTO: 0 10E3/UL (ref 0–0.7)
EOSINOPHIL NFR BLD AUTO: 0 %
ERYTHROCYTE [DISTWIDTH] IN BLOOD BY AUTOMATED COUNT: 12.4 % (ref 10–15)
FENTANYL UR QL: ABNORMAL
GLUCOSE SERPL-MCNC: 182 MG/DL (ref 70–99)
GLUCOSE UR STRIP-MCNC: 200 MG/DL
HCO3 SERPL-SCNC: 18 MMOL/L (ref 22–29)
HCT VFR BLD AUTO: 41.2 % (ref 40–53)
HGB BLD-MCNC: 13.7 G/DL (ref 13.3–17.7)
HGB UR QL STRIP: NEGATIVE
IMM GRANULOCYTES # BLD: 0 10E3/UL
IMM GRANULOCYTES NFR BLD: 0 %
KETONES UR STRIP-MCNC: 40 MG/DL
LEUKOCYTE ESTERASE UR QL STRIP: NEGATIVE
LIPASE SERPL-CCNC: 15 U/L (ref 13–60)
LYMPHOCYTES # BLD AUTO: 1.1 10E3/UL (ref 0.8–5.3)
LYMPHOCYTES NFR BLD AUTO: 9 %
MCH RBC QN AUTO: 31.3 PG (ref 26.5–33)
MCHC RBC AUTO-ENTMCNC: 33.3 G/DL (ref 31.5–36.5)
MCV RBC AUTO: 94 FL (ref 78–100)
MONOCYTES # BLD AUTO: 0.6 10E3/UL (ref 0–1.3)
MONOCYTES NFR BLD AUTO: 5 %
MUCOUS THREADS #/AREA URNS LPF: PRESENT /LPF
NEUTROPHILS # BLD AUTO: 10.5 10E3/UL (ref 1.6–8.3)
NEUTROPHILS NFR BLD AUTO: 86 %
NITRATE UR QL: NEGATIVE
NRBC # BLD AUTO: 0 10E3/UL
NRBC BLD AUTO-RTO: 0 /100
OPIATES UR QL SCN: ABNORMAL
PCP QUAL URINE (ROCHE): ABNORMAL
PH UR STRIP: 5.5 [PH] (ref 5–7)
PLAT MORPH BLD: NORMAL
PLATELET # BLD AUTO: 176 10E3/UL (ref 150–450)
POTASSIUM SERPL-SCNC: 3.9 MMOL/L (ref 3.4–5.3)
PROT SERPL-MCNC: 7.8 G/DL (ref 6.4–8.3)
RBC # BLD AUTO: 4.38 10E6/UL (ref 4.4–5.9)
RBC MORPH BLD: NORMAL
RBC URINE: 0 /HPF
SODIUM SERPL-SCNC: 139 MMOL/L (ref 135–145)
SP GR UR STRIP: 1.03 (ref 1–1.03)
SQUAMOUS EPITHELIAL: <1 /HPF
UROBILINOGEN UR STRIP-MCNC: NORMAL MG/DL
WBC # BLD AUTO: 12.2 10E3/UL (ref 4–11)
WBC URINE: 3 /HPF

## 2025-04-01 PROCEDURE — 258N000003 HC RX IP 258 OP 636: Performed by: EMERGENCY MEDICINE

## 2025-04-01 PROCEDURE — 96361 HYDRATE IV INFUSION ADD-ON: CPT | Performed by: EMERGENCY MEDICINE

## 2025-04-01 PROCEDURE — 81003 URINALYSIS AUTO W/O SCOPE: CPT | Performed by: EMERGENCY MEDICINE

## 2025-04-01 PROCEDURE — 99285 EMERGENCY DEPT VISIT HI MDM: CPT | Mod: 25 | Performed by: EMERGENCY MEDICINE

## 2025-04-01 PROCEDURE — 96375 TX/PRO/DX INJ NEW DRUG ADDON: CPT | Performed by: EMERGENCY MEDICINE

## 2025-04-01 PROCEDURE — 93005 ELECTROCARDIOGRAM TRACING: CPT | Performed by: EMERGENCY MEDICINE

## 2025-04-01 PROCEDURE — 84155 ASSAY OF PROTEIN SERUM: CPT | Performed by: EMERGENCY MEDICINE

## 2025-04-01 PROCEDURE — 250N000011 HC RX IP 250 OP 636: Performed by: EMERGENCY MEDICINE

## 2025-04-01 PROCEDURE — 83690 ASSAY OF LIPASE: CPT | Performed by: EMERGENCY MEDICINE

## 2025-04-01 PROCEDURE — 96374 THER/PROPH/DIAG INJ IV PUSH: CPT | Performed by: EMERGENCY MEDICINE

## 2025-04-01 PROCEDURE — 96376 TX/PRO/DX INJ SAME DRUG ADON: CPT | Performed by: EMERGENCY MEDICINE

## 2025-04-01 PROCEDURE — 82310 ASSAY OF CALCIUM: CPT | Performed by: EMERGENCY MEDICINE

## 2025-04-01 PROCEDURE — 99284 EMERGENCY DEPT VISIT MOD MDM: CPT | Performed by: EMERGENCY MEDICINE

## 2025-04-01 PROCEDURE — 74176 CT ABD & PELVIS W/O CONTRAST: CPT

## 2025-04-01 PROCEDURE — 84075 ASSAY ALKALINE PHOSPHATASE: CPT | Performed by: EMERGENCY MEDICINE

## 2025-04-01 PROCEDURE — 93010 ELECTROCARDIOGRAM REPORT: CPT | Performed by: EMERGENCY MEDICINE

## 2025-04-01 PROCEDURE — 85014 HEMATOCRIT: CPT | Performed by: EMERGENCY MEDICINE

## 2025-04-01 PROCEDURE — 36415 COLL VENOUS BLD VENIPUNCTURE: CPT | Performed by: EMERGENCY MEDICINE

## 2025-04-01 PROCEDURE — 85004 AUTOMATED DIFF WBC COUNT: CPT | Performed by: EMERGENCY MEDICINE

## 2025-04-01 PROCEDURE — 80307 DRUG TEST PRSMV CHEM ANLYZR: CPT | Performed by: EMERGENCY MEDICINE

## 2025-04-01 RX ORDER — METOCLOPRAMIDE HYDROCHLORIDE 5 MG/ML
5 INJECTION INTRAMUSCULAR; INTRAVENOUS ONCE
Status: COMPLETED | OUTPATIENT
Start: 2025-04-01 | End: 2025-04-01

## 2025-04-01 RX ORDER — DIPHENHYDRAMINE HYDROCHLORIDE 50 MG/ML
25 INJECTION, SOLUTION INTRAMUSCULAR; INTRAVENOUS ONCE
Status: COMPLETED | OUTPATIENT
Start: 2025-04-01 | End: 2025-04-01

## 2025-04-01 RX ORDER — OLANZAPINE 10 MG/1
1.25 INJECTION, POWDER, LYOPHILIZED, FOR SOLUTION INTRAMUSCULAR ONCE
Status: COMPLETED | OUTPATIENT
Start: 2025-04-01 | End: 2025-04-01

## 2025-04-01 RX ORDER — MORPHINE SULFATE 4 MG/ML
4 INJECTION, SOLUTION INTRAMUSCULAR; INTRAVENOUS ONCE
Status: COMPLETED | OUTPATIENT
Start: 2025-04-01 | End: 2025-04-01

## 2025-04-01 RX ORDER — METOCLOPRAMIDE HYDROCHLORIDE 5 MG/ML
5 INJECTION INTRAMUSCULAR; INTRAVENOUS ONCE
Status: DISCONTINUED | OUTPATIENT
Start: 2025-04-01 | End: 2025-04-01

## 2025-04-01 RX ORDER — ONDANSETRON 4 MG/1
4 TABLET, ORALLY DISINTEGRATING ORAL EVERY 8 HOURS PRN
Qty: 10 TABLET | Refills: 0 | Status: SHIPPED | OUTPATIENT
Start: 2025-04-01 | End: 2025-04-03

## 2025-04-01 RX ORDER — OLANZAPINE 10 MG/1
2.5 INJECTION, POWDER, LYOPHILIZED, FOR SOLUTION INTRAMUSCULAR ONCE
Status: COMPLETED | OUTPATIENT
Start: 2025-04-01 | End: 2025-04-01

## 2025-04-01 RX ORDER — ONDANSETRON 2 MG/ML
4 INJECTION INTRAMUSCULAR; INTRAVENOUS ONCE
Status: DISCONTINUED | OUTPATIENT
Start: 2025-04-01 | End: 2025-04-01

## 2025-04-01 RX ADMIN — MORPHINE SULFATE 4 MG: 4 INJECTION, SOLUTION INTRAMUSCULAR; INTRAVENOUS at 11:39

## 2025-04-01 RX ADMIN — MORPHINE SULFATE 4 MG: 4 INJECTION, SOLUTION INTRAMUSCULAR; INTRAVENOUS at 14:34

## 2025-04-01 RX ADMIN — DIPHENHYDRAMINE HYDROCHLORIDE 25 MG: 50 INJECTION, SOLUTION INTRAMUSCULAR; INTRAVENOUS at 11:39

## 2025-04-01 RX ADMIN — PROCHLORPERAZINE EDISYLATE 10 MG: 5 INJECTION INTRAMUSCULAR; INTRAVENOUS at 11:39

## 2025-04-01 RX ADMIN — OLANZAPINE 2.5 MG: 10 INJECTION, POWDER, FOR SOLUTION INTRAMUSCULAR at 14:34

## 2025-04-01 RX ADMIN — SODIUM CHLORIDE 1000 ML: 0.9 INJECTION, SOLUTION INTRAVENOUS at 11:43

## 2025-04-01 RX ADMIN — METOCLOPRAMIDE HYDROCHLORIDE 5 MG: 5 INJECTION INTRAMUSCULAR; INTRAVENOUS at 16:36

## 2025-04-01 RX ADMIN — OLANZAPINE 1.25 MG: 10 INJECTION, POWDER, FOR SOLUTION INTRAMUSCULAR at 17:14

## 2025-04-01 RX ADMIN — MORPHINE SULFATE 4 MG: 4 INJECTION, SOLUTION INTRAMUSCULAR; INTRAVENOUS at 17:14

## 2025-04-01 ASSESSMENT — ACTIVITIES OF DAILY LIVING (ADL)
ADLS_ACUITY_SCORE: 50

## 2025-04-01 NOTE — ED PROVIDER NOTES
ED Provider Note  [facility]      History     Chief Complaint   Patient presents with    Abdominal Pain     20G L AC. 15 mg toradol, 4 mg zofran. [facility]. 4am severe abd pain. Pt states sober for 2 months ETOH. smokes daily THC. Pain 12/10.      HPI  Ravi Hankins is a 34 year old male who presents to the emergency department with mid abdominal pain.  Patient states that he developed mid abdominal pain associated nausea, vomiting, and diarrhea at 4:00 this morning.  He states his vomiting has consisted of yellow liquid.  He has had brown, watery diarrhea.  He denies any recent antibiotic use.  He denies any fever.  No history of abdominal surgery.  He does smoke marijuana.    Past Medical History  Past Medical History:   Diagnosis Date    Dilated aortic root      Past Surgical History:   Procedure Laterality Date    NO HISTORY OF SURGERY       atenolol (TENORMIN) 100 MG tablet  folic acid (FOLVITE) 1 MG tablet  hydrOXYzine HCl (ATARAX) 25 MG tablet  melatonin 3 MG tablet  multivitamin w/minerals (THERA-VIT-M) tablet  thiamine (B-1) 100 MG tablet  traZODone (DESYREL) 50 MG tablet  venlafaxine (EFFEXOR XR) 37.5 MG 24 hr capsule      Allergies   Allergen Reactions    Contrast Dye      PN: LW CM1:  Reaction :  rash    Sulfa Antibiotics      PN: LW Reaction: unsure which kid has allergy per mom???     Family History  Family History   Problem Relation Age of Onset    Heart Disease Brother     Abdominal Aortic Aneurysm Brother     Heart Disease Other         self    Substance Abuse Mother     Substance Abuse Father     Substance Abuse Maternal Grandfather     Diabetes No family hx of     Coronary Artery Disease No family hx of     Hypertension No family hx of     Hyperlipidemia No family hx of     Cerebrovascular Disease No family hx of     Breast Cancer No family hx of     Colon Cancer No family hx of     Prostate Cancer No family hx of     Other Cancer No family hx of      Depression No family hx of     Anxiety Disorder No family hx of     Mental Illness No family hx of     Anesthesia Reaction No family hx of     Asthma No family hx of     Osteoporosis No family hx of     Genetic Disorder No family hx of     Thyroid Disease No family hx of     Obesity No family hx of     Unknown/Adopted No family hx of      Social History   Social History     Tobacco Use    Smoking status: Former     Current packs/day: 0.00     Average packs/day: 0.1 packs/day for 5.0 years (0.5 ttl pk-yrs)     Types: Cigarettes     Start date: 2017     Quit date: 2022     Years since quitting: 3.2    Smokeless tobacco: Never    Tobacco comments:     1-2 cigarettes a day for 5 years   Vaping Use    Vaping status: Some Days    Start date: 2/22/2021    Substances: Nicotine    Devices: Disposable   Substance Use Topics    Alcohol use: Yes     Comment: daily    Drug use: Yes     Types: Marijuana     Comment: Pt reports smoking a blunt today, pt reports smoking canabis everyday..      A medically appropriate review of systems was performed with pertinent positives and negatives noted in the HPI, and all other systems negative.    Physical Exam   BP: 139/79  Pulse: 60  Resp: 20  SpO2: 100 %  Physical Exam  Vitals and nursing note reviewed.   Constitutional:       General: He is in acute distress.      Appearance: He is not diaphoretic.   HENT:      Head: Atraumatic.   Eyes:      General: No scleral icterus.     Pupils: Pupils are equal, round, and reactive to light.   Cardiovascular:      Rate and Rhythm: Normal rate and regular rhythm.      Heart sounds: Normal heart sounds.   Pulmonary:      Effort: No respiratory distress.      Breath sounds: Normal breath sounds.   Abdominal:      General: Bowel sounds are normal.      Palpations: Abdomen is soft.      Tenderness: There is abdominal tenderness.       Musculoskeletal:         General: No tenderness.   Skin:     General: Skin is warm and dry.      Findings: No rash.    Neurological:      General: No focal deficit present.      Motor: No weakness.      Coordination: Coordination normal.   Psychiatric:         Mood and Affect: Mood normal.           ED Course, Procedures, & Data      Procedures            EKG Interpretation:      Interpreted by JOHNNY MONTALVO MD, MD  Time reviewed: 1426  Symptoms at time of EKG: vomiting   Rhythm: sinus bradycardia  Rate: 55  Axis: normal  Ectopy: none  Conduction: normal  ST Segments/ T Waves: No ST-T wave changes  Q Waves: none  Comparison to prior: Unchanged    Clinical Impression: normal EKG           Results for orders placed or performed during the hospital encounter of 04/01/25   CT Abdomen Pelvis w/o Contrast     Status: None    Narrative    EXAM: CT ABDOMEN PELVIS W/O CONTRAST  LOCATION: [facility]  DATE: 4/1/2025    INDICATION: Mid abdominal pain, vomiting  COMPARISON: CTs AP 2/28/2025,1/17/2025 and multiple prior studies  TECHNIQUE: CT scan of the abdomen and pelvis was performed without IV contrast. Multiplanar reformats were obtained. Dose reduction techniques were used.  CONTRAST: None.    FINDINGS:   LOWER CHEST: Visualized lungs are clear. No pleural effusion. Heart size normal with no pericardial effusion.     HEPATOBILIARY: Liver is normal.  Previously seen diffuse hepatic steatosis has resolved. Gallbladder unremarkable with no visible stone or sludge material.  No  bile duct dilatation.     PANCREAS: Normal.    SPLEEN: Spleen size normal.    ADRENAL GLANDS: Normal.    KIDNEYS/BLADDER: A 5 mm and three 1 mm nonobstructing right kidney stones and a 2 mm nonobstructing left kidney stone are unchanged. Kidneys, ureters and bladder are otherwise normal.    BOWEL: Normal appendix. Fluid-filled loops of small bowel and borderline mural thickening and liquid stool throughout the colon would be compatible with a mild nonspecific enterocolitis in the proper clinical setting. No bowel  obstruction. No free fluid.    LYMPH NODES: No lymphadenopathy.    VASCULATURE: Normal caliber abdominal aorta.      PELVIC ORGANS: No pelvic mass or fluid.    MUSCULOSKELETAL: Unremarkable.      Impression    IMPRESSION:   1.  Fluid-filled loops of small bowel and borderline mural thickening and liquid stool throughout the colon would be compatible with a mild nonspecific enterocolitis in the proper clinical setting.  2.  Kidney stones unchanged.  3.  Previously seen diffuse hepatic steatosis has resolved.       Comprehensive metabolic panel     Status: Abnormal   Result Value Ref Range    Sodium 139 135 - 145 mmol/L    Potassium 3.9 3.4 - 5.3 mmol/L    Carbon Dioxide (CO2) 18 (L) 22 - 29 mmol/L    Anion Gap 18 (H) 7 - 15 mmol/L    Urea Nitrogen 12.6 6.0 - 20.0 mg/dL    Creatinine 0.98 0.67 - 1.17 mg/dL    GFR Estimate >90 >60 mL/min/1.73m2    Calcium 9.1 8.8 - 10.4 mg/dL    Chloride 103 98 - 107 mmol/L    Glucose 182 (H) 70 - 99 mg/dL    Alkaline Phosphatase 62 40 - 150 U/L    AST 22 0 - 45 U/L    ALT 17 0 - 70 U/L    Protein Total 7.8 6.4 - 8.3 g/dL    Albumin 4.4 3.5 - 5.2 g/dL    Bilirubin Total 0.6 <=1.2 mg/dL   Lipase     Status: Normal   Result Value Ref Range    Lipase 15 13 - 60 U/L   CBC with platelets and differential     Status: Abnormal   Result Value Ref Range    WBC Count 12.2 (H) 4.0 - 11.0 10e3/uL    RBC Count 4.38 (L) 4.40 - 5.90 10e6/uL    Hemoglobin 13.7 13.3 - 17.7 g/dL    Hematocrit 41.2 40.0 - 53.0 %    MCV 94 78 - 100 fL    MCH 31.3 26.5 - 33.0 pg    MCHC 33.3 31.5 - 36.5 g/dL    RDW 12.4 10.0 - 15.0 %    Platelet Count 176 150 - 450 10e3/uL    % Neutrophils 86 %    % Lymphocytes 9 %    % Monocytes 5 %    % Eosinophils 0 %    % Basophils 0 %    % Immature Granulocytes 0 %    NRBCs per 100 WBC 0 <1 /100    Absolute Neutrophils 10.5 (H) 1.6 - 8.3 10e3/uL    Absolute Lymphocytes 1.1 0.8 - 5.3 10e3/uL    Absolute Monocytes 0.6 0.0 - 1.3 10e3/uL    Absolute Eosinophils 0.0 0.0 - 0.7 10e3/uL     Absolute Basophils 0.0 0.0 - 0.2 10e3/uL    Absolute Immature Granulocytes 0.0 <=0.4 10e3/uL    Absolute NRBCs 0.0 10e3/uL   RBC and Platelet Morphology     Status: None   Result Value Ref Range    RBC Morphology Confirmed RBC Indices     Platelet Assessment  Automated Count Confirmed. Platelet morphology is normal.     Automated Count Confirmed. Platelet morphology is normal.   EKG 12-lead, tracing only     Status: None (Preliminary result)   Result Value Ref Range    Systolic Blood Pressure  mmHg    Diastolic Blood Pressure  mmHg    Ventricular Rate 55 BPM    Atrial Rate 55 BPM    UT Interval 184 ms    QRS Duration 96 ms     ms    QTc 436 ms    P Axis 4 degrees    R AXIS 66 degrees    T Axis 68 degrees    Interpretation ECG       Sinus bradycardia with sinus arrhythmia  Otherwise normal ECG     CBC with platelets differential     Status: Abnormal    Narrative    The following orders were created for panel order CBC with platelets differential.  Procedure                               Abnormality         Status                     ---------                               -----------         ------                     CBC with platelets and ...[[id number]]  Abnormal            Final result               RBC and Platelet Morpho...[[id number]]                      Final result                 Please view results for these tests on the individual orders.     Medications   sodium chloride 0.9% BOLUS 1,000 mL (0 mLs Intravenous Stopped 4/1/25 1311)   morphine (PF) injection 4 mg (4 mg Intravenous $Given 4/1/25 1139)   prochlorperazine (COMPAZINE) injection 10 mg (10 mg Intravenous $Given 4/1/25 1139)   diphenhydrAMINE (BENADRYL) injection 25 mg (25 mg Intravenous $Given 4/1/25 1139)   morphine (PF) injection 4 mg (4 mg Intravenous $Given 4/1/25 1434)   OLANZapine (zyPREXA) IV injection 2.5 mg (2.5 mg Intravenous $Given 4/1/25 1434)     Labs Ordered and Resulted from Time of ED Arrival to Time of ED Departure    COMPREHENSIVE METABOLIC PANEL - Abnormal       Result Value    Sodium 139      Potassium 3.9      Carbon Dioxide (CO2) 18 (*)     Anion Gap 18 (*)     Urea Nitrogen 12.6      Creatinine 0.98      GFR Estimate >90      Calcium 9.1      Chloride 103      Glucose 182 (*)     Alkaline Phosphatase 62      AST 22      ALT 17      Protein Total 7.8      Albumin 4.4      Bilirubin Total 0.6     CBC WITH PLATELETS AND DIFFERENTIAL - Abnormal    WBC Count 12.2 (*)     RBC Count 4.38 (*)     Hemoglobin 13.7      Hematocrit 41.2      MCV 94      MCH 31.3      MCHC 33.3      RDW 12.4      Platelet Count 176      % Neutrophils 86      % Lymphocytes 9      % Monocytes 5      % Eosinophils 0      % Basophils 0      % Immature Granulocytes 0      NRBCs per 100 WBC 0      Absolute Neutrophils 10.5 (*)     Absolute Lymphocytes 1.1      Absolute Monocytes 0.6      Absolute Eosinophils 0.0      Absolute Basophils 0.0      Absolute Immature Granulocytes 0.0      Absolute NRBCs 0.0     LIPASE - Normal    Lipase 15     RBC AND PLATELET MORPHOLOGY    RBC Morphology Confirmed RBC Indices      Platelet Assessment        Value: Automated Count Confirmed. Platelet morphology is normal.   ROUTINE UA WITH MICROSCOPIC REFLEX TO CULTURE     CT Abdomen Pelvis w/o Contrast   Final Result   IMPRESSION:    1.  Fluid-filled loops of small bowel and borderline mural thickening and liquid stool throughout the colon would be compatible with a mild nonspecific enterocolitis in the proper clinical setting.   2.  Kidney stones unchanged.   3.  Previously seen diffuse hepatic steatosis has resolved.               Reviewed [facility] emergency department encounter on 6/12/2024 for gastritis and alcohol use disorder.  Reviewed [facility] emergency department encounter from 5/29/2024 for abdominal pain.  Reviewed previous CBC, metabolic panel, lipase, alcohol level, CT abdomen/pelvis-renal calculi without ureteral stones on 5/29/2024.    Critical care was  not performed.     Medical Decision Making  The patient's presentation was of moderate complexity (an undiagnosed new problem with uncertain prognosis).    The patient's evaluation involved:  review of external note(s) from 2 sources (see separate area of note for details)  review of 3+ test result(s) ordered prior to this encounter (see separate area of note for details)  ordering and/or review of 3+ test(s) in this encounter (see separate area of note for details)  independent interpretation of testing performed by another health professional (EKG interpreted by me with above results.)    The patient's management necessitated high risk (drug therapy requiring intensive monitoring (intravenous olanzapine)).    Assessment & Plan    34 year old male with history of substance use disorder including current marijuana use who presents to the emergency department with mid abdominal pain with associated vomiting and diarrhea.  Patient is in severe distress here in the emergency department.  He has normal vital signs and no peritonitis.  Patient's labs remarkable for leukocytosis with a white count of 12,000.  He has a mild anion gap acidosis.  His lipase is normal-do not suspect pancreatitis.  No abnormal liver enzymes-do not suspect acute hepatitis or biliary obstruction.  No right upper quadrant tenderness to suggest cholecystitis.  Abdomen/pelvis CT reveals fluid-filled loops of small bowel and borderline mural thickening and liquid stool throughout the colon compatible with nonspecific enterocolitis.  Suspect that is the cause of his symptoms.  Also suspect cannabinoid hyperemesis syndrome contributing.  An EKG was obtained which was normal.  The patient was given morphine and Compazine originally.  He subsequently received an additional dose of morphine.  The symptoms are now fairly well controlled after IV Zyprexa.  He did experience some mild recurrent nausea with attempt at p.o. intake.  Reglan given.  Will  continue to monitor.  Anticipate discharge.  Prescription for Zofran sent to his pharmacy.    I have reviewed the nursing notes. I have reviewed the findings, diagnosis, plan and need for follow up with the patient.    New Prescriptions    No medications on file       Final diagnoses:   Enterocolitis   Cannabinoid hyperemesis syndrome     Chart documentation was completed with Dragon voice-recognition software. Even though reviewed, this chart may still contain some grammatical, spelling, and word errors.     Jose Ferguson Md  [facility] EMERGENCY DEPARTMENT  4/1/2025     Jose Ferguson MD  04/01/25 1603       Jose Ferguson MD  04/01/25 1602       Jose Ferguson MD  04/01/25 8600

## 2025-04-01 NOTE — DISCHARGE INSTRUCTIONS
Take ondansetron as needed for nausea.  Your symptoms are likely worsened by marijuana use.  You should discontinue marijuana use.  Clear liquid diet-advance as tolerated.  Begin with a sports drink such as Gatorade diluted half with water.  Advance as tolerated.    Follow-up with your primary care clinic.    Return if fever or other concerns.

## 2025-04-01 NOTE — ED NOTES
Bed: TANG  Expected date:   Expected time:   Means of arrival:   Comments:  Theodore 782    35 yo M  Abd pain

## 2025-04-01 NOTE — ED TRIAGE NOTES
20G L AC. 15 mg toradol, 4 mg zofran. [facility]. 4am severe abd pain. Pt states sober for 2 months ETOH. smokes daily THC. Pain 12/10. . Pt endorses diarrhea. Dry heaving in hallway.      Triage Assessment (Adult)       Row Name 04/01/25 1116          Triage Assessment    Airway WDL WDL        Respiratory WDL    Respiratory WDL WDL        Skin Circulation/Temperature WDL    Skin Circulation/Temperature WDL WDL        Cardiac WDL    Cardiac WDL WDL        Peripheral/Neurovascular WDL    Peripheral Neurovascular WDL WDL        Cognitive/Neuro/Behavioral WDL    Cognitive/Neuro/Behavioral WDL WDL

## 2025-04-02 ENCOUNTER — PATIENT OUTREACH (OUTPATIENT)
Dept: CARE COORDINATION | Facility: CLINIC | Age: 35
End: 2025-04-02

## 2025-04-02 LAB
ATRIAL RATE - MUSE: 55 BPM
DIASTOLIC BLOOD PRESSURE - MUSE: NORMAL MMHG
INTERPRETATION ECG - MUSE: NORMAL
P AXIS - MUSE: 4 DEGREES
PR INTERVAL - MUSE: 184 MS
QRS DURATION - MUSE: 96 MS
QT - MUSE: 456 MS
QTC - MUSE: 436 MS
R AXIS - MUSE: 66 DEGREES
SYSTOLIC BLOOD PRESSURE - MUSE: NORMAL MMHG
T AXIS - MUSE: 68 DEGREES
VENTRICULAR RATE- MUSE: 55 BPM

## 2025-04-02 NOTE — PROGRESS NOTES
[facility] Resource [place]  Community Health Worker Initial Outreach    CHW Initial Information Gathering:  Referral Source: ED Follow-Up  CHW Additional Questions  If ED/Hospital discharge, follow-up appointment scheduled as recommended?: No  Patient agreeable to assistance with scheduling?: Yes  CHW assisted patient to schedule (specify): CHW scheduled ED follow-up for 4/21/25 with PCP  [facility] active?: No    Patient accepts CC: No, patient declined at this time.Unable to send care coordination introduction letter since [facility] is not active.  Clinic Care Coordination services remain available via referral if needed.        Diamond Jaime  Community Health Worker  [facility] [place]    *Connected Care Resource Team does NOT follow patient ongoing. Referrals are identified based on internal discharge reports and the outreach is to ensure patient has an understanding of their discharge instructions.

## 2025-04-03 DIAGNOSIS — R11.0 NAUSEA: Primary | ICD-10-CM

## 2025-04-03 RX ORDER — ONDANSETRON 4 MG/1
4 TABLET, ORALLY DISINTEGRATING ORAL EVERY 8 HOURS PRN
Qty: 30 TABLET | Refills: 0 | Status: SHIPPED | OUTPATIENT
Start: 2025-04-03

## 2025-04-03 NOTE — TELEPHONE ENCOUNTER
Medication passed protocol, however, refill RN could not approve because needing provider review. Should therapy be completed at this time or continued? Provider, please approve or deny the prescription.

## 2025-04-03 NOTE — TELEPHONE ENCOUNTER
[employer] called.   Patient is a member of restricted recipient line.   Wondering if we can refill the prescription instead?  Patient is restricted to A.S.  Thanks!  Leslie BONE

## 2025-04-03 NOTE — TELEPHONE ENCOUNTER
1. Nausea (Primary)  - ondansetron (ZOFRAN ODT) 4 MG ODT tab; Take 1 tablet (4 mg) by mouth every 8 hours as needed for nausea.  Dispense: 30 tablet; Refill: 0

## 2025-04-27 ENCOUNTER — HOSPITAL ENCOUNTER (EMERGENCY)
Facility: CLINIC | Age: 35
Discharge: HOME | End: 2025-04-28
Attending: EMERGENCY MEDICINE | Admitting: EMERGENCY MEDICINE
Payer: COMMERCIAL

## 2025-04-27 DIAGNOSIS — R10.84 GENERALIZED ABDOMINAL PAIN: ICD-10-CM

## 2025-04-27 DIAGNOSIS — R11.2 NAUSEA AND VOMITING, UNSPECIFIED VOMITING TYPE: ICD-10-CM

## 2025-04-27 DIAGNOSIS — F12.288 CANNABIS HYPEREMESIS SYNDROME CONCURRENT WITH AND DUE TO CANNABIS DEPENDENCE (H): ICD-10-CM

## 2025-04-27 PROCEDURE — 93005 ELECTROCARDIOGRAM TRACING: CPT | Performed by: EMERGENCY MEDICINE

## 2025-04-27 PROCEDURE — 96375 TX/PRO/DX INJ NEW DRUG ADDON: CPT | Performed by: EMERGENCY MEDICINE

## 2025-04-27 PROCEDURE — 96374 THER/PROPH/DIAG INJ IV PUSH: CPT | Performed by: EMERGENCY MEDICINE

## 2025-04-27 PROCEDURE — 93010 ELECTROCARDIOGRAM REPORT: CPT | Performed by: EMERGENCY MEDICINE

## 2025-04-27 PROCEDURE — 99284 EMERGENCY DEPT VISIT MOD MDM: CPT | Mod: 25 | Performed by: EMERGENCY MEDICINE

## 2025-04-27 PROCEDURE — 99284 EMERGENCY DEPT VISIT MOD MDM: CPT | Performed by: EMERGENCY MEDICINE

## 2025-04-27 PROCEDURE — 250N000011 HC RX IP 250 OP 636: Performed by: EMERGENCY MEDICINE

## 2025-04-27 PROCEDURE — 96361 HYDRATE IV INFUSION ADD-ON: CPT | Performed by: EMERGENCY MEDICINE

## 2025-04-27 RX ORDER — MORPHINE SULFATE 4 MG/ML
4 INJECTION, SOLUTION INTRAMUSCULAR; INTRAVENOUS ONCE
Status: COMPLETED | OUTPATIENT
Start: 2025-04-28 | End: 2025-04-28

## 2025-04-27 RX ORDER — ONDANSETRON 2 MG/ML
4 INJECTION INTRAMUSCULAR; INTRAVENOUS
Status: COMPLETED | OUTPATIENT
Start: 2025-04-27 | End: 2025-04-27

## 2025-04-27 RX ADMIN — ONDANSETRON 4 MG: 2 INJECTION INTRAMUSCULAR; INTRAVENOUS at 23:51

## 2025-04-27 ASSESSMENT — COLUMBIA-SUICIDE SEVERITY RATING SCALE - C-SSRS
6. HAVE YOU EVER DONE ANYTHING, STARTED TO DO ANYTHING, OR PREPARED TO DO ANYTHING TO END YOUR LIFE?: NO
1. IN THE PAST MONTH, HAVE YOU WISHED YOU WERE DEAD OR WISHED YOU COULD GO TO SLEEP AND NOT WAKE UP?: NO
2. HAVE YOU ACTUALLY HAD ANY THOUGHTS OF KILLING YOURSELF IN THE PAST MONTH?: NO

## 2025-04-28 ENCOUNTER — HOSPITAL ENCOUNTER (EMERGENCY)
Facility: CLINIC | Age: 35
Discharge: HOME | End: 2025-04-28
Attending: FAMILY MEDICINE | Admitting: FAMILY MEDICINE
Payer: COMMERCIAL

## 2025-04-28 ENCOUNTER — APPOINTMENT (OUTPATIENT)
Dept: ULTRASOUND IMAGING | Facility: CLINIC | Age: 35
End: 2025-04-28
Attending: FAMILY MEDICINE
Payer: COMMERCIAL

## 2025-04-28 ENCOUNTER — APPOINTMENT (OUTPATIENT)
Dept: CT IMAGING | Facility: CLINIC | Age: 35
End: 2025-04-28
Attending: FAMILY MEDICINE
Payer: COMMERCIAL

## 2025-04-28 VITALS
BODY MASS INDEX: 20.54 KG/M2 | DIASTOLIC BLOOD PRESSURE: 85 MMHG | OXYGEN SATURATION: 100 % | HEART RATE: 52 BPM | RESPIRATION RATE: 18 BRPM | WEIGHT: 160 LBS | TEMPERATURE: 98.1 F | SYSTOLIC BLOOD PRESSURE: 131 MMHG

## 2025-04-28 VITALS
BODY MASS INDEX: 20.15 KG/M2 | OXYGEN SATURATION: 98 % | HEART RATE: 58 BPM | HEIGHT: 74 IN | DIASTOLIC BLOOD PRESSURE: 88 MMHG | TEMPERATURE: 97.6 F | SYSTOLIC BLOOD PRESSURE: 120 MMHG | WEIGHT: 157 LBS | RESPIRATION RATE: 22 BRPM

## 2025-04-28 DIAGNOSIS — R11.15 CYCLIC VOMITING SYNDROME: ICD-10-CM

## 2025-04-28 DIAGNOSIS — F12.20 CANNABIS DEPENDENCE (H): ICD-10-CM

## 2025-04-28 LAB
ALBUMIN SERPL BCG-MCNC: 4 G/DL (ref 3.5–5.2)
ALBUMIN SERPL BCG-MCNC: 4.4 G/DL (ref 3.5–5.2)
ALBUMIN UR-MCNC: 30 MG/DL
ALP SERPL-CCNC: 52 U/L (ref 40–150)
ALP SERPL-CCNC: 60 U/L (ref 40–150)
ALT SERPL W P-5'-P-CCNC: 14 U/L (ref 0–70)
ALT SERPL W P-5'-P-CCNC: 16 U/L (ref 0–70)
AMPHETAMINES UR QL SCN: ABNORMAL
ANION GAP SERPL CALCULATED.3IONS-SCNC: 10 MMOL/L (ref 7–15)
ANION GAP SERPL CALCULATED.3IONS-SCNC: 13 MMOL/L (ref 7–15)
APPEARANCE UR: CLEAR
AST SERPL W P-5'-P-CCNC: 22 U/L (ref 0–45)
AST SERPL W P-5'-P-CCNC: 23 U/L (ref 0–45)
ATRIAL RATE - MUSE: 44 BPM
ATRIAL RATE - MUSE: 51 BPM
BARBITURATES UR QL SCN: ABNORMAL
BASOPHILS # BLD AUTO: 0 10E3/UL (ref 0–0.2)
BASOPHILS # BLD AUTO: 0 10E3/UL (ref 0–0.2)
BASOPHILS NFR BLD AUTO: 0 %
BASOPHILS NFR BLD AUTO: 0 %
BENZODIAZ UR QL SCN: ABNORMAL
BILIRUB SERPL-MCNC: 0.4 MG/DL
BILIRUB SERPL-MCNC: 0.5 MG/DL
BILIRUB UR QL STRIP: NEGATIVE
BUN SERPL-MCNC: 10.4 MG/DL (ref 6–20)
BUN SERPL-MCNC: 13.5 MG/DL (ref 6–20)
BZE UR QL SCN: ABNORMAL
CALCIUM SERPL-MCNC: 8.3 MG/DL (ref 8.8–10.4)
CALCIUM SERPL-MCNC: 9.3 MG/DL (ref 8.8–10.4)
CANNABINOIDS UR QL SCN: ABNORMAL
CHLORIDE SERPL-SCNC: 100 MMOL/L (ref 98–107)
CHLORIDE SERPL-SCNC: 106 MMOL/L (ref 98–107)
COLOR UR AUTO: YELLOW
CREAT SERPL-MCNC: 0.98 MG/DL (ref 0.67–1.17)
CREAT SERPL-MCNC: 1 MG/DL (ref 0.67–1.17)
DIASTOLIC BLOOD PRESSURE - MUSE: NORMAL MMHG
DIASTOLIC BLOOD PRESSURE - MUSE: NORMAL MMHG
EGFRCR SERPLBLD CKD-EPI 2021: >90 ML/MIN/1.73M2
EGFRCR SERPLBLD CKD-EPI 2021: >90 ML/MIN/1.73M2
EOSINOPHIL # BLD AUTO: 0 10E3/UL (ref 0–0.7)
EOSINOPHIL # BLD AUTO: 0 10E3/UL (ref 0–0.7)
EOSINOPHIL NFR BLD AUTO: 0 %
EOSINOPHIL NFR BLD AUTO: 0 %
ERYTHROCYTE [DISTWIDTH] IN BLOOD BY AUTOMATED COUNT: 12.4 % (ref 10–15)
ERYTHROCYTE [DISTWIDTH] IN BLOOD BY AUTOMATED COUNT: 12.6 % (ref 10–15)
ETHANOL SERPL-MCNC: 0.04 G/DL
FENTANYL UR QL: ABNORMAL
GLUCOSE SERPL-MCNC: 122 MG/DL (ref 70–99)
GLUCOSE SERPL-MCNC: 90 MG/DL (ref 70–99)
GLUCOSE UR STRIP-MCNC: NEGATIVE MG/DL
HCO3 SERPL-SCNC: 24 MMOL/L (ref 22–29)
HCO3 SERPL-SCNC: 24 MMOL/L (ref 22–29)
HCT VFR BLD AUTO: 34.9 % (ref 40–53)
HCT VFR BLD AUTO: 35.8 % (ref 40–53)
HGB BLD-MCNC: 11.6 G/DL (ref 13.3–17.7)
HGB BLD-MCNC: 11.9 G/DL (ref 13.3–17.7)
HGB UR QL STRIP: NEGATIVE
IMM GRANULOCYTES # BLD: 0 10E3/UL
IMM GRANULOCYTES # BLD: 0 10E3/UL
IMM GRANULOCYTES NFR BLD: 0 %
IMM GRANULOCYTES NFR BLD: 0 %
INTERPRETATION ECG - MUSE: NORMAL
INTERPRETATION ECG - MUSE: NORMAL
KETONES UR STRIP-MCNC: ABNORMAL MG/DL
LACTATE SERPL-SCNC: 1.1 MMOL/L (ref 0.7–2)
LACTATE SERPL-SCNC: 1.1 MMOL/L (ref 0.7–2)
LACTATE SERPL-SCNC: 2.1 MMOL/L (ref 0.7–2)
LEUKOCYTE ESTERASE UR QL STRIP: NEGATIVE
LIPASE SERPL-CCNC: 25 U/L (ref 13–60)
LIPASE SERPL-CCNC: 28 U/L (ref 13–60)
LYMPHOCYTES # BLD AUTO: 1.4 10E3/UL (ref 0.8–5.3)
LYMPHOCYTES # BLD AUTO: 1.6 10E3/UL (ref 0.8–5.3)
LYMPHOCYTES NFR BLD AUTO: 23 %
LYMPHOCYTES NFR BLD AUTO: 24 %
MAGNESIUM SERPL-MCNC: 2.1 MG/DL (ref 1.7–2.3)
MCH RBC QN AUTO: 30.6 PG (ref 26.5–33)
MCH RBC QN AUTO: 30.7 PG (ref 26.5–33)
MCHC RBC AUTO-ENTMCNC: 33.2 G/DL (ref 31.5–36.5)
MCHC RBC AUTO-ENTMCNC: 33.2 G/DL (ref 31.5–36.5)
MCV RBC AUTO: 92 FL (ref 78–100)
MCV RBC AUTO: 92 FL (ref 78–100)
MONOCYTES # BLD AUTO: 0.6 10E3/UL (ref 0–1.3)
MONOCYTES # BLD AUTO: 0.9 10E3/UL (ref 0–1.3)
MONOCYTES NFR BLD AUTO: 16 %
MONOCYTES NFR BLD AUTO: 9 %
MUCOUS THREADS #/AREA URNS LPF: PRESENT /LPF
NEUTROPHILS # BLD AUTO: 3.4 10E3/UL (ref 1.6–8.3)
NEUTROPHILS # BLD AUTO: 4.7 10E3/UL (ref 1.6–8.3)
NEUTROPHILS NFR BLD AUTO: 59 %
NEUTROPHILS NFR BLD AUTO: 68 %
NITRATE UR QL: NEGATIVE
NRBC # BLD AUTO: 0 10E3/UL
NRBC # BLD AUTO: 0 10E3/UL
NRBC BLD AUTO-RTO: 0 /100
NRBC BLD AUTO-RTO: 0 /100
OPIATES UR QL SCN: ABNORMAL
P AXIS - MUSE: 61 DEGREES
P AXIS - MUSE: 66 DEGREES
PCP QUAL URINE (ROCHE): ABNORMAL
PH UR STRIP: 6.5 [PH] (ref 5–7)
PLATELET # BLD AUTO: 155 10E3/UL (ref 150–450)
PLATELET # BLD AUTO: 165 10E3/UL (ref 150–450)
POTASSIUM SERPL-SCNC: 3.1 MMOL/L (ref 3.4–5.3)
POTASSIUM SERPL-SCNC: 3.5 MMOL/L (ref 3.4–5.3)
PR INTERVAL - MUSE: 148 MS
PR INTERVAL - MUSE: 156 MS
PROCALCITONIN SERPL IA-MCNC: 0.04 NG/ML
PROT SERPL-MCNC: 6.6 G/DL (ref 6.4–8.3)
PROT SERPL-MCNC: 7.4 G/DL (ref 6.4–8.3)
QRS DURATION - MUSE: 94 MS
QRS DURATION - MUSE: 98 MS
QT - MUSE: 462 MS
QT - MUSE: 480 MS
QTC - MUSE: 410 MS
QTC - MUSE: 425 MS
R AXIS - MUSE: 64 DEGREES
R AXIS - MUSE: 65 DEGREES
RBC # BLD AUTO: 3.78 10E6/UL (ref 4.4–5.9)
RBC # BLD AUTO: 3.89 10E6/UL (ref 4.4–5.9)
RBC URINE: 1 /HPF
SODIUM SERPL-SCNC: 137 MMOL/L (ref 135–145)
SODIUM SERPL-SCNC: 140 MMOL/L (ref 135–145)
SP GR UR STRIP: 1.03 (ref 1–1.03)
SQUAMOUS EPITHELIAL: <1 /HPF
SYSTOLIC BLOOD PRESSURE - MUSE: NORMAL MMHG
SYSTOLIC BLOOD PRESSURE - MUSE: NORMAL MMHG
T AXIS - MUSE: 55 DEGREES
T AXIS - MUSE: 63 DEGREES
TROPONIN T SERPL HS-MCNC: <6 NG/L
UROBILINOGEN UR STRIP-MCNC: NORMAL MG/DL
VENTRICULAR RATE- MUSE: 44 BPM
VENTRICULAR RATE- MUSE: 51 BPM
WBC # BLD AUTO: 5.8 10E3/UL (ref 4–11)
WBC # BLD AUTO: 7 10E3/UL (ref 4–11)
WBC URINE: 9 /HPF

## 2025-04-28 PROCEDURE — 96361 HYDRATE IV INFUSION ADD-ON: CPT | Performed by: FAMILY MEDICINE

## 2025-04-28 PROCEDURE — 93005 ELECTROCARDIOGRAM TRACING: CPT | Performed by: FAMILY MEDICINE

## 2025-04-28 PROCEDURE — 76705 ECHO EXAM OF ABDOMEN: CPT

## 2025-04-28 PROCEDURE — 74176 CT ABD & PELVIS W/O CONTRAST: CPT

## 2025-04-28 PROCEDURE — 96374 THER/PROPH/DIAG INJ IV PUSH: CPT | Performed by: FAMILY MEDICINE

## 2025-04-28 PROCEDURE — 99284 EMERGENCY DEPT VISIT MOD MDM: CPT | Performed by: FAMILY MEDICINE

## 2025-04-28 PROCEDURE — 258N000003 HC RX IP 258 OP 636: Performed by: EMERGENCY MEDICINE

## 2025-04-28 PROCEDURE — 250N000011 HC RX IP 250 OP 636: Performed by: EMERGENCY MEDICINE

## 2025-04-28 PROCEDURE — 99285 EMERGENCY DEPT VISIT HI MDM: CPT | Mod: 25 | Performed by: FAMILY MEDICINE

## 2025-04-28 PROCEDURE — 84484 ASSAY OF TROPONIN QUANT: CPT | Performed by: EMERGENCY MEDICINE

## 2025-04-28 PROCEDURE — 258N000003 HC RX IP 258 OP 636: Performed by: FAMILY MEDICINE

## 2025-04-28 PROCEDURE — 80307 DRUG TEST PRSMV CHEM ANLYZR: CPT | Performed by: FAMILY MEDICINE

## 2025-04-28 PROCEDURE — 36415 COLL VENOUS BLD VENIPUNCTURE: CPT | Performed by: EMERGENCY MEDICINE

## 2025-04-28 PROCEDURE — 96376 TX/PRO/DX INJ SAME DRUG ADON: CPT | Performed by: FAMILY MEDICINE

## 2025-04-28 PROCEDURE — 83690 ASSAY OF LIPASE: CPT | Performed by: FAMILY MEDICINE

## 2025-04-28 PROCEDURE — 83605 ASSAY OF LACTIC ACID: CPT | Performed by: EMERGENCY MEDICINE

## 2025-04-28 PROCEDURE — 96375 TX/PRO/DX INJ NEW DRUG ADDON: CPT | Performed by: FAMILY MEDICINE

## 2025-04-28 PROCEDURE — 83605 ASSAY OF LACTIC ACID: CPT | Performed by: FAMILY MEDICINE

## 2025-04-28 PROCEDURE — 83690 ASSAY OF LIPASE: CPT | Performed by: EMERGENCY MEDICINE

## 2025-04-28 PROCEDURE — 250N000013 HC RX MED GY IP 250 OP 250 PS 637: Performed by: FAMILY MEDICINE

## 2025-04-28 PROCEDURE — 36415 COLL VENOUS BLD VENIPUNCTURE: CPT | Performed by: FAMILY MEDICINE

## 2025-04-28 PROCEDURE — 82040 ASSAY OF SERUM ALBUMIN: CPT | Performed by: EMERGENCY MEDICINE

## 2025-04-28 PROCEDURE — 85004 AUTOMATED DIFF WBC COUNT: CPT | Performed by: FAMILY MEDICINE

## 2025-04-28 PROCEDURE — 83735 ASSAY OF MAGNESIUM: CPT | Performed by: EMERGENCY MEDICINE

## 2025-04-28 PROCEDURE — 84145 PROCALCITONIN (PCT): CPT | Performed by: FAMILY MEDICINE

## 2025-04-28 PROCEDURE — 81003 URINALYSIS AUTO W/O SCOPE: CPT | Performed by: FAMILY MEDICINE

## 2025-04-28 PROCEDURE — 85004 AUTOMATED DIFF WBC COUNT: CPT | Performed by: EMERGENCY MEDICINE

## 2025-04-28 PROCEDURE — 82077 ASSAY SPEC XCP UR&BREATH IA: CPT | Performed by: FAMILY MEDICINE

## 2025-04-28 PROCEDURE — 250N000011 HC RX IP 250 OP 636: Mod: JZ | Performed by: FAMILY MEDICINE

## 2025-04-28 PROCEDURE — 93010 ELECTROCARDIOGRAM REPORT: CPT | Performed by: FAMILY MEDICINE

## 2025-04-28 PROCEDURE — 84155 ASSAY OF PROTEIN SERUM: CPT | Performed by: FAMILY MEDICINE

## 2025-04-28 RX ORDER — HALOPERIDOL 5 MG/ML
2.5 INJECTION INTRAMUSCULAR ONCE
Status: COMPLETED | OUTPATIENT
Start: 2025-04-28 | End: 2025-04-28

## 2025-04-28 RX ORDER — LORAZEPAM 2 MG/ML
1 INJECTION INTRAMUSCULAR ONCE
Status: COMPLETED | OUTPATIENT
Start: 2025-04-28 | End: 2025-04-28

## 2025-04-28 RX ORDER — ONDANSETRON 4 MG/1
4 TABLET, ORALLY DISINTEGRATING ORAL EVERY 8 HOURS PRN
Qty: 10 TABLET | Refills: 0 | Status: SHIPPED | OUTPATIENT
Start: 2025-04-28 | End: 2025-04-29

## 2025-04-28 RX ORDER — DROPERIDOL 2.5 MG/ML
0.62 INJECTION, SOLUTION INTRAMUSCULAR; INTRAVENOUS ONCE
Status: DISCONTINUED | OUTPATIENT
Start: 2025-04-28 | End: 2025-04-28

## 2025-04-28 RX ORDER — GABAPENTIN 100 MG/1
100 CAPSULE ORAL 3 TIMES DAILY
Qty: 40 CAPSULE | Refills: 0 | Status: SHIPPED | OUTPATIENT
Start: 2025-04-28 | End: 2025-04-29

## 2025-04-28 RX ORDER — GABAPENTIN 100 MG/1
100 CAPSULE ORAL ONCE
Status: COMPLETED | OUTPATIENT
Start: 2025-04-28 | End: 2025-04-28

## 2025-04-28 RX ORDER — GABAPENTIN 300 MG/1
300 CAPSULE ORAL ONCE
Status: DISCONTINUED | OUTPATIENT
Start: 2025-04-28 | End: 2025-04-28

## 2025-04-28 RX ORDER — POTASSIUM CHLORIDE 1.5 G/1.58G
40 POWDER, FOR SOLUTION ORAL ONCE
Status: COMPLETED | OUTPATIENT
Start: 2025-04-28 | End: 2025-04-28

## 2025-04-28 RX ORDER — KETOROLAC TROMETHAMINE 30 MG/ML
30 INJECTION, SOLUTION INTRAMUSCULAR; INTRAVENOUS ONCE
Status: COMPLETED | OUTPATIENT
Start: 2025-04-28 | End: 2025-04-28

## 2025-04-28 RX ORDER — HYDROMORPHONE HYDROCHLORIDE 1 MG/ML
1 INJECTION, SOLUTION INTRAMUSCULAR; INTRAVENOUS; SUBCUTANEOUS ONCE
Status: COMPLETED | OUTPATIENT
Start: 2025-04-28 | End: 2025-04-28

## 2025-04-28 RX ORDER — SODIUM CHLORIDE 9 MG/ML
INJECTION, SOLUTION INTRAVENOUS
Status: DISCONTINUED
Start: 2025-04-28 | End: 2025-04-28 | Stop reason: HOSPADM

## 2025-04-28 RX ORDER — ONDANSETRON 2 MG/ML
4 INJECTION INTRAMUSCULAR; INTRAVENOUS ONCE
Status: COMPLETED | OUTPATIENT
Start: 2025-04-28 | End: 2025-04-28

## 2025-04-28 RX ADMIN — ONDANSETRON 4 MG: 2 INJECTION INTRAMUSCULAR; INTRAVENOUS at 12:06

## 2025-04-28 RX ADMIN — MORPHINE SULFATE 4 MG: 4 INJECTION, SOLUTION INTRAMUSCULAR; INTRAVENOUS at 00:00

## 2025-04-28 RX ADMIN — KETOROLAC TROMETHAMINE 30 MG: 30 INJECTION, SOLUTION INTRAMUSCULAR at 17:44

## 2025-04-28 RX ADMIN — POTASSIUM CHLORIDE 40 MEQ: 1.5 POWDER, FOR SOLUTION ORAL at 16:51

## 2025-04-28 RX ADMIN — LORAZEPAM 1 MG: 2 INJECTION INTRAMUSCULAR; INTRAVENOUS at 00:39

## 2025-04-28 RX ADMIN — ONDANSETRON 4 MG: 2 INJECTION INTRAMUSCULAR; INTRAVENOUS at 17:44

## 2025-04-28 RX ADMIN — HYDROMORPHONE HYDROCHLORIDE 1 MG: 1 INJECTION, SOLUTION INTRAMUSCULAR; INTRAVENOUS; SUBCUTANEOUS at 12:04

## 2025-04-28 RX ADMIN — HALOPERIDOL LACTATE 2.5 MG: 5 INJECTION, SOLUTION INTRAMUSCULAR at 00:40

## 2025-04-28 RX ADMIN — SODIUM CHLORIDE 1000 ML: 0.9 INJECTION, SOLUTION INTRAVENOUS at 12:10

## 2025-04-28 RX ADMIN — SODIUM CHLORIDE, SODIUM LACTATE, POTASSIUM CHLORIDE, AND CALCIUM CHLORIDE 1000 ML: .6; .31; .03; .02 INJECTION, SOLUTION INTRAVENOUS at 00:38

## 2025-04-28 RX ADMIN — GABAPENTIN 100 MG: 100 CAPSULE ORAL at 17:43

## 2025-04-28 ASSESSMENT — ACTIVITIES OF DAILY LIVING (ADL)
ADLS_ACUITY_SCORE: 50

## 2025-04-28 NOTE — ED PROVIDER NOTES
"ED Provider Note  [facility]      History     Chief Complaint   Patient presents with    Abdominal Pain     Abdominal pain starting last night. Multiple episodes of emesis. Was seen this morning in [place]- did ct scans and blood work with primary care follow up recommendations.      HPI  Ravi Hankins is a 34 year old male who smokes cannabis daily and presented with nausea vomiting and centralized abdominal pain which began early this morning at 5 AM.  Patient has not smoked weed today.  Denies fever chills or diarrhea          Physical Exam   BP: 131/60  Pulse: 50  Temp: 97.8  F (36.6  C)  Resp: 22  Height: 188 cm (6' 2\")  Weight: 71.2 kg (157 lb)  SpO2: 98 %  Physical Exam  Patient appears nauseous and in discomfort  Breathing comfortably with lungs clear  Pulse borderline bradycardic  Abdomen soft with no Gomez's or McBurney sign    ED Course, Procedures, & Data      Procedures            EKG Interpretation:      Interpreted by Toro Lindsey MD  Time reviewed: 12:08 AM  Symptoms at time of EKG: Nausea and abdominal pain  Rhythm: sinus bradycardia  Rate: 44 bpm  Axis: Normal  ST Segments/ T Waves: No acute ischemic changes    Clinical Impression: Bradycardia without ischemic changes.  QTc 410 ms                 Results for orders placed or performed during the hospital encounter of 04/27/25   Comprehensive metabolic panel     Status: Abnormal   Result Value Ref Range    Sodium 137 135 - 145 mmol/L    Potassium 3.5 3.4 - 5.3 mmol/L    Carbon Dioxide (CO2) 24 22 - 29 mmol/L    Anion Gap 13 7 - 15 mmol/L    Urea Nitrogen 13.5 6.0 - 20.0 mg/dL    Creatinine 0.98 0.67 - 1.17 mg/dL    GFR Estimate >90 >60 mL/min/1.73m2    Calcium 9.3 8.8 - 10.4 mg/dL    Chloride 100 98 - 107 mmol/L    Glucose 122 (H) 70 - 99 mg/dL    Alkaline Phosphatase 60 40 - 150 U/L    AST 23 0 - 45 U/L    ALT 16 0 - 70 U/L    Protein Total 7.4 6.4 - 8.3 g/dL    Albumin 4.4 3.5 - 5.2 g/dL    Bilirubin " Total 0.5 <=1.2 mg/dL   Lipase     Status: Normal   Result Value Ref Range    Lipase 28 13 - 60 U/L   Lactic acid whole blood with 1x repeat in 2 hr when >2     Status: Normal   Result Value Ref Range    Lactic Acid, Initial 1.1 0.7 - 2.0 mmol/L   Troponin T, High Sensitivity     Status: Normal   Result Value Ref Range    Troponin T, High Sensitivity <6 <=22 ng/L   Magnesium     Status: Normal   Result Value Ref Range    Magnesium 2.1 1.7 - 2.3 mg/dL   CBC with platelets and differential     Status: Abnormal   Result Value Ref Range    WBC Count 5.8 4.0 - 11.0 10e3/uL    RBC Count 3.78 (L) 4.40 - 5.90 10e6/uL    Hemoglobin 11.6 (L) 13.3 - 17.7 g/dL    Hematocrit 34.9 (L) 40.0 - 53.0 %    MCV 92 78 - 100 fL    MCH 30.7 26.5 - 33.0 pg    MCHC 33.2 31.5 - 36.5 g/dL    RDW 12.4 10.0 - 15.0 %    Platelet Count 155 150 - 450 10e3/uL    % Neutrophils 59 %    % Lymphocytes 24 %    % Monocytes 16 %    % Eosinophils 0 %    % Basophils 0 %    % Immature Granulocytes 0 %    NRBCs per 100 WBC 0 <1 /100    Absolute Neutrophils 3.4 1.6 - 8.3 10e3/uL    Absolute Lymphocytes 1.4 0.8 - 5.3 10e3/uL    Absolute Monocytes 0.9 0.0 - 1.3 10e3/uL    Absolute Eosinophils 0.0 0.0 - 0.7 10e3/uL    Absolute Basophils 0.0 0.0 - 0.2 10e3/uL    Absolute Immature Granulocytes 0.0 <=0.4 10e3/uL    Absolute NRBCs 0.0 10e3/uL   EKG 12-lead, tracing only     Status: None (Preliminary result)   Result Value Ref Range    Systolic Blood Pressure  mmHg    Diastolic Blood Pressure  mmHg    Ventricular Rate 44 BPM    Atrial Rate 44 BPM    NJ Interval 148 ms    QRS Duration 98 ms     ms    QTc 410 ms    P Axis 61 degrees    R AXIS 65 degrees    T Axis 63 degrees    Interpretation ECG       Sinus bradycardia  Moderate voltage criteria for LVH, may be normal variant  Abnormal ECG     CBC with platelets differential     Status: Abnormal    Narrative    The following orders were created for panel order CBC with platelets differential.  Procedure                                Abnormality         Status                     ---------                               -----------         ------                     CBC with platelets and ...[[id number]]  Abnormal            Final result                 Please view results for these tests on the individual orders.     Medications   lactated ringers BOLUS 1,000 mL (1,000 mLs Intravenous $New Bag 4/28/25 0038)   sodium chloride 0.9 % infusion (  Canceled Entry 4/28/25 0059)   ondansetron (ZOFRAN) injection 4 mg (4 mg Intravenous $Given 4/27/25 2351)   morphine (PF) injection 4 mg (4 mg Intravenous $Given 4/28/25 0000)   LORazepam (ATIVAN) injection 1 mg (1 mg Intravenous $Given 4/28/25 0039)   haloperidol lactate (HALDOL) injection 2.5 mg (2.5 mg Intravenous $Given 4/28/25 0040)     Labs Ordered and Resulted from Time of ED Arrival to Time of ED Departure   COMPREHENSIVE METABOLIC PANEL - Abnormal       Result Value    Sodium 137      Potassium 3.5      Carbon Dioxide (CO2) 24      Anion Gap 13      Urea Nitrogen 13.5      Creatinine 0.98      GFR Estimate >90      Calcium 9.3      Chloride 100      Glucose 122 (*)     Alkaline Phosphatase 60      AST 23      ALT 16      Protein Total 7.4      Albumin 4.4      Bilirubin Total 0.5     CBC WITH PLATELETS AND DIFFERENTIAL - Abnormal    WBC Count 5.8      RBC Count 3.78 (*)     Hemoglobin 11.6 (*)     Hematocrit 34.9 (*)     MCV 92      MCH 30.7      MCHC 33.2      RDW 12.4      Platelet Count 155      % Neutrophils 59      % Lymphocytes 24      % Monocytes 16      % Eosinophils 0      % Basophils 0      % Immature Granulocytes 0      NRBCs per 100 WBC 0      Absolute Neutrophils 3.4      Absolute Lymphocytes 1.4      Absolute Monocytes 0.9      Absolute Eosinophils 0.0      Absolute Basophils 0.0      Absolute Immature Granulocytes 0.0      Absolute NRBCs 0.0     LIPASE - Normal    Lipase 28     LACTIC ACID WHOLE BLOOD WITH 1X REPEAT IN 2 HR WHEN >2 - Normal    Lactic Acid, Initial  1.1     TROPONIN T, HIGH SENSITIVITY - Normal    Troponin T, High Sensitivity <6     MAGNESIUM - Normal    Magnesium 2.1     ROUTINE UA WITH MICROSCOPIC REFLEX TO CULTURE     No orders to display          Critical care was not performed.     Medical Decision Making  The patient's presentation was of high complexity (an acute health issue posing potential threat to life or bodily function).    The patient's evaluation involved:  review of external note(s) from 1 sources (prior emergency department note from outside hospital)  review of 1 test result(s) ordered prior to this encounter (see separate area of note for details)  strong consideration of a test (CT abdomen pelvis) that was ultimately deferred  ordering and/or review of 2 test(s) in this encounter (see separate area of note for details)    The patient's management necessitated high risk (a parenteral controlled substance).    Assessment & Plan    Abdominal pain with nausea vomiting in the setting of cannabis use.  No history of abdominal surgeries and similar presentations of prior ED visits.    - I reviewed outside hospital emergency department notes which documents similar cannabis hyperemesis syndrome   - IV Ativan, IV Haldol   - Zofran and morphine initially given with IV fluids     CT abdomen pelvis initially ordered but upon reassessment at 1 AM patient is sleeping comfortably and needed to be awakened and reported that he has 2 out of 10 abdominal discomfort and feels much better.      If initial blood work is overall unremarkable, will discharge with return precautions and primary care follow-up    130a -labs generally unremarkable.  Patient continues to sleep comfortably after initial medications.  After reassessment we will discharge with PMD follow-up as referral    I have reviewed the nursing notes. I have reviewed the findings, diagnosis, plan and need for follow up with the patient.    New Prescriptions    No medications on file       Final  diagnoses:   Cannabis hyperemesis syndrome concurrent with and due to cannabis dependence (H)   Generalized abdominal pain   Nausea and vomiting, unspecified vomiting type       Toro Lindsey MD  [facility] EMERGENCY DEPARTMENT  4/27/2025     Toro Lindsey MD  04/28/25 0113       Toro Lindsey MD  04/28/25 0133

## 2025-04-28 NOTE — ED TRIAGE NOTES
Brought in from home by EMS for abd pain and N/V. Per EMS pt was in [place] in the last month was in the ED yesterday given zogran tykn and morphine was doing okay over night until today.     18 G R AC given zofran 4 mg IV

## 2025-04-28 NOTE — ED NOTES
Placed pt on cardiac monitoring, moved from [facility] to [facility], notified RN pt was in 10/10 lower abdominal pain and had previously been seen in the ED for stomach flu.  RN ack.

## 2025-04-28 NOTE — ED TRIAGE NOTES
Abdominal pain starting last night. Multiple episodes of emesis. Was seen this morning in [facility] [place]- did ct scans and blood work with primary care follow up recommendations.      Triage Assessment (Adult)       Row Name 04/27/25 0624          Triage Assessment    Airway WDL WDL        Respiratory WDL    Respiratory WDL WDL        Skin Circulation/Temperature WDL    Skin Circulation/Temperature WDL WDL        Cardiac WDL    Cardiac WDL WDL        Peripheral/Neurovascular WDL    Peripheral Neurovascular WDL WDL        Cognitive/Neuro/Behavioral WDL    Cognitive/Neuro/Behavioral WDL mood/behavior     Mood/Behavior anxious;restless        Lawrence Coma Scale    Best Eye Response 4-->(E4) spontaneous     Best Motor Response 6-->(M6) obeys commands     Best Verbal Response 5-->(V5) oriented     Lawrence Coma Scale Score 15

## 2025-04-28 NOTE — ED NOTES
Patient Verbalized understanding of discharge instructions including medication administration and recommended follow up care w/ PCP. as noted on discharge instructions. Written discharge instructions given, denies any further questions.

## 2025-04-28 NOTE — ED TRIAGE NOTES
Triage Assessment (Adult)       Row Name 04/28/25 1056          Triage Assessment    Airway WDL WDL        Respiratory WDL    Respiratory WDL WDL        Skin Circulation/Temperature WDL    Skin Circulation/Temperature WDL WDL        Cardiac WDL    Cardiac WDL WDL        Peripheral/Neurovascular WDL    Peripheral Neurovascular WDL WDL        Cognitive/Neuro/Behavioral WDL    Cognitive/Neuro/Behavioral WDL WDL

## 2025-04-28 NOTE — ED PROVIDER NOTES
[facility] EMERGENCY DEPARTMENT ([facility])    4/28/25      ED PROVIDER NOTE   History     Chief Complaint   Patient presents with    Abdominal Pain    Nausea, Vomiting, & Diarrhea     The history is provided by the patient and medical records.     Ravi Hankins is a 34 year old male with a history of GERD and polysubstance use who presents to the ED for abdominal pain, nausea/vomiting and diarrhea.  Patient notes that he has been having significant abdominal pain and vomiting since leaving the emergency room does not feel like he can tolerate any oral intake and is concerned about increased abdominal pain.  It was noted that patient had recently had diagnosis of cyclical vomiting there was some concern for the possibility of imaging however patient had improved he is now having increased pain and increased vomiting and will likely need further imaging.    Per chart review, patient was seen in the ED yesterday (04/27/2025) for n/v and abdominal pain. Patient was given IV Ativan, IV Haldol, Zofran and Morphine. CT abdomen pelvis initially ordered but  reassessment of patient reported that he feels much better. Labs were generally unremarkable. Was advised to follow up with PMD and ED if symptoms were to reoccur.     Past Medical History  Past Medical History:   Diagnosis Date    Dilated aortic root      Past Surgical History:   Procedure Laterality Date    NO HISTORY OF SURGERY       gabapentin (NEURONTIN) 100 MG capsule  ondansetron (ZOFRAN ODT) 4 MG ODT tab  atenolol (TENORMIN) 100 MG tablet  folic acid (FOLVITE) 1 MG tablet  hydrOXYzine HCl (ATARAX) 25 MG tablet  melatonin 3 MG tablet  multivitamin w/minerals (THERA-VIT-M) tablet  ondansetron (ZOFRAN ODT) 4 MG ODT tab  thiamine (B-1) 100 MG tablet  traZODone (DESYREL) 50 MG tablet  venlafaxine (EFFEXOR XR) 37.5 MG 24 hr capsule      Allergies   Allergen Reactions    Contrast Dye      PN: LW CM1:  Reaction :  rash    Sulfa Antibiotics      PN: LW  Reaction: unsure which kid has allergy per mom???     Family History  Family History   Problem Relation Age of Onset    Heart Disease Brother     Abdominal Aortic Aneurysm Brother     Heart Disease Other         self    Substance Abuse Mother     Substance Abuse Father     Substance Abuse Maternal Grandfather     Diabetes No family hx of     Coronary Artery Disease No family hx of     Hypertension No family hx of     Hyperlipidemia No family hx of     Cerebrovascular Disease No family hx of     Breast Cancer No family hx of     Colon Cancer No family hx of     Prostate Cancer No family hx of     Other Cancer No family hx of     Depression No family hx of     Anxiety Disorder No family hx of     Mental Illness No family hx of     Anesthesia Reaction No family hx of     Asthma No family hx of     Osteoporosis No family hx of     Genetic Disorder No family hx of     Thyroid Disease No family hx of     Obesity No family hx of     Unknown/Adopted No family hx of      Social History   Social History     Tobacco Use    Smoking status: Former     Current packs/day: 0.00     Average packs/day: 0.1 packs/day for 5.0 years (0.5 ttl pk-yrs)     Types: Cigarettes     Start date: 2017     Quit date: 2022     Years since quitting: 3.3    Smokeless tobacco: Never    Tobacco comments:     1-2 cigarettes a day for 5 years   Vaping Use    Vaping status: Some Days    Start date: 2/22/2021    Substances: Nicotine    Devices: Disposable   Substance Use Topics    Alcohol use: Yes     Comment: daily    Drug use: Yes     Types: Marijuana     Comment: Pt reports smoking a blunt today, pt reports smoking canabis everyday..          Physical Exam   BP: 131/85  Pulse: 52  Temp: 98.1  F (36.7  C)  Resp: 18  Weight: 71.2 kg (157 lb)  SpO2: 100 %  Physical Exam  Vitals and nursing note reviewed.   Constitutional:       General: He is not in acute distress.     Appearance: Normal appearance. He is not toxic-appearing.   HENT:      Head: Atraumatic.    Eyes:      General: No scleral icterus.     Conjunctiva/sclera: Conjunctivae normal.   Cardiovascular:      Rate and Rhythm: Normal rate.      Heart sounds: Normal heart sounds.   Pulmonary:      Effort: Pulmonary effort is normal. No respiratory distress.      Breath sounds: Normal breath sounds.   Abdominal:      Palpations: Abdomen is soft.      Tenderness: There is generalized abdominal tenderness. There is no guarding or rebound.   Musculoskeletal:         General: No deformity.      Cervical back: Neck supple.   Skin:     General: Skin is warm.   Neurological:      General: No focal deficit present.      Mental Status: He is alert and oriented to person, place, and time.      Sensory: No sensory deficit.      Motor: No weakness.      Coordination: Coordination normal.   Psychiatric:         Mood and Affect: Mood is anxious.         Speech: Speech normal.         Behavior: Behavior is cooperative.         Thought Content: Thought content does not include homicidal or suicidal ideation.           ED Course, Procedures, & Data      Procedures            EKG Interpretation:      Interpreted by Rell Schumacher  Time reviewed: 12:13:47  Symptoms at time of EKG: Abdominal pain, nausea/vomiting and diarrhea  Rhythm: sinus bradycardia  Rate: 51 BPM  Axis: normal  Ectopy: none  Conduction: normal  ST Segments/ T Waves: No ST-T wave changes  Q Waves: none  Comparison to prior: Unchanged from 04/01/2025    Clinical Impression: normal EKG       Results for orders placed or performed during the hospital encounter of 04/28/25   CT Abdomen Pelvis w/o Contrast     Status: None    Narrative    EXAM: CT ABDOMEN PELVIS W/O CONTRAST  LOCATION: [facility]  DATE: 4/28/2025    INDICATION: pain  COMPARISON: CT abdomen and pelvis 04/01/2025  TECHNIQUE: CT scan of the abdomen and pelvis was performed without IV contrast. Multiplanar reformats were obtained. Dose reduction  techniques were used.  CONTRAST: None.    FINDINGS:   LOWER CHEST: Normal.    HEPATOBILIARY: Dense fluid in the gallbladder could represent either sludge or vicarious excretion of previously administered IV contrast.    PANCREAS: Normal.    SPLEEN: Normal.    ADRENAL GLANDS: Normal.    KIDNEYS/BLADDER: A few small nonobstructing stones both kidneys, the largest measuring 4 mm.    BOWEL: Normal appendix. No evidence for bowel obstruction. No bowel wall thickening or inflammatory changes. Interval resolution of the numerous fluid-filled small bowel loops. There remains a modest amount of liquid stool in the rectum, also improved. A   few diverticula sigmoid colon. Moderate gastric distention with fluid.    LYMPH NODES: Normal.    VASCULATURE: Normal.    PELVIC ORGANS: Normal.    MUSCULOSKELETAL: Normal.      Impression    IMPRESSION:   1.  Nonobstructing stones both kidneys.   2.  Dense sludge versus vicarious excretion of previously administered IV contrast in the gallbladder.  3.  No other findings to account for the patient's abdominal pain.     US Abdomen Limited (RUQ)     Status: None    Narrative    EXAM: US ABDOMEN LIMITED  LOCATION: [facility]  DATE: 4/28/2025    INDICATION: Hyperdensity within gallbladder which could represent stones or/sludge versus contrast excretion.  COMPARISON: CT 4/20/2025 and 4/1/2025  TECHNIQUE: Limited abdominal ultrasound.    FINDINGS:    GALLBLADDER: Normal. No gallstones, wall thickening, or pericholecystic fluid. Negative sonographic Gomez's sign.    BILE DUCTS: No biliary dilatation. The common duct measures 2 mm.    LIVER: Normal parenchyma with smooth contour. No focal mass. The portal vein is patent with flow in the normal direction.    RIGHT KIDNEY: 10.8 cm. Normal right renal cortical thickness and echogenicity. Punctate echogenic foci within the mid and lower right renal sinus compatible with known nonobstructing calculi. No  hydronephrosis.    PANCREAS: The visualized portions are normal.    No ascites.      Impression    IMPRESSION:  1.  No color sludge nor gallstones. No evidence of cholecystitis nor bile duct dilatation.  2.  Echogenic foci within the mid and lower right renal sinus compatible with known nonobstructing upper urinary tract calculi.       Comprehensive metabolic panel     Status: Abnormal   Result Value Ref Range    Sodium 140 135 - 145 mmol/L    Potassium 3.1 (L) 3.4 - 5.3 mmol/L    Carbon Dioxide (CO2) 24 22 - 29 mmol/L    Anion Gap 10 7 - 15 mmol/L    Urea Nitrogen 10.4 6.0 - 20.0 mg/dL    Creatinine 1.00 0.67 - 1.17 mg/dL    GFR Estimate >90 >60 mL/min/1.73m2    Calcium 8.3 (L) 8.8 - 10.4 mg/dL    Chloride 106 98 - 107 mmol/L    Glucose 90 70 - 99 mg/dL    Alkaline Phosphatase 52 40 - 150 U/L    AST 22 0 - 45 U/L    ALT 14 0 - 70 U/L    Protein Total 6.6 6.4 - 8.3 g/dL    Albumin 4.0 3.5 - 5.2 g/dL    Bilirubin Total 0.4 <=1.2 mg/dL   Lipase     Status: Normal   Result Value Ref Range    Lipase 25 13 - 60 U/L   Lactic acid whole blood with 1x repeat in 2 hr when >2     Status: Abnormal   Result Value Ref Range    Lactic Acid, Initial 2.1 (H) 0.7 - 2.0 mmol/L   Procalcitonin     Status: Normal   Result Value Ref Range    Procalcitonin 0.04 <0.50 ng/mL   Ethyl Alcohol Level     Status: Abnormal   Result Value Ref Range    Alcohol ethyl 0.04 (H) <=0.01 g/dL   UA with Microscopic reflex to Culture     Status: Abnormal    Specimen: Urine, Midstream   Result Value Ref Range    Color Urine Yellow Colorless, Straw, Light Yellow, Yellow    Appearance Urine Clear Clear    Glucose Urine Negative Negative mg/dL    Bilirubin Urine Negative Negative    Ketones Urine Trace (A) Negative mg/dL    Specific Gravity Urine 1.032 1.003 - 1.035    Blood Urine Negative Negative    pH Urine 6.5 5.0 - 7.0    Protein Albumin Urine 30 (A) Negative mg/dL    Urobilinogen Urine Normal Normal mg/dL    Nitrite Urine Negative Negative    Leukocyte  Esterase Urine Negative Negative    Mucus Urine Present (A) None Seen /LPF    RBC Urine 1 <=2 /HPF    WBC Urine 9 (H) <=5 /HPF    Squamous Epithelials Urine <1 <=1 /HPF    Narrative    Urine Culture not indicated   CBC with platelets and differential     Status: Abnormal   Result Value Ref Range    WBC Count 7.0 4.0 - 11.0 10e3/uL    RBC Count 3.89 (L) 4.40 - 5.90 10e6/uL    Hemoglobin 11.9 (L) 13.3 - 17.7 g/dL    Hematocrit 35.8 (L) 40.0 - 53.0 %    MCV 92 78 - 100 fL    MCH 30.6 26.5 - 33.0 pg    MCHC 33.2 31.5 - 36.5 g/dL    RDW 12.6 10.0 - 15.0 %    Platelet Count 165 150 - 450 10e3/uL    % Neutrophils 68 %    % Lymphocytes 23 %    % Monocytes 9 %    % Eosinophils 0 %    % Basophils 0 %    % Immature Granulocytes 0 %    NRBCs per 100 WBC 0 <1 /100    Absolute Neutrophils 4.7 1.6 - 8.3 10e3/uL    Absolute Lymphocytes 1.6 0.8 - 5.3 10e3/uL    Absolute Monocytes 0.6 0.0 - 1.3 10e3/uL    Absolute Eosinophils 0.0 0.0 - 0.7 10e3/uL    Absolute Basophils 0.0 0.0 - 0.2 10e3/uL    Absolute Immature Granulocytes 0.0 <=0.4 10e3/uL    Absolute NRBCs 0.0 10e3/uL   Urine Drug Screen Panel     Status: Abnormal   Result Value Ref Range    Amphetamines Urine Screen Negative Screen Negative    Barbituates Urine Screen Negative Screen Negative    Benzodiazepine Urine Screen Positive (A) Screen Negative    Cannabinoids Urine Screen Positive (A) Screen Negative    Cocaine Urine Screen Negative Screen Negative    Fentanyl Qual Urine Screen Negative Screen Negative    Opiates Urine Screen Positive (A) Screen Negative    PCP Urine Screen Negative Screen Negative   Lactic acid whole blood     Status: Normal   Result Value Ref Range    Lactic Acid 1.1 0.7 - 2.0 mmol/L   EKG 12-lead, tracing only     Status: None   Result Value Ref Range    Systolic Blood Pressure  mmHg    Diastolic Blood Pressure  mmHg    Ventricular Rate 51 BPM    Atrial Rate 51 BPM    IN Interval 156 ms    QRS Duration 94 ms     ms    QTc 425 ms    P Axis 66  degrees    R AXIS 64 degrees    T Axis 55 degrees    Interpretation ECG       Unconfirmed report - interpretation of this ECG is computer generated - see medical record for final interpretation  Sinus bradycardia  Otherwise normal ECG    Confirmed by - EMERGENCY ROOM, PHYSICIAN ([id number]),  Kathy Salinas ([id number]) on 4/28/2025 6:44:55 PM     CBC with platelets differential     Status: Abnormal    Narrative    The following orders were created for panel order CBC with platelets differential.  Procedure                               Abnormality         Status                     ---------                               -----------         ------                     CBC with platelets and ...[[id number]]  Abnormal            Final result                 Please view results for these tests on the individual orders.   Urine Drug Screen     Status: Abnormal    Narrative    The following orders were created for panel order Urine Drug Screen.  Procedure                               Abnormality         Status                     ---------                               -----------         ------                     Urine Drug Screen Panel[[id number]]     Abnormal            Final result                 Please view results for these tests on the individual orders.   Results for orders placed or performed during the hospital encounter of 04/27/25   Comprehensive metabolic panel     Status: Abnormal   Result Value Ref Range    Sodium 137 135 - 145 mmol/L    Potassium 3.5 3.4 - 5.3 mmol/L    Carbon Dioxide (CO2) 24 22 - 29 mmol/L    Anion Gap 13 7 - 15 mmol/L    Urea Nitrogen 13.5 6.0 - 20.0 mg/dL    Creatinine 0.98 0.67 - 1.17 mg/dL    GFR Estimate >90 >60 mL/min/1.73m2    Calcium 9.3 8.8 - 10.4 mg/dL    Chloride 100 98 - 107 mmol/L    Glucose 122 (H) 70 - 99 mg/dL    Alkaline Phosphatase 60 40 - 150 U/L    AST 23 0 - 45 U/L    ALT 16 0 - 70 U/L    Protein Total 7.4 6.4 - 8.3 g/dL    Albumin 4.4 3.5 - 5.2 g/dL    Bilirubin  Total 0.5 <=1.2 mg/dL   Lipase     Status: Normal   Result Value Ref Range    Lipase 28 13 - 60 U/L   Lactic acid whole blood with 1x repeat in 2 hr when >2     Status: Normal   Result Value Ref Range    Lactic Acid, Initial 1.1 0.7 - 2.0 mmol/L   Troponin T, High Sensitivity     Status: Normal   Result Value Ref Range    Troponin T, High Sensitivity <6 <=22 ng/L   Magnesium     Status: Normal   Result Value Ref Range    Magnesium 2.1 1.7 - 2.3 mg/dL   CBC with platelets and differential     Status: Abnormal   Result Value Ref Range    WBC Count 5.8 4.0 - 11.0 10e3/uL    RBC Count 3.78 (L) 4.40 - 5.90 10e6/uL    Hemoglobin 11.6 (L) 13.3 - 17.7 g/dL    Hematocrit 34.9 (L) 40.0 - 53.0 %    MCV 92 78 - 100 fL    MCH 30.7 26.5 - 33.0 pg    MCHC 33.2 31.5 - 36.5 g/dL    RDW 12.4 10.0 - 15.0 %    Platelet Count 155 150 - 450 10e3/uL    % Neutrophils 59 %    % Lymphocytes 24 %    % Monocytes 16 %    % Eosinophils 0 %    % Basophils 0 %    % Immature Granulocytes 0 %    NRBCs per 100 WBC 0 <1 /100    Absolute Neutrophils 3.4 1.6 - 8.3 10e3/uL    Absolute Lymphocytes 1.4 0.8 - 5.3 10e3/uL    Absolute Monocytes 0.9 0.0 - 1.3 10e3/uL    Absolute Eosinophils 0.0 0.0 - 0.7 10e3/uL    Absolute Basophils 0.0 0.0 - 0.2 10e3/uL    Absolute Immature Granulocytes 0.0 <=0.4 10e3/uL    Absolute NRBCs 0.0 10e3/uL   EKG 12-lead, tracing only     Status: None   Result Value Ref Range    Systolic Blood Pressure  mmHg    Diastolic Blood Pressure  mmHg    Ventricular Rate 44 BPM    Atrial Rate 44 BPM    CO Interval 148 ms    QRS Duration 98 ms     ms    QTc 410 ms    P Axis 61 degrees    R AXIS 65 degrees    T Axis 63 degrees    Interpretation ECG       Sinus bradycardia  Moderate voltage criteria for LVH, may be normal variant  Abnormal ECG  Unconfirmed report - interpretation of this ECG is computer generated - see medical record for final interpretation  Confirmed by - EMERGENCY ROOM, PHYSICIAN ([id number]),  SULTANA ANDERSEN ([id number])  on 4/28/2025 6:36:44 AM     CBC with platelets differential     Status: Abnormal    Narrative    The following orders were created for panel order CBC with platelets differential.  Procedure                               Abnormality         Status                     ---------                               -----------         ------                     CBC with platelets and ...[[id number]]  Abnormal            Final result                 Please view results for these tests on the individual orders.     Medications   sodium chloride 0.9% BOLUS 1,000 mL (0 mLs Intravenous Stopped 4/28/25 1340)   ondansetron (ZOFRAN) injection 4 mg (4 mg Intravenous $Given 4/28/25 1206)   HYDROmorphone (PF) (DILAUDID) injection 1 mg (1 mg Intravenous $Given 4/28/25 1204)   potassium chloride (KLOR-CON) Packet 40 mEq (40 mEq Oral $Given 4/28/25 1651)   ketorolac (TORADOL) injection 30 mg (30 mg Intravenous $Given 4/28/25 1744)   ondansetron (ZOFRAN) injection 4 mg (4 mg Intravenous $Given 4/28/25 1744)   gabapentin (NEURONTIN) capsule 100 mg (100 mg Oral $Given 4/28/25 1743)     Labs Ordered and Resulted from Time of ED Arrival to Time of ED Departure   COMPREHENSIVE METABOLIC PANEL - Abnormal       Result Value    Sodium 140      Potassium 3.1 (*)     Carbon Dioxide (CO2) 24      Anion Gap 10      Urea Nitrogen 10.4      Creatinine 1.00      GFR Estimate >90      Calcium 8.3 (*)     Chloride 106      Glucose 90      Alkaline Phosphatase 52      AST 22      ALT 14      Protein Total 6.6      Albumin 4.0      Bilirubin Total 0.4     LACTIC ACID WHOLE BLOOD WITH 1X REPEAT IN 2 HR WHEN >2 - Abnormal    Lactic Acid, Initial 2.1 (*)    ETHYL ALCOHOL LEVEL - Abnormal    Alcohol ethyl 0.04 (*)    ROUTINE UA WITH MICROSCOPIC REFLEX TO CULTURE - Abnormal    Color Urine Yellow      Appearance Urine Clear      Glucose Urine Negative      Bilirubin Urine Negative      Ketones Urine Trace (*)     Specific Gravity Urine 1.032      Blood Urine  Negative      pH Urine 6.5      Protein Albumin Urine 30 (*)     Urobilinogen Urine Normal      Nitrite Urine Negative      Leukocyte Esterase Urine Negative      Mucus Urine Present (*)     RBC Urine 1      WBC Urine 9 (*)     Squamous Epithelials Urine <1     CBC WITH PLATELETS AND DIFFERENTIAL - Abnormal    WBC Count 7.0      RBC Count 3.89 (*)     Hemoglobin 11.9 (*)     Hematocrit 35.8 (*)     MCV 92      MCH 30.6      MCHC 33.2      RDW 12.6      Platelet Count 165      % Neutrophils 68      % Lymphocytes 23      % Monocytes 9      % Eosinophils 0      % Basophils 0      % Immature Granulocytes 0      NRBCs per 100 WBC 0      Absolute Neutrophils 4.7      Absolute Lymphocytes 1.6      Absolute Monocytes 0.6      Absolute Eosinophils 0.0      Absolute Basophils 0.0      Absolute Immature Granulocytes 0.0      Absolute NRBCs 0.0     URINE DRUG SCREEN PANEL - Abnormal    Amphetamines Urine Screen Negative      Barbituates Urine Screen Negative      Benzodiazepine Urine Screen Positive (*)     Cannabinoids Urine Screen Positive (*)     Cocaine Urine Screen Negative      Fentanyl Qual Urine Screen Negative      Opiates Urine Screen Positive (*)     PCP Urine Screen Negative     LIPASE - Normal    Lipase 25     PROCALCITONIN - Normal    Procalcitonin 0.04     LACTIC ACID WHOLE BLOOD - Normal    Lactic Acid 1.1       US Abdomen Limited (RUQ)   Final Result   IMPRESSION:   1.  No color sludge nor gallstones. No evidence of cholecystitis nor bile duct dilatation.   2.  Echogenic foci within the mid and lower right renal sinus compatible with known nonobstructing upper urinary tract calculi.            CT Abdomen Pelvis w/o Contrast   Final Result   IMPRESSION:    1.  Nonobstructing stones both kidneys.    2.  Dense sludge versus vicarious excretion of previously administered IV contrast in the gallbladder.   3.  No other findings to account for the patient's abdominal pain.                Critical care was not  performed.     Medical Decision Making  The patient's presentation was of moderate complexity (an acute illness with systemic symptoms).    The patient's evaluation involved:  ordering and/or review of 3+ test(s) in this encounter (see separate area of note for details)    The patient's management necessitated moderate risk (prescription drug management including medications given in the ED), moderate risk (IV contrast administration), and high risk (a parenteral controlled substance).    Assessment & Plan        I have reviewed the nursing notes. I have reviewed the findings, diagnosis, plan and need for follow up with the patient.    Discharge Medication List as of 4/28/2025  6:04 PM        START taking these medications    Details   gabapentin (NEURONTIN) 100 MG capsule Take 1 capsule (100 mg) by mouth 3 times daily., Disp-40 capsule, R-0, Local Print      !! ondansetron (ZOFRAN ODT) 4 MG ODT tab Take 1 tablet (4 mg) by mouth every 8 hours as needed for nausea., Disp-10 tablet, R-0, Local Print       !! - Potential duplicate medications found. Please discuss with provider.          Patient presenting once again with significant nausea vomiting abdominal pain extensive repeat workup including now imaging which did not reveal any acute abnormalities with likely diagnosis of cyclical vomiting associated with his marijuana use we had an extensive discussion about ending his marijuana use patient understands this and states that he no longer wants to risk the possibility of having any sort of complication and will not be using marijuana by his report.  In the interim patient was treated with medications here in the emergency room with good control of his symptoms he will be discharged to home with Neurontin and Zofran prescriptions and follow-up with his primary MD            Final diagnoses:   Cyclic vomiting syndrome   Cannabis dependence (H)       Rell Schumacher MD  [facility] EMERGENCY  DEPARTMENT  4/28/2025        Rell Schumacher MD  04/29/25 1548

## 2025-04-28 NOTE — DISCHARGE INSTRUCTIONS
Discharge to home stop using marijuana.  May use Zofran for nausea May use Neurontin for discomfort follow-up with your primary MD.

## 2025-04-29 ENCOUNTER — TELEPHONE (OUTPATIENT)
Dept: FAMILY MEDICINE | Facility: CLINIC | Age: 35
End: 2025-04-29

## 2025-04-29 DIAGNOSIS — F12.20 CANNABIS USE DISORDER, SEVERE, DEPENDENCE (H): Primary | ICD-10-CM

## 2025-04-29 DIAGNOSIS — F41.9 ANXIETY: ICD-10-CM

## 2025-04-29 NOTE — TELEPHONE ENCOUNTER
Triage,  Patient was seen in the ED yesterday.   Needs A.S. to send zofran and gabapentin to pharmacy prescribed/recommended by Dr. Rell Schumacher as patient is restricted to A.S.   Please send Rxs to [facility] in [place] - pharmacy pended.   Not sure which medication strength to pend for outpatient pharmacy.   Thanks!  Leslie BONE

## 2025-04-30 ENCOUNTER — PRE VISIT (OUTPATIENT)
Dept: UROLOGY | Facility: CLINIC | Age: 35
End: 2025-04-30
Payer: COMMERCIAL

## 2025-04-30 NOTE — TELEPHONE ENCOUNTER
Reason for visit: follow-up     Relevant information: pt needs to complete litholink, litholink hasn't been completed as of 04/30/2025.    Records/imaging/labs/orders: all records available    Inge Scott  4/30/2025  7:24 AM

## 2025-05-01 RX ORDER — GABAPENTIN 100 MG/1
100 CAPSULE ORAL 3 TIMES DAILY
Qty: 30 CAPSULE | Refills: 0 | Status: SHIPPED | OUTPATIENT
Start: 2025-05-01

## 2025-05-01 RX ORDER — ONDANSETRON 4 MG/1
4 TABLET, ORALLY DISINTEGRATING ORAL EVERY 8 HOURS PRN
Qty: 10 TABLET | Refills: 0 | Status: SHIPPED | OUTPATIENT
Start: 2025-05-01

## 2025-05-01 NOTE — TELEPHONE ENCOUNTER
Left message informing medication request complete and sent to preferred pharmacy  Advised to call back if questions/issues    Kelley ARCE RN

## 2025-05-13 ENCOUNTER — HOSPITAL ENCOUNTER (EMERGENCY)
Facility: CLINIC | Age: 35
Discharge: HOME | End: 2025-05-13
Attending: EMERGENCY MEDICINE | Admitting: EMERGENCY MEDICINE
Payer: COMMERCIAL

## 2025-05-13 VITALS
DIASTOLIC BLOOD PRESSURE: 74 MMHG | TEMPERATURE: 97.2 F | BODY MASS INDEX: 20.53 KG/M2 | SYSTOLIC BLOOD PRESSURE: 130 MMHG | RESPIRATION RATE: 16 BRPM | OXYGEN SATURATION: 97 % | HEART RATE: 88 BPM | HEIGHT: 74 IN | WEIGHT: 160 LBS

## 2025-05-13 DIAGNOSIS — R10.84 GENERALIZED ABDOMINAL PAIN: ICD-10-CM

## 2025-05-13 DIAGNOSIS — R11.2 NAUSEA AND VOMITING, UNSPECIFIED VOMITING TYPE: ICD-10-CM

## 2025-05-13 DIAGNOSIS — R11.0 NAUSEA: ICD-10-CM

## 2025-05-13 LAB
ALBUMIN SERPL BCG-MCNC: 4.4 G/DL (ref 3.5–5.2)
ALP SERPL-CCNC: 61 U/L (ref 40–150)
ALT SERPL W P-5'-P-CCNC: 11 U/L (ref 0–70)
ANION GAP SERPL CALCULATED.3IONS-SCNC: 16 MMOL/L (ref 7–15)
AST SERPL W P-5'-P-CCNC: 20 U/L (ref 0–45)
BASOPHILS # BLD AUTO: 0 10E3/UL (ref 0–0.2)
BASOPHILS NFR BLD AUTO: 0 %
BILIRUB SERPL-MCNC: 0.5 MG/DL
BUN SERPL-MCNC: 14.3 MG/DL (ref 6–20)
CALCIUM SERPL-MCNC: 9 MG/DL (ref 8.8–10.4)
CHLORIDE SERPL-SCNC: 105 MMOL/L (ref 98–107)
CREAT SERPL-MCNC: 0.88 MG/DL (ref 0.67–1.17)
EGFRCR SERPLBLD CKD-EPI 2021: >90 ML/MIN/1.73M2
EOSINOPHIL # BLD AUTO: 0 10E3/UL (ref 0–0.7)
EOSINOPHIL NFR BLD AUTO: 0 %
ERYTHROCYTE [DISTWIDTH] IN BLOOD BY AUTOMATED COUNT: 12.2 % (ref 10–15)
GLUCOSE SERPL-MCNC: 86 MG/DL (ref 70–99)
HCO3 SERPL-SCNC: 20 MMOL/L (ref 22–29)
HCT VFR BLD AUTO: 37.7 % (ref 40–53)
HGB BLD-MCNC: 13 G/DL (ref 13.3–17.7)
IMM GRANULOCYTES # BLD: 0 10E3/UL
IMM GRANULOCYTES NFR BLD: 0 %
LIPASE SERPL-CCNC: 22 U/L (ref 13–60)
LYMPHOCYTES # BLD AUTO: 1.1 10E3/UL (ref 0.8–5.3)
LYMPHOCYTES NFR BLD AUTO: 19 %
MCH RBC QN AUTO: 31.8 PG (ref 26.5–33)
MCHC RBC AUTO-ENTMCNC: 34.5 G/DL (ref 31.5–36.5)
MCV RBC AUTO: 92 FL (ref 78–100)
MONOCYTES # BLD AUTO: 0.3 10E3/UL (ref 0–1.3)
MONOCYTES NFR BLD AUTO: 5 %
NEUTROPHILS # BLD AUTO: 4.6 10E3/UL (ref 1.6–8.3)
NEUTROPHILS NFR BLD AUTO: 76 %
NRBC # BLD AUTO: 0 10E3/UL
NRBC BLD AUTO-RTO: 0 /100
PLATELET # BLD AUTO: 160 10E3/UL (ref 150–450)
POTASSIUM SERPL-SCNC: 3.6 MMOL/L (ref 3.4–5.3)
PROT SERPL-MCNC: 7.5 G/DL (ref 6.4–8.3)
RBC # BLD AUTO: 4.09 10E6/UL (ref 4.4–5.9)
SODIUM SERPL-SCNC: 141 MMOL/L (ref 135–145)
WBC # BLD AUTO: 6 10E3/UL (ref 4–11)

## 2025-05-13 PROCEDURE — 83690 ASSAY OF LIPASE: CPT | Performed by: EMERGENCY MEDICINE

## 2025-05-13 PROCEDURE — 99284 EMERGENCY DEPT VISIT MOD MDM: CPT | Performed by: EMERGENCY MEDICINE

## 2025-05-13 PROCEDURE — 85025 COMPLETE CBC W/AUTO DIFF WBC: CPT | Performed by: EMERGENCY MEDICINE

## 2025-05-13 PROCEDURE — 99284 EMERGENCY DEPT VISIT MOD MDM: CPT | Mod: 25 | Performed by: EMERGENCY MEDICINE

## 2025-05-13 PROCEDURE — 96374 THER/PROPH/DIAG INJ IV PUSH: CPT | Performed by: EMERGENCY MEDICINE

## 2025-05-13 PROCEDURE — 82247 BILIRUBIN TOTAL: CPT | Performed by: EMERGENCY MEDICINE

## 2025-05-13 PROCEDURE — 250N000009 HC RX 250: Performed by: EMERGENCY MEDICINE

## 2025-05-13 PROCEDURE — 36415 COLL VENOUS BLD VENIPUNCTURE: CPT | Performed by: EMERGENCY MEDICINE

## 2025-05-13 PROCEDURE — 96376 TX/PRO/DX INJ SAME DRUG ADON: CPT | Performed by: EMERGENCY MEDICINE

## 2025-05-13 PROCEDURE — 250N000013 HC RX MED GY IP 250 OP 250 PS 637: Performed by: EMERGENCY MEDICINE

## 2025-05-13 PROCEDURE — 250N000011 HC RX IP 250 OP 636: Mod: JZ | Performed by: EMERGENCY MEDICINE

## 2025-05-13 RX ORDER — LIDOCAINE HYDROCHLORIDE 20 MG/ML
10 SOLUTION OROPHARYNGEAL ONCE
Status: COMPLETED | OUTPATIENT
Start: 2025-05-13 | End: 2025-05-13

## 2025-05-13 RX ORDER — HYDROMORPHONE HYDROCHLORIDE 1 MG/ML
0.5 INJECTION, SOLUTION INTRAMUSCULAR; INTRAVENOUS; SUBCUTANEOUS EVERY 30 MIN PRN
Refills: 0 | Status: COMPLETED | OUTPATIENT
Start: 2025-05-13 | End: 2025-05-13

## 2025-05-13 RX ORDER — FAMOTIDINE 20 MG/1
20 TABLET, FILM COATED ORAL 2 TIMES DAILY PRN
Qty: 30 TABLET | Refills: 0 | Status: SHIPPED | OUTPATIENT
Start: 2025-05-13

## 2025-05-13 RX ORDER — MAGNESIUM HYDROXIDE/ALUMINUM HYDROXICE/SIMETHICONE 120; 1200; 1200 MG/30ML; MG/30ML; MG/30ML
15 SUSPENSION ORAL ONCE
Status: COMPLETED | OUTPATIENT
Start: 2025-05-13 | End: 2025-05-13

## 2025-05-13 RX ORDER — ONDANSETRON 4 MG/1
4 TABLET, ORALLY DISINTEGRATING ORAL EVERY 8 HOURS PRN
Qty: 20 TABLET | Refills: 0 | Status: SHIPPED | OUTPATIENT
Start: 2025-05-13

## 2025-05-13 RX ADMIN — ALUMINUM HYDROXIDE, MAGNESIUM HYDROXIDE, AND SIMETHICONE 15 ML: 200; 200; 20 SUSPENSION ORAL at 03:08

## 2025-05-13 RX ADMIN — HYDROMORPHONE HYDROCHLORIDE 0.5 MG: 1 INJECTION, SOLUTION INTRAMUSCULAR; INTRAVENOUS; SUBCUTANEOUS at 04:01

## 2025-05-13 RX ADMIN — HYDROMORPHONE HYDROCHLORIDE 0.5 MG: 1 INJECTION, SOLUTION INTRAMUSCULAR; INTRAVENOUS; SUBCUTANEOUS at 06:04

## 2025-05-13 RX ADMIN — HYDROMORPHONE HYDROCHLORIDE 0.5 MG: 1 INJECTION, SOLUTION INTRAMUSCULAR; INTRAVENOUS; SUBCUTANEOUS at 03:06

## 2025-05-13 RX ADMIN — LIDOCAINE HYDROCHLORIDE 10 ML: 20 SOLUTION ORAL at 03:08

## 2025-05-13 ASSESSMENT — ACTIVITIES OF DAILY LIVING (ADL)
ADLS_ACUITY_SCORE: 50

## 2025-05-13 NOTE — ED PROVIDER NOTES
"ED Provider Note  [facility]      History     Chief Complaint   Patient presents with    Abdominal Pain     Patient was brought in to ED by ambulance. Per report, patient had pizza with jalapeno around 7:00 pm. Around 10:00 pm patient started having severe  abdominal pain, nausea,and vomiting. En route, patient received Zofran 4 mg IV, Fentanyl 100 cg IV, and Dilaudid 1 mg IV. Patient has 18G PIV to right AC. Pre hospital BG was 116     HPI  Ravi Hankins is a 34 year old male who presents to the ED for evaluation of abdominal pain.  He has a history of vomiting syndrome.  Has had several ED visits for similar symptoms.  Pain started at approximately 10 PM after he ate a [employer] pizza with jalapenos around 7 PM.  Pain is described as severe, located in the epigastric area.  Also endorses nausea and vomiting.  Received fentanyl, Dilaudid, Zofran and around noted \"minimal\" relief.  Has close relationship with PCP but has not seen gastroenterologist for this.  Has never had an endoscopy.  Previous ED visits have attributed the symptoms to cannabinoid hyperemesis syndrome      Past Medical History  Past Medical History:   Diagnosis Date    Dilated aortic root      Past Surgical History:   Procedure Laterality Date    NO HISTORY OF SURGERY       famotidine (PEPCID) 20 MG tablet  ondansetron (ZOFRAN ODT) 4 MG ODT tab  atenolol (TENORMIN) 100 MG tablet  folic acid (FOLVITE) 1 MG tablet  gabapentin (NEURONTIN) 100 MG capsule  hydrOXYzine HCl (ATARAX) 25 MG tablet  melatonin 3 MG tablet  multivitamin w/minerals (THERA-VIT-M) tablet  thiamine (B-1) 100 MG tablet  traZODone (DESYREL) 50 MG tablet  venlafaxine (EFFEXOR XR) 37.5 MG 24 hr capsule      Allergies   Allergen Reactions    Contrast Dye      PN: LW CM1:  Reaction :  rash    Sulfa Antibiotics      PN: LW Reaction: unsure which kid has allergy per mom???     Family History  Family History   Problem Relation Age of Onset    Heart " "Disease Brother     Abdominal Aortic Aneurysm Brother     Heart Disease Other         self    Substance Abuse Mother     Substance Abuse Father     Substance Abuse Maternal Grandfather     Diabetes No family hx of     Coronary Artery Disease No family hx of     Hypertension No family hx of     Hyperlipidemia No family hx of     Cerebrovascular Disease No family hx of     Breast Cancer No family hx of     Colon Cancer No family hx of     Prostate Cancer No family hx of     Other Cancer No family hx of     Depression No family hx of     Anxiety Disorder No family hx of     Mental Illness No family hx of     Anesthesia Reaction No family hx of     Asthma No family hx of     Osteoporosis No family hx of     Genetic Disorder No family hx of     Thyroid Disease No family hx of     Obesity No family hx of     Unknown/Adopted No family hx of      Social History   Social History     Tobacco Use    Smoking status: Former     Current packs/day: 0.00     Average packs/day: 0.1 packs/day for 5.0 years (0.5 ttl pk-yrs)     Types: Cigarettes     Start date: 2017     Quit date: 2022     Years since quitting: 3.3    Smokeless tobacco: Never    Tobacco comments:     1-2 cigarettes a day for 5 years   Vaping Use    Vaping status: Some Days    Start date: 2/22/2021    Substances: Nicotine    Devices: Disposable   Substance Use Topics    Alcohol use: Yes     Comment: once a week    Drug use: Not Currently     Types: Marijuana      A medically appropriate review of systems was performed with pertinent positives and negatives noted in the HPI, and all other systems negative.    Physical Exam   BP: 120/81  Pulse: 60  Temp: 97.2  F (36.2  C)  Resp: 20  Height: 188 cm (6' 2\")  Weight: 72.6 kg (160 lb)  SpO2: 94 %  Physical Exam  Vitals and nursing note reviewed.   Constitutional:       General: He is in acute distress (due to pain).      Appearance: Normal appearance.   HENT:      Head: Normocephalic.      Nose: Nose normal.   Eyes:      " Pupils: Pupils are equal, round, and reactive to light.   Cardiovascular:      Rate and Rhythm: Normal rate and regular rhythm.   Pulmonary:      Effort: Pulmonary effort is normal.   Abdominal:      General: There is no distension.      Comments: Reported pain with associated tenderness to palpation in the epigastric area.  Guarding present, no rebound tenderness.  Abdomen is soft and nondistended.  No overlying skin changes.  No CVA tenderness.   Musculoskeletal:         General: No deformity. Normal range of motion.      Cervical back: Normal range of motion.   Skin:     General: Skin is warm.   Neurological:      Mental Status: He is alert and oriented to person, place, and time.   Psychiatric:         Mood and Affect: Mood normal.           ED Course, Procedures, & Data           Results for orders placed or performed during the hospital encounter of 05/13/25   Comprehensive metabolic panel     Status: Abnormal   Result Value Ref Range    Sodium 141 135 - 145 mmol/L    Potassium 3.6 3.4 - 5.3 mmol/L    Carbon Dioxide (CO2) 20 (L) 22 - 29 mmol/L    Anion Gap 16 (H) 7 - 15 mmol/L    Urea Nitrogen 14.3 6.0 - 20.0 mg/dL    Creatinine 0.88 0.67 - 1.17 mg/dL    GFR Estimate >90 >60 mL/min/1.73m2    Calcium 9.0 8.8 - 10.4 mg/dL    Chloride 105 98 - 107 mmol/L    Glucose 86 70 - 99 mg/dL    Alkaline Phosphatase 61 40 - 150 U/L    AST 20 0 - 45 U/L    ALT 11 0 - 70 U/L    Protein Total 7.5 6.4 - 8.3 g/dL    Albumin 4.4 3.5 - 5.2 g/dL    Bilirubin Total 0.5 <=1.2 mg/dL   Lipase     Status: Normal   Result Value Ref Range    Lipase 22 13 - 60 U/L   CBC with platelets and differential     Status: Abnormal   Result Value Ref Range    WBC Count 6.0 4.0 - 11.0 10e3/uL    RBC Count 4.09 (L) 4.40 - 5.90 10e6/uL    Hemoglobin 13.0 (L) 13.3 - 17.7 g/dL    Hematocrit 37.7 (L) 40.0 - 53.0 %    MCV 92 78 - 100 fL    MCH 31.8 26.5 - 33.0 pg    MCHC 34.5 31.5 - 36.5 g/dL    RDW 12.2 10.0 - 15.0 %    Platelet Count 160 150 - 450  10e3/uL    % Neutrophils 76 %    % Lymphocytes 19 %    % Monocytes 5 %    % Eosinophils 0 %    % Basophils 0 %    % Immature Granulocytes 0 %    NRBCs per 100 WBC 0 <1 /100    Absolute Neutrophils 4.6 1.6 - 8.3 10e3/uL    Absolute Lymphocytes 1.1 0.8 - 5.3 10e3/uL    Absolute Monocytes 0.3 0.0 - 1.3 10e3/uL    Absolute Eosinophils 0.0 0.0 - 0.7 10e3/uL    Absolute Basophils 0.0 0.0 - 0.2 10e3/uL    Absolute Immature Granulocytes 0.0 <=0.4 10e3/uL    Absolute NRBCs 0.0 10e3/uL   CBC with platelets differential     Status: Abnormal    Narrative    The following orders were created for panel order CBC with platelets differential.  Procedure                               Abnormality         Status                     ---------                               -----------         ------                     CBC with platelets and ...[[id number]]  Abnormal            Final result                 Please view results for these tests on the individual orders.     Medications   HYDROmorphone (PF) (DILAUDID) injection 0.5 mg (0.5 mg Intravenous $Given 5/13/25 0604)   alum & mag hydroxide-simethicone (MAALOX) suspension 15 mL (15 mLs Oral $Given 5/13/25 0303)   lidocaine (viscous) (XYLOCAINE) 2 % solution 10 mL (10 mLs Mouth/Throat $Given 5/13/25 0308)     Labs Ordered and Resulted from Time of ED Arrival to Time of ED Departure   COMPREHENSIVE METABOLIC PANEL - Abnormal       Result Value    Sodium 141      Potassium 3.6      Carbon Dioxide (CO2) 20 (*)     Anion Gap 16 (*)     Urea Nitrogen 14.3      Creatinine 0.88      GFR Estimate >90      Calcium 9.0      Chloride 105      Glucose 86      Alkaline Phosphatase 61      AST 20      ALT 11      Protein Total 7.5      Albumin 4.4      Bilirubin Total 0.5     CBC WITH PLATELETS AND DIFFERENTIAL - Abnormal    WBC Count 6.0      RBC Count 4.09 (*)     Hemoglobin 13.0 (*)     Hematocrit 37.7 (*)     MCV 92      MCH 31.8      MCHC 34.5      RDW 12.2      Platelet Count 160      %  Neutrophils 76      % Lymphocytes 19      % Monocytes 5      % Eosinophils 0      % Basophils 0      % Immature Granulocytes 0      NRBCs per 100 WBC 0      Absolute Neutrophils 4.6      Absolute Lymphocytes 1.1      Absolute Monocytes 0.3      Absolute Eosinophils 0.0      Absolute Basophils 0.0      Absolute Immature Granulocytes 0.0      Absolute NRBCs 0.0     LIPASE - Normal    Lipase 22       No orders to display          Critical care was not performed.     Medical Decision Making  The patient's presentation was of moderate complexity (a chronic illness mild to moderate exacerbation, progression, or side effect of treatment).    The patient's evaluation involved:  review of 2 test result(s) ordered prior to this encounter (see separate area of note for details)  ordering and/or review of 3+ test(s) in this encounter (see separate area of note for details)    The patient's management necessitated high risk (a parenteral controlled substance).    Assessment & Plan    Patient with history of hyperemesis syndrome, presents the ED for evaluation nausea vomiting and epigastric abdominal pain.  On arrival, patient is distressed due to his level pain.  Was treated with IV hydromorphone and GI cocktail.  Laboratory workup is reassuring.  No transaminitis or bilirubin elevation to suggest hepatobiliary pathology.  No lipase elevation to suggest acute pancreatitis.  I reviewed right upper quadrant ultrasound and CT scan from 4/28/2025 which did not show any acute pathology.  Did not feel that repeat imaging would be useful at this time.  Suspicion for acute cholecystitis or appendicitis is low.  His symptoms improved with the medications noted above.  Discharged with famotidine and Zofran.  Will follow-up with PCP.  Recommending outpatient GI referral as well.  Educated reasons to return to the ED.    I have reviewed the nursing notes. I have reviewed the findings, diagnosis, plan and need for follow up with the  patient.    Discharge Medication List as of 5/13/2025  6:17 AM        START taking these medications    Details   famotidine (PEPCID) 20 MG tablet Take 1 tablet (20 mg) by mouth 2 times daily as needed (abdominal pain, heartburn, indigestion)., Disp-30 tablet, R-0, E-Prescribe             Final diagnoses:   Generalized abdominal pain   Nausea and vomiting, unspecified vomiting type       Ruy Angeles DO  [facility] EMERGENCY DEPARTMENT  5/13/2025     Ruy Angeles DO  05/13/25 0658     36.5

## 2025-05-13 NOTE — ED TRIAGE NOTES
Patient was brought in to ED by ambulance. Per report, patient had pizza with jalapeno around 7:00 pm. Around 10:00 pm patient started having severe  abdominal pain, nausea,and vomiting. En route, patient received Zofran 4 mg IV, Fentanyl 100 cg IV, and Dilaudid 1 mg IV. Patient has 18G PIV to right AC. Pre hospital BG was 116     Triage Assessment (Adult)       Row Name 05/13/25 0237          Triage Assessment    Airway WDL WDL        Respiratory WDL    Respiratory WDL WDL        Skin Circulation/Temperature WDL    Skin Circulation/Temperature WDL WDL        Cardiac WDL    Cardiac WDL WDL        Peripheral/Neurovascular WDL    Peripheral Neurovascular WDL WDL        Cognitive/Neuro/Behavioral WDL    Cognitive/Neuro/Behavioral WDL WDL

## 2025-05-15 ENCOUNTER — PATIENT OUTREACH (OUTPATIENT)
Dept: CARE COORDINATION | Facility: CLINIC | Age: 35
End: 2025-05-15
Payer: COMMERCIAL

## 2025-05-15 NOTE — PROGRESS NOTES
[facility] Care Resource Center Contact  RUST/Voicemail     Clinical Data: Care Coordination ED-sourced Outreach-     Outreach attempted x 2.  Left message on patient's voicemail, providing [facility]'s 24/7 scheduling and nurse triage phone number [phone] ([phone]) for questions/concerns and/or to schedule an appt with an [facility] provider.      [facility] unable to send Care Coordination introduction letter as patient does not have an active MyChart account. Clinic Care Coordination services remain available via referral if needed.    Plan: [facility] will do no further outreaches at this time.       FORREST King  [facility] Care Resource [place], [facility]    *Connected Care Resource Team does NOT follow patient ongoing. Referrals are identified based on internal discharge reports and the outreach is to ensure patient has an understanding of their discharge instructions.

## 2025-05-20 ENCOUNTER — PATIENT OUTREACH (OUTPATIENT)
Dept: CARE COORDINATION | Facility: CLINIC | Age: 35
End: 2025-05-20

## 2025-05-20 ENCOUNTER — APPOINTMENT (OUTPATIENT)
Dept: CT IMAGING | Facility: CLINIC | Age: 35
End: 2025-05-20
Attending: FAMILY MEDICINE
Payer: COMMERCIAL

## 2025-05-20 ENCOUNTER — HOSPITAL ENCOUNTER (EMERGENCY)
Facility: CLINIC | Age: 35
Discharge: HOME | End: 2025-05-20
Attending: FAMILY MEDICINE | Admitting: FAMILY MEDICINE
Payer: COMMERCIAL

## 2025-05-20 VITALS
WEIGHT: 160 LBS | DIASTOLIC BLOOD PRESSURE: 84 MMHG | SYSTOLIC BLOOD PRESSURE: 123 MMHG | BODY MASS INDEX: 20.53 KG/M2 | HEIGHT: 74 IN | OXYGEN SATURATION: 99 % | TEMPERATURE: 98.4 F | RESPIRATION RATE: 18 BRPM | HEART RATE: 65 BPM

## 2025-05-20 DIAGNOSIS — F10.929 ALCOHOLIC INTOXICATION WITH COMPLICATION: ICD-10-CM

## 2025-05-20 DIAGNOSIS — K29.20 ACUTE ALCOHOLIC GASTRITIS WITHOUT HEMORRHAGE: ICD-10-CM

## 2025-05-20 LAB
ALBUMIN SERPL BCG-MCNC: 4.7 G/DL (ref 3.5–5.2)
ALBUMIN UR-MCNC: 20 MG/DL
ALCOHOL BREATH TEST: 0.21 (ref 0–0.01)
ALP SERPL-CCNC: 75 U/L (ref 40–150)
ALT SERPL W P-5'-P-CCNC: 27 U/L (ref 0–70)
AMPHETAMINES UR QL SCN: ABNORMAL
ANION GAP SERPL CALCULATED.3IONS-SCNC: 14 MMOL/L (ref 7–15)
APPEARANCE UR: CLEAR
AST SERPL W P-5'-P-CCNC: 42 U/L (ref 0–45)
BARBITURATES UR QL SCN: ABNORMAL
BASOPHILS # BLD AUTO: 0 10E3/UL (ref 0–0.2)
BASOPHILS NFR BLD AUTO: 0 %
BENZODIAZ UR QL SCN: ABNORMAL
BILIRUB SERPL-MCNC: 0.7 MG/DL
BILIRUB UR QL STRIP: NEGATIVE
BUN SERPL-MCNC: 7.5 MG/DL (ref 6–20)
BZE UR QL SCN: ABNORMAL
CALCIUM SERPL-MCNC: 9.6 MG/DL (ref 8.8–10.4)
CANNABINOIDS UR QL SCN: ABNORMAL
CHLORIDE SERPL-SCNC: 101 MMOL/L (ref 98–107)
COLOR UR AUTO: ABNORMAL
CREAT SERPL-MCNC: 0.95 MG/DL (ref 0.67–1.17)
EGFRCR SERPLBLD CKD-EPI 2021: >90 ML/MIN/1.73M2
EOSINOPHIL # BLD AUTO: 0 10E3/UL (ref 0–0.7)
EOSINOPHIL NFR BLD AUTO: 0 %
ERYTHROCYTE [DISTWIDTH] IN BLOOD BY AUTOMATED COUNT: 12.1 % (ref 10–15)
ETHANOL SERPL-MCNC: 0.2 G/DL
FENTANYL UR QL: ABNORMAL
GLUCOSE SERPL-MCNC: 103 MG/DL (ref 70–99)
GLUCOSE UR STRIP-MCNC: NEGATIVE MG/DL
HCO3 SERPL-SCNC: 25 MMOL/L (ref 22–29)
HCT VFR BLD AUTO: 41.6 % (ref 40–53)
HGB BLD-MCNC: 14.1 G/DL (ref 13.3–17.7)
HGB UR QL STRIP: NEGATIVE
IMM GRANULOCYTES # BLD: 0 10E3/UL
IMM GRANULOCYTES NFR BLD: 0 %
KETONES UR STRIP-MCNC: NEGATIVE MG/DL
LEUKOCYTE ESTERASE UR QL STRIP: NEGATIVE
LIPASE SERPL-CCNC: 29 U/L (ref 13–60)
LYMPHOCYTES # BLD AUTO: 2.1 10E3/UL (ref 0.8–5.3)
LYMPHOCYTES NFR BLD AUTO: 33 %
MCH RBC QN AUTO: 30.3 PG (ref 26.5–33)
MCHC RBC AUTO-ENTMCNC: 33.9 G/DL (ref 31.5–36.5)
MCV RBC AUTO: 90 FL (ref 78–100)
MONOCYTES # BLD AUTO: 0.9 10E3/UL (ref 0–1.3)
MONOCYTES NFR BLD AUTO: 14 %
MUCOUS THREADS #/AREA URNS LPF: PRESENT /LPF
NEUTROPHILS # BLD AUTO: 3.3 10E3/UL (ref 1.6–8.3)
NEUTROPHILS NFR BLD AUTO: 53 %
NITRATE UR QL: NEGATIVE
NRBC # BLD AUTO: 0 10E3/UL
NRBC BLD AUTO-RTO: 0 /100
OPIATES UR QL SCN: ABNORMAL
PCP QUAL URINE (ROCHE): ABNORMAL
PH UR STRIP: 7.5 [PH] (ref 5–7)
PLATELET # BLD AUTO: 168 10E3/UL (ref 150–450)
POTASSIUM SERPL-SCNC: 3.3 MMOL/L (ref 3.4–5.3)
PROT SERPL-MCNC: 8.2 G/DL (ref 6.4–8.3)
RBC # BLD AUTO: 4.65 10E6/UL (ref 4.4–5.9)
RBC URINE: 1 /HPF
SODIUM SERPL-SCNC: 140 MMOL/L (ref 135–145)
SP GR UR STRIP: 1.01 (ref 1–1.03)
SPERM #/AREA URNS HPF: PRESENT /HPF
SQUAMOUS EPITHELIAL: <1 /HPF
UROBILINOGEN UR STRIP-MCNC: NORMAL MG/DL
WBC # BLD AUTO: 6.3 10E3/UL (ref 4–11)
WBC URINE: 2 /HPF

## 2025-05-20 PROCEDURE — 74176 CT ABD & PELVIS W/O CONTRAST: CPT

## 2025-05-20 PROCEDURE — 258N000003 HC RX IP 258 OP 636: Performed by: FAMILY MEDICINE

## 2025-05-20 PROCEDURE — 36415 COLL VENOUS BLD VENIPUNCTURE: CPT | Performed by: FAMILY MEDICINE

## 2025-05-20 PROCEDURE — 96361 HYDRATE IV INFUSION ADD-ON: CPT | Performed by: FAMILY MEDICINE

## 2025-05-20 PROCEDURE — 96374 THER/PROPH/DIAG INJ IV PUSH: CPT | Performed by: FAMILY MEDICINE

## 2025-05-20 PROCEDURE — 80307 DRUG TEST PRSMV CHEM ANLYZR: CPT | Performed by: FAMILY MEDICINE

## 2025-05-20 PROCEDURE — 96375 TX/PRO/DX INJ NEW DRUG ADDON: CPT | Performed by: FAMILY MEDICINE

## 2025-05-20 PROCEDURE — 82075 ASSAY OF BREATH ETHANOL: CPT | Performed by: FAMILY MEDICINE

## 2025-05-20 PROCEDURE — 82310 ASSAY OF CALCIUM: CPT | Performed by: FAMILY MEDICINE

## 2025-05-20 PROCEDURE — 82077 ASSAY SPEC XCP UR&BREATH IA: CPT | Performed by: FAMILY MEDICINE

## 2025-05-20 PROCEDURE — 99284 EMERGENCY DEPT VISIT MOD MDM: CPT | Performed by: FAMILY MEDICINE

## 2025-05-20 PROCEDURE — 83690 ASSAY OF LIPASE: CPT | Performed by: FAMILY MEDICINE

## 2025-05-20 PROCEDURE — 99285 EMERGENCY DEPT VISIT HI MDM: CPT | Mod: 25 | Performed by: FAMILY MEDICINE

## 2025-05-20 PROCEDURE — 81001 URINALYSIS AUTO W/SCOPE: CPT | Performed by: FAMILY MEDICINE

## 2025-05-20 PROCEDURE — 250N000013 HC RX MED GY IP 250 OP 250 PS 637: Performed by: FAMILY MEDICINE

## 2025-05-20 PROCEDURE — 85004 AUTOMATED DIFF WBC COUNT: CPT | Performed by: FAMILY MEDICINE

## 2025-05-20 PROCEDURE — 250N000011 HC RX IP 250 OP 636: Performed by: FAMILY MEDICINE

## 2025-05-20 RX ORDER — PANTOPRAZOLE SODIUM 40 MG/1
40 TABLET, DELAYED RELEASE ORAL DAILY
Qty: 30 TABLET | Refills: 0 | Status: SHIPPED | OUTPATIENT
Start: 2025-05-20 | End: 2025-06-19

## 2025-05-20 RX ORDER — HYDROMORPHONE HCL IN WATER/PF 6 MG/30 ML
0.2 PATIENT CONTROLLED ANALGESIA SYRINGE INTRAVENOUS ONCE
Refills: 0 | Status: COMPLETED | OUTPATIENT
Start: 2025-05-20 | End: 2025-05-20

## 2025-05-20 RX ORDER — ONDANSETRON 2 MG/ML
4 INJECTION INTRAMUSCULAR; INTRAVENOUS EVERY 30 MIN PRN
Status: DISCONTINUED | OUTPATIENT
Start: 2025-05-20 | End: 2025-05-20 | Stop reason: HOSPADM

## 2025-05-20 RX ORDER — MAGNESIUM HYDROXIDE/ALUMINUM HYDROXICE/SIMETHICONE 120; 1200; 1200 MG/30ML; MG/30ML; MG/30ML
30 SUSPENSION ORAL ONCE
Status: COMPLETED | OUTPATIENT
Start: 2025-05-20 | End: 2025-05-20

## 2025-05-20 RX ORDER — SUCRALFATE ORAL 1 G/10ML
1 SUSPENSION ORAL ONCE
Status: COMPLETED | OUTPATIENT
Start: 2025-05-20 | End: 2025-05-20

## 2025-05-20 RX ADMIN — SODIUM CHLORIDE, SODIUM LACTATE, POTASSIUM CHLORIDE, AND CALCIUM CHLORIDE 1000 ML: .6; .31; .03; .02 INJECTION, SOLUTION INTRAVENOUS at 11:12

## 2025-05-20 RX ADMIN — ALUMINUM HYDROXIDE, MAGNESIUM HYDROXIDE, AND DIMETHICONE 30 ML: 200; 20; 200 SUSPENSION ORAL at 13:53

## 2025-05-20 RX ADMIN — PANTOPRAZOLE SODIUM 40 MG: 40 INJECTION, POWDER, LYOPHILIZED, FOR SOLUTION INTRAVENOUS at 11:11

## 2025-05-20 RX ADMIN — SUCRALFATE 1 G: 1 SUSPENSION ORAL at 13:53

## 2025-05-20 RX ADMIN — HYDROMORPHONE HYDROCHLORIDE 0.2 MG: 0.2 INJECTION, SOLUTION INTRAMUSCULAR; INTRAVENOUS; SUBCUTANEOUS at 13:54

## 2025-05-20 RX ADMIN — ONDANSETRON 4 MG: 2 INJECTION INTRAMUSCULAR; INTRAVENOUS at 11:11

## 2025-05-20 ASSESSMENT — ACTIVITIES OF DAILY LIVING (ADL)
ADLS_ACUITY_SCORE: 50

## 2025-05-20 NOTE — ED TRIAGE NOTES
Patient states his stomach is killing him, has been drinking over the weekend. Denies N/V. Last drink 2 hours ago. States is looking for help with abdominal pain as well as alcohol detox. Reports having kidney stones. Was sober for three months, started drinking a couple days ago.  Denies history of withdrawal seizures, denies other drug use, denies SI.

## 2025-05-20 NOTE — ED PROVIDER NOTES
[facility] [place] EMERGENCY DEPARTMENT ([facility])    5/20/25      ED PROVIDER NOTE     History     Chief Complaint   Patient presents with    Abdominal Pain    Drug / Alcohol Assessment     HPI  Ravi Hankins is a 34 year old male with history of anxiety, alcohol use, cannabinoid use, cyclic vomiting syndrome, and enteric colitis who presents to the emergency department with abdominal pain in setting of recent alcohol use.  Was sober for three months, started drinking a couple days ago.  He states that he was drinking over the past weekend and now his stomach is killing him. Last drink 2 hours ago.  Estimates he went through 2 L of tequila in the last 2 days.  He describes his abdominal pain is in the mid abdomen.  He also has right flank pain.  The pain is constant, unremitting, denies any change in bowel habit, diarrhea.  He is nauseated but is not vomiting.  He also notes having kidney stones. Denies history of alcohol withdrawal seizures or DTs, denies other drug use, denies suicidal homicidal ideation.  No nausea, vomiting or diarrhea.    Past Medical History  Past Medical History:   Diagnosis Date    Dilated aortic root      Past Surgical History:   Procedure Laterality Date    NO HISTORY OF SURGERY       atenolol (TENORMIN) 100 MG tablet  famotidine (PEPCID) 20 MG tablet  folic acid (FOLVITE) 1 MG tablet  gabapentin (NEURONTIN) 100 MG capsule  hydrOXYzine HCl (ATARAX) 25 MG tablet  melatonin 3 MG tablet  multivitamin w/minerals (THERA-VIT-M) tablet  ondansetron (ZOFRAN ODT) 4 MG ODT tab  pantoprazole (PROTONIX) 40 MG EC tablet  thiamine (B-1) 100 MG tablet  traZODone (DESYREL) 50 MG tablet  venlafaxine (EFFEXOR XR) 37.5 MG 24 hr capsule      Allergies   Allergen Reactions    Contrast Dye      PN: LW CM1:  Reaction :  rash    Sulfa Antibiotics      PN: LW Reaction: unsure which kid has allergy per mom???     Family History  Family History   Problem Relation Age of Onset    Heart Disease Brother      "Abdominal Aortic Aneurysm Brother     Heart Disease Other         self    Substance Abuse Mother     Substance Abuse Father     Substance Abuse Maternal Grandfather     Diabetes No family hx of     Coronary Artery Disease No family hx of     Hypertension No family hx of     Hyperlipidemia No family hx of     Cerebrovascular Disease No family hx of     Breast Cancer No family hx of     Colon Cancer No family hx of     Prostate Cancer No family hx of     Other Cancer No family hx of     Depression No family hx of     Anxiety Disorder No family hx of     Mental Illness No family hx of     Anesthesia Reaction No family hx of     Asthma No family hx of     Osteoporosis No family hx of     Genetic Disorder No family hx of     Thyroid Disease No family hx of     Obesity No family hx of     Unknown/Adopted No family hx of      Social History   Social History     Tobacco Use    Smoking status: Former     Current packs/day: 0.00     Average packs/day: 0.1 packs/day for 5.0 years (0.5 ttl pk-yrs)     Types: Cigarettes     Start date: 2017     Quit date: 2022     Years since quitting: 3.3    Smokeless tobacco: Never    Tobacco comments:     1-2 cigarettes a day for 5 years   Vaping Use    Vaping status: Some Days    Start date: 2/22/2021    Substances: Nicotine    Devices: Disposable   Substance Use Topics    Alcohol use: Yes     Comment: once a week    Drug use: Not Currently     Types: Marijuana      A medically appropriate review of systems was performed with pertinent positives and negatives noted in the HPI, and all other systems negative.    Physical Exam   BP: 123/84  Pulse: 65  Temp: 98.4  F (36.9  C)  Resp: 18  Height: 188 cm (6' 2\")  Weight: 72.6 kg (160 lb)  SpO2: 99 %  Physical Exam  Vitals and nursing note reviewed.   Constitutional:       General: He is not in acute distress.     Appearance: Normal appearance. He is not toxic-appearing.   HENT:      Head: Atraumatic.   Eyes:      General: No scleral icterus.     " Conjunctiva/sclera: Conjunctivae normal.   Cardiovascular:      Rate and Rhythm: Normal rate.      Heart sounds: Normal heart sounds.   Pulmonary:      Effort: Pulmonary effort is normal. No respiratory distress.      Breath sounds: Normal breath sounds.   Abdominal:      Palpations: Abdomen is soft.      Tenderness: There is abdominal tenderness in the epigastric area. There is right CVA tenderness. There is no guarding or rebound.   Musculoskeletal:         General: No deformity.      Cervical back: Neck supple.   Skin:     General: Skin is warm.   Neurological:      Mental Status: He is alert.   Psychiatric:         Attention and Perception: Attention normal.         Mood and Affect: Affect is tearful.         Speech: Speech is slurred.         Behavior: Behavior is cooperative.         Thought Content: Thought content normal.      Comments: Slurred speech consistent with elevated alcohol level           ED Course, Procedures, & Data      Procedures                Results for orders placed or performed during the hospital encounter of 05/20/25   CT Abdomen Pelvis w/o Contrast     Status: None    Narrative    EXAM: CT ABDOMEN PELVIS W/O CONTRAST  LOCATION: [facility]  DATE: 5/20/2025    INDICATION: Abdominal and flank pain, history of kidney stone  COMPARISON: 4/28/2025  TECHNIQUE: CT scan of the abdomen and pelvis was performed without IV contrast. Multiplanar reformats were obtained. Dose reduction techniques were used.  CONTRAST: None.    FINDINGS:   LOWER CHEST: Normal.    HEPATOBILIARY: Diffuse hepatic steatosis. No significant mass. No bile duct dilatation. No calcified gallstones.    PANCREAS: Normal.    SPLEEN: Normal.    ADRENAL GLANDS: Normal.    KIDNEYS/BLADDER: Unchanged subcentimeter nonobstructing intrarenal calculi bilaterally. No collecting system dilation or ureteral calculi.    BOWEL: No obstruction or inflammatory change. Normal appendix.    LYMPH  NODES: Normal.    VASCULATURE: Normal.    PELVIC ORGANS: Normal.    MUSCULOSKELETAL: Normal.      Impression    IMPRESSION:   1.  No acute findings in the abdomen or pelvis.  2.  Unchanged subcentimeter nonobstructing intrarenal calculi bilaterally.  3.  Hepatic steatosis.       Comprehensive metabolic panel     Status: Abnormal   Result Value Ref Range    Sodium 140 135 - 145 mmol/L    Potassium 3.3 (L) 3.4 - 5.3 mmol/L    Carbon Dioxide (CO2) 25 22 - 29 mmol/L    Anion Gap 14 7 - 15 mmol/L    Urea Nitrogen 7.5 6.0 - 20.0 mg/dL    Creatinine 0.95 0.67 - 1.17 mg/dL    GFR Estimate >90 >60 mL/min/1.73m2    Calcium 9.6 8.8 - 10.4 mg/dL    Chloride 101 98 - 107 mmol/L    Glucose 103 (H) 70 - 99 mg/dL    Alkaline Phosphatase 75 40 - 150 U/L    AST 42 0 - 45 U/L    ALT 27 0 - 70 U/L    Protein Total 8.2 6.4 - 8.3 g/dL    Albumin 4.7 3.5 - 5.2 g/dL    Bilirubin Total 0.7 <=1.2 mg/dL   Lipase     Status: Normal   Result Value Ref Range    Lipase 29 13 - 60 U/L   Ethanol Level Blood     Status: Abnormal   Result Value Ref Range    Ethanol Level Blood 0.20 (H) <=0.01 g/dL   UA with Microscopic reflex to Culture     Status: Abnormal    Specimen: Urine, Midstream   Result Value Ref Range    Color Urine Light Yellow Colorless, Straw, Light Yellow, Yellow    Appearance Urine Clear Clear    Glucose Urine Negative Negative mg/dL    Bilirubin Urine Negative Negative    Ketones Urine Negative Negative mg/dL    Specific Gravity Urine 1.014 1.003 - 1.035    Blood Urine Negative Negative    pH Urine 7.5 (H) 5.0 - 7.0    Protein Albumin Urine 20 (A) Negative mg/dL    Urobilinogen Urine Normal Normal mg/dL    Nitrite Urine Negative Negative    Leukocyte Esterase Urine Negative Negative    Mucus Urine Present (A) None Seen /LPF    Sperm Urine Present (A) None Seen /HPF    RBC Urine 1 <=2 /HPF    WBC Urine 2 <=5 /HPF    Squamous Epithelials Urine <1 <=1 /HPF    Narrative    Urine Culture not indicated   Urine Drug Screen Panel     Status:  Abnormal   Result Value Ref Range    Amphetamines Urine Screen Negative Screen Negative    Barbituates Urine Screen Negative Screen Negative    Benzodiazepine Urine Screen Negative Screen Negative    Cannabinoids Urine Screen Positive (A) Screen Negative    Cocaine Urine Screen Negative Screen Negative    Fentanyl Qual Urine Screen Negative Screen Negative    Opiates Urine Screen Negative Screen Negative    PCP Urine Screen Negative Screen Negative   CBC with platelets and differential     Status: None   Result Value Ref Range    WBC Count 6.3 4.0 - 11.0 10e3/uL    RBC Count 4.65 4.40 - 5.90 10e6/uL    Hemoglobin 14.1 13.3 - 17.7 g/dL    Hematocrit 41.6 40.0 - 53.0 %    MCV 90 78 - 100 fL    MCH 30.3 26.5 - 33.0 pg    MCHC 33.9 31.5 - 36.5 g/dL    RDW 12.1 10.0 - 15.0 %    Platelet Count 168 150 - 450 10e3/uL    % Neutrophils 53 %    % Lymphocytes 33 %    % Monocytes 14 %    % Eosinophils 0 %    % Basophils 0 %    % Immature Granulocytes 0 %    NRBCs per 100 WBC 0 <1 /100    Absolute Neutrophils 3.3 1.6 - 8.3 10e3/uL    Absolute Lymphocytes 2.1 0.8 - 5.3 10e3/uL    Absolute Monocytes 0.9 0.0 - 1.3 10e3/uL    Absolute Eosinophils 0.0 0.0 - 0.7 10e3/uL    Absolute Basophils 0.0 0.0 - 0.2 10e3/uL    Absolute Immature Granulocytes 0.0 <=0.4 10e3/uL    Absolute NRBCs 0.0 10e3/uL   Alcohol breath test POCT     Status: Abnormal   Result Value Ref Range    Alcohol Breath Test 0.210 (A) 0.00 - 0.01   CBC with platelets differential     Status: None    Narrative    The following orders were created for panel order CBC with platelets differential.  Procedure                               Abnormality         Status                     ---------                               -----------         ------                     CBC with platelets and ...[[id number]]                      Final result                 Please view results for these tests on the individual orders.   Urine Drug Screen     Status: Abnormal    Narrative    The  following orders were created for panel order Urine Drug Screen.  Procedure                               Abnormality         Status                     ---------                               -----------         ------                     Urine Drug Screen Panel[[id number]]     Abnormal            Final result                 Please view results for these tests on the individual orders.     Medications   ondansetron (ZOFRAN) injection 4 mg (4 mg Intravenous $Given 5/20/25 1111)   lactated ringers BOLUS 1,000 mL (0 mLs Intravenous Stopped 5/20/25 1156)   pantoprazole (PROTONIX) IV push injection 40 mg (40 mg Intravenous $Given 5/20/25 1111)   sucralfate (CARAFATE) suspension 1 g (1 g Oral $Given 5/20/25 1353)   alum & mag hydroxide-simethicone (MAALOX) suspension 30 mL (30 mLs Oral $Given 5/20/25 1353)   HYDROmorphone (DILAUDID) injection 0.2 mg (0.2 mg Intravenous $Given 5/20/25 1354)     Labs Ordered and Resulted from Time of ED Arrival to Time of ED Departure   COMPREHENSIVE METABOLIC PANEL - Abnormal       Result Value    Sodium 140      Potassium 3.3 (*)     Carbon Dioxide (CO2) 25      Anion Gap 14      Urea Nitrogen 7.5      Creatinine 0.95      GFR Estimate >90      Calcium 9.6      Chloride 101      Glucose 103 (*)     Alkaline Phosphatase 75      AST 42      ALT 27      Protein Total 8.2      Albumin 4.7      Bilirubin Total 0.7     ETHANOL LEVEL BLOOD - Abnormal    Ethanol Level Blood 0.20 (*)    ROUTINE UA WITH MICROSCOPIC REFLEX TO CULTURE - Abnormal    Color Urine Light Yellow      Appearance Urine Clear      Glucose Urine Negative      Bilirubin Urine Negative      Ketones Urine Negative      Specific Gravity Urine 1.014      Blood Urine Negative      pH Urine 7.5 (*)     Protein Albumin Urine 20 (*)     Urobilinogen Urine Normal      Nitrite Urine Negative      Leukocyte Esterase Urine Negative      Mucus Urine Present (*)     Sperm Urine Present (*)     RBC Urine 1      WBC Urine 2      Squamous  Epithelials Urine <1     URINE DRUG SCREEN PANEL - Abnormal    Amphetamines Urine Screen Negative      Barbituates Urine Screen Negative      Benzodiazepine Urine Screen Negative      Cannabinoids Urine Screen Positive (*)     Cocaine Urine Screen Negative      Fentanyl Qual Urine Screen Negative      Opiates Urine Screen Negative      PCP Urine Screen Negative     ALCOHOL BREATH TEST POCT - Abnormal    Alcohol Breath Test 0.210 (*)    LIPASE - Normal    Lipase 29     CBC WITH PLATELETS AND DIFFERENTIAL    WBC Count 6.3      RBC Count 4.65      Hemoglobin 14.1      Hematocrit 41.6      MCV 90      MCH 30.3      MCHC 33.9      RDW 12.1      Platelet Count 168      % Neutrophils 53      % Lymphocytes 33      % Monocytes 14      % Eosinophils 0      % Basophils 0      % Immature Granulocytes 0      NRBCs per 100 WBC 0      Absolute Neutrophils 3.3      Absolute Lymphocytes 2.1      Absolute Monocytes 0.9      Absolute Eosinophils 0.0      Absolute Basophils 0.0      Absolute Immature Granulocytes 0.0      Absolute NRBCs 0.0       CT Abdomen Pelvis w/o Contrast   Final Result   IMPRESSION:    1.  No acute findings in the abdomen or pelvis.   2.  Unchanged subcentimeter nonobstructing intrarenal calculi bilaterally.   3.  Hepatic steatosis.                   Critical care was not performed.     Medical Decision Making  The patient's presentation was of high complexity (a chronic illness severe exacerbation, progression, or side effect of treatment).    The patient's evaluation involved:  an assessment requiring an independent historian (patient's brother is here and provides collateral information)  review of external note(s) from 3+ sources (review of ED visits from 5/13/2025, 4/28/2025, and 4/1/2025)  ordering and/or review of 3+ test(s) in this encounter (see separate area of note for details)  independent interpretation of testing performed by another health professional (CT abdomen pelvis images personally reviewed  "and reviewed radiologist interpretation)    The patient's management necessitated moderate risk (prescription drug management including medications given in the ED), moderate risk (limitations due to social determinants of health (substance use)), and high risk (a parenteral controlled substance).    Assessment & Plan    A 34-year-old male with a history of alcohol and marijuana loss, kidney stones, cyclic vomiting, anxiety.  He presents stating he has a 2-day relapse drinking tequila and states he wants to \"be monitored while I sober up\".  Denies any history of DTs or seizures and is not requesting discharge.  He does complain of mid abdominal pain and right flank pain, differential diagnosis including gastritis, duodenitis, pancreatitis, cholecystitis, cholelithiasis, pyelonephritis, ureterolithiasis, colitis, appendicitis, diverticulitis  Exam his vital signs are normal.  He smells of alcohol and is obviously intoxicated, tearful, anxious.  He has mid abdominal tenderness without peritoneal signs.  His physical exam is otherwise unremarkable.  patient's labs are without any significant acute metabolic abnormality.  His alcohol level is 0.210, his urine drug screen is positive for cannabinoids.  His lipase and LFTs are normal.  He was treated symptomatically with PPI, Carafate, Maalox, ultimately 0.2 mg of Dilaudid.  He was allowed to sober for more than 4 and half hours he is now clinically sober, improved, taking p.o.  Benign serial abdominal exam.  His imaging shows no obstructing ureteral stone or other acute process in the abdomen.  His abdominal discomfort is likely related to alcohol gastritis or duodenitis.  Patient was advised of same, will be started on a PPI.  He does not appear to require detox, no signs of withdrawal has only been drinking for 2 days.  Was provided with resources to [place] recovery services if he desires further treatment.  We discussed the indications for emergency department " return and follow-up.  Stable for discharge.      I have reviewed the nursing notes. I have reviewed the findings, diagnosis, plan and need for follow up with the patient.    New Prescriptions    PANTOPRAZOLE (PROTONIX) 40 MG EC TABLET    Take 1 tablet (40 mg) by mouth daily for 30 doses.       Final diagnoses:   Alcoholic intoxication with complication   Acute alcoholic gastritis without hemorrhage     I, Elvira Gooden, am serving as a trained medical scribe to document services personally performed by Deejay Baker MD based on the provider's statements to me on May 20, 2025.  This document has been checked and approved by the attending provider.    I, Deejay Baker MD, was physically present and have reviewed and verified the accuracy of this note documented by Elvira Gooden, medical scribe.      Deejay Baker MD   [facility] EMERGENCY DEPARTMENT  5/20/2025        Deejay Baker MD  05/20/25 [id number]

## 2025-05-20 NOTE — DISCHARGE INSTRUCTIONS
Rest, avoid alcohol.  Advance your diet slowly as tolerated.  Start Protonix for the next month then may discontinue if you continue to refrain from alcohol.  If you desire chemical dependency assessment or counseling, follow up with [facility]: [phone]

## 2025-05-23 ENCOUNTER — TELEPHONE (OUTPATIENT)
Dept: UROLOGY | Facility: CLINIC | Age: 35
End: 2025-05-23

## 2025-05-23 NOTE — TELEPHONE ENCOUNTER
Patient needs to be rescheduled for their virtual visit due to Reason for Reschedule:  per provider - No 24-hour urine collection results avialable in patient's chart. He should reschedule visit until after this is completed. -Veronica    Appointment mode: Video  Provider: Veronica Rogel

## 2025-05-29 DIAGNOSIS — F41.9 ANXIETY: ICD-10-CM

## 2025-05-29 RX ORDER — VENLAFAXINE HYDROCHLORIDE 37.5 MG/1
37.5 CAPSULE, EXTENDED RELEASE ORAL DAILY
Qty: 30 CAPSULE | Refills: 2 | Status: SHIPPED | OUTPATIENT
Start: 2025-05-29

## 2025-06-08 NOTE — TELEPHONE ENCOUNTER
See other refill encounter  Seroquel will need to be discuss at appt Monday 6/15/2020  Heather SHELBY RN     There are no Wet Read(s) to document.

## 2025-06-10 DIAGNOSIS — F10.20 ALCOHOL DEPENDENCE, CONTINUOUS (H): ICD-10-CM

## 2025-06-10 RX ORDER — ATENOLOL 100 MG/1
100 TABLET ORAL DAILY
Qty: 90 TABLET | Refills: 0 | Status: SHIPPED | OUTPATIENT
Start: 2025-06-10

## 2025-07-16 ENCOUNTER — APPOINTMENT (OUTPATIENT)
Dept: CT IMAGING | Facility: CLINIC | Age: 35
End: 2025-07-16
Attending: FAMILY MEDICINE
Payer: COMMERCIAL

## 2025-07-16 ENCOUNTER — HOSPITAL ENCOUNTER (EMERGENCY)
Facility: CLINIC | Age: 35
Discharge: HOME | End: 2025-07-17
Attending: FAMILY MEDICINE
Payer: COMMERCIAL

## 2025-07-16 VITALS
TEMPERATURE: 98.8 F | RESPIRATION RATE: 18 BRPM | SYSTOLIC BLOOD PRESSURE: 109 MMHG | HEART RATE: 60 BPM | OXYGEN SATURATION: 96 % | DIASTOLIC BLOOD PRESSURE: 70 MMHG

## 2025-07-16 DIAGNOSIS — M54.50 ACUTE RIGHT-SIDED LOW BACK PAIN WITHOUT SCIATICA: ICD-10-CM

## 2025-07-16 LAB
ALBUMIN SERPL BCG-MCNC: 4.1 G/DL (ref 3.5–5.2)
ALP SERPL-CCNC: 58 U/L (ref 40–150)
ALT SERPL W P-5'-P-CCNC: 12 U/L (ref 0–70)
ANION GAP SERPL CALCULATED.3IONS-SCNC: 18 MMOL/L (ref 7–15)
AST SERPL W P-5'-P-CCNC: 23 U/L (ref 0–45)
BASOPHILS # BLD AUTO: 0 10E3/UL (ref 0–0.2)
BASOPHILS NFR BLD AUTO: 0 %
BILIRUB SERPL-MCNC: 0.2 MG/DL
BUN SERPL-MCNC: 13.9 MG/DL (ref 6–20)
CALCIUM SERPL-MCNC: 8.3 MG/DL (ref 8.8–10.4)
CHLORIDE SERPL-SCNC: 103 MMOL/L (ref 98–107)
CREAT SERPL-MCNC: 0.94 MG/DL (ref 0.67–1.17)
EGFRCR SERPLBLD CKD-EPI 2021: >90 ML/MIN/1.73M2
EOSINOPHIL # BLD AUTO: 0 10E3/UL (ref 0–0.7)
EOSINOPHIL NFR BLD AUTO: 0 %
ERYTHROCYTE [DISTWIDTH] IN BLOOD BY AUTOMATED COUNT: 12.2 % (ref 10–15)
GLUCOSE SERPL-MCNC: 83 MG/DL (ref 70–99)
HCO3 SERPL-SCNC: 21 MMOL/L (ref 22–29)
HCT VFR BLD AUTO: 36 % (ref 40–53)
HGB BLD-MCNC: 12.3 G/DL (ref 13.3–17.7)
IMM GRANULOCYTES # BLD: 0 10E3/UL
IMM GRANULOCYTES NFR BLD: 0 %
LACTATE SERPL-SCNC: 2.3 MMOL/L (ref 0.7–2)
LACTATE SERPL-SCNC: 3.2 MMOL/L (ref 0.7–2)
LYMPHOCYTES # BLD AUTO: 1.3 10E3/UL (ref 0.8–5.3)
LYMPHOCYTES NFR BLD AUTO: 24 %
MCH RBC QN AUTO: 30.4 PG (ref 26.5–33)
MCHC RBC AUTO-ENTMCNC: 34.2 G/DL (ref 31.5–36.5)
MCV RBC AUTO: 89 FL (ref 78–100)
MONOCYTES # BLD AUTO: 0.4 10E3/UL (ref 0–1.3)
MONOCYTES NFR BLD AUTO: 7 %
NEUTROPHILS # BLD AUTO: 3.7 10E3/UL (ref 1.6–8.3)
NEUTROPHILS NFR BLD AUTO: 68 %
NRBC # BLD AUTO: 0 10E3/UL
NRBC BLD AUTO-RTO: 0 /100
PLATELET # BLD AUTO: 176 10E3/UL (ref 150–450)
POTASSIUM SERPL-SCNC: 3.4 MMOL/L (ref 3.4–5.3)
PROT SERPL-MCNC: 6.9 G/DL (ref 6.4–8.3)
RBC # BLD AUTO: 4.05 10E6/UL (ref 4.4–5.9)
SODIUM SERPL-SCNC: 142 MMOL/L (ref 135–145)
WBC # BLD AUTO: 5.4 10E3/UL (ref 4–11)

## 2025-07-16 PROCEDURE — 85004 AUTOMATED DIFF WBC COUNT: CPT | Performed by: FAMILY MEDICINE

## 2025-07-16 PROCEDURE — 96374 THER/PROPH/DIAG INJ IV PUSH: CPT | Performed by: FAMILY MEDICINE

## 2025-07-16 PROCEDURE — 258N000003 HC RX IP 258 OP 636: Performed by: FAMILY MEDICINE

## 2025-07-16 PROCEDURE — 83605 ASSAY OF LACTIC ACID: CPT | Performed by: FAMILY MEDICINE

## 2025-07-16 PROCEDURE — 250N000011 HC RX IP 250 OP 636: Performed by: FAMILY MEDICINE

## 2025-07-16 PROCEDURE — 80053 COMPREHEN METABOLIC PANEL: CPT | Performed by: FAMILY MEDICINE

## 2025-07-16 PROCEDURE — 96361 HYDRATE IV INFUSION ADD-ON: CPT | Performed by: FAMILY MEDICINE

## 2025-07-16 PROCEDURE — 36415 COLL VENOUS BLD VENIPUNCTURE: CPT | Performed by: FAMILY MEDICINE

## 2025-07-16 PROCEDURE — 99285 EMERGENCY DEPT VISIT HI MDM: CPT | Mod: 25 | Performed by: FAMILY MEDICINE

## 2025-07-16 PROCEDURE — 96376 TX/PRO/DX INJ SAME DRUG ADON: CPT | Performed by: FAMILY MEDICINE

## 2025-07-16 PROCEDURE — 74176 CT ABD & PELVIS W/O CONTRAST: CPT

## 2025-07-16 PROCEDURE — 99284 EMERGENCY DEPT VISIT MOD MDM: CPT | Performed by: FAMILY MEDICINE

## 2025-07-16 PROCEDURE — 96375 TX/PRO/DX INJ NEW DRUG ADDON: CPT | Performed by: FAMILY MEDICINE

## 2025-07-16 RX ORDER — CYCLOBENZAPRINE HCL 10 MG
10 TABLET ORAL 3 TIMES DAILY PRN
Qty: 10 TABLET | Refills: 0 | Status: SHIPPED | OUTPATIENT
Start: 2025-07-16 | End: 2025-07-17

## 2025-07-16 RX ORDER — OXYCODONE AND ACETAMINOPHEN 5; 325 MG/1; MG/1
1 TABLET ORAL EVERY 6 HOURS PRN
Qty: 6 TABLET | Refills: 0 | Status: SHIPPED | OUTPATIENT
Start: 2025-07-16 | End: 2025-07-17

## 2025-07-16 RX ORDER — HYDROMORPHONE HYDROCHLORIDE 1 MG/ML
0.5 INJECTION, SOLUTION INTRAMUSCULAR; INTRAVENOUS; SUBCUTANEOUS ONCE
Refills: 0 | Status: COMPLETED | OUTPATIENT
Start: 2025-07-16 | End: 2025-07-16

## 2025-07-16 RX ORDER — IBUPROFEN 600 MG/1
600 TABLET, FILM COATED ORAL EVERY 6 HOURS PRN
Qty: 30 TABLET | Refills: 0 | Status: SHIPPED | OUTPATIENT
Start: 2025-07-16 | End: 2025-07-17

## 2025-07-16 RX ORDER — KETOROLAC TROMETHAMINE 15 MG/ML
15 INJECTION, SOLUTION INTRAMUSCULAR; INTRAVENOUS ONCE
Status: COMPLETED | OUTPATIENT
Start: 2025-07-16 | End: 2025-07-16

## 2025-07-16 RX ADMIN — HYDROMORPHONE HYDROCHLORIDE 1 MG: 1 INJECTION, SOLUTION INTRAMUSCULAR; INTRAVENOUS; SUBCUTANEOUS at 21:28

## 2025-07-16 RX ADMIN — KETOROLAC TROMETHAMINE 15 MG: 15 INJECTION, SOLUTION INTRAMUSCULAR; INTRAVENOUS at 20:49

## 2025-07-16 RX ADMIN — SODIUM CHLORIDE 1000 ML: 0.9 INJECTION, SOLUTION INTRAVENOUS at 21:59

## 2025-07-16 RX ADMIN — SODIUM CHLORIDE 1000 ML: 0.9 INJECTION, SOLUTION INTRAVENOUS at 20:49

## 2025-07-16 RX ADMIN — HYDROMORPHONE HYDROCHLORIDE 0.5 MG: 1 INJECTION, SOLUTION INTRAMUSCULAR; INTRAVENOUS; SUBCUTANEOUS at 22:28

## 2025-07-16 ASSESSMENT — ACTIVITIES OF DAILY LIVING (ADL)
ADLS_ACUITY_SCORE: 50

## 2025-07-17 ENCOUNTER — HOSPITAL ENCOUNTER (EMERGENCY)
Facility: CLINIC | Age: 35
Discharge: HOME | End: 2025-07-17
Attending: EMERGENCY MEDICINE
Payer: COMMERCIAL

## 2025-07-17 VITALS
DIASTOLIC BLOOD PRESSURE: 84 MMHG | TEMPERATURE: 98.4 F | OXYGEN SATURATION: 98 % | SYSTOLIC BLOOD PRESSURE: 121 MMHG | HEART RATE: 61 BPM | RESPIRATION RATE: 16 BRPM

## 2025-07-17 DIAGNOSIS — N20.0 KIDNEY STONES: ICD-10-CM

## 2025-07-17 DIAGNOSIS — N39.0 UTI (URINARY TRACT INFECTION): ICD-10-CM

## 2025-07-17 LAB
ALBUMIN UR-MCNC: NEGATIVE MG/DL
APPEARANCE UR: CLEAR
BILIRUB UR QL STRIP: NEGATIVE
COLOR UR AUTO: ABNORMAL
GLUCOSE UR STRIP-MCNC: NEGATIVE MG/DL
HGB UR QL STRIP: NEGATIVE
KETONES UR STRIP-MCNC: 40 MG/DL
LEUKOCYTE ESTERASE UR QL STRIP: ABNORMAL
MUCOUS THREADS #/AREA URNS LPF: PRESENT /LPF
NITRATE UR QL: NEGATIVE
PH UR STRIP: 6.5 [PH] (ref 5–7)
RBC URINE: 1 /HPF
SP GR UR STRIP: 1.02 (ref 1–1.03)
SQUAMOUS EPITHELIAL: <1 /HPF
UROBILINOGEN UR STRIP-MCNC: NORMAL MG/DL
WBC URINE: 19 /HPF

## 2025-07-17 PROCEDURE — 81001 URINALYSIS AUTO W/SCOPE: CPT | Performed by: EMERGENCY MEDICINE

## 2025-07-17 PROCEDURE — 258N000003 HC RX IP 258 OP 636

## 2025-07-17 PROCEDURE — 250N000013 HC RX MED GY IP 250 OP 250 PS 637

## 2025-07-17 PROCEDURE — 99284 EMERGENCY DEPT VISIT MOD MDM: CPT | Performed by: EMERGENCY MEDICINE

## 2025-07-17 PROCEDURE — 99284 EMERGENCY DEPT VISIT MOD MDM: CPT | Mod: 25 | Performed by: EMERGENCY MEDICINE

## 2025-07-17 PROCEDURE — 96374 THER/PROPH/DIAG INJ IV PUSH: CPT | Performed by: EMERGENCY MEDICINE

## 2025-07-17 PROCEDURE — 87086 URINE CULTURE/COLONY COUNT: CPT | Performed by: EMERGENCY MEDICINE

## 2025-07-17 PROCEDURE — 96361 HYDRATE IV INFUSION ADD-ON: CPT | Performed by: EMERGENCY MEDICINE

## 2025-07-17 PROCEDURE — 250N000013 HC RX MED GY IP 250 OP 250 PS 637: Performed by: EMERGENCY MEDICINE

## 2025-07-17 PROCEDURE — 250N000011 HC RX IP 250 OP 636

## 2025-07-17 RX ORDER — DROPERIDOL 2.5 MG/ML
2.5 INJECTION, SOLUTION INTRAMUSCULAR; INTRAVENOUS ONCE
Status: COMPLETED | OUTPATIENT
Start: 2025-07-17 | End: 2025-07-17

## 2025-07-17 RX ORDER — CEPHALEXIN 500 MG/1
500 CAPSULE ORAL ONCE
Status: COMPLETED | OUTPATIENT
Start: 2025-07-17 | End: 2025-07-17

## 2025-07-17 RX ORDER — OXYCODONE HYDROCHLORIDE 5 MG/1
5 TABLET ORAL ONCE
Refills: 0 | Status: COMPLETED | OUTPATIENT
Start: 2025-07-17 | End: 2025-07-17

## 2025-07-17 RX ORDER — CEPHALEXIN 500 MG/1
500 CAPSULE ORAL 2 TIMES DAILY
Qty: 20 CAPSULE | Refills: 0 | Status: SHIPPED | OUTPATIENT
Start: 2025-07-17 | End: 2025-07-21

## 2025-07-17 RX ORDER — OXYCODONE AND ACETAMINOPHEN 5; 325 MG/1; MG/1
1 TABLET ORAL EVERY 6 HOURS PRN
Qty: 6 TABLET | Refills: 0 | Status: SHIPPED | OUTPATIENT
Start: 2025-07-17

## 2025-07-17 RX ORDER — CYCLOBENZAPRINE HCL 10 MG
10 TABLET ORAL 3 TIMES DAILY PRN
Qty: 10 TABLET | Refills: 0 | Status: SHIPPED | OUTPATIENT
Start: 2025-07-17

## 2025-07-17 RX ORDER — IBUPROFEN 600 MG/1
600 TABLET, FILM COATED ORAL EVERY 6 HOURS PRN
Qty: 30 TABLET | Refills: 0 | Status: SHIPPED | OUTPATIENT
Start: 2025-07-17

## 2025-07-17 RX ADMIN — DROPERIDOL 2.5 MG: 2.5 INJECTION, SOLUTION INTRAMUSCULAR; INTRAVENOUS at 17:21

## 2025-07-17 RX ADMIN — OXYCODONE HYDROCHLORIDE 5 MG: 5 TABLET ORAL at 18:02

## 2025-07-17 RX ADMIN — CEPHALEXIN 500 MG: 500 CAPSULE ORAL at 18:02

## 2025-07-17 RX ADMIN — SODIUM CHLORIDE 1000 ML: 0.9 INJECTION, SOLUTION INTRAVENOUS at 17:22

## 2025-07-17 ASSESSMENT — ACTIVITIES OF DAILY LIVING (ADL)
ADLS_ACUITY_SCORE: 50
ADLS_ACUITY_SCORE: 50

## 2025-07-17 NOTE — ED NOTES
Bed: ED19  Expected date: 7/16/25  Expected time: 8:32 PM  Means of arrival:   Comments:  To Triage:  35M Abd pain poss kidney stones ETA 15mins

## 2025-07-17 NOTE — ED PROVIDER NOTES
ED Provider Note  [facility] EMERGENCY DEPARTMENT ([facility])    7/16/25       ED PROVIDER NOTE     History     Chief Complaint   Patient presents with    Flank Pain     Pt states he started having right side flank pain this afternoon. History of kidney pain.     HPI  Ravi Hankins is a 35 year old male with a notable history of polysubstance use disorder (alcohol, opioids, cocaine, cannabis),  cyclic vomiting syndrome, kidney stones who presents to the ED for evaluation of flank pain. ***     Past Medical History  Past Medical History:   Diagnosis Date    Dilated aortic root      Past Surgical History:   Procedure Laterality Date    NO HISTORY OF SURGERY       atenolol (TENORMIN) 100 MG tablet  famotidine (PEPCID) 20 MG tablet  folic acid (FOLVITE) 1 MG tablet  gabapentin (NEURONTIN) 100 MG capsule  hydrOXYzine HCl (ATARAX) 25 MG tablet  melatonin 3 MG tablet  multivitamin w/minerals (THERA-VIT-M) tablet  ondansetron (ZOFRAN ODT) 4 MG ODT tab  thiamine (B-1) 100 MG tablet  traZODone (DESYREL) 50 MG tablet  venlafaxine (EFFEXOR XR) 37.5 MG 24 hr capsule      Allergies   Allergen Reactions    Contrast Dye      PN: LW CM1:  Reaction :  rash    Sulfa Antibiotics      PN: LW Reaction: unsure which kid has allergy per mom???     Family History  Family History   Problem Relation Age of Onset    Heart Disease Brother     Abdominal Aortic Aneurysm Brother     Heart Disease Other         self    Substance Abuse Mother     Substance Abuse Father     Substance Abuse Maternal Grandfather     Diabetes No family hx of     Coronary Artery Disease No family hx of     Hypertension No family hx of     Hyperlipidemia No family hx of     Cerebrovascular Disease No family hx of     Breast Cancer No family hx of     Colon Cancer No family hx of     Prostate Cancer No family hx of     Other Cancer No family hx of     Depression No family hx of     Anxiety Disorder No family hx of   "   Mental Illness No family hx of     Anesthesia Reaction No family hx of     Asthma No family hx of     Osteoporosis No family hx of     Genetic Disorder No family hx of     Thyroid Disease No family hx of     Obesity No family hx of     Unknown/Adopted No family hx of      Social History   Social History     Tobacco Use    Smoking status: Former     Current packs/day: 0.00     Average packs/day: 0.1 packs/day for 5.0 years (0.5 ttl pk-yrs)     Types: Cigarettes     Start date: 2017     Quit date: 2022     Years since quitting: 3.5    Smokeless tobacco: Never    Tobacco comments:     1-2 cigarettes a day for 5 years   Vaping Use    Vaping status: Some Days    Start date: 2/22/2021    Substances: Nicotine    Devices: Disposable   Substance Use Topics    Alcohol use: Yes     Comment: once a week    Drug use: Not Currently     Types: Marijuana      A medically appropriate review of systems was performed with pertinent positives and negatives noted in the HPI, and all other systems negative.    Physical Exam      Physical Exam  Constitutional:       General: He is not in acute distress.     Appearance: Normal appearance. He is not toxic-appearing.   HENT:      Head: Atraumatic.   Eyes:      General: No scleral icterus.     Conjunctiva/sclera: Conjunctivae normal.   Cardiovascular:      Rate and Rhythm: Normal rate.      Heart sounds: Normal heart sounds.   Pulmonary:      Effort: Pulmonary effort is normal. No respiratory distress.      Breath sounds: Normal breath sounds.   Abdominal:      Palpations: Abdomen is soft.      Tenderness: There is no abdominal tenderness.   Musculoskeletal:         General: No deformity.      Cervical back: Neck supple.   Skin:     General: Skin is warm.   Neurological:      Mental Status: He is alert.       ***    ED Course, Procedures, & Data      Procedures       {ED Course Selections (Optional):193897}  {ED Sepsis CMS Documentation (Optional):069880::\" \"}          Medications "   ketorolac (TORADOL) injection 15 mg (has no administration in time range)          {Critical Care Performed?:175998}    Assessment & Plan    ***    I have reviewed the nursing notes. I have reviewed the findings, diagnosis, plan and need for follow up with the patient.    New Prescriptions    No medications on file       Final diagnoses:   None       Rell Schumacher MD  [facility] EMERGENCY DEPARTMENT  7/16/2025   0.7 - 2.0 mmol/L   CBC with platelets and differential   Result Value Ref Range    WBC Count 5.4 4.0 - 11.0 10e3/uL    RBC Count 4.05 (L) 4.40 - 5.90 10e6/uL    Hemoglobin 12.3 (L) 13.3 - 17.7 g/dL    Hematocrit 36.0 (L) 40.0 - 53.0 %    MCV 89 78 - 100 fL    MCH 30.4 26.5 - 33.0 pg    MCHC 34.2 31.5 - 36.5 g/dL    RDW 12.2 10.0 - 15.0 %    Platelet Count 176 150 - 450 10e3/uL    % Neutrophils 68 %    % Lymphocytes 24 %    % Monocytes 7 %    % Eosinophils 0 %    % Basophils 0 %    % Immature Granulocytes 0 %    NRBCs per 100 WBC 0 <1 /100    Absolute Neutrophils 3.7 1.6 - 8.3 10e3/uL    Absolute Lymphocytes 1.3 0.8 - 5.3 10e3/uL    Absolute Monocytes 0.4 0.0 - 1.3 10e3/uL    Absolute Eosinophils 0.0 0.0 - 0.7 10e3/uL    Absolute Basophils 0.0 0.0 - 0.2 10e3/uL    Absolute Immature Granulocytes 0.0 <=0.4 10e3/uL    Absolute NRBCs 0.0 10e3/uL   Lactic acid whole blood   Result Value Ref Range    Lactic Acid 2.3 (H) 0.7 - 2.0 mmol/L     Medications   ketorolac (TORADOL) injection 15 mg (15 mg Intravenous $Given 7/16/25 2049)   sodium chloride 0.9% BOLUS 1,000 mL (0 mLs Intravenous Stopped 7/16/25 2248)   HYDROmorphone (DILAUDID) injection 1 mg (1 mg Intravenous $Given 7/16/25 2128)   sodium chloride 0.9% BOLUS 1,000 mL (0 mLs Intravenous Stopped 7/16/25 2335)   HYDROmorphone (PF) (DILAUDID) injection 0.5 mg (0.5 mg Intravenous $Given 7/16/25 2228)          Critical care was not performed.     Medical Decision Making  The patient's presentation was of moderate complexity (an acute illness with systemic symptoms).    The patient's evaluation involved:  ordering and/or review of 3+ test(s) in this encounter (see separate area of note for details)    The patient's management necessitated moderate risk (prescription drug management including medications given in the ED) and high risk (a parenteral controlled substance).    Assessment & Plan        I have reviewed the nursing notes. I have reviewed the findings, diagnosis,  plan and need for follow up with the patient.    Discharge Medication List as of 7/16/2025 11:58 PM        START taking these medications    Details   cyclobenzaprine (FLEXERIL) 10 MG tablet Take 1 tablet (10 mg) by mouth 3 times daily as needed for muscle spasms., Disp-10 tablet, R-0, [facility]      ibuprofen (ADVIL/MOTRIN) 600 MG tablet Take 1 tablet (600 mg) by mouth every 6 hours as needed for moderate pain., Disp-30 tablet, R-0, [facility]      oxyCODONE-acetaminophen (PERCOCET) 5-325 MG tablet Take 1 tablet by mouth every 6 hours as needed for severe pain., Disp-6 tablet, R-0, [facility]             Final diagnoses:   Acute right-sided low back pain without sciatica       Rell Schumacher MD  [facility] EMERGENCY DEPARTMENT  7/16/2025     Rell Schumacher MD  07/18/25 0952

## 2025-07-17 NOTE — ED TRIAGE NOTES
BIBA, having pain due to kidney stone since 1pm today, rates pain 10/10, was given 15mg ketorolac, 100mcg fentanyl, en route VS HR60 /87 100%RA RR16, 18g RAC     Triage Assessment (Adult)       Row Name 07/17/25 1644          Triage Assessment    Airway WDL WDL        Respiratory WDL    Respiratory WDL WDL        Skin Circulation/Temperature WDL    Skin Circulation/Temperature WDL WDL        Cardiac WDL    Cardiac WDL WDL        Peripheral/Neurovascular WDL    Peripheral Neurovascular WDL WDL        Cognitive/Neuro/Behavioral WDL    Cognitive/Neuro/Behavioral WDL WDL

## 2025-07-17 NOTE — DISCHARGE INSTRUCTIONS
Thank you for visiting the [facility] ED.  Recommend managing pain with prescribed medications.  Take Keflex twice daily for the next 10 days for UTI.  If worsening symptoms, return to ED.  Follow-up with scheduled urology appointment.

## 2025-07-17 NOTE — ED NOTES
Bed: [facility]-  Expected date: 7/17/25  Expected time: 3:50 PM  Means of arrival:   Comments:  [address]  35M  Kidney stones

## 2025-07-17 NOTE — ED PROVIDER NOTES
ED Provider Note  [facility]      History     Chief Complaint   Patient presents with    Flank Pain     BIBA, having pain due to kidney stone since 1pm today, rates pain 10/10, was given 15mg ketorolac, 100mcg fentanyl, en route VS HR60 /87 100%RA RR16, 18g RAC     HPI  Ravi Hankins is a 35 year old male with PMH of polysubstance abuse and kidney stones presenting for flank pain.  Of note, he was seen yesterday by Dr. Miles in the ED for similar symptoms.  CT of abdomen pelvis performed yesterday revealed bilateral intrarenal stones measuring up to 3 mm on the right and 1 mm on the left.  Lactic acid was slightly elevated at 3.2, but repeat trending down to 2.3.  He was given Toradol and Dilaudid while in the ED and discharged.  Prescribed oxycodone, Flexeril, and Advil for pain control outpatient, but patient was not able to  this medication.  En route to ED, he received Toradol 15 mg and fentanyl 100 mcg which temporarily relieved his pain.  He presents today because his pain has increased and he has not been able to manage it at home.  Denies nausea, vomiting, dysuria, blood in urine, groin pain.  Patient works outside doing lawn care and tries to drink a gallon of water daily.  Occasional alcohol use.  He has a urology appointment scheduled in 2 weeks and they requested him to complete Litholink.    Past Medical History  Past Medical History:   Diagnosis Date    Dilated aortic root      Past Surgical History:   Procedure Laterality Date    NO HISTORY OF SURGERY       cephALEXin (KEFLEX) 500 MG capsule  atenolol (TENORMIN) 100 MG tablet  cyclobenzaprine (FLEXERIL) 10 MG tablet  famotidine (PEPCID) 20 MG tablet  folic acid (FOLVITE) 1 MG tablet  gabapentin (NEURONTIN) 100 MG capsule  hydrOXYzine HCl (ATARAX) 25 MG tablet  ibuprofen (ADVIL/MOTRIN) 600 MG tablet  melatonin 3 MG tablet  multivitamin w/minerals (THERA-VIT-M) tablet  ondansetron (ZOFRAN ODT) 4 MG  ODT tab  oxyCODONE-acetaminophen (PERCOCET) 5-325 MG tablet  thiamine (B-1) 100 MG tablet  traZODone (DESYREL) 50 MG tablet  venlafaxine (EFFEXOR XR) 37.5 MG 24 hr capsule      Allergies   Allergen Reactions    Contrast Dye      PN: LW CM1:  Reaction :  rash    Sulfa Antibiotics      PN: LW Reaction: unsure which kid has allergy per mom???     Family History  Family History   Problem Relation Age of Onset    Heart Disease Brother     Abdominal Aortic Aneurysm Brother     Heart Disease Other         self    Substance Abuse Mother     Substance Abuse Father     Substance Abuse Maternal Grandfather     Diabetes No family hx of     Coronary Artery Disease No family hx of     Hypertension No family hx of     Hyperlipidemia No family hx of     Cerebrovascular Disease No family hx of     Breast Cancer No family hx of     Colon Cancer No family hx of     Prostate Cancer No family hx of     Other Cancer No family hx of     Depression No family hx of     Anxiety Disorder No family hx of     Mental Illness No family hx of     Anesthesia Reaction No family hx of     Asthma No family hx of     Osteoporosis No family hx of     Genetic Disorder No family hx of     Thyroid Disease No family hx of     Obesity No family hx of     Unknown/Adopted No family hx of      Social History   Social History     Tobacco Use    Smoking status: Former     Current packs/day: 0.00     Average packs/day: 0.1 packs/day for 5.0 years (0.5 ttl pk-yrs)     Types: Cigarettes     Start date: 2017     Quit date: 2022     Years since quitting: 3.5    Smokeless tobacco: Never    Tobacco comments:     1-2 cigarettes a day for 5 years   Vaping Use    Vaping status: Some Days    Start date: 2/22/2021    Substances: Nicotine    Devices: Disposable   Substance Use Topics    Alcohol use: Yes     Comment: once a week    Drug use: Not Currently     Types: Marijuana      Past medical history, past surgical history, medications, allergies, family history, and social  history were reviewed with the patient. No additional pertinent items.   A medically appropriate review of systems was performed with pertinent positives and negatives noted in the HPI, and all other systems negative.    Physical Exam   BP: 134/80  Pulse: 69  Temp: 98.4  F (36.9  C)  Resp: 16  SpO2: 100 %  Physical Exam  Constitutional:       General: He is in acute distress.      Comments: Patient crying throughout interview, writhing in pain on hospital bed   HENT:      Head: Atraumatic.   Eyes:      Extraocular Movements: Extraocular movements intact.      Conjunctiva/sclera: Conjunctivae normal.   Cardiovascular:      Rate and Rhythm: Normal rate and regular rhythm.      Heart sounds: Normal heart sounds.   Pulmonary:      Effort: Pulmonary effort is normal.      Breath sounds: Normal breath sounds.   Abdominal:      General: Abdomen is flat.      Palpations: Abdomen is soft.      Tenderness: There is no abdominal tenderness. There is right CVA tenderness (severe tenderness) and left CVA tenderness (mild tenderness).   Musculoskeletal:      Cervical back: Normal range of motion.   Neurological:      Mental Status: He is alert and oriented to person, place, and time.           ED Course, Procedures, & Data     ED Course as of 07/17/25 1838   Thu Jul 17, 2025   1809 UA with Microscopic reflex to Culture(!)           Assessment & Plan    Patient is a 35-year-old male with history of polysubstance abuse and kidney stones presenting for flank pain.  He was here yesterday for similar symptoms and CT performed which showed intrarenal stones bilaterally.  No obstruction.  Pain managed in ED with Toradol and Dilaudid.  He was discharged with pain management options that he has not picked up.  In ED today, he appears in acute distress due to pain.  Vitals are within normal limits.    On physical exam, patient crying in pain and severe right CVA tenderness.  Did not repeat CT as I do not think there would be acute changes  from yesterday evening. Patient with known intrarenal stones that should not be causing pain.  Given fluids and UA ordered since this was not performed yesterday.  UA positive for leukocyte esterase. Given Keflex in ED. Patient requested to remove IV so fluids were not completed. While in ED, pain managed with droperidol and oxycodone.  His last EKG was in April and it was normal so I did not think it was necessary to repeat EKG today prior to droperidol administration.  Patient no longer crying in pain after receiving medications and discussed managing symptoms with outpatient medication.  He is amenable to discharge and will follow-up with urology as scheduled.    I have reviewed the nursing notes. I have reviewed the findings, diagnosis, plan and need for follow up with the patient.    Discharge Medication List as of 7/17/2025  6:07 PM        START taking these medications    Details   cephALEXin (KEFLEX) 500 MG capsule Take 1 capsule (500 mg) by mouth 2 times daily for 10 days., Disp-20 capsule, R-0, Local Print             Final diagnoses:   Kidney stones   UTI (urinary tract infection)     This patient was seen and discussed with my attending physician.  Marylu Duarte DO  PGY-1 Psychiatry Resident      Malik Christopher  [facility] EMERGENCY DEPARTMENT  7/17/2025      ED Attending Physician Attestation    I Malik Christopher DO, cared for this patient with the Resident. I have performed a history and physical examination of the patient and discussed management with the resident. I reviewed the resident's documentation above and agree with the documented findings and plan of care.    Summary of HPI, PE, ED Course   Patient  evaluated in the emergency department for back pain.  He was seen yesterday and CT scan did show kidney stones but no obstructing stones.  UA was not completed yesterday.  We did do a UA today.  This is suspicious for a possible infection.  No need to repeat CT today.   Coming from droperidol and oxycodone did help his pain.  We will also add Keflex to treat for possible UTI.  He can  the prescriptions prescribed yesterday in addition to the ones I prescribed him today and follow-up with urology.  Patient is comfortable discharge home and the plan.    Critical Care & Procedures  Not applicable.        Medical Decision Making  The patient's presentation is strongly suggestive of moderate complexity (a chronic illness mild to moderate exacerbation, progression, or side effect of treatment).    The patient's evaluation involved:  review of external note(s) from 3+ sources (see separate area of note for details)  ordering and/or review of 3+ test(s) in this encounter (see separate area of note for details)  review of 3+ test result(s) ordered prior to this encounter (see separate area of note for details)    The patient's management involved moderate risk (prescription drug management including medications given in the ED).      Malik Christopher,   Emergency Medicine        Malik Christopher DO  07/17/25 6753

## 2025-07-18 LAB — BACTERIA UR CULT: NO GROWTH

## 2025-07-19 ENCOUNTER — RESULTS FOLLOW-UP (OUTPATIENT)
Dept: NURSING | Facility: CLINIC | Age: 35
End: 2025-07-19
Payer: COMMERCIAL

## 2025-07-21 ENCOUNTER — PATIENT OUTREACH (OUTPATIENT)
Dept: CARE COORDINATION | Facility: CLINIC | Age: 35
End: 2025-07-21
Payer: COMMERCIAL

## 2025-07-21 ENCOUNTER — PRE VISIT (OUTPATIENT)
Dept: UROLOGY | Facility: CLINIC | Age: 35
End: 2025-07-21
Payer: COMMERCIAL

## 2025-07-21 ENCOUNTER — TELEPHONE (OUTPATIENT)
Dept: FAMILY MEDICINE | Facility: CLINIC | Age: 35
End: 2025-07-21
Payer: COMMERCIAL

## 2025-07-21 DIAGNOSIS — N39.0 ACUTE UTI: Primary | ICD-10-CM

## 2025-07-21 RX ORDER — CEPHALEXIN 500 MG/1
500 CAPSULE ORAL 2 TIMES DAILY
Qty: 10 CAPSULE | Refills: 0 | Status: SHIPPED | OUTPATIENT
Start: 2025-07-21

## 2025-07-21 NOTE — TELEPHONE ENCOUNTER
AS,  Please see below message and advise.  States this was a printed prescription that pharmacy was unable to fill due to restricted recipient  ED visit 7/17/25  Medication pended if approved  Thanks,  Marylu JUSTIN RN

## 2025-07-21 NOTE — TELEPHONE ENCOUNTER
Urine culture is negative  As such 5 days course of antibiotic is enough- if having burning in urine, or symptoms of urinary tract infection        - cephALEXin (KEFLEX) 500 MG capsule; Take 1 capsule (500 mg) by mouth 2 times daily.  Dispense: 10 capsule; Refill: 0    Thanks

## 2025-07-21 NOTE — TELEPHONE ENCOUNTER
Incoming  call seen in Er for Uti was given Rx Klefflex but unable to fill because Restric to [facility]. Please send in Rx to    [id number] - [place], [place] - [address]    Not taken medication yet arik      Thanks  Maggi BONE

## 2025-07-21 NOTE — TELEPHONE ENCOUNTER
Reason for visit: Stone Management Follow Up    Relevant information: Pt was in the ED with Kidney Stones and a UTI on 07/17/2025. Pt prior to recent event had a hx of kidney stones.     Records/imaging/labs/order: NO LITHOLINK, see Epic for relevant labs and imaging    Pt called: no need for call    At rooming: Virtual     --  Vikas Iyer on 7/21/2025 at 8:59 AM

## 2025-07-21 NOTE — PROGRESS NOTES
Clinic Care Coordination Contact  Community Health Worker Initial Outreach    CHW Initial Information Gathering:  Referral Source: ED Follow-Up  Current living arrangement:: Not Assessed  No PCP office visit in Past Year: No  CHW Additional Questions  If ED/Hospital discharge, follow-up appointment scheduled as recommended?: No  Patient agreeable to assistance with scheduling?: No  Patient declined (specify): Patient expressed that he will call on his own at a later time  Medication changes made following ED/Hospital discharge?: No  MyChart active?: No  Patient agreeable to assistance with activating MyChart?: No    Patient accepts CC: No, Patient expressed no additional support is needed at this time. [facility] unable to send Care Coordination introduction letter as patient does not have an active [employer] account. Clinic Care Coordination services remain available via referral if needed.     Kateryna Rodriguez  Community Health Worker    Connected Care Resources   [facility]     *Connected Care Resources Team does NOT   follow patient ongoing. Referrals are identified   based on internal discharge report and the outreach is to ensure   Patient has understanding of their discharge instructions.

## 2025-07-21 NOTE — TELEPHONE ENCOUNTER
Left non detailed voicemail for patient to return call to [facility] to notify of medication sent by PCP  Thanks,  Marylu JUSTIN RN

## 2025-07-28 ENCOUNTER — APPOINTMENT (OUTPATIENT)
Dept: CT IMAGING | Facility: CLINIC | Age: 35
End: 2025-07-28
Attending: STUDENT IN AN ORGANIZED HEALTH CARE EDUCATION/TRAINING PROGRAM
Payer: COMMERCIAL

## 2025-07-28 ENCOUNTER — HOSPITAL ENCOUNTER (EMERGENCY)
Facility: CLINIC | Age: 35
Discharge: HOME | End: 2025-07-29
Attending: EMERGENCY MEDICINE | Admitting: EMERGENCY MEDICINE
Payer: COMMERCIAL

## 2025-07-28 DIAGNOSIS — R10.9 ACUTE RIGHT FLANK PAIN: Primary | ICD-10-CM

## 2025-07-28 PROCEDURE — 99284 EMERGENCY DEPT VISIT MOD MDM: CPT | Mod: FS | Performed by: EMERGENCY MEDICINE

## 2025-07-28 PROCEDURE — 99284 EMERGENCY DEPT VISIT MOD MDM: CPT | Mod: 25 | Performed by: EMERGENCY MEDICINE

## 2025-07-28 PROCEDURE — 80053 COMPREHEN METABOLIC PANEL: CPT | Performed by: STUDENT IN AN ORGANIZED HEALTH CARE EDUCATION/TRAINING PROGRAM

## 2025-07-28 PROCEDURE — 96375 TX/PRO/DX INJ NEW DRUG ADDON: CPT | Performed by: EMERGENCY MEDICINE

## 2025-07-28 PROCEDURE — 74176 CT ABD & PELVIS W/O CONTRAST: CPT

## 2025-07-28 PROCEDURE — 258N000003 HC RX IP 258 OP 636: Performed by: STUDENT IN AN ORGANIZED HEALTH CARE EDUCATION/TRAINING PROGRAM

## 2025-07-28 PROCEDURE — 250N000011 HC RX IP 250 OP 636: Performed by: STUDENT IN AN ORGANIZED HEALTH CARE EDUCATION/TRAINING PROGRAM

## 2025-07-28 PROCEDURE — 85014 HEMATOCRIT: CPT | Performed by: STUDENT IN AN ORGANIZED HEALTH CARE EDUCATION/TRAINING PROGRAM

## 2025-07-28 PROCEDURE — 96374 THER/PROPH/DIAG INJ IV PUSH: CPT | Performed by: EMERGENCY MEDICINE

## 2025-07-28 PROCEDURE — 36415 COLL VENOUS BLD VENIPUNCTURE: CPT | Performed by: STUDENT IN AN ORGANIZED HEALTH CARE EDUCATION/TRAINING PROGRAM

## 2025-07-28 PROCEDURE — 99285 EMERGENCY DEPT VISIT HI MDM: CPT | Mod: 25 | Performed by: EMERGENCY MEDICINE

## 2025-07-28 RX ORDER — KETOROLAC TROMETHAMINE 15 MG/ML
15 INJECTION, SOLUTION INTRAMUSCULAR; INTRAVENOUS ONCE
Status: COMPLETED | OUTPATIENT
Start: 2025-07-28 | End: 2025-07-28

## 2025-07-28 RX ORDER — ONDANSETRON 2 MG/ML
4 INJECTION INTRAMUSCULAR; INTRAVENOUS ONCE
Status: COMPLETED | OUTPATIENT
Start: 2025-07-28 | End: 2025-07-28

## 2025-07-28 RX ADMIN — KETOROLAC TROMETHAMINE 15 MG: 15 INJECTION, SOLUTION INTRAMUSCULAR; INTRAVENOUS at 23:42

## 2025-07-28 RX ADMIN — HYDROMORPHONE HYDROCHLORIDE 1 MG: 1 INJECTION, SOLUTION INTRAMUSCULAR; INTRAVENOUS; SUBCUTANEOUS at 23:42

## 2025-07-28 RX ADMIN — SODIUM CHLORIDE 1000 ML: 0.9 INJECTION, SOLUTION INTRAVENOUS at 23:41

## 2025-07-28 RX ADMIN — ONDANSETRON 4 MG: 2 INJECTION INTRAMUSCULAR; INTRAVENOUS at 23:41

## 2025-07-28 ASSESSMENT — ACTIVITIES OF DAILY LIVING (ADL)
ADLS_ACUITY_SCORE: 50
ADLS_ACUITY_SCORE: 50

## 2025-07-29 ENCOUNTER — HOSPITAL ENCOUNTER (EMERGENCY)
Facility: CLINIC | Age: 35
Discharge: HOME | End: 2025-07-29
Attending: FAMILY MEDICINE
Payer: COMMERCIAL

## 2025-07-29 ENCOUNTER — HOSPITAL ENCOUNTER (EMERGENCY)
Facility: CLINIC | Age: 35
Discharge: HOME | End: 2025-07-29
Attending: FAMILY MEDICINE | Admitting: FAMILY MEDICINE
Payer: COMMERCIAL

## 2025-07-29 VITALS
OXYGEN SATURATION: 96 % | TEMPERATURE: 98 F | RESPIRATION RATE: 18 BRPM | HEIGHT: 74 IN | SYSTOLIC BLOOD PRESSURE: 131 MMHG | DIASTOLIC BLOOD PRESSURE: 82 MMHG | WEIGHT: 165 LBS | BODY MASS INDEX: 21.17 KG/M2 | HEART RATE: 71 BPM

## 2025-07-29 VITALS
OXYGEN SATURATION: 96 % | RESPIRATION RATE: 17 BRPM | HEART RATE: 68 BPM | SYSTOLIC BLOOD PRESSURE: 112 MMHG | TEMPERATURE: 97.5 F | BODY MASS INDEX: 20.53 KG/M2 | DIASTOLIC BLOOD PRESSURE: 68 MMHG | HEIGHT: 74 IN | WEIGHT: 160 LBS

## 2025-07-29 VITALS
DIASTOLIC BLOOD PRESSURE: 79 MMHG | HEART RATE: 64 BPM | RESPIRATION RATE: 18 BRPM | SYSTOLIC BLOOD PRESSURE: 115 MMHG | TEMPERATURE: 97.6 F | OXYGEN SATURATION: 99 %

## 2025-07-29 DIAGNOSIS — N20.0 NEPHROLITHIASIS: ICD-10-CM

## 2025-07-29 DIAGNOSIS — F11.10 OPIATE ABUSE, CONTINUOUS (H): ICD-10-CM

## 2025-07-29 DIAGNOSIS — R10.9 RIGHT FLANK PAIN: Primary | ICD-10-CM

## 2025-07-29 DIAGNOSIS — F10.920 ALCOHOL INTOXICATION, UNCOMPLICATED: Primary | ICD-10-CM

## 2025-07-29 LAB
ALBUMIN SERPL BCG-MCNC: 4.8 G/DL (ref 3.5–5.2)
ALBUMIN UR-MCNC: 20 MG/DL
ALP SERPL-CCNC: 59 U/L (ref 40–150)
ALT SERPL W P-5'-P-CCNC: 15 U/L (ref 0–70)
AMORPH CRY #/AREA URNS HPF: ABNORMAL /HPF
ANION GAP SERPL CALCULATED.3IONS-SCNC: 18 MMOL/L (ref 7–15)
ANION GAP SERPL CALCULATED.3IONS-SCNC: 19 MMOL/L (ref 7–15)
APPEARANCE UR: ABNORMAL
AST SERPL W P-5'-P-CCNC: 28 U/L (ref 0–45)
BASOPHILS # BLD AUTO: 0 10E3/UL (ref 0–0.2)
BASOPHILS # BLD AUTO: 0 10E3/UL (ref 0–0.2)
BASOPHILS NFR BLD AUTO: 0 %
BASOPHILS NFR BLD AUTO: 0 %
BILIRUB SERPL-MCNC: 0.3 MG/DL
BILIRUB UR QL STRIP: NEGATIVE
BUN SERPL-MCNC: 11.6 MG/DL (ref 6–20)
BUN SERPL-MCNC: 15.6 MG/DL (ref 6–20)
CALCIUM SERPL-MCNC: 8.5 MG/DL (ref 8.8–10.4)
CALCIUM SERPL-MCNC: 8.8 MG/DL (ref 8.8–10.4)
CELLULAR CAST: 18 /LPF (ref ?–1)
CHLORIDE SERPL-SCNC: 104 MMOL/L (ref 98–107)
CHLORIDE SERPL-SCNC: 105 MMOL/L (ref 98–107)
CK SERPL-CCNC: 157 U/L (ref 39–308)
COLOR UR AUTO: YELLOW
CREAT SERPL-MCNC: 0.81 MG/DL (ref 0.67–1.17)
CREAT SERPL-MCNC: 0.97 MG/DL (ref 0.67–1.17)
EGFRCR SERPLBLD CKD-EPI 2021: >90 ML/MIN/1.73M2
EGFRCR SERPLBLD CKD-EPI 2021: >90 ML/MIN/1.73M2
EOSINOPHIL # BLD AUTO: 0 10E3/UL (ref 0–0.7)
EOSINOPHIL # BLD AUTO: 0 10E3/UL (ref 0–0.7)
EOSINOPHIL NFR BLD AUTO: 0 %
EOSINOPHIL NFR BLD AUTO: 0 %
ERYTHROCYTE [DISTWIDTH] IN BLOOD BY AUTOMATED COUNT: 13.1 % (ref 10–15)
ERYTHROCYTE [DISTWIDTH] IN BLOOD BY AUTOMATED COUNT: 13.4 % (ref 10–15)
ETHANOL SERPL-MCNC: 0.21 G/DL
GLUCOSE SERPL-MCNC: 80 MG/DL (ref 70–99)
GLUCOSE SERPL-MCNC: 99 MG/DL (ref 70–99)
GLUCOSE UR STRIP-MCNC: NEGATIVE MG/DL
HCO3 SERPL-SCNC: 20 MMOL/L (ref 22–29)
HCO3 SERPL-SCNC: 21 MMOL/L (ref 22–29)
HCT VFR BLD AUTO: 39.5 % (ref 40–53)
HCT VFR BLD AUTO: 43.1 % (ref 40–53)
HGB BLD-MCNC: 12.7 G/DL (ref 13.3–17.7)
HGB BLD-MCNC: 14.2 G/DL (ref 13.3–17.7)
HGB UR QL STRIP: NEGATIVE
HYALINE CASTS: 4 /LPF
IMM GRANULOCYTES # BLD: 0 10E3/UL
IMM GRANULOCYTES # BLD: 0 10E3/UL
IMM GRANULOCYTES NFR BLD: 0 %
IMM GRANULOCYTES NFR BLD: 0 %
KETONES UR STRIP-MCNC: NEGATIVE MG/DL
LEUKOCYTE ESTERASE UR QL STRIP: NEGATIVE
LIPASE SERPL-CCNC: 12 U/L (ref 13–60)
LYMPHOCYTES # BLD AUTO: 1.1 10E3/UL (ref 0.8–5.3)
LYMPHOCYTES # BLD AUTO: 1.3 10E3/UL (ref 0.8–5.3)
LYMPHOCYTES NFR BLD AUTO: 15 %
LYMPHOCYTES NFR BLD AUTO: 23 %
MCH RBC QN AUTO: 29.8 PG (ref 26.5–33)
MCH RBC QN AUTO: 30.1 PG (ref 26.5–33)
MCHC RBC AUTO-ENTMCNC: 32.2 G/DL (ref 31.5–36.5)
MCHC RBC AUTO-ENTMCNC: 32.9 G/DL (ref 31.5–36.5)
MCV RBC AUTO: 91 FL (ref 78–100)
MCV RBC AUTO: 93 FL (ref 78–100)
MONOCYTES # BLD AUTO: 0.6 10E3/UL (ref 0–1.3)
MONOCYTES # BLD AUTO: 0.7 10E3/UL (ref 0–1.3)
MONOCYTES NFR BLD AUTO: 12 %
MONOCYTES NFR BLD AUTO: 8 %
MUCOUS THREADS #/AREA URNS LPF: PRESENT /LPF
NEUTROPHILS # BLD AUTO: 3.6 10E3/UL (ref 1.6–8.3)
NEUTROPHILS # BLD AUTO: 5.3 10E3/UL (ref 1.6–8.3)
NEUTROPHILS NFR BLD AUTO: 64 %
NEUTROPHILS NFR BLD AUTO: 76 %
NITRATE UR QL: NEGATIVE
NRBC # BLD AUTO: 0 10E3/UL
NRBC # BLD AUTO: 0 10E3/UL
NRBC BLD AUTO-RTO: 0 /100
NRBC BLD AUTO-RTO: 0 /100
PH UR STRIP: 5 [PH] (ref 5–7)
PLATELET # BLD AUTO: 149 10E3/UL (ref 150–450)
PLATELET # BLD AUTO: 178 10E3/UL (ref 150–450)
POTASSIUM SERPL-SCNC: 3.9 MMOL/L (ref 3.4–5.3)
POTASSIUM SERPL-SCNC: 4.5 MMOL/L (ref 3.4–5.3)
PROT SERPL-MCNC: 8.4 G/DL (ref 6.4–8.3)
RBC # BLD AUTO: 4.26 10E6/UL (ref 4.4–5.9)
RBC # BLD AUTO: 4.72 10E6/UL (ref 4.4–5.9)
RBC URINE: 2 /HPF
SODIUM SERPL-SCNC: 142 MMOL/L (ref 135–145)
SODIUM SERPL-SCNC: 145 MMOL/L (ref 135–145)
SP GR UR STRIP: 1.03 (ref 1–1.03)
SPERM #/AREA URNS HPF: PRESENT /HPF
UROBILINOGEN UR STRIP-MCNC: NORMAL MG/DL
WBC # BLD AUTO: 5.6 10E3/UL (ref 4–11)
WBC # BLD AUTO: 7 10E3/UL (ref 4–11)
WBC URINE: 0 /HPF

## 2025-07-29 PROCEDURE — 250N000013 HC RX MED GY IP 250 OP 250 PS 637: Performed by: EMERGENCY MEDICINE

## 2025-07-29 PROCEDURE — 83690 ASSAY OF LIPASE: CPT | Performed by: EMERGENCY MEDICINE

## 2025-07-29 PROCEDURE — 82077 ASSAY SPEC XCP UR&BREATH IA: CPT

## 2025-07-29 PROCEDURE — 258N000003 HC RX IP 258 OP 636: Performed by: EMERGENCY MEDICINE

## 2025-07-29 PROCEDURE — 99283 EMERGENCY DEPT VISIT LOW MDM: CPT | Performed by: FAMILY MEDICINE

## 2025-07-29 PROCEDURE — 250N000013 HC RX MED GY IP 250 OP 250 PS 637: Performed by: FAMILY MEDICINE

## 2025-07-29 PROCEDURE — 99284 EMERGENCY DEPT VISIT MOD MDM: CPT | Mod: GC | Performed by: FAMILY MEDICINE

## 2025-07-29 PROCEDURE — 82550 ASSAY OF CK (CPK): CPT | Performed by: EMERGENCY MEDICINE

## 2025-07-29 PROCEDURE — 250N000011 HC RX IP 250 OP 636

## 2025-07-29 PROCEDURE — 36415 COLL VENOUS BLD VENIPUNCTURE: CPT

## 2025-07-29 PROCEDURE — 250N000013 HC RX MED GY IP 250 OP 250 PS 637

## 2025-07-29 PROCEDURE — 82310 ASSAY OF CALCIUM: CPT

## 2025-07-29 PROCEDURE — 96361 HYDRATE IV INFUSION ADD-ON: CPT | Performed by: EMERGENCY MEDICINE

## 2025-07-29 PROCEDURE — 250N000011 HC RX IP 250 OP 636: Performed by: EMERGENCY MEDICINE

## 2025-07-29 PROCEDURE — 85004 AUTOMATED DIFF WBC COUNT: CPT

## 2025-07-29 PROCEDURE — 81003 URINALYSIS AUTO W/O SCOPE: CPT | Performed by: STUDENT IN AN ORGANIZED HEALTH CARE EDUCATION/TRAINING PROGRAM

## 2025-07-29 PROCEDURE — 36415 COLL VENOUS BLD VENIPUNCTURE: CPT | Performed by: EMERGENCY MEDICINE

## 2025-07-29 PROCEDURE — 96375 TX/PRO/DX INJ NEW DRUG ADDON: CPT | Performed by: EMERGENCY MEDICINE

## 2025-07-29 RX ORDER — ONDANSETRON 4 MG/1
4 TABLET, ORALLY DISINTEGRATING ORAL ONCE
Status: COMPLETED | OUTPATIENT
Start: 2025-07-29 | End: 2025-07-29

## 2025-07-29 RX ORDER — DIPHENHYDRAMINE HCL 25 MG
25 CAPSULE ORAL ONCE
Status: COMPLETED | OUTPATIENT
Start: 2025-07-29 | End: 2025-07-29

## 2025-07-29 RX ORDER — ACETAMINOPHEN 500 MG
1000 TABLET ORAL ONCE
Status: COMPLETED | OUTPATIENT
Start: 2025-07-29 | End: 2025-07-29

## 2025-07-29 RX ORDER — DIAZEPAM 2 MG/1
2 TABLET ORAL ONCE
Status: COMPLETED | OUTPATIENT
Start: 2025-07-29 | End: 2025-07-29

## 2025-07-29 RX ORDER — LIDOCAINE 4 G/G
1 PATCH TOPICAL
Status: DISCONTINUED | OUTPATIENT
Start: 2025-07-29 | End: 2025-07-29 | Stop reason: HOSPADM

## 2025-07-29 RX ORDER — LIDOCAINE 50 MG/G
1 PATCH TOPICAL EVERY 24 HOURS
Qty: 10 PATCH | Refills: 0 | Status: SHIPPED | OUTPATIENT
Start: 2025-07-29

## 2025-07-29 RX ORDER — LIDOCAINE 4 G/G
1 PATCH TOPICAL ONCE
Status: DISCONTINUED | OUTPATIENT
Start: 2025-07-29 | End: 2025-07-29 | Stop reason: HOSPADM

## 2025-07-29 RX ORDER — GABAPENTIN 300 MG/1
300 CAPSULE ORAL 3 TIMES DAILY
Qty: 30 CAPSULE | Refills: 0 | Status: SHIPPED | OUTPATIENT
Start: 2025-07-29

## 2025-07-29 RX ADMIN — PROCHLORPERAZINE EDISYLATE 5 MG: 5 INJECTION INTRAMUSCULAR; INTRAVENOUS at 01:27

## 2025-07-29 RX ADMIN — SODIUM CHLORIDE 1000 ML: 0.9 INJECTION, SOLUTION INTRAVENOUS at 01:29

## 2025-07-29 RX ADMIN — LIDOCAINE 1 PATCH: 560 PATCH PERCUTANEOUS; TOPICAL; TRANSDERMAL at 19:52

## 2025-07-29 RX ADMIN — ACETAMINOPHEN 1000 MG: 500 TABLET ORAL at 14:04

## 2025-07-29 RX ADMIN — LIDOCAINE 1 PATCH: 4 PATCH TOPICAL at 14:04

## 2025-07-29 RX ADMIN — DIAZEPAM 2 MG: 2 TABLET ORAL at 01:28

## 2025-07-29 RX ADMIN — DIPHENHYDRAMINE HYDROCHLORIDE 25 MG: 25 CAPSULE ORAL at 01:28

## 2025-07-29 RX ADMIN — ONDANSETRON 4 MG: 4 TABLET, ORALLY DISINTEGRATING ORAL at 14:04

## 2025-07-29 ASSESSMENT — COLUMBIA-SUICIDE SEVERITY RATING SCALE - C-SSRS
1. IN THE PAST MONTH, HAVE YOU WISHED YOU WERE DEAD OR WISHED YOU COULD GO TO SLEEP AND NOT WAKE UP?: NO
2. HAVE YOU ACTUALLY HAD ANY THOUGHTS OF KILLING YOURSELF IN THE PAST MONTH?: NO
6. HAVE YOU EVER DONE ANYTHING, STARTED TO DO ANYTHING, OR PREPARED TO DO ANYTHING TO END YOUR LIFE?: NO
6. HAVE YOU EVER DONE ANYTHING, STARTED TO DO ANYTHING, OR PREPARED TO DO ANYTHING TO END YOUR LIFE?: NO
1. IN THE PAST MONTH, HAVE YOU WISHED YOU WERE DEAD OR WISHED YOU COULD GO TO SLEEP AND NOT WAKE UP?: NO
2. HAVE YOU ACTUALLY HAD ANY THOUGHTS OF KILLING YOURSELF IN THE PAST MONTH?: NO

## 2025-07-29 ASSESSMENT — ACTIVITIES OF DAILY LIVING (ADL)
ADLS_ACUITY_SCORE: 50

## 2025-07-29 NOTE — ED NOTES
Bed: ED  Expected date: 7/29/25  Expected time: 1:01 PM  Means of arrival: Ambulance  Comments:  [address] 35M kidney stones

## 2025-07-29 NOTE — DISCHARGE INSTRUCTIONS
Follow-up with your urologist as scheduled on Friday or sooner if you call their office to see if you can be seen and evaluated earlier this week for further management and discussion of your kidney stones  Continue Tylenol (max 4000 mg in 24 hours) and ibuprofen (max 3200 mg in 24 hours) for pain  It is dangerous to drink alcohol and then receiving IV or oral narcotic pain medications so if you require transfer back to the ER for worsening pain, please be honest with EMS and ED personnel about your alcohol use  Otherwise, do not hesitate to seek urgent or emergent reevaluation if you have any worsening or concerning signs or symptoms

## 2025-07-29 NOTE — DISCHARGE INSTRUCTIONS
Use the numbing patch as prescribed. Follow up with your clinic doctor within the next week. Return with any new or worsening symptoms or any other concerns.

## 2025-07-29 NOTE — ED NOTES
Patient reports having a urology appointment scheduled for in a week and that he has the specimen collection container at home for a 24 hour urine study.

## 2025-07-29 NOTE — ED PROVIDER NOTES
ED Provider Note  [facility] Emergency Department      History     Chief Complaint   Patient presents with    Flank Pain     Right sided flank pain, believes he has a kidney stone. States he has hx of kidney stones, sudden onset of pain about an hour ago. 8 mg zofran, 1 mg diludad, 15 torodol given in route.      HPI  Ravi Hankins is a 35 year old male, with a history of previous kidney stones, who presents to the emergency department for evaluation of right flank pain.  Patient reports that he had sudden onset of right flank pain that does not radiate approximately 90 minutes before arrival at the emergency department.  Patient was brought in by ambulance, no relief with Toradol and Dilaudid given in the ambulance.  He is unable to find position of comfort.  He has follow-up with urology in 2 weeks for evaluation of recurring kidney stones.  He denies any history of other abdominal surgeries, denies history of previous lithotripsy or renal stenting.  He denies any urinary symptoms prior to his symptom onset tonight.    Independent historian:  None    Chart review:  I reviewed previous emergency department note from 7/17/2025, patient brought in by ambulance for flank pain, given his frequent ER visits with negative findings for obstructing kidney stones, urinalysis was done and was felt to be suggestive of a UTI and he was given prescription for Keflex for UTI.  ED provider note from 7/16/2025 shows the patient presented with right-sided flank pain.  History of cyclical vomiting syndrome as well as kidney stones were considered.  ED provider note from 5/13/2025 shows patient presented with abdominal pain, history of vomiting syndrome, minimal relief with fentanyl, Dilaudid, Zofran.  Recommend follow-up with PCP and outpatient GI referral.  Patient also has multiple emergency department visits for abdominal pain and nausea and vomiting.    Physical Exam   BP:  "124/87  Pulse: 68  Temp: 97.6  F (36.4  C)  Resp: 18  Height: 188 cm (6' 2\")  Weight: 72.6 kg (160 lb)  SpO2: 100 %  Physical Exam  Vitals and nursing note reviewed.   Constitutional:       General: He is in acute distress.      Appearance: Normal appearance. He is not ill-appearing, toxic-appearing or diaphoretic.   HENT:      Nose: Nose normal.      Mouth/Throat:      Mouth: Mucous membranes are moist.      Pharynx: Oropharynx is clear.   Eyes:      Extraocular Movements: Extraocular movements intact.      Pupils: Pupils are equal, round, and reactive to light.   Cardiovascular:      Rate and Rhythm: Normal rate and regular rhythm.      Pulses: Normal pulses.      Heart sounds: Normal heart sounds.   Pulmonary:      Effort: Pulmonary effort is normal.      Breath sounds: Normal breath sounds.   Abdominal:      General: Bowel sounds are normal.      Palpations: Abdomen is soft.      Tenderness: There is no abdominal tenderness. There is right CVA tenderness.   Musculoskeletal:         General: Normal range of motion.      Cervical back: Normal range of motion.      Right lower leg: No edema.      Left lower leg: No edema.   Skin:     General: Skin is warm and dry.   Neurological:      General: No focal deficit present.      Mental Status: He is alert and oriented to person, place, and time.   Psychiatric:         Mood and Affect: Mood normal.         Behavior: Behavior normal.           ED Course, Procedures, & Data      Procedures                Results for orders placed or performed during the hospital encounter of 07/28/25   CT Abdomen Pelvis w/o Contrast     Status: None    Narrative    EXAM: CT ABDOMEN PELVIS W/O CONTRAST  LOCATION: [facility]  DATE: 7/28/2025    INDICATION: Right flank pain, history of kidney stones.  COMPARISON: CT July 16, 2025.  TECHNIQUE: CT scan of the abdomen and pelvis was performed without IV contrast. Multiplanar reformats were obtained. " Dose reduction techniques were used.  CONTRAST: None.    FINDINGS:   LOWER CHEST: Normal.    HEPATOBILIARY: Normal liver, gallbladder, biliary tree.    PANCREAS: Normal.    SPLEEN: Normal.    ADRENAL GLANDS: Normal.    KIDNEYS/BLADDER: Normal noncontrast appearance of the renal parenchyma. No hydronephrosis. Right renal calculi measuring up to 4 mm at the lower pole and 2 mm at the left mid pole are again observed and appear unchanged in number and location. No   ureteral or urinary bladder calculus.    BOWEL: Normal distal esophagus, stomach, small bowel, large bowel, and appendix. Normal retrocecal appendix is nicely demonstrated. No mesenteric inflammatory change or peritoneal free fluid.    LYMPH NODES: Normal.    VASCULATURE: Normal.    PELVIC ORGANS: Normal.    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 No bone or body wall lesion identified to account for flank pain.      Impression    IMPRESSION:   1.  Nonobstructing bilateral nephrolithiasis. No acute abnormality is identified.       Comprehensive Metabolic Panel (Limited Occurrences)     Status: Abnormal   Result Value Ref Range    Sodium 142 135 - 145 mmol/L    Potassium 4.5 3.4 - 5.3 mmol/L    Carbon Dioxide (CO2) 20 (L) 22 - 29 mmol/L    Anion Gap 18 (H) 7 - 15 mmol/L    Urea Nitrogen 15.6 6.0 - 20.0 mg/dL    Creatinine 0.81 0.67 - 1.17 mg/dL    GFR Estimate >90 >60 mL/min/1.73m2    Calcium 8.8 8.8 - 10.4 mg/dL    Chloride 104 98 - 107 mmol/L    Glucose 99 70 - 99 mg/dL    Alkaline Phosphatase 59 40 - 150 U/L    AST 28 0 - 45 U/L    ALT 15 0 - 70 U/L    Protein Total 8.4 (H) 6.4 - 8.3 g/dL    Albumin 4.8 3.5 - 5.2 g/dL    Bilirubin Total 0.3 <=1.2 mg/dL   CBC with platelets and differential     Status: None   Result Value Ref Range    WBC Count 7.0 4.0 - 11.0 10e3/uL    RBC Count 4.72 4.40 - 5.90 10e6/uL    Hemoglobin 14.2 13.3 - 17.7 g/dL    Hematocrit 43.1 40.0 - 53.0 %    MCV 91 78 - 100 fL    MCH 30.1 26.5  - 33.0 pg    MCHC 32.9 31.5 - 36.5 g/dL    RDW 13.1 10.0 - 15.0 %    Platelet Count 178 150 - 450 10e3/uL    % Neutrophils 76 %    % Lymphocytes 15 %    % Monocytes 8 %    % Eosinophils 0 %    % Basophils 0 %    % Immature Granulocytes 0 %    NRBCs per 100 WBC 0 <1 /100    Absolute Neutrophils 5.3 1.6 - 8.3 10e3/uL    Absolute Lymphocytes 1.1 0.8 - 5.3 10e3/uL    Absolute Monocytes 0.6 0.0 - 1.3 10e3/uL    Absolute Eosinophils 0.0 0.0 - 0.7 10e3/uL    Absolute Basophils 0.0 0.0 - 0.2 10e3/uL    Absolute Immature Granulocytes 0.0 <=0.4 10e3/uL    Absolute NRBCs 0.0 10e3/uL   CBC with Platelets and Differential (Limited Occurrences)     Status: None    Narrative    The following orders were created for panel order CBC with Platelets and Differential (Limited Occurrences).  Procedure                               Abnormality         Status                     ---------                               -----------         ------                     CBC with platelets and ...[[id number]]                      Final result                 Please view results for these tests on the individual orders.     Medications   sodium chloride 0.9% BOLUS 1,000 mL (1,000 mLs Intravenous $New Bag 7/29/25 0129)   sodium chloride 0.9% BOLUS 1,000 mL (0 mLs Intravenous Stopped 7/29/25 0128)   HYDROmorphone (DILAUDID) injection 1 mg (1 mg Intravenous $Given 7/28/25 2342)   ketorolac (TORADOL) injection 15 mg (15 mg Intravenous $Given 7/28/25 2342)   ondansetron (ZOFRAN) injection 4 mg (4 mg Intravenous $Given 7/28/25 2341)   diazepam (VALIUM) tablet 2 mg (2 mg Oral $Given 7/29/25 0128)   prochlorperazine (COMPAZINE) injection 5 mg (5 mg Intravenous $Given 7/29/25 0127)   diphenhydrAMINE (BENADRYL) capsule 25 mg (25 mg Oral $Given 7/29/25 0128)     Labs Ordered and Resulted from Time of ED Arrival to Time of ED Departure   COMPREHENSIVE METABOLIC PANEL (LIMITED OCCURRENCES) - Abnormal       Result Value    Sodium 142      Potassium 4.5       Carbon Dioxide (CO2) 20 (*)     Anion Gap 18 (*)     Urea Nitrogen 15.6      Creatinine 0.81      GFR Estimate >90      Calcium 8.8      Chloride 104      Glucose 99      Alkaline Phosphatase 59      AST 28      ALT 15      Protein Total 8.4 (*)     Albumin 4.8      Bilirubin Total 0.3     CBC WITH PLATELETS AND DIFFERENTIAL    WBC Count 7.0      RBC Count 4.72      Hemoglobin 14.2      Hematocrit 43.1      MCV 91      MCH 30.1      MCHC 32.9      RDW 13.1      Platelet Count 178      % Neutrophils 76      % Lymphocytes 15      % Monocytes 8      % Eosinophils 0      % Basophils 0      % Immature Granulocytes 0      NRBCs per 100 WBC 0      Absolute Neutrophils 5.3      Absolute Lymphocytes 1.1      Absolute Monocytes 0.6      Absolute Eosinophils 0.0      Absolute Basophils 0.0      Absolute Immature Granulocytes 0.0      Absolute NRBCs 0.0     ROUTINE UA WITH MICROSCOPIC REFLEX TO CULTURE   LIPASE   CK TOTAL     CT Abdomen Pelvis w/o Contrast   Final Result   IMPRESSION:    1.  Nonobstructing bilateral nephrolithiasis. No acute abnormality is identified.                   Independent interpretation: none    Discussion of management: none    Social determinants of health: unknown    Assessment & Plan    Ravi Hankins is a 35 year old male who presents to the emergency department for evaluation of right flank pain.  Upon presentation patient is alert and oriented, speaking in full sentences, afebrile, skin warm pink dry in acute distress writhing around on the bed in pain holding his right flank.  A broad differential was considered including pyelonephritis, acute obstructing kidney stone, bowel obstruction, aortic dissection.    Patient's history and physical exam was initially most concerning for acute kidney stone.  Patient has a history of kidney stones and reports symptoms are consistent with previous ones.  Patient has multiple ER visits for abdominal pain thought to be cyclical vomiting related to  polysubstance use and flank pain for which he has not been found to have obstructing kidney stones previously.  Minimal relief with Dilaudid and Toradol.  Did consider alternative etiologies as no white count was noted on CBC, no elevation of his creatinine, no signs of acute infection.  Given the symptoms onset while working outside in the hot humid weather, did consider possible rhabdomyolysis, additionally thought about pancreatitis.  Add-on labs are still pending and urinalysis still pending at the end of my shift.  Patient will be signed out to oncoming provider pending these results.     Patient again reports that IV narcotic pain relief along with Toradol is minimally effective however he is able to sleep while here in the emergency department.  I suspect the pending labs will be negative however he did recently have elevated leukocyte Estrace on his urine analysis from a couple weeks ago, could consider possibility of an acute UTI however as he is denying any urinary symptoms I have low suspicion for this at this time.  Suspect the patient will need outpatient follow-up for further evaluation and may need short course of prescription analgesia however would caution against long or high-dose narcotics given his frequent ER visits for similar symptoms without evidence of obstructing kidney stones.      Critical care was not performed.     Medical Decision Making  The patient's presentation was of moderate complexity (an undiagnosed new problem with uncertain prognosis).    The patient's evaluation involved:  review of external note(s) from 3+ sources (see separate area of note for details)  strong consideration of a test (see separate area of note for details) that was ultimately deferred  ordering and/or review of 3+ test(s) in this encounter (see separate area of note for details)    The patient's management necessitated high risk (a parenteral controlled substance).    --    ED Attending Physician  Attestation    I Cielo Nino MD, cared for this patient with the Advanced Practice Provider (KARTHIKEYAN). I personally provided a substantive portion of the care for this patient, including approving the care plan for the number and complexity of problems addressed and taking responsibility related to the risk of complications and/or morbidity or mortality of patient management. Please see the KARTHIKEYAN's documentation for full details.    Summary of HPI, PE, ED Course   Patient is a 35 year old male evaluated in the emergency department for right-sided flank pain that started when bending over or doing yard work today.  Abdominal CT obtained which shows renal stones but without evidence of ureterolithiasis or hydronephrosis.  UA is negative for evidence of UTI.  I have a low suspicion for aortic pathology and no dilatation noted of the aorta.  CK is within normal limits.  Suspect symptoms are most likely secondary to musculoskeletal pain.  Will plan to treat symptomatically with ongoing outpatient follow-up and close return precautions.    Cielo Nino MD  Emergency Medicine       New Prescriptions    No medications on file       Final diagnoses:   Acute right flank pain     Heath Thorne PA-C, CAQ-EM    [facility] EMERGENCY DEPARTMENT  7/28/2025     Heath Thorne PA-C  07/29/25 0153    MD Mica Lucas Amy Margaret, MD  07/29/25 2234

## 2025-07-29 NOTE — ED PROVIDER NOTES
ED Provider Note  [facility]      History     Chief Complaint   Patient presents with    Kidney Stone(s) Followup     Patient was diagnosed with kidney stones on the right yesterday, pain came back tomorrow. Patient reports having a follow up appointment scheduled but came here due to the pain. History of surgery for stones.      The history is provided by the patient and medical records. No  was used.     Ravi Hankins is a 35 year old male with past medical history of kidney stones, alcohol use, aortic root dilatation, polysubstance use, tobacco use who presents to the ED with continued right low back/flank pain.  He was seen in the ED yesterday and diagnosed with nonobstructing bilateral nephrolithiasis.  He was discharged home with prescription for lidocaine patches in the setting of otherwise reassuring lab work and pain control.  He states that he was unable to  the lidocaine patch prescription today.  He has not tried any other over-the-counter medications for his back pain.  He denies vomiting, fever, chills.  She denies any hematuria, dysuria, urgency or frequency of urination, or feelings of urinary retention.  He denies any diarrhea or constipation.  He denies any abdominal pain.  He otherwise states his symptoms are the same as they were yesterday without any notable changes or worsening.  He received IV Toradol, IV Dilaudid, and IV Zofran in the EMS prior to arrival to the ED.  _______________________________________________________________  Narrative & Impression   EXAM: CT ABDOMEN PELVIS W/O CONTRAST  LOCATION: [facility]  DATE: 7/28/2025     INDICATION: Right flank pain, history of kidney stones.  COMPARISON: CT July 16, 2025.  TECHNIQUE: CT scan of the abdomen and pelvis was performed without IV contrast. Multiplanar reformats were obtained. Dose reduction techniques were used.  CONTRAST:  None.     FINDINGS:   LOWER CHEST: Normal.     HEPATOBILIARY: Normal liver, gallbladder, biliary tree.     PANCREAS: Normal.     SPLEEN: Normal.     ADRENAL GLANDS: Normal.     KIDNEYS/BLADDER: Normal noncontrast appearance of the renal parenchyma. No hydronephrosis. Right renal calculi measuring up to 4 mm at the lower pole and 2 mm at the left mid pole are again observed and appear unchanged in number and location. No   ureteral or urinary bladder calculus.     BOWEL: Normal distal esophagus, stomach, small bowel, large bowel, and appendix. Normal retrocecal appendix is nicely demonstrated. No mesenteric inflammatory change or peritoneal free fluid.     LYMPH NODES: Normal.     VASCULATURE: Normal.     PELVIC ORGANS: Normal.     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 No bone or body wall lesion identified to account for flank pain.                                                                      IMPRESSION:   1.  Nonobstructing bilateral nephrolithiasis. No acute abnormality is identified.       Past Medical History  Past Medical History:   Diagnosis Date    Dilated aortic root      Past Surgical History:   Procedure Laterality Date    NO HISTORY OF SURGERY       atenolol (TENORMIN) 100 MG tablet  cephALEXin (KEFLEX) 500 MG capsule  cyclobenzaprine (FLEXERIL) 10 MG tablet  famotidine (PEPCID) 20 MG tablet  folic acid (FOLVITE) 1 MG tablet  gabapentin (NEURONTIN) 100 MG capsule  hydrOXYzine HCl (ATARAX) 25 MG tablet  ibuprofen (ADVIL/MOTRIN) 600 MG tablet  lidocaine (LIDODERM) 5 % patch  melatonin 3 MG tablet  multivitamin w/minerals (THERA-VIT-M) tablet  ondansetron (ZOFRAN ODT) 4 MG ODT tab  oxyCODONE-acetaminophen (PERCOCET) 5-325 MG tablet  thiamine (B-1) 100 MG tablet  traZODone (DESYREL) 50 MG tablet  venlafaxine (EFFEXOR XR) 37.5 MG 24 hr capsule      Allergies   Allergen Reactions    Contrast Dye      PN: LW CM1:  Reaction :  rash    Sulfa Antibiotics       PN: LW Reaction: unsure which kid has allergy per mom???     Family History  Family History   Problem Relation Age of Onset    Heart Disease Brother     Abdominal Aortic Aneurysm Brother     Heart Disease Other         self    Substance Abuse Mother     Substance Abuse Father     Substance Abuse Maternal Grandfather     Diabetes No family hx of     Coronary Artery Disease No family hx of     Hypertension No family hx of     Hyperlipidemia No family hx of     Cerebrovascular Disease No family hx of     Breast Cancer No family hx of     Colon Cancer No family hx of     Prostate Cancer No family hx of     Other Cancer No family hx of     Depression No family hx of     Anxiety Disorder No family hx of     Mental Illness No family hx of     Anesthesia Reaction No family hx of     Asthma No family hx of     Osteoporosis No family hx of     Genetic Disorder No family hx of     Thyroid Disease No family hx of     Obesity No family hx of     Unknown/Adopted No family hx of      Social History   Social History     Tobacco Use    Smoking status: Former     Current packs/day: 0.00     Average packs/day: 0.1 packs/day for 5.0 years (0.5 ttl pk-yrs)     Types: Cigarettes     Start date: 2017     Quit date: 2022     Years since quitting: 3.5    Smokeless tobacco: Never    Tobacco comments:     1-2 cigarettes a day for 5 years   Vaping Use    Vaping status: Some Days    Start date: 2/22/2021    Substances: Nicotine    Devices: Disposable   Substance Use Topics    Alcohol use: Yes     Comment: once a week    Drug use: Not Currently     Types: Marijuana      Past medical history, past surgical history, medications, allergies, family history, and social history were reviewed with the patient. No additional pertinent items.     A medically appropriate review of systems was performed with pertinent positives and negatives noted in the HPI, and all other systems negative.    Physical Exam   BP: 125/72  Pulse: 94  Temp: 97.6  F (36.4   C)  Resp: 18  SpO2: 93 %  Physical Exam  Constitutional:       General: He is not in acute distress.     Appearance: Normal appearance. He is normal weight. He is not ill-appearing, toxic-appearing or diaphoretic.   HENT:      Head: Normocephalic and atraumatic.   Cardiovascular:      Rate and Rhythm: Normal rate and regular rhythm.   Pulmonary:      Effort: Pulmonary effort is normal. No respiratory distress.      Breath sounds: Normal breath sounds. No stridor.   Abdominal:      General: Bowel sounds are normal.      Palpations: Abdomen is soft.      Tenderness: There is no abdominal tenderness. There is no right CVA tenderness, left CVA tenderness, guarding or rebound.   Musculoskeletal:      Cervical back: Normal.      Thoracic back: Normal.      Lumbar back: Tenderness (right low back) present. No bony tenderness.   Skin:     General: Skin is warm.   Neurological:      Mental Status: He is alert and oriented to person, place, and time. Mental status is at baseline.   Psychiatric:         Mood and Affect: Mood normal.         Behavior: Behavior normal.           ED Course, Procedures, & Data      Procedures                Results for orders placed or performed during the hospital encounter of 07/29/25   Basic Metabolic Panel (Limited Occurrences)   Result Value Ref Range    Sodium 145 135 - 145 mmol/L    Potassium 3.9 3.4 - 5.3 mmol/L    Chloride 105 98 - 107 mmol/L    Carbon Dioxide (CO2) 21 (L) 22 - 29 mmol/L    Anion Gap 19 (H) 7 - 15 mmol/L    Urea Nitrogen 11.6 6.0 - 20.0 mg/dL    Creatinine 0.97 0.67 - 1.17 mg/dL    GFR Estimate >90 >60 mL/min/1.73m2    Calcium 8.5 (L) 8.8 - 10.4 mg/dL    Glucose 80 70 - 99 mg/dL   Result Value Ref Range    Ethanol Level Blood 0.21 (H) <=0.01 g/dL   CBC with platelets and differential   Result Value Ref Range    WBC Count 5.6 4.0 - 11.0 10e3/uL    RBC Count 4.26 (L) 4.40 - 5.90 10e6/uL    Hemoglobin 12.7 (L) 13.3 - 17.7 g/dL    Hematocrit 39.5 (L) 40.0 - 53.0 %    MCV 93  78 - 100 fL    MCH 29.8 26.5 - 33.0 pg    MCHC 32.2 31.5 - 36.5 g/dL    RDW 13.4 10.0 - 15.0 %    Platelet Count 149 (L) 150 - 450 10e3/uL    % Neutrophils 64 %    % Lymphocytes 23 %    % Monocytes 12 %    % Eosinophils 0 %    % Basophils 0 %    % Immature Granulocytes 0 %    NRBCs per 100 WBC 0 <1 /100    Absolute Neutrophils 3.6 1.6 - 8.3 10e3/uL    Absolute Lymphocytes 1.3 0.8 - 5.3 10e3/uL    Absolute Monocytes 0.7 0.0 - 1.3 10e3/uL    Absolute Eosinophils 0.0 0.0 - 0.7 10e3/uL    Absolute Basophils 0.0 0.0 - 0.2 10e3/uL    Absolute Immature Granulocytes 0.0 <=0.4 10e3/uL    Absolute NRBCs 0.0 10e3/uL     Medications   acetaminophen (TYLENOL) tablet 1,000 mg (1,000 mg Oral $Given 7/29/25 1404)   ondansetron (ZOFRAN ODT) ODT tab 4 mg (4 mg Oral $Given 7/29/25 1404)          Critical care was not performed.     Medical Decision Making  The patient's presentation was of high complexity (a chronic illness severe exacerbation, progression, or side effect of treatment).    The patient's evaluation involved:  review of external note(s) from 1 sources (ED encounter 7/28/2025)  review of 3+ test result(s) ordered prior to this encounter (CT AMP imaging, CBC, CMP 7/28/2025)  ordering and/or review of 3+ test(s) in this encounter (see separate area of note for details)    The patient's management necessitated moderate risk (prescription drug management including medications given in the ED).    Assessment & Plan    Ravi is a 35-year-old male that presented to the ED with concerns of continuing flank pain.  Acceptable vital signs on presentation without tachycardia, fever, hypotension.  Patient appears to be writhing in pain but is otherwise in no acute distress nontoxic-appearing.  Patient flinching significantly with light touch of the right back and flank area.  No acute abdomen signs on palpation.  No overlying skin changes of the flank area.  Imaging reviewed from yesterday notable for nonobstructing renal  calculus with no ureteral calculus.  Repeat blood work obtained with no significant changes or abnormalities.  Patient's blood alcohol 0.21 today in the ED.  Discussion with the patient had about the dangers of drinking and receiving narcotic medications.  Discussed that stones that are present in the kidney do not cause pain and that he did not have any ureteral calculi which is what causes the flank pain.  Discussed that his urine was also clear yesterday and he is continuing to not have any urinary symptoms so is unlikely that he has a ascending bladder infection or kidney infection developing.  Patient was given p.o. Tylenol, topical lidocaine patch, and ODT Zofran for symptoms.  Patient was not given subsequent narcotics here in the ED.  Patient is otherwise stable for discharge home.  Strict return precautions given.  No Rx narcotics.  Recommended continued follow-up with his urologist this Friday as scheduled.  Patient was agreeable to the discharge treatment plan, voiced understanding, and all questions answered prior to discharge.    I have reviewed the nursing notes. I have reviewed the findings, diagnosis, plan and need for follow up with the patient.    Discharge Medication List as of 7/29/2025  3:21 PM          Final diagnoses:   Alcohol intoxication, uncomplicated   Nephrolithiasis     RITO Young MD  [facility] EMERGENCY DEPARTMENT  7/29/2025     Farrah Barnes PA-C  07/29/25 1929

## 2025-07-29 NOTE — ED TRIAGE NOTES
Patient brought in by EMS. EMS reports that the patient was here last night and was diagnosed with stones on the right. Patient reported that pain came back an hour prior to calling for an ambulance. Patient endorses surgery in the past to remove stones. Patient received 4 mg Zofran. 15 mg Toradol, and 1 mg IV dilaudid en route. EMS reports patient was attempting to induce vomiting with a finger in his throat. EMS reports the patient smelling of alcohol.      18 g R AC

## 2025-07-29 NOTE — ED TRIAGE NOTES
BIBA in with sudden onset of right sided flank pain and N/V. Hx of kidney stones, feels like this is similar to what has happened in the past.   18g IV in L hand, given 8 mg zofran, 1 mg diludad, and 15 mg torodol in route. Still nauseous      Triage Assessment (Adult)       Row Name 07/28/25 3089          Triage Assessment    Airway WDL WDL        Respiratory WDL    Respiratory WDL WDL        Skin Circulation/Temperature WDL    Skin Circulation/Temperature WDL WDL        Cardiac WDL    Cardiac WDL WDL        Peripheral/Neurovascular WDL    Peripheral Neurovascular WDL WDL        Cognitive/Neuro/Behavioral WDL    Cognitive/Neuro/Behavioral WDL WDL

## 2025-07-29 NOTE — ED PROVIDER NOTES
Patient was accepted at shift change signout with plan for you to follow-up on the CK, which was unremarkable, lipase, which was unremarkable, urinalysis, which did not show sign of infection.  The plan was to discharge if these were not concerning.  I did discuss the findings with the patient.  He is offered a prescription for lidocaine patches and does agree.  He states he does have a primary care provider and is encouraged to follow-up within the next few days, return with any new or worsening symptoms, any other concerns.  He verbalizes understanding and is agreeable to the plan.    Dictation Disclaimer: Some of this Note has been completed with voice-recognition dictation software. Although errors are generally corrected real-time, there is the potential for a rare error to be present in the completed chart.       Harmony Barron MD  07/29/25 2955

## 2025-07-30 ENCOUNTER — HOSPITAL ENCOUNTER (EMERGENCY)
Facility: CLINIC | Age: 35
Discharge: HOME | End: 2025-07-30
Attending: EMERGENCY MEDICINE
Payer: COMMERCIAL

## 2025-07-30 ENCOUNTER — PATIENT OUTREACH (OUTPATIENT)
Dept: CARE COORDINATION | Facility: CLINIC | Age: 35
End: 2025-07-30

## 2025-07-30 VITALS
DIASTOLIC BLOOD PRESSURE: 89 MMHG | SYSTOLIC BLOOD PRESSURE: 130 MMHG | RESPIRATION RATE: 18 BRPM | OXYGEN SATURATION: 100 % | HEART RATE: 58 BPM | TEMPERATURE: 98.2 F

## 2025-07-30 DIAGNOSIS — F10.11 HISTORY OF ALCOHOL ABUSE: ICD-10-CM

## 2025-07-30 DIAGNOSIS — Z87.442 HISTORY OF KIDNEY STONES: ICD-10-CM

## 2025-07-30 DIAGNOSIS — R10.9 RIGHT FLANK PAIN: Primary | ICD-10-CM

## 2025-07-30 DIAGNOSIS — F50.89 SELF INDUCED VOMITING: ICD-10-CM

## 2025-07-30 LAB
ALBUMIN SERPL BCG-MCNC: 4.5 G/DL (ref 3.5–5.2)
ALBUMIN UR-MCNC: 20 MG/DL
ALP SERPL-CCNC: 61 U/L (ref 40–150)
ALT SERPL W P-5'-P-CCNC: 14 U/L (ref 0–70)
ANION GAP SERPL CALCULATED.3IONS-SCNC: 15 MMOL/L (ref 7–15)
APPEARANCE UR: CLEAR
AST SERPL W P-5'-P-CCNC: 25 U/L (ref 0–45)
BASOPHILS # BLD AUTO: 0 10E3/UL (ref 0–0.2)
BASOPHILS NFR BLD AUTO: 0 %
BILIRUB SERPL-MCNC: 0.7 MG/DL
BILIRUB UR QL STRIP: NEGATIVE
BUN SERPL-MCNC: 11.7 MG/DL (ref 6–20)
CALCIUM SERPL-MCNC: 8.9 MG/DL (ref 8.8–10.4)
CHLORIDE SERPL-SCNC: 104 MMOL/L (ref 98–107)
COLOR UR AUTO: ABNORMAL
CREAT SERPL-MCNC: 0.89 MG/DL (ref 0.67–1.17)
EGFRCR SERPLBLD CKD-EPI 2021: >90 ML/MIN/1.73M2
EOSINOPHIL # BLD AUTO: 0 10E3/UL (ref 0–0.7)
EOSINOPHIL NFR BLD AUTO: 0 %
ERYTHROCYTE [DISTWIDTH] IN BLOOD BY AUTOMATED COUNT: 13.2 % (ref 10–15)
GLUCOSE SERPL-MCNC: 89 MG/DL (ref 70–99)
GLUCOSE UR STRIP-MCNC: NEGATIVE MG/DL
HCO3 SERPL-SCNC: 23 MMOL/L (ref 22–29)
HCT VFR BLD AUTO: 39.3 % (ref 40–53)
HGB BLD-MCNC: 12.6 G/DL (ref 13.3–17.7)
HGB UR QL STRIP: NEGATIVE
IMM GRANULOCYTES # BLD: 0 10E3/UL
IMM GRANULOCYTES NFR BLD: 0 %
KETONES UR STRIP-MCNC: NEGATIVE MG/DL
LEUKOCYTE ESTERASE UR QL STRIP: NEGATIVE
LIPASE SERPL-CCNC: 37 U/L (ref 13–60)
LYMPHOCYTES # BLD AUTO: 1.6 10E3/UL (ref 0.8–5.3)
LYMPHOCYTES NFR BLD AUTO: 18 %
MCH RBC QN AUTO: 30.2 PG (ref 26.5–33)
MCHC RBC AUTO-ENTMCNC: 32.1 G/DL (ref 31.5–36.5)
MCV RBC AUTO: 94 FL (ref 78–100)
MONOCYTES # BLD AUTO: 1 10E3/UL (ref 0–1.3)
MONOCYTES NFR BLD AUTO: 11 %
MUCOUS THREADS #/AREA URNS LPF: PRESENT /LPF
NEUTROPHILS # BLD AUTO: 6.4 10E3/UL (ref 1.6–8.3)
NEUTROPHILS NFR BLD AUTO: 71 %
NITRATE UR QL: NEGATIVE
NRBC # BLD AUTO: 0 10E3/UL
NRBC BLD AUTO-RTO: 0 /100
PH UR STRIP: 6 [PH] (ref 5–7)
PLATELET # BLD AUTO: 155 10E3/UL (ref 150–450)
POTASSIUM SERPL-SCNC: 3.9 MMOL/L (ref 3.4–5.3)
PROT SERPL-MCNC: 7.5 G/DL (ref 6.4–8.3)
RBC # BLD AUTO: 4.17 10E6/UL (ref 4.4–5.9)
RBC URINE: 3 /HPF
SODIUM SERPL-SCNC: 142 MMOL/L (ref 135–145)
SP GR UR STRIP: 1.02 (ref 1–1.03)
UROBILINOGEN UR STRIP-MCNC: NORMAL MG/DL
WBC # BLD AUTO: 9.1 10E3/UL (ref 4–11)
WBC URINE: 6 /HPF

## 2025-07-30 PROCEDURE — 250N000013 HC RX MED GY IP 250 OP 250 PS 637: Performed by: PHYSICIAN ASSISTANT

## 2025-07-30 PROCEDURE — 84155 ASSAY OF PROTEIN SERUM: CPT | Performed by: PHYSICIAN ASSISTANT

## 2025-07-30 PROCEDURE — 99284 EMERGENCY DEPT VISIT MOD MDM: CPT | Performed by: PHYSICIAN ASSISTANT

## 2025-07-30 PROCEDURE — 99284 EMERGENCY DEPT VISIT MOD MDM: CPT | Performed by: EMERGENCY MEDICINE

## 2025-07-30 PROCEDURE — 36415 COLL VENOUS BLD VENIPUNCTURE: CPT | Performed by: PHYSICIAN ASSISTANT

## 2025-07-30 PROCEDURE — 85048 AUTOMATED LEUKOCYTE COUNT: CPT | Performed by: PHYSICIAN ASSISTANT

## 2025-07-30 PROCEDURE — 84132 ASSAY OF SERUM POTASSIUM: CPT | Performed by: PHYSICIAN ASSISTANT

## 2025-07-30 PROCEDURE — 83690 ASSAY OF LIPASE: CPT | Performed by: PHYSICIAN ASSISTANT

## 2025-07-30 PROCEDURE — 81001 URINALYSIS AUTO W/SCOPE: CPT | Performed by: PHYSICIAN ASSISTANT

## 2025-07-30 PROCEDURE — 85014 HEMATOCRIT: CPT | Performed by: PHYSICIAN ASSISTANT

## 2025-07-30 RX ORDER — MAGNESIUM HYDROXIDE/ALUMINUM HYDROXICE/SIMETHICONE 120; 1200; 1200 MG/30ML; MG/30ML; MG/30ML
15 SUSPENSION ORAL ONCE
Status: COMPLETED | OUTPATIENT
Start: 2025-07-30 | End: 2025-07-30

## 2025-07-30 RX ORDER — FAMOTIDINE 20 MG/1
20 TABLET, FILM COATED ORAL ONCE
Status: COMPLETED | OUTPATIENT
Start: 2025-07-30 | End: 2025-07-30

## 2025-07-30 RX ORDER — ONDANSETRON 4 MG/1
4 TABLET, ORALLY DISINTEGRATING ORAL EVERY 8 HOURS PRN
Qty: 10 TABLET | Refills: 0 | Status: SHIPPED | OUTPATIENT
Start: 2025-07-30 | End: 2025-08-02

## 2025-07-30 RX ORDER — LIDOCAINE 4 G/G
1 PATCH TOPICAL
Status: DISCONTINUED | OUTPATIENT
Start: 2025-07-30 | End: 2025-07-30 | Stop reason: HOSPADM

## 2025-07-30 RX ORDER — OXYCODONE HYDROCHLORIDE 5 MG/1
5 TABLET ORAL ONCE
Refills: 0 | Status: COMPLETED | OUTPATIENT
Start: 2025-07-30 | End: 2025-07-30

## 2025-07-30 RX ORDER — LIDOCAINE 4 G/G
1 PATCH TOPICAL EVERY 24 HOURS
Qty: 5 PATCH | Refills: 0 | Status: SHIPPED | OUTPATIENT
Start: 2025-07-30

## 2025-07-30 RX ORDER — ACETAMINOPHEN 500 MG
1000 TABLET ORAL ONCE
Status: COMPLETED | OUTPATIENT
Start: 2025-07-30 | End: 2025-07-30

## 2025-07-30 RX ADMIN — LIDOCAINE 4% 1 PATCH: 40 PATCH TOPICAL at 11:12

## 2025-07-30 RX ADMIN — OXYCODONE HYDROCHLORIDE 5 MG: 5 TABLET ORAL at 12:09

## 2025-07-30 RX ADMIN — FAMOTIDINE 20 MG: 20 TABLET, FILM COATED ORAL at 11:11

## 2025-07-30 RX ADMIN — ALUMINUM HYDROXIDE, MAGNESIUM HYDROXIDE, AND SIMETHICONE 15 ML: 200; 200; 20 SUSPENSION ORAL at 11:11

## 2025-07-30 RX ADMIN — ACETAMINOPHEN 1000 MG: 500 TABLET ORAL at 11:11

## 2025-07-30 ASSESSMENT — ACTIVITIES OF DAILY LIVING (ADL)
ADLS_ACUITY_SCORE: 50

## 2025-07-30 NOTE — DISCHARGE INSTRUCTIONS
Here in the emergency room you have a reassuring evaluation.  Your basic laboratory studies here are reassuring.  This appears to be consistent with your chronic flank pain.  I do recommend following up with urology with your appointment later this week.  You can use Tylenol, ibuprofen, lidocaine patches as needed for discomfort.  We discussed I am not comfortable prescribing narcotic pain medication for this pain at home.  You should keep your appoint with urology later this week.    If you develop any new or worsening symptoms, is important to return right away to the emergency department for further evaluation and management.

## 2025-07-30 NOTE — ED TRIAGE NOTES
Per EMS patient has kidney stones. Urology appt on Friday. Can't get comfortable. 20' Left AC. 500mL NS given along with 4mg zofran and 2mg total of dilaudid. Patient is very Emotional stating that he is really uncomfortable and doesn't want to be in the ER, thinking we will discharge him without pain management.

## 2025-07-30 NOTE — ED NOTES
Bed: ED07  Expected date: 7/30/25  Expected time:   Means of arrival:   Comments:  [address]: 35 (M) Nausea/Abd Pain/Vomiting/Kidney Stone

## 2025-07-30 NOTE — ED TRIAGE NOTES
patient is alert and oriented X4, able to make needs known. Here with right side flank pain, nausea and vomiting. Had known kidney stone per pt.

## 2025-07-30 NOTE — PROGRESS NOTES
[facility]    Background: Primary Care-Care Coordination initial outreach identified per system criteria and reviewed by [facility] [place] team.    Assessment: Upon chart review, [facility] Team member will not proceed with patient outreach related to this episode of Primary Care-Care Coordination program due to reason below:    Patient has presented to Emergency Department, been readmitted to hospital, or transferred to another hospital.    Plan: Primary Care-Care Coordination episode addressed appropriately per reason noted above.      Amelie Regan MA  [facility] [place], [facility]    *[facility] Care Resource Team does NOT follow patient ongoing. Referrals are identified based on internal discharge reports and the outreach is to ensure patient has an understanding of their discharge instructions.

## 2025-07-30 NOTE — ED PROVIDER NOTES
[facility] EMERGENCY DEPARTMENT ([facility])    7/29/25          History     Chief Complaint   Patient presents with    Flank Pain     Kidney pain      HPI  Ravi Hankins is a 35 year old male who with history of previous kidney stones presents to the ED due to flank pain.    As per epic review: Patient presented to [facility] ED on 7/28/2025 and was discharged on 7/29/2025; today after presenting for acute right flank pain.  Patient's urine analysis did not show any sign of infection.  He was offered a prescription of lidocaine patches.  He stated that he has a primary care provider and was encouraged to follow-up within the next few days if symptoms worsen.    CT abdomen/pelvis without contrast on 7/28/2025:   1.  Nonobstructing bilateral nephrolithiasis. No acute abnormality is identified.      Patient was just seen this afternoon at that time he had received Dilaudid and route he was discharged with lidocaine patch and within 2 hours he was returned had removed lidocaine patch and then route would receive 2 mg of Dilaudid in spite of his past evaluation demonstrating an alcohol level of 0.2.      Past Medical History  Past Medical History:   Diagnosis Date    Dilated aortic root      Past Surgical History:   Procedure Laterality Date    NO HISTORY OF SURGERY       gabapentin (NEURONTIN) 300 MG capsule  atenolol (TENORMIN) 100 MG tablet  cephALEXin (KEFLEX) 500 MG capsule  cyclobenzaprine (FLEXERIL) 10 MG tablet  famotidine (PEPCID) 20 MG tablet  folic acid (FOLVITE) 1 MG tablet  gabapentin (NEURONTIN) 100 MG capsule  hydrOXYzine HCl (ATARAX) 25 MG tablet  ibuprofen (ADVIL/MOTRIN) 600 MG tablet  Lidocaine (LIDOCARE) 4 % Patch  lidocaine (LIDODERM) 5 % patch  melatonin 3 MG tablet  multivitamin w/minerals (THERA-VIT-M) tablet  ondansetron (ZOFRAN ODT) 4 MG ODT tab  ondansetron (ZOFRAN ODT) 4 MG ODT tab  oxyCODONE-acetaminophen (PERCOCET) 5-325 MG tablet  thiamine (B-1) 100 MG  tablet  traZODone (DESYREL) 50 MG tablet  venlafaxine (EFFEXOR XR) 37.5 MG 24 hr capsule      Allergies   Allergen Reactions    Contrast Dye      PN: LW CM1:  Reaction :  rash    Sulfa Antibiotics      PN: LW Reaction: unsure which kid has allergy per mom???     Family History  Family History   Problem Relation Age of Onset    Heart Disease Brother     Abdominal Aortic Aneurysm Brother     Heart Disease Other         self    Substance Abuse Mother     Substance Abuse Father     Substance Abuse Maternal Grandfather     Diabetes No family hx of     Coronary Artery Disease No family hx of     Hypertension No family hx of     Hyperlipidemia No family hx of     Cerebrovascular Disease No family hx of     Breast Cancer No family hx of     Colon Cancer No family hx of     Prostate Cancer No family hx of     Other Cancer No family hx of     Depression No family hx of     Anxiety Disorder No family hx of     Mental Illness No family hx of     Anesthesia Reaction No family hx of     Asthma No family hx of     Osteoporosis No family hx of     Genetic Disorder No family hx of     Thyroid Disease No family hx of     Obesity No family hx of     Unknown/Adopted No family hx of      Social History   Social History     Tobacco Use    Smoking status: Former     Current packs/day: 0.00     Average packs/day: 0.1 packs/day for 5.0 years (0.5 ttl pk-yrs)     Types: Cigarettes     Start date: 2017     Quit date: 2022     Years since quitting: 3.5    Smokeless tobacco: Never    Tobacco comments:     1-2 cigarettes a day for 5 years   Vaping Use    Vaping status: Some Days    Start date: 2/22/2021    Substances: Nicotine    Devices: Disposable   Substance Use Topics    Alcohol use: Yes     Comment: once a week    Drug use: Not Currently     Types: Marijuana      Past medical history, past surgical history, medications, allergies, family history, and social history were reviewed with the patient. No additional pertinent items.   A complete  "review of systems was performed with pertinent positives and negatives noted in the HPI, and all other systems negative.    Physical Exam   BP: 131/82  Pulse: 71  Temp: 98  F (36.7  C)  Resp: 18  Height: 188 cm (6' 2\")  Weight: 74.8 kg (165 lb)  SpO2: 96 %  Physical Exam  Constitutional:       General: He is not in acute distress.     Appearance: Normal appearance. He is not toxic-appearing.   HENT:      Head: Atraumatic.   Eyes:      General: No scleral icterus.     Conjunctiva/sclera: Conjunctivae normal.   Cardiovascular:      Rate and Rhythm: Normal rate.      Heart sounds: Normal heart sounds.   Pulmonary:      Effort: Pulmonary effort is normal. No respiratory distress.      Breath sounds: Normal breath sounds.   Abdominal:      Palpations: Abdomen is soft.      Tenderness: There is no abdominal tenderness. There is right CVA tenderness.   Musculoskeletal:         General: No deformity.      Cervical back: Neck supple.   Skin:     General: Skin is warm.   Neurological:      Mental Status: He is alert.           ED Course, Procedures, & Data      Procedures    Patient had full evaluation within the last 48 hours no further labs or imaging were indicated at this time.        Medications - No data to display         Critical care was not performed.     Medical Decision Making  The patient's presentation was of moderate complexity (a chronic illness mild to moderate exacerbation, progression, or side effect of treatment).    The patient's evaluation involved:  review of 3+ test result(s) ordered prior to this encounter (see separate area of note for details)    The patient's management necessitated moderate risk (prescription drug management including medications given in the ED) and moderate risk (limitations due to social determinants of health (substance use)).    Assessment & Plan      Patient with history of renal stones that are nonobstructive with repeated presentations to the emergency room in which she is " given Dilaudid and route patient at this time was also intoxicated this morning with an alcohol of 0.2 after he had received Dilaudid and route we have discussed her concerns about the abuse of narcotics and I recommended that if he has ongoing pain in the right flank that he should be using the lidocaine patches and nonnarcotic pain management with the possibility of increased Neurontin.  Patient is also scheduled to follow-up closely with the urology clinic this week.      I am concerned about the possibility of malingering behaviors and have discussed this with the patient he understands that he is putting himself in danger if he is using narcotics along with alcohol.    I have reviewed the nursing notes. I have reviewed the findings, diagnosis, plan and need for follow up with the patient.    Discharge Medication List as of 7/29/2025  8:03 PM        START taking these medications    Details   !! gabapentin (NEURONTIN) 300 MG capsule Take 1 capsule (300 mg) by mouth 3 times daily., Disp-30 capsule, R-0, Local Print       !! - Potential duplicate medications found. Please discuss with provider.          Final diagnoses:   Right flank pain   Opiate abuse, continuous (H)       Rell Schumacher  [facility] EMERGENCY DEPARTMENT  7/29/2025     Rell Schumacher MD  07/31/25 1702

## 2025-07-30 NOTE — ED NOTES
Bed: ED07  Expected date: 7/29/25  Expected time: 7:15 PM  Means of arrival: Ambulance  Comments:  [address] 35M kidney stones

## 2025-07-30 NOTE — ED PROVIDER NOTES
ED Provider Note  [facility]      History     Chief Complaint   Patient presents with    Flank Pain     Called EMS due to flank pain, nausea and vomiting. Toradol and 75mg fentanyl I route via IV.     HPI    Ravi Hankins is a 35 year old male past medical history significant for heroin dependence, nephrolithiasis history, tobacco abuse, opioid use disorder alcohol use disorder polysubstance use who presents to the emergency department with concerns for flank pain.      Per chart review patient was  in this same emergency department 3 different times with concerns for flank pain.  Initially he presented early yesterday morning with right-sided flank pain.  He underwent CT of the abdomen pelvis without contrast showing nonobstructing bilateral kidney stones, right sided 4 mm stone at the lower pole and 2 mm left-sided left midpole stone.  He was noted to have anion gap of 18, no white count, was given fluids Dilaudid Toradol Valium Compazine and Benadryl.  He also had a CK obtained which was normal suggesting against rhabdomyolysis.    Patient return to the emergency room yesterday afternoon with concerns for flank pain.  Patient was given Tylenol and Zofran and discharged with recommendations of following up with urology.  Patient returned to the emergency department yesterday evening with concerns for right flank pain ultimately was discharged with diagnoses of right flank pain and opiate abuse.    Patient presents today with concerns for ongoing symptoms.  Notes he continues to have right sided upper abdominal pain with involvement into the right flank, noting that he feels this is related to his kidney stone.  Symptoms are associated with nausea.  Denies any associated fevers, cough, mopped assist, dyspnea chest pain diarrhea constipation bloody stools urinary symptoms.  He notes he has had a prior surgery in relation to prior kidney stone denies other history of abdominal  surgeries.    He tells me he is scheduled to see urology on Friday, and notes that he was unable to get to the pharmacy to  lidocaine patches since being seen yesterday.  He tells me that typically Dilaudid helps when he has his flank pain.      Physical Exam   BP: (!) 137/103  Pulse: 58  Temp: 98  F (36.7  C)  Resp: 18  SpO2: 99 %  Physical Exam      GENERAL APPEARANCE: The patient is well developed, well appearing, and in no acute distress.  HEAD:  Normocephalic.  EENT: Voice normal.  NECK: Trachea is midline.  Uvula midline without exudates  LUNGS: Breath sounds are equal and clear bilaterally. No wheezes, rhonchi, or rales.  HEART: Regular rate and normal rhythm.   ABDOMEN: Soft, flat, and benign. No mass, tenderness, guarding, or rebound.  : No CVA tenderness bilaterally.  Examination of the right abdomen flank without overlying skin lesions.  EXTREMITIES: No cyanosis, clubbing, or edema.  NEUROLOGIC: No focal sensory or motor deficits are noted.  PSYCHIATRIC: The patient is awake, alert.  Appropriate mood and affect.  SKIN: Warm, dry, and well perfused.      ED Course, Procedures, & Data     ED Course as of 07/30/25 1354   Wed Jul 30, 2025   1122 Nursing staff noted while they were in the room that patient was self inducing emesis at bedside.   1204 Reevaluated patient at bedside.  He did take the Tylenol GI cocktail reports no significant proved in his symptoms.  I again discussed with him I am not comfortable prescribing narcotics at home or IV narcotics here in the emergency department.  I am agreeable to a single dose of oral oxycodone patient is understanding of this.      Results for orders placed or performed during the hospital encounter of 07/30/25   Comprehensive Metabolic Panel (Limited Occurrences)   Result Value Ref Range    Sodium 142 135 - 145 mmol/L    Potassium 3.9 3.4 - 5.3 mmol/L    Carbon Dioxide (CO2) 23 22 - 29 mmol/L    Anion Gap 15 7 - 15 mmol/L    Urea Nitrogen 11.7 6.0 - 20.0  mg/dL    Creatinine 0.89 0.67 - 1.17 mg/dL    GFR Estimate >90 >60 mL/min/1.73m2    Calcium 8.9 8.8 - 10.4 mg/dL    Chloride 104 98 - 107 mmol/L    Glucose 89 70 - 99 mg/dL    Alkaline Phosphatase 61 40 - 150 U/L    AST 25 0 - 45 U/L    ALT 14 0 - 70 U/L    Protein Total 7.5 6.4 - 8.3 g/dL    Albumin 4.5 3.5 - 5.2 g/dL    Bilirubin Total 0.7 <=1.2 mg/dL   Result Value Ref Range    Lipase 37 13 - 60 U/L   UA with Microscopic reflex to Culture    Specimen: Urine, Midstream   Result Value Ref Range    Color Urine Light Yellow Colorless, Straw, Light Yellow, Yellow    Appearance Urine Clear Clear    Glucose Urine Negative Negative mg/dL    Bilirubin Urine Negative Negative    Ketones Urine Negative Negative mg/dL    Specific Gravity Urine 1.024 1.003 - 1.035    Blood Urine Negative Negative    pH Urine 6.0 5.0 - 7.0    Protein Albumin Urine 20 (A) Negative mg/dL    Urobilinogen Urine Normal Normal mg/dL    Nitrite Urine Negative Negative    Leukocyte Esterase Urine Negative Negative    Mucus Urine Present (A) None Seen /LPF    RBC Urine 3 (H) <=2 /HPF    WBC Urine 6 (H) <=5 /HPF   CBC with platelets and differential   Result Value Ref Range    WBC Count 9.1 4.0 - 11.0 10e3/uL    RBC Count 4.17 (L) 4.40 - 5.90 10e6/uL    Hemoglobin 12.6 (L) 13.3 - 17.7 g/dL    Hematocrit 39.3 (L) 40.0 - 53.0 %    MCV 94 78 - 100 fL    MCH 30.2 26.5 - 33.0 pg    MCHC 32.1 31.5 - 36.5 g/dL    RDW 13.2 10.0 - 15.0 %    Platelet Count 155 150 - 450 10e3/uL    % Neutrophils 71 %    % Lymphocytes 18 %    % Monocytes 11 %    % Eosinophils 0 %    % Basophils 0 %    % Immature Granulocytes 0 %    NRBCs per 100 WBC 0 <1 /100    Absolute Neutrophils 6.4 1.6 - 8.3 10e3/uL    Absolute Lymphocytes 1.6 0.8 - 5.3 10e3/uL    Absolute Monocytes 1.0 0.0 - 1.3 10e3/uL    Absolute Eosinophils 0.0 0.0 - 0.7 10e3/uL    Absolute Basophils 0.0 0.0 - 0.2 10e3/uL    Absolute Immature Granulocytes 0.0 <=0.4 10e3/uL    Absolute NRBCs 0.0 10e3/uL     Medications    Lidocaine (LIDOCARE) 4 % Patch 1 patch (1 patch Transdermal $Patch/Med Applied 7/30/25 1112)   acetaminophen (TYLENOL) tablet 1,000 mg (1,000 mg Oral $Given 7/30/25 1111)   alum & mag hydroxide-simethicone (MAALOX) suspension 15 mL (15 mLs Oral $Given 7/30/25 1111)   famotidine (PEPCID) tablet 20 mg (20 mg Oral $Given 7/30/25 1111)   oxyCODONE (ROXICODONE) tablet 5 mg (5 mg Oral $Given 7/30/25 1209)          Critical care was not performed.     Medical Decision Making  The patient's presentation was of high complexity (a chronic illness severe exacerbation, progression, or side effect of treatment).    The patient's evaluation involved:  review of external note(s) from 3+ sources (see separate area of note for details)  review of 3+ test result(s) ordered prior to this encounter (see separate area of note for details)  ordering and/or review of 3+ test(s) in this encounter (see separate area of note for details)    The patient's management necessitated moderate risk (prescription drug management including medications given in the ED) and moderate risk (limitations due to social determinants of health (healthcare access difficulty and substance use)).    Assessment & Plan    This is a 35-year-old male presenting with concerns for ongoing right flank pain in the setting of fourth ER visit in the last 2 days for similar difficulties.  On presentation he has reassuring vital signs.  Without tachycardia, fever, hypoxia.  On exam he is not toxic appearing.  I am not able to appreciate any focal abdominal tenderness or CVA tenderness.  There is no overlying lesions noted of the right flank or abdomen to suggest zoster.  Reviewed his recent ED workups including CT abdomen 2 days ago showing nonobstructing bilateral kidney stones.  He has had recent reassuring laboratory studies as well including recent UA yesterday showing no findings for infection.  Here in the ER patient admits that his last drink was yesterday he does  have a history of alcohol abuse and polysubstance use.    At this time considered broad differential including progression of the stone more distally however feel this is of lesser likelihood with reassuring workup and clinical picture.  Do not feel repeat CT scan is indicated.  His scan 2 days ago did not show stone in the ureter to explain flank pain.  Considered possible urinary tract infection as he does have a history of prior UTI earlier in the month, feel this is of lesser likelihood with negative UA yesterday.  He mentions some involvement into the upper abdomen with his discomfort consider possible gastritis as he notes he has continued to drink alcohol.  Will plan on routine labs today including CBC chemistry lipase evaluating for pancreatitis, UA evaluating for UTI.  Patient will be given Tylenol, lidocaine patch, famotidine and GI cocktail.  Discussed with him at this time I do not see indication for narcotic pain medication particularly with his history of alcohol abuse and polysubstance use I do not feel this would benefit patient in the long-term particularly with his frequent ED visits and requesting Dilaudid by name.    Labs reviewed and reassuring.  CBC without leukocytosis, patient hemic 12.6 similar to prior from yesterday.  Chemistry within normal limits.  Lipase normal.  Glucose normal.  Creatinine normal.  LFTs within normal limits.  UA shows 6 whites without leukocyte esterase nitrites to suggest infection.    After Zofran and lidocaine patch patient reports of continuing ongoing discomfort.  I was notified by nursing staff that patient was seen on the video camera was self inducing emesis during this time.  I reevaluated patient and discussed with him I am comfortable giving a single dose of oral oxycodone here in the department as he reports to continue feeling uncomfortable.    At this point I do feel patient is safe to discharge with close outpatient follow-up.  I discussed with him I am  uncomfortable prescribing further narcotics including any IV narcotics or outpatient prescriptions for narcotics due to his significant history of polysubstance use and recent visit yesterday with alcohol intoxication.  Unfortunately he is a restricted recipient, unable to send Zofran or lidocaine patches to our pharmacy here in the [facility] and he will need to pick them up it has pharmacy where he has had them sent previously.  Patient has no other questions or concerns at this time.  Red flag signs were addressed, and they were in agreement with the patient care plan provided.    I have reviewed the nursing notes. I have reviewed the findings, diagnosis, plan and need for follow up with the patient.    Discharge Medication List as of 7/30/2025 12:54 PM        START taking these medications    Details   Lidocaine (LIDOCARE) 4 % Patch Place 1 patch over 12 hours onto the skin every 24 hours. To prevent lidocaine toxicity, patient should be patch free for 12 hrs daily.Disp-5 patch, [id number]      !! ondansetron (ZOFRAN ODT) 4 MG ODT tab Take 1 tablet (4 mg) by mouth every 8 hours as needed for nausea., Disp-10 tablet, R-0, E-Prescribe       !! - Potential duplicate medications found. Please discuss with provider.          Final diagnoses:   Right flank pain   History of kidney stones   History of alcohol abuse   Self induced vomiting       NADIA Cifuentes  [facility] EMERGENCY DEPARTMENT  7/30/2025     Tiffany Quigley PA-C  07/30/25 [id number]

## 2025-07-30 NOTE — DISCHARGE INSTRUCTIONS
Discharge to home remove the lidocaine patch after 9 hours this time because you have already had it on for 3 hours today going forward you should be using the lidocaine patch for 12 hours out of every 24 hours.  I will also have you increase your Neurontin dose.  Follow-up with urology as scheduled

## 2025-08-06 NOTE — DISCHARGE INSTRUCTIONS
Discharge to home plan on utilizing pain meds anti-inflammatories muscle relaxants and following up with your primary MD for recheck return if any development of weakness or numbness.   Patient is a 20 year old male who presents with bee sting posterior to left ear that occurred approximately 1 hour prior to evaluation. He denies prior history of anaphylactic reaction to bee sting. He is experiencing increasing diffuse pruritic hives, gum/lip/and tongue swelling, difficulty swallowing, exterior throat swelling, and palpitations. He denies difficulty breathing. No cough is reported.    SoluMedrol 125mgIM, Diphenhydramine 50Im, and Famotidine 40mg po was administered; patient was transferred to ED via EMS in stable condition for further monitoring.

## 2025-08-21 ENCOUNTER — PATIENT OUTREACH (OUTPATIENT)
Dept: CARE COORDINATION | Facility: CLINIC | Age: 35
End: 2025-08-21
Payer: COMMERCIAL

## 2025-08-28 ENCOUNTER — PRE VISIT (OUTPATIENT)
Dept: UROLOGY | Facility: CLINIC | Age: 35
End: 2025-08-28
Payer: COMMERCIAL

## 2025-09-04 ENCOUNTER — HOSPITAL ENCOUNTER (EMERGENCY)
Facility: CLINIC | Age: 35
Discharge: HOME | End: 2025-09-04
Attending: EMERGENCY MEDICINE
Payer: COMMERCIAL

## 2025-09-04 VITALS
SYSTOLIC BLOOD PRESSURE: 129 MMHG | OXYGEN SATURATION: 100 % | WEIGHT: 165 LBS | DIASTOLIC BLOOD PRESSURE: 94 MMHG | RESPIRATION RATE: 16 BRPM | HEART RATE: 101 BPM | TEMPERATURE: 97.8 F | BODY MASS INDEX: 21.17 KG/M2 | HEIGHT: 74 IN

## 2025-09-04 DIAGNOSIS — F41.9 ANXIETY: ICD-10-CM

## 2025-09-04 DIAGNOSIS — Z76.89 FREQUENT PATIENT IN EMERGENCY DEPARTMENT: ICD-10-CM

## 2025-09-04 DIAGNOSIS — R10.9 RIGHT FLANK PAIN: Primary | ICD-10-CM

## 2025-09-04 LAB
ALBUMIN SERPL BCG-MCNC: 4.3 G/DL (ref 3.5–5.2)
ALBUMIN UR-MCNC: 10 MG/DL
ALP SERPL-CCNC: 67 U/L (ref 40–150)
ALT SERPL W P-5'-P-CCNC: 17 U/L (ref 0–70)
ANION GAP SERPL CALCULATED.3IONS-SCNC: 17 MMOL/L (ref 7–15)
APPEARANCE UR: CLEAR
AST SERPL W P-5'-P-CCNC: 29 U/L (ref 0–45)
BASOPHILS # BLD AUTO: <0.03 10E3/UL (ref 0–0.2)
BASOPHILS NFR BLD AUTO: 0.1 %
BILIRUB DIRECT SERPL-MCNC: 0.16 MG/DL (ref 0–0.3)
BILIRUB SERPL-MCNC: 0.5 MG/DL
BILIRUB UR QL STRIP: NEGATIVE
BUN SERPL-MCNC: 11 MG/DL (ref 6–20)
CALCIUM SERPL-MCNC: 8.6 MG/DL (ref 8.8–10.4)
CHLORIDE SERPL-SCNC: 106 MMOL/L (ref 98–107)
COLOR UR AUTO: YELLOW
CREAT SERPL-MCNC: 0.88 MG/DL (ref 0.67–1.17)
EGFRCR SERPLBLD CKD-EPI 2021: >90 ML/MIN/1.73M2
EOSINOPHIL # BLD AUTO: 0.03 10E3/UL (ref 0–0.7)
EOSINOPHIL NFR BLD AUTO: 0.4 %
ERYTHROCYTE [DISTWIDTH] IN BLOOD BY AUTOMATED COUNT: 13 % (ref 10–15)
GLUCOSE SERPL-MCNC: 90 MG/DL (ref 70–99)
GLUCOSE UR STRIP-MCNC: NEGATIVE MG/DL
HCO3 SERPL-SCNC: 22 MMOL/L (ref 22–29)
HCT VFR BLD AUTO: 39.5 % (ref 40–53)
HGB BLD-MCNC: 12.7 G/DL (ref 13.3–17.7)
HGB UR QL STRIP: ABNORMAL
HOLD SPECIMEN: NORMAL
HOLD SPECIMEN: NORMAL
IMM GRANULOCYTES # BLD: <0.03 10E3/UL
IMM GRANULOCYTES NFR BLD: 0.3 %
KETONES UR STRIP-MCNC: 40 MG/DL
LEUKOCYTE ESTERASE UR QL STRIP: NEGATIVE
LIPASE SERPL-CCNC: 17 U/L (ref 13–60)
LYMPHOCYTES # BLD AUTO: 1.84 10E3/UL (ref 0.8–5.3)
LYMPHOCYTES NFR BLD AUTO: 23 %
MCH RBC QN AUTO: 30.4 PG (ref 26.5–33)
MCHC RBC AUTO-ENTMCNC: 32.2 G/DL (ref 31.5–36.5)
MCV RBC AUTO: 94.5 FL (ref 78–100)
MONOCYTES # BLD AUTO: 0.66 10E3/UL (ref 0–1.3)
MONOCYTES NFR BLD AUTO: 8.3 %
MUCOUS THREADS #/AREA URNS LPF: PRESENT /LPF
NEUTROPHILS # BLD AUTO: 5.44 10E3/UL (ref 1.6–8.3)
NEUTROPHILS NFR BLD AUTO: 67.9 %
NITRATE UR QL: NEGATIVE
NRBC # BLD AUTO: <0.03 10E3/UL
NRBC BLD AUTO-RTO: 0 /100
PH UR STRIP: 5.5 [PH] (ref 5–7)
PLATELET # BLD AUTO: 208 10E3/UL (ref 150–450)
POTASSIUM SERPL-SCNC: 3.3 MMOL/L (ref 3.4–5.3)
PROT SERPL-MCNC: 7.5 G/DL (ref 6.4–8.3)
RBC # BLD AUTO: 4.18 10E6/UL (ref 4.4–5.9)
RBC URINE: 2 /HPF
SODIUM SERPL-SCNC: 145 MMOL/L (ref 135–145)
SP GR UR STRIP: 1.02 (ref 1–1.03)
SQUAMOUS EPITHELIAL: <1 /HPF
UROBILINOGEN UR STRIP-MCNC: NORMAL MG/DL
WBC # BLD AUTO: 8 10E3/UL (ref 4–11)
WBC URINE: 2 /HPF

## 2025-09-04 PROCEDURE — 83690 ASSAY OF LIPASE: CPT | Performed by: EMERGENCY MEDICINE

## 2025-09-04 PROCEDURE — 82248 BILIRUBIN DIRECT: CPT | Performed by: EMERGENCY MEDICINE

## 2025-09-04 PROCEDURE — 250N000011 HC RX IP 250 OP 636: Performed by: EMERGENCY MEDICINE

## 2025-09-04 PROCEDURE — 82310 ASSAY OF CALCIUM: CPT | Performed by: EMERGENCY MEDICINE

## 2025-09-04 PROCEDURE — 81003 URINALYSIS AUTO W/O SCOPE: CPT | Performed by: EMERGENCY MEDICINE

## 2025-09-04 PROCEDURE — 36415 COLL VENOUS BLD VENIPUNCTURE: CPT | Performed by: EMERGENCY MEDICINE

## 2025-09-04 PROCEDURE — 85025 COMPLETE CBC W/AUTO DIFF WBC: CPT | Performed by: EMERGENCY MEDICINE

## 2025-09-04 RX ORDER — LIDOCAINE 4 G/G
1 PATCH TOPICAL EVERY 24 HOURS
Qty: 12 PATCH | Refills: 0 | Status: SHIPPED | OUTPATIENT
Start: 2025-09-04

## 2025-09-04 RX ORDER — KETOROLAC TROMETHAMINE 15 MG/ML
15 INJECTION, SOLUTION INTRAMUSCULAR; INTRAVENOUS ONCE
Status: DISCONTINUED | OUTPATIENT
Start: 2025-09-04 | End: 2025-09-04 | Stop reason: HOSPADM

## 2025-09-04 RX ORDER — VENLAFAXINE HYDROCHLORIDE 37.5 MG/1
37.5 CAPSULE, EXTENDED RELEASE ORAL DAILY
Qty: 90 CAPSULE | Refills: 0 | Status: SHIPPED | OUTPATIENT
Start: 2025-09-04

## 2025-09-04 RX ORDER — NAPROXEN 500 MG/1
500 TABLET ORAL 2 TIMES DAILY PRN
Qty: 30 TABLET | Refills: 0 | Status: SHIPPED | OUTPATIENT
Start: 2025-09-04 | End: 2025-09-04

## 2025-09-04 RX ORDER — ONDANSETRON 4 MG/1
4 TABLET, ORALLY DISINTEGRATING ORAL ONCE
Status: COMPLETED | OUTPATIENT
Start: 2025-09-04 | End: 2025-09-04

## 2025-09-04 RX ORDER — LIDOCAINE 4 G/G
1 PATCH TOPICAL EVERY 24 HOURS
Qty: 12 PATCH | Refills: 0 | Status: SHIPPED | OUTPATIENT
Start: 2025-09-04 | End: 2025-09-04

## 2025-09-04 RX ORDER — NAPROXEN 500 MG/1
500 TABLET ORAL 2 TIMES DAILY PRN
Qty: 30 TABLET | Refills: 0 | Status: SHIPPED | OUTPATIENT
Start: 2025-09-04

## 2025-09-04 RX ORDER — KETOROLAC TROMETHAMINE 15 MG/ML
15 INJECTION, SOLUTION INTRAMUSCULAR; INTRAVENOUS ONCE
Status: COMPLETED | OUTPATIENT
Start: 2025-09-04 | End: 2025-09-04

## 2025-09-04 RX ADMIN — KETOROLAC TROMETHAMINE 15 MG: 15 INJECTION, SOLUTION INTRAMUSCULAR; INTRAVENOUS at 08:03

## 2025-09-04 ASSESSMENT — ACTIVITIES OF DAILY LIVING (ADL)
ADLS_ACUITY_SCORE: 50
